# Patient Record
Sex: MALE | Race: WHITE | NOT HISPANIC OR LATINO | Employment: FULL TIME | URBAN - METROPOLITAN AREA
[De-identification: names, ages, dates, MRNs, and addresses within clinical notes are randomized per-mention and may not be internally consistent; named-entity substitution may affect disease eponyms.]

---

## 2024-02-07 ENCOUNTER — APPOINTMENT (OUTPATIENT)
Dept: GENERAL RADIOLOGY | Facility: HOSPITAL | Age: 49
End: 2024-02-07
Payer: COMMERCIAL

## 2024-02-07 ENCOUNTER — APPOINTMENT (OUTPATIENT)
Facility: HOSPITAL | Age: 49
End: 2024-02-07
Payer: COMMERCIAL

## 2024-02-07 ENCOUNTER — HOSPITAL ENCOUNTER (INPATIENT)
Facility: HOSPITAL | Age: 49
LOS: 9 days | Discharge: HOME | End: 2024-02-16
Attending: EMERGENCY MEDICINE | Admitting: FAMILY MEDICINE
Payer: COMMERCIAL

## 2024-02-07 DIAGNOSIS — Z78.9 DECREASED ACTIVITIES OF DAILY LIVING (ADL): ICD-10-CM

## 2024-02-07 DIAGNOSIS — M86.072 ACUTE HEMATOGENOUS OSTEOMYELITIS OF LEFT ANKLE: Primary | ICD-10-CM

## 2024-02-07 DIAGNOSIS — L02.619 ABSCESS OF HEEL: ICD-10-CM

## 2024-02-07 DIAGNOSIS — S86.012A RUPTURE OF LEFT ACHILLES TENDON, INITIAL ENCOUNTER: ICD-10-CM

## 2024-02-07 DIAGNOSIS — R26.2 DIFFICULTY IN WALKING: ICD-10-CM

## 2024-02-07 PROBLEM — S86.019A ACHILLES RUPTURE: Status: ACTIVE | Noted: 2024-02-07

## 2024-02-07 LAB
ALBUMIN SERPL-MCNC: 3.4 G/DL (ref 3.5–5.2)
ALBUMIN/GLOB SERPL: 0.9 G/DL
ALP SERPL-CCNC: 144 U/L (ref 39–117)
ALT SERPL W P-5'-P-CCNC: 18 U/L (ref 1–41)
ANION GAP SERPL CALCULATED.3IONS-SCNC: 15.6 MMOL/L (ref 5–15)
AST SERPL-CCNC: 22 U/L (ref 1–40)
BASOPHILS # BLD AUTO: 0.04 10*3/MM3 (ref 0–0.2)
BASOPHILS NFR BLD AUTO: 0.2 % (ref 0–1.5)
BH CV LOWER VASCULAR LEFT COMMON FEMORAL AUGMENT: NORMAL
BH CV LOWER VASCULAR LEFT COMMON FEMORAL COMPETENT: NORMAL
BH CV LOWER VASCULAR LEFT COMMON FEMORAL COMPRESS: NORMAL
BH CV LOWER VASCULAR LEFT COMMON FEMORAL PHASIC: NORMAL
BH CV LOWER VASCULAR LEFT COMMON FEMORAL SPONT: NORMAL
BH CV LOWER VASCULAR LEFT DISTAL FEMORAL COMPRESS: NORMAL
BH CV LOWER VASCULAR LEFT GASTRONEMIUS COMPRESS: NORMAL
BH CV LOWER VASCULAR LEFT GREATER SAPH AK COMPRESS: NORMAL
BH CV LOWER VASCULAR LEFT GREATER SAPH BK COMPRESS: NORMAL
BH CV LOWER VASCULAR LEFT LESSER SAPH COMPRESS: NORMAL
BH CV LOWER VASCULAR LEFT MID FEMORAL AUGMENT: NORMAL
BH CV LOWER VASCULAR LEFT MID FEMORAL COMPETENT: NORMAL
BH CV LOWER VASCULAR LEFT MID FEMORAL COMPRESS: NORMAL
BH CV LOWER VASCULAR LEFT MID FEMORAL PHASIC: NORMAL
BH CV LOWER VASCULAR LEFT MID FEMORAL SPONT: NORMAL
BH CV LOWER VASCULAR LEFT PERONEAL COMPRESS: NORMAL
BH CV LOWER VASCULAR LEFT POPLITEAL AUGMENT: NORMAL
BH CV LOWER VASCULAR LEFT POPLITEAL COMPETENT: NORMAL
BH CV LOWER VASCULAR LEFT POPLITEAL COMPRESS: NORMAL
BH CV LOWER VASCULAR LEFT POPLITEAL PHASIC: NORMAL
BH CV LOWER VASCULAR LEFT POPLITEAL SPONT: NORMAL
BH CV LOWER VASCULAR LEFT POSTERIOR TIBIAL COMPRESS: NORMAL
BH CV LOWER VASCULAR LEFT PROXIMAL FEMORAL COMPRESS: NORMAL
BH CV LOWER VASCULAR RIGHT COMMON FEMORAL AUGMENT: NORMAL
BH CV LOWER VASCULAR RIGHT COMMON FEMORAL COMPETENT: NORMAL
BH CV LOWER VASCULAR RIGHT COMMON FEMORAL COMPRESS: NORMAL
BH CV LOWER VASCULAR RIGHT COMMON FEMORAL PHASIC: NORMAL
BH CV LOWER VASCULAR RIGHT COMMON FEMORAL SPONT: NORMAL
BH CV VAS PRELIMINARY FINDINGS SCRIPTING: 1
BILIRUB SERPL-MCNC: 0.9 MG/DL (ref 0–1.2)
BUN SERPL-MCNC: 14 MG/DL (ref 6–20)
BUN/CREAT SERPL: 19.4 (ref 7–25)
CALCIUM SPEC-SCNC: 9.1 MG/DL (ref 8.6–10.5)
CHLORIDE SERPL-SCNC: 99 MMOL/L (ref 98–107)
CO2 SERPL-SCNC: 20.4 MMOL/L (ref 22–29)
CREAT SERPL-MCNC: 0.72 MG/DL (ref 0.76–1.27)
CRP SERPL-MCNC: 31.39 MG/DL (ref 0–0.5)
D-LACTATE SERPL-SCNC: 1.7 MMOL/L (ref 0.5–2)
DEPRECATED RDW RBC AUTO: 44 FL (ref 37–54)
EGFRCR SERPLBLD CKD-EPI 2021: 111.3 ML/MIN/1.73
EOSINOPHIL # BLD AUTO: 0.03 10*3/MM3 (ref 0–0.4)
EOSINOPHIL NFR BLD AUTO: 0.1 % (ref 0.3–6.2)
ERYTHROCYTE [DISTWIDTH] IN BLOOD BY AUTOMATED COUNT: 13.1 % (ref 12.3–15.4)
ERYTHROCYTE [SEDIMENTATION RATE] IN BLOOD: 54 MM/HR (ref 0–15)
GLOBULIN UR ELPH-MCNC: 4 GM/DL
GLUCOSE SERPL-MCNC: 157 MG/DL (ref 65–99)
HCT VFR BLD AUTO: 36.4 % (ref 37.5–51)
HGB BLD-MCNC: 12.2 G/DL (ref 13–17.7)
IMM GRANULOCYTES # BLD AUTO: 0.36 10*3/MM3 (ref 0–0.05)
IMM GRANULOCYTES NFR BLD AUTO: 1.6 % (ref 0–0.5)
LYMPHOCYTES # BLD AUTO: 0.47 10*3/MM3 (ref 0.7–3.1)
LYMPHOCYTES NFR BLD AUTO: 2.1 % (ref 19.6–45.3)
MCH RBC QN AUTO: 30.9 PG (ref 26.6–33)
MCHC RBC AUTO-ENTMCNC: 33.5 G/DL (ref 31.5–35.7)
MCV RBC AUTO: 92.2 FL (ref 79–97)
MONOCYTES # BLD AUTO: 1.35 10*3/MM3 (ref 0.1–0.9)
MONOCYTES NFR BLD AUTO: 6 % (ref 5–12)
NEUTROPHILS NFR BLD AUTO: 20.16 10*3/MM3 (ref 1.7–7)
NEUTROPHILS NFR BLD AUTO: 90 % (ref 42.7–76)
NRBC BLD AUTO-RTO: 0 /100 WBC (ref 0–0.2)
PLATELET # BLD AUTO: 165 10*3/MM3 (ref 140–450)
PMV BLD AUTO: 11.4 FL (ref 6–12)
POTASSIUM SERPL-SCNC: 3.5 MMOL/L (ref 3.5–5.2)
PROT SERPL-MCNC: 7.4 G/DL (ref 6–8.5)
RBC # BLD AUTO: 3.95 10*6/MM3 (ref 4.14–5.8)
SODIUM SERPL-SCNC: 135 MMOL/L (ref 136–145)
WBC NRBC COR # BLD AUTO: 22.41 10*3/MM3 (ref 3.4–10.8)
WHOLE BLOOD HOLD COAG: NORMAL

## 2024-02-07 PROCEDURE — 93010 ELECTROCARDIOGRAM REPORT: CPT | Performed by: INTERNAL MEDICINE

## 2024-02-07 PROCEDURE — 83605 ASSAY OF LACTIC ACID: CPT

## 2024-02-07 PROCEDURE — 93971 EXTREMITY STUDY: CPT | Performed by: SURGERY

## 2024-02-07 PROCEDURE — 99285 EMERGENCY DEPT VISIT HI MDM: CPT

## 2024-02-07 PROCEDURE — 93971 EXTREMITY STUDY: CPT

## 2024-02-07 PROCEDURE — 73630 X-RAY EXAM OF FOOT: CPT

## 2024-02-07 PROCEDURE — 25010000002 ONDANSETRON PER 1 MG: Performed by: EMERGENCY MEDICINE

## 2024-02-07 PROCEDURE — 94761 N-INVAS EAR/PLS OXIMETRY MLT: CPT

## 2024-02-07 PROCEDURE — 25010000002 HYDROMORPHONE 1 MG/ML SOLUTION: Performed by: EMERGENCY MEDICINE

## 2024-02-07 PROCEDURE — 86140 C-REACTIVE PROTEIN: CPT | Performed by: EMERGENCY MEDICINE

## 2024-02-07 PROCEDURE — 25010000002 HYDROMORPHONE 1 MG/ML SOLUTION: Performed by: FAMILY MEDICINE

## 2024-02-07 PROCEDURE — 85652 RBC SED RATE AUTOMATED: CPT | Performed by: EMERGENCY MEDICINE

## 2024-02-07 PROCEDURE — 99222 1ST HOSP IP/OBS MODERATE 55: CPT | Performed by: FAMILY MEDICINE

## 2024-02-07 PROCEDURE — 80053 COMPREHEN METABOLIC PANEL: CPT | Performed by: EMERGENCY MEDICINE

## 2024-02-07 PROCEDURE — 73610 X-RAY EXAM OF ANKLE: CPT

## 2024-02-07 PROCEDURE — 73590 X-RAY EXAM OF LOWER LEG: CPT

## 2024-02-07 PROCEDURE — 93005 ELECTROCARDIOGRAM TRACING: CPT | Performed by: FAMILY MEDICINE

## 2024-02-07 PROCEDURE — 25010000002 MORPHINE PER 10 MG: Performed by: EMERGENCY MEDICINE

## 2024-02-07 PROCEDURE — 85025 COMPLETE CBC W/AUTO DIFF WBC: CPT | Performed by: EMERGENCY MEDICINE

## 2024-02-07 RX ORDER — ONDANSETRON 2 MG/ML
4 INJECTION INTRAMUSCULAR; INTRAVENOUS ONCE
Status: COMPLETED | OUTPATIENT
Start: 2024-02-07 | End: 2024-02-07

## 2024-02-07 RX ORDER — AMOXICILLIN 250 MG
2 CAPSULE ORAL 2 TIMES DAILY PRN
Status: DISCONTINUED | OUTPATIENT
Start: 2024-02-07 | End: 2024-02-16 | Stop reason: HOSPADM

## 2024-02-07 RX ORDER — SODIUM CHLORIDE 0.9 % (FLUSH) 0.9 %
10 SYRINGE (ML) INJECTION EVERY 12 HOURS SCHEDULED
Status: DISCONTINUED | OUTPATIENT
Start: 2024-02-07 | End: 2024-02-16 | Stop reason: HOSPADM

## 2024-02-07 RX ORDER — HEPARIN SODIUM 5000 [USP'U]/ML
5000 INJECTION, SOLUTION INTRAVENOUS; SUBCUTANEOUS EVERY 8 HOURS SCHEDULED
Status: DISCONTINUED | OUTPATIENT
Start: 2024-02-07 | End: 2024-02-13

## 2024-02-07 RX ORDER — SODIUM CHLORIDE 9 MG/ML
40 INJECTION, SOLUTION INTRAVENOUS AS NEEDED
Status: DISCONTINUED | OUTPATIENT
Start: 2024-02-07 | End: 2024-02-16 | Stop reason: HOSPADM

## 2024-02-07 RX ORDER — SODIUM CHLORIDE 0.9 % (FLUSH) 0.9 %
10 SYRINGE (ML) INJECTION AS NEEDED
Status: DISCONTINUED | OUTPATIENT
Start: 2024-02-07 | End: 2024-02-16 | Stop reason: HOSPADM

## 2024-02-07 RX ORDER — BISACODYL 10 MG
10 SUPPOSITORY, RECTAL RECTAL DAILY PRN
Status: DISCONTINUED | OUTPATIENT
Start: 2024-02-07 | End: 2024-02-16 | Stop reason: HOSPADM

## 2024-02-07 RX ORDER — HYDROCODONE BITARTRATE AND ACETAMINOPHEN 7.5; 325 MG/1; MG/1
1 TABLET ORAL EVERY 6 HOURS PRN
Status: DISCONTINUED | OUTPATIENT
Start: 2024-02-07 | End: 2024-02-12

## 2024-02-07 RX ORDER — POLYETHYLENE GLYCOL 3350 17 G/17G
17 POWDER, FOR SOLUTION ORAL DAILY PRN
Status: DISCONTINUED | OUTPATIENT
Start: 2024-02-07 | End: 2024-02-16 | Stop reason: HOSPADM

## 2024-02-07 RX ORDER — BISACODYL 5 MG/1
5 TABLET, DELAYED RELEASE ORAL DAILY PRN
Status: DISCONTINUED | OUTPATIENT
Start: 2024-02-07 | End: 2024-02-16 | Stop reason: HOSPADM

## 2024-02-07 RX ORDER — ONDANSETRON 2 MG/ML
4 INJECTION INTRAMUSCULAR; INTRAVENOUS EVERY 6 HOURS PRN
Status: DISCONTINUED | OUTPATIENT
Start: 2024-02-07 | End: 2024-02-16 | Stop reason: HOSPADM

## 2024-02-07 RX ADMIN — HYDROMORPHONE HYDROCHLORIDE 1 MG: 1 INJECTION, SOLUTION INTRAMUSCULAR; INTRAVENOUS; SUBCUTANEOUS at 18:46

## 2024-02-07 RX ADMIN — HYDROMORPHONE HYDROCHLORIDE 1 MG: 1 INJECTION, SOLUTION INTRAMUSCULAR; INTRAVENOUS; SUBCUTANEOUS at 23:52

## 2024-02-07 RX ADMIN — MORPHINE SULFATE 4 MG: 4 INJECTION, SOLUTION INTRAMUSCULAR; INTRAVENOUS at 15:30

## 2024-02-07 RX ADMIN — ONDANSETRON 4 MG: 2 INJECTION INTRAMUSCULAR; INTRAVENOUS at 15:30

## 2024-02-07 RX ADMIN — Medication 10 ML: at 20:45

## 2024-02-07 RX ADMIN — HYDROMORPHONE HYDROCHLORIDE 1 MG: 1 INJECTION, SOLUTION INTRAMUSCULAR; INTRAVENOUS; SUBCUTANEOUS at 20:46

## 2024-02-07 RX ADMIN — HYDROCODONE BITARTRATE AND ACETAMINOPHEN 1 TABLET: 7.5; 325 TABLET ORAL at 20:23

## 2024-02-07 RX ADMIN — HYDROMORPHONE HYDROCHLORIDE 1 MG: 1 INJECTION, SOLUTION INTRAMUSCULAR; INTRAVENOUS; SUBCUTANEOUS at 16:03

## 2024-02-07 NOTE — ED NOTES
Pts o2 sat dropped to 92% he was placed on 2 liters per nasal cannula, PRAMOD Pino aware and Clinical coordinator aware and arrangements for a monitored bed are being made currently.

## 2024-02-07 NOTE — H&P
"[facility]   HISTORY AND PHYSICAL    Patient Name: Karsten Johnson  : 1975  MRN: [id number]  Primary Care Physician:  Provider, No Known  Date of admission: 2024    Subjective   Subjective     Chief Complaint: Left heel pain    History of Present Illness  Patient is a 48-year-old male with essentially no past medical history who presented to the emergency department with left heel/Achilles pain that began 5 days ago.  Patient says that happened while at work.  There was no trauma.  Patient said the pain started slowly but steadily increased.  Patient went to an urgent care in [facility] and was told that he most likely had a torn Achilles.  He said he was told by the physician there to \"just take it easy\".  Unfortunately the swelling and pain intensified over the last couple of days [place] for further evaluation.  Here in the ED he had an x-ray that shows rupture of the Achilles tendon.  The case was discussed with both orthopedic surgery as well as podiatry.  Podiatry recommended an MRI without contrast and stated that they will see him in the a.m. for surgical fixation  Review of Systems   General-fatigue  Musculoskeletal left ankle pain  All other systems were reviewed and subsequently negative  Personal History     History reviewed. No pertinent past medical history.    History reviewed. No pertinent surgical history.    Family History:   Hypertension    Social History:  reports that he has never smoked. He does not have any smokeless tobacco history on file. He reports current alcohol use. He reports that he does not use drugs.    Home Medications:   None    Allergies:  No Known Allergies    Objective    Objective     Vitals:   Temp:  [98.4 °F (36.9 °C)-99.1 °F (37.3 °C)] 99.1 °F (37.3 °C)  Heart Rate:  [93-95] 95  Resp:  [20-22] 22  BP: (141-151)/(87-91) 141/87    Physical Exam  Constitutional:  Well-developed and well-nourished.  No acute distress.      HENT:  Head:  Normocephalic and " atraumatic.  Mouth:  Moist mucous membranes.    Eyes:  Conjunctivae and EOM are normal. No scleral icterus.    Neck:  Neck supple.  No JVD present.    Cardiovascular:  Normal rate, regular rhythm and normal heart sounds with no murmur.  Pulmonary/Chest:  No respiratory distress, no wheezes, no crackles, with normal breath sounds and good air movement.  Abdominal:  Soft. No distension and no tenderness.   Musculoskeletal: Positive swelling and ecchymosis over the left Achilles.  The majority of the swelling would be at the insertion site of the calcaneus.  The area is exquisitely tender to palpation neurological:  Alert and oriented to person, place, and time.  No cranial nerve deficit.    Skin:  Skin is warm and dry. No rash noted. No pallor.   Peripheral vascular:  No clubbing, no cyanosis, no edema.  Psychiatric: Appropriate mood and affect  :    Result Review    Result Review:  I have personally reviewed the results from the time of this admission to 2/7/2024 17:49 EST and agree with these findings:  [x]  Laboratory list / accordion  []  Microbiology  [x]  Radiology  []  EKG/Telemetry   []  Cardiology/Vascular   []  Pathology  []  Old records  []  Other:  Most notable findings include:       Assessment & Plan   Assessment / Plan     Brief Patient Summary:  Karsten Johnson is a 48 y.o. male who presents to the emergency department with redness swelling over the left Achilles.  X-ray shows that it is most likely torn and will need surgical fixation.  Podiatry is aware and will see in the a.m.    Active Hospital Problems:  Active Hospital Problems    Diagnosis     Achilles rupture    Leukocytosis most likely reactive  Mild anemia    Plan:   Patient will be admitted to the hospital service  Of order an MRI without contrast of the left ankle  Will order a duplex Doppler of the left leg to rule out DVT  Podiatry has already been consulted and will see in the a.m. for surgical fixation  Will treat the patient's pain  with both IV and oral pain medications  Patient can have a regular diet until midnight at which time we will make him n.p.o.      DVT prophylaxis:  Medical DVT prophylaxis orders are signed and held.          CODE STATUS:    Code Status (Patient has no pulse and is not breathing): CPR (Attempt to Resuscitate)  Medical Interventions (Patient has pulse or is breathing): Full Support    Admission Status:  I believe this patient meets inpatient status.    Tao Jacobson, DO

## 2024-02-07 NOTE — ED PROVIDER NOTES
Time: 3:41 PM EST  Date of encounter:  2/7/2024  Independent Historian/Clinical History and Information was obtained by:   Patient    History is limited by: N/A    Chief Complaint: Left ankle/Achilles pain      History of Present Illness:  Patient is a 48 y.o. year old male who presents to the emergency department for evaluation of left ankle/Achilles pain that began 5 days ago at work.  Patient denies any known trauma.  Patient states he went to [place] urgent care and was told he had a torn Achilles.  Patient states he is now having worsening pain and swelling/bruising.    HPI    Patient Care Team  Primary Care Provider: Provider, No Known    Past Medical History:     No Known Allergies  History reviewed. No pertinent past medical history.  History reviewed. No pertinent surgical history.  History reviewed. No pertinent family history.    Home Medications:  Prior to Admission medications    Not on File        Social History:   Social History     Tobacco Use    Smoking status: Never   Substance Use Topics    Alcohol use: Yes    Drug use: Never         Review of Systems:  Review of Systems   Constitutional:  Negative for chills and fever.   HENT:  Negative for congestion, rhinorrhea and sore throat.    Eyes:  Negative for pain and visual disturbance.   Respiratory:  Negative for apnea, cough, chest tightness and shortness of breath.    Cardiovascular:  Negative for chest pain and palpitations.   Gastrointestinal:  Negative for abdominal pain, diarrhea, nausea and vomiting.   Genitourinary:  Negative for difficulty urinating and dysuria.   Musculoskeletal:  Positive for arthralgias and gait problem. Negative for myalgias.   Skin:  Positive for color change.   Neurological:  Negative for seizures and headaches.   Psychiatric/Behavioral: Negative.     All other systems reviewed and are negative.       Physical Exam:  /87 (BP Location: Left arm, Patient Position: Lying)   Pulse 95   Temp 99.1 °F (37.3 °C)  (Oral)   Resp 22   SpO2 93%     Physical Exam  Vitals and nursing note reviewed.   Constitutional:       General: He is not in acute distress.     Appearance: Normal appearance. He is not toxic-appearing.   HENT:      Head: Normocephalic and atraumatic.      Jaw: There is normal jaw occlusion.   Eyes:      General: Lids are normal.      Extraocular Movements: Extraocular movements intact.      Conjunctiva/sclera: Conjunctivae normal.      Pupils: Pupils are equal, round, and reactive to light.   Cardiovascular:      Rate and Rhythm: Normal rate and regular rhythm.      Pulses: Normal pulses.      Heart sounds: Normal heart sounds.   Pulmonary:      Effort: Pulmonary effort is normal. No respiratory distress.      Breath sounds: Normal breath sounds. No wheezing or rhonchi.   Abdominal:      General: Abdomen is flat.      Palpations: Abdomen is soft.      Tenderness: There is no abdominal tenderness. There is no guarding or rebound.   Musculoskeletal:         General: Normal range of motion.      Cervical back: Normal range of motion and neck supple.      Right lower leg: No edema.      Left lower leg: No edema.   Skin:     General: Skin is warm and dry.      Findings: Bruising (Left Achilles/foot as pictured below.) present.   Neurological:      Mental Status: He is alert and oriented to person, place, and time. Mental status is at baseline.   Psychiatric:         Mood and Affect: Mood normal.                  Procedures:  Procedures      Medical Decision Making:      Comorbidities that affect care:    None    External Notes reviewed:          The following orders were placed and all results were independently analyzed by me:  Orders Placed This Encounter   Procedures    XR Foot 3+ View Left    XR Ankle 3+ View Left    XR Tibia Fibula 2 View Left    Comprehensive Metabolic Panel    Sedimentation Rate    C-reactive Protein    CBC Auto Differential    Lactic Acid, Plasma    Inpatient Podiatry Consult    Inpatient  Hospitalist Consult    CBC & Differential       Medications Given in the Emergency Department:  Medications   morphine injection 4 mg (4 mg Intravenous Given 2/7/24 1530)   ondansetron (ZOFRAN) injection 4 mg (4 mg Intravenous Given 2/7/24 1530)   HYDROmorphone (DILAUDID) injection 1 mg (1 mg Intravenous Given 2/7/24 1603)        ED Course:         Labs:    Lab Results (last 24 hours)       Procedure Component Value Units Date/Time    Comprehensive Metabolic Panel [[id number]]  (Abnormal) Collected: 02/07/24 1533    Specimen: Blood Updated: 02/07/24 1609     Glucose 157 mg/dL      BUN 14 mg/dL      Creatinine 0.72 mg/dL      Sodium 135 mmol/L      Potassium 3.5 mmol/L      Chloride 99 mmol/L      CO2 20.4 mmol/L      Calcium 9.1 mg/dL      Total Protein 7.4 g/dL      Albumin 3.4 g/dL      ALT (SGPT) 18 U/L      AST (SGOT) 22 U/L      Alkaline Phosphatase 144 U/L      Total Bilirubin 0.9 mg/dL      Globulin 4.0 gm/dL      A/G Ratio 0.9 g/dL      BUN/Creatinine Ratio 19.4     Anion Gap 15.6 mmol/L      eGFR 111.3 mL/min/1.73     Narrative:      GFR Normal >60  Chronic Kidney Disease <60  Kidney Failure <15      CBC & Differential [[id number]]  (Abnormal) Collected: 02/07/24 1533    Specimen: Blood Updated: 02/07/24 1543    Narrative:      The following orders were created for panel order CBC & Differential.  Procedure                               Abnormality         Status                     ---------                               -----------         ------                     CBC Auto Differential[[id number]]        Abnormal            Final result                 Please view results for these tests on the individual orders.    Sedimentation Rate [[id number]]  (Abnormal) Collected: 02/07/24 1533    Specimen: Blood Updated: 02/07/24 1544     Sed Rate 54 mm/hr     C-reactive Protein [[id number]]  (Abnormal) Collected: 02/07/24 1533    Specimen: Blood Updated: 02/07/24 1609     C-Reactive Protein 31.39 mg/dL     CBC Auto  Differential [[id number]]  (Abnormal) Collected: 02/07/24 1533    Specimen: Blood Updated: 02/07/24 1543     WBC 22.41 10*3/mm3      RBC 3.95 10*6/mm3      Hemoglobin 12.2 g/dL      Hematocrit 36.4 %      MCV 92.2 fL      MCH 30.9 pg      MCHC 33.5 g/dL      RDW 13.1 %      RDW-SD 44.0 fl      MPV 11.4 fL      Platelets 165 10*3/mm3      Neutrophil % 90.0 %      Lymphocyte % 2.1 %      Monocyte % 6.0 %      Eosinophil % 0.1 %      Basophil % 0.2 %      Immature Grans % 1.6 %      Neutrophils, Absolute 20.16 10*3/mm3      Lymphocytes, Absolute 0.47 10*3/mm3      Monocytes, Absolute 1.35 10*3/mm3      Eosinophils, Absolute 0.03 10*3/mm3      Basophils, Absolute 0.04 10*3/mm3      Immature Grans, Absolute 0.36 10*3/mm3      nRBC 0.0 /100 WBC     Lactic Acid, Plasma [[id number]]  (Normal) Collected: 02/07/24 1533    Specimen: Blood Updated: 02/07/24 1607     Lactate 1.7 mmol/L              Imaging:    XR Foot 3+ View Left    Result Date: 2/7/2024  PROCEDURE: XR ANKLE 3+ VW LEFT, 2/07/2024, 15:42 XR FOOT 3+ VW LEFT, 2/07/2024, 15:44 XR TIBIA FIBULA 2 VW LEFT, 2/07/2024, 15:  COMPARISON: None  INDICATIONS: pain no known injury swelling and bruising medial aspect  FINDINGS:  Ankle:  There is soft tissue swelling about the medial portion of the ankle.  The ankle mortise looks intact.  There is osseous demineralization.  There is abnormal appearance to the distal Achilles tendon area with abnormal ossification in soft tissue swelling.  An Achilles tendon injury could account for this.  There is a plantar calcaneal spur.  There is ossification in the soft tissues adjacent to the medial tibial tubercle that may relate to more of the changes in the posterior ankle as opposed to an injury around this area.  Foot:  As noted on the ankle films there is an abnormal appearance to the posterior ankle around the distal Achilles tendon with abnormal ossification and soft tissue swelling that could relate to a more acute Achilles tendon  injury.  There is no subluxation or dislocation of the digits.  There is osseous demineralization.  Tibia fibula:  There is no definite fracture.  There is osseous demineralization.        1. Abnormal appearance to the area of the distal Achilles tendon.  Injury to this area could not be excluded. 2. Osseous demineralization 3. Soft tissue swelling about the medial ankle/foot. 4. Calcification/ossification adjacent to the medial tubercle of the talus that may relate to the findings in the posterior ankle area as opposed to an injury around the medial tubercle of the talus       AUSTIN HARRIS MD       Electronically Signed and Approved By: AUSTIN HARRIS MD on 2/07/2024 at 16:04             XR Ankle 3+ View Left    Result Date: 2/7/2024  PROCEDURE: XR ANKLE 3+ VW LEFT, 2/07/2024, 15:42 XR FOOT 3+ VW LEFT, 2/07/2024, 15:44 XR TIBIA FIBULA 2 VW LEFT, 2/07/2024, 15:  COMPARISON: None  INDICATIONS: pain no known injury swelling and bruising medial aspect  FINDINGS:  Ankle:  There is soft tissue swelling about the medial portion of the ankle.  The ankle mortise looks intact.  There is osseous demineralization.  There is abnormal appearance to the distal Achilles tendon area with abnormal ossification in soft tissue swelling.  An Achilles tendon injury could account for this.  There is a plantar calcaneal spur.  There is ossification in the soft tissues adjacent to the medial tibial tubercle that may relate to more of the changes in the posterior ankle as opposed to an injury around this area.  Foot:  As noted on the ankle films there is an abnormal appearance to the posterior ankle around the distal Achilles tendon with abnormal ossification and soft tissue swelling that could relate to a more acute Achilles tendon injury.  There is no subluxation or dislocation of the digits.  There is osseous demineralization.  Tibia fibula:  There is no definite fracture.  There is osseous demineralization.        1. Abnormal appearance  to the area of the distal Achilles tendon.  Injury to this area could not be excluded. 2. Osseous demineralization 3. Soft tissue swelling about the medial ankle/foot. 4. Calcification/ossification adjacent to the medial tubercle of the talus that may relate to the findings in the posterior ankle area as opposed to an injury around the medial tubercle of the talus       AUSTIN HARRIS MD       Electronically Signed and Approved By: AUSTIN HARRIS MD on 2/07/2024 at 16:04             XR Tibia Fibula 2 View Left    Result Date: 2/7/2024  PROCEDURE: XR ANKLE 3+ VW LEFT, 2/07/2024, 15:42 XR FOOT 3+ VW LEFT, 2/07/2024, 15:44 XR TIBIA FIBULA 2 VW LEFT, 2/07/2024, 15:  COMPARISON: None  INDICATIONS: pain no known injury swelling and bruising medial aspect  FINDINGS:  Ankle:  There is soft tissue swelling about the medial portion of the ankle.  The ankle mortise looks intact.  There is osseous demineralization.  There is abnormal appearance to the distal Achilles tendon area with abnormal ossification in soft tissue swelling.  An Achilles tendon injury could account for this.  There is a plantar calcaneal spur.  There is ossification in the soft tissues adjacent to the medial tibial tubercle that may relate to more of the changes in the posterior ankle as opposed to an injury around this area.  Foot:  As noted on the ankle films there is an abnormal appearance to the posterior ankle around the distal Achilles tendon with abnormal ossification and soft tissue swelling that could relate to a more acute Achilles tendon injury.  There is no subluxation or dislocation of the digits.  There is osseous demineralization.  Tibia fibula:  There is no definite fracture.  There is osseous demineralization.        1. Abnormal appearance to the area of the distal Achilles tendon.  Injury to this area could not be excluded. 2. Osseous demineralization 3. Soft tissue swelling about the medial ankle/foot. 4. Calcification/ossification adjacent  to the medial tubercle of the talus that may relate to the findings in the posterior ankle area as opposed to an injury around the medial tubercle of the talus       AUSTIN HARRIS MD       Electronically Signed and Approved By: AUSTIN HARRIS MD on 2/07/2024 at 16:04                Differential Diagnosis and Discussion:    Extremity Pain: Differential diagnosis includes but is not limited to soft tissue sprain, tendonitis, tendon injury, dislocation, fracture, deep vein thrombosis, arterial insufficiency, osteoarthritis, bursitis, and ligamentous damage.    All labs were reviewed and interpreted by me.  All X-rays impressions were independently interpreted by me.    MDM     Amount and/or Complexity of Data Reviewed  Clinical lab tests: reviewed  Tests in the radiology section of CPT®: reviewed       Patient most likely has an Achilles rupture.  I spoke with Dr. Beck who states to do a duplex venous ultrasound to rule out DVT and admit to the hospitalist.  He states he would like the hospitalist to do an MRI without contrast and he will see the patient in the morning.  I spoke with hospitalist  who agrees to admit the patient and order the study.          Patient Care Considerations:          Consultants/Shared Management Plan:    I spoke with Dr. Beck who states to do a duplex venous ultrasound to rule out DVT and admit to the hospitalist.  He states he would like the hospitalist to do an MRI without contrast and he will see the patient in the morning.  I spoke with hospitalist  who agrees to admit the patient and order the study.    Social Determinants of Health:          Disposition and Care Coordination:    Admit:   Through independent evaluation of the patient's history, physical, and imperical data, the patient meets criteria for inpatient admission to the hospital.        Final diagnoses:   Rupture of left Achilles tendon, initial encounter        ED Disposition       ED  Disposition   Decision to Admit    Condition   --    Comment   --               This medical record created using voice recognition software.             Alvarez Braden PA-C  02/07/24 1732

## 2024-02-08 ENCOUNTER — APPOINTMENT (OUTPATIENT)
Dept: MRI IMAGING | Facility: HOSPITAL | Age: 49
End: 2024-02-08
Payer: COMMERCIAL

## 2024-02-08 ENCOUNTER — ANESTHESIA EVENT (OUTPATIENT)
Dept: PERIOP | Facility: HOSPITAL | Age: 49
End: 2024-02-08

## 2024-02-08 PROBLEM — L02.416 ABSCESS OF LEFT LEG: Status: ACTIVE | Noted: 2024-02-08

## 2024-02-08 PROBLEM — L02.619: Status: ACTIVE | Noted: 2024-02-08

## 2024-02-08 LAB
AMPHET+METHAMPHET UR QL: NEGATIVE
ANION GAP SERPL CALCULATED.3IONS-SCNC: 10.5 MMOL/L (ref 5–15)
BARBITURATES UR QL SCN: NEGATIVE
BASOPHILS # BLD AUTO: 0.06 10*3/MM3 (ref 0–0.2)
BASOPHILS NFR BLD AUTO: 0.2 % (ref 0–1.5)
BENZODIAZ UR QL SCN: NEGATIVE
BUN SERPL-MCNC: 14 MG/DL (ref 6–20)
BUN/CREAT SERPL: 17.5 (ref 7–25)
CALCIUM SPEC-SCNC: 9.1 MG/DL (ref 8.6–10.5)
CANNABINOIDS SERPL QL: POSITIVE
CHLORIDE SERPL-SCNC: 98 MMOL/L (ref 98–107)
CO2 SERPL-SCNC: 23.5 MMOL/L (ref 22–29)
COCAINE UR QL: NEGATIVE
CREAT SERPL-MCNC: 0.8 MG/DL (ref 0.76–1.27)
D-LACTATE SERPL-SCNC: 1.1 MMOL/L (ref 0.5–2)
DEPRECATED RDW RBC AUTO: 45.8 FL (ref 37–54)
EGFRCR SERPLBLD CKD-EPI 2021: 107.8 ML/MIN/1.73
EOSINOPHIL # BLD AUTO: 0.02 10*3/MM3 (ref 0–0.4)
EOSINOPHIL NFR BLD AUTO: 0.1 % (ref 0.3–6.2)
ERYTHROCYTE [DISTWIDTH] IN BLOOD BY AUTOMATED COUNT: 13.2 % (ref 12.3–15.4)
FENTANYL UR-MCNC: NEGATIVE NG/ML
GLUCOSE SERPL-MCNC: 126 MG/DL (ref 65–99)
HCT VFR BLD AUTO: 35.8 % (ref 37.5–51)
HGB BLD-MCNC: 11.4 G/DL (ref 13–17.7)
IMM GRANULOCYTES # BLD AUTO: 1.09 10*3/MM3 (ref 0–0.05)
IMM GRANULOCYTES NFR BLD AUTO: 4.5 % (ref 0–0.5)
LYMPHOCYTES # BLD AUTO: 0.77 10*3/MM3 (ref 0.7–3.1)
LYMPHOCYTES NFR BLD AUTO: 3.2 % (ref 19.6–45.3)
MAGNESIUM SERPL-MCNC: 1.9 MG/DL (ref 1.6–2.6)
MCH RBC QN AUTO: 30 PG (ref 26.6–33)
MCHC RBC AUTO-ENTMCNC: 31.8 G/DL (ref 31.5–35.7)
MCV RBC AUTO: 94.2 FL (ref 79–97)
METHADONE UR QL SCN: NEGATIVE
MONOCYTES # BLD AUTO: 1.47 10*3/MM3 (ref 0.1–0.9)
MONOCYTES NFR BLD AUTO: 6 % (ref 5–12)
NEUTROPHILS NFR BLD AUTO: 20.91 10*3/MM3 (ref 1.7–7)
NEUTROPHILS NFR BLD AUTO: 86 % (ref 42.7–76)
NRBC BLD AUTO-RTO: 0 /100 WBC (ref 0–0.2)
OPIATES UR QL: POSITIVE
OXYCODONE UR QL SCN: NEGATIVE
PLAT MORPH BLD: NORMAL
PLATELET # BLD AUTO: 168 10*3/MM3 (ref 140–450)
PMV BLD AUTO: 11.5 FL (ref 6–12)
POTASSIUM SERPL-SCNC: 4 MMOL/L (ref 3.5–5.2)
PROCALCITONIN SERPL-MCNC: 1.89 NG/ML (ref 0–0.25)
QT INTERVAL: 341 MS
QTC INTERVAL: 428 MS
RBC # BLD AUTO: 3.8 10*6/MM3 (ref 4.14–5.8)
RBC MORPH BLD: NORMAL
SODIUM SERPL-SCNC: 132 MMOL/L (ref 136–145)
WBC MORPH BLD: NORMAL
WBC NRBC COR # BLD AUTO: 24.32 10*3/MM3 (ref 3.4–10.8)

## 2024-02-08 PROCEDURE — 87154 CUL TYP ID BLD PTHGN 6+ TRGT: CPT | Performed by: INTERNAL MEDICINE

## 2024-02-08 PROCEDURE — 25010000002 CEFEPIME PER 500 MG: Performed by: INTERNAL MEDICINE

## 2024-02-08 PROCEDURE — 25010000002 HYDROMORPHONE 1 MG/ML SOLUTION: Performed by: FAMILY MEDICINE

## 2024-02-08 PROCEDURE — 80307 DRUG TEST PRSMV CHEM ANLYZR: CPT | Performed by: INTERNAL MEDICINE

## 2024-02-08 PROCEDURE — 94799 UNLISTED PULMONARY SVC/PX: CPT

## 2024-02-08 PROCEDURE — 85025 COMPLETE CBC W/AUTO DIFF WBC: CPT | Performed by: FAMILY MEDICINE

## 2024-02-08 PROCEDURE — 87186 SC STD MICRODIL/AGAR DIL: CPT | Performed by: INTERNAL MEDICINE

## 2024-02-08 PROCEDURE — 25810000003 SODIUM CHLORIDE 0.9 % SOLUTION: Performed by: INTERNAL MEDICINE

## 2024-02-08 PROCEDURE — 87147 CULTURE TYPE IMMUNOLOGIC: CPT | Performed by: INTERNAL MEDICINE

## 2024-02-08 PROCEDURE — 88312 SPECIAL STAINS GROUP 1: CPT | Performed by: INTERNAL MEDICINE

## 2024-02-08 PROCEDURE — 80048 BASIC METABOLIC PNL TOTAL CA: CPT | Performed by: FAMILY MEDICINE

## 2024-02-08 PROCEDURE — 94761 N-INVAS EAR/PLS OXIMETRY MLT: CPT

## 2024-02-08 PROCEDURE — 99232 SBSQ HOSP IP/OBS MODERATE 35: CPT | Performed by: INTERNAL MEDICINE

## 2024-02-08 PROCEDURE — 25010000002 HYDROMORPHONE 1 MG/ML SOLUTION: Performed by: INTERNAL MEDICINE

## 2024-02-08 PROCEDURE — 87040 BLOOD CULTURE FOR BACTERIA: CPT | Performed by: INTERNAL MEDICINE

## 2024-02-08 PROCEDURE — 25010000002 VANCOMYCIN 5 G RECONSTITUTED SOLUTION: Performed by: INTERNAL MEDICINE

## 2024-02-08 PROCEDURE — 85007 BL SMEAR W/DIFF WBC COUNT: CPT | Performed by: FAMILY MEDICINE

## 2024-02-08 PROCEDURE — 83605 ASSAY OF LACTIC ACID: CPT | Performed by: INTERNAL MEDICINE

## 2024-02-08 PROCEDURE — 25010000002 HEPARIN (PORCINE) PER 1000 UNITS: Performed by: FAMILY MEDICINE

## 2024-02-08 PROCEDURE — 84145 PROCALCITONIN (PCT): CPT | Performed by: INTERNAL MEDICINE

## 2024-02-08 PROCEDURE — 83735 ASSAY OF MAGNESIUM: CPT | Performed by: FAMILY MEDICINE

## 2024-02-08 PROCEDURE — 99253 IP/OBS CNSLTJ NEW/EST LOW 45: CPT | Performed by: PODIATRIST

## 2024-02-08 PROCEDURE — 73721 MRI JNT OF LWR EXTRE W/O DYE: CPT

## 2024-02-08 RX ORDER — VANCOMYCIN 2 GRAM/500 ML IN 0.9 % SODIUM CHLORIDE INTRAVENOUS
2000 ONCE
Status: DISCONTINUED | OUTPATIENT
Start: 2024-02-08 | End: 2024-02-08

## 2024-02-08 RX ORDER — ACETAMINOPHEN 325 MG/1
650 TABLET ORAL EVERY 6 HOURS PRN
Status: DISCONTINUED | OUTPATIENT
Start: 2024-02-08 | End: 2024-02-16 | Stop reason: HOSPADM

## 2024-02-08 RX ORDER — VANCOMYCIN 2 GRAM/500 ML IN 0.9 % SODIUM CHLORIDE INTRAVENOUS
2000 ONCE
Status: COMPLETED | OUTPATIENT
Start: 2024-02-08 | End: 2024-02-08

## 2024-02-08 RX ORDER — VANCOMYCIN/0.9 % SOD CHLORIDE 1.5G/250ML
1500 PLASTIC BAG, INJECTION (ML) INTRAVENOUS EVERY 12 HOURS
Status: DISCONTINUED | OUTPATIENT
Start: 2024-02-09 | End: 2024-02-12

## 2024-02-08 RX ORDER — ACETAMINOPHEN 650 MG/1
650 SUPPOSITORY RECTAL EVERY 6 HOURS PRN
Status: DISCONTINUED | OUTPATIENT
Start: 2024-02-08 | End: 2024-02-16 | Stop reason: HOSPADM

## 2024-02-08 RX ORDER — CHOLECALCIFEROL (VITAMIN D3) 125 MCG
10 CAPSULE ORAL NIGHTLY PRN
Status: DISCONTINUED | OUTPATIENT
Start: 2024-02-08 | End: 2024-02-16 | Stop reason: HOSPADM

## 2024-02-08 RX ADMIN — HYDROMORPHONE HYDROCHLORIDE 1 MG: 1 INJECTION, SOLUTION INTRAMUSCULAR; INTRAVENOUS; SUBCUTANEOUS at 10:01

## 2024-02-08 RX ADMIN — HYDROMORPHONE HYDROCHLORIDE 1.5 MG: 1 INJECTION, SOLUTION INTRAMUSCULAR; INTRAVENOUS; SUBCUTANEOUS at 14:55

## 2024-02-08 RX ADMIN — CEFEPIME HYDROCHLORIDE 1000 MG: 1 INJECTION, POWDER, FOR SOLUTION INTRAMUSCULAR; INTRAVENOUS at 22:47

## 2024-02-08 RX ADMIN — Medication 10 ML: at 11:20

## 2024-02-08 RX ADMIN — HYDROMORPHONE HYDROCHLORIDE 1 MG: 1 INJECTION, SOLUTION INTRAMUSCULAR; INTRAVENOUS; SUBCUTANEOUS at 05:34

## 2024-02-08 RX ADMIN — HEPARIN SODIUM 5000 UNITS: 5000 INJECTION INTRAVENOUS; SUBCUTANEOUS at 14:55

## 2024-02-08 RX ADMIN — HYDROMORPHONE HYDROCHLORIDE 1.5 MG: 1 INJECTION, SOLUTION INTRAMUSCULAR; INTRAVENOUS; SUBCUTANEOUS at 18:04

## 2024-02-08 RX ADMIN — CEFEPIME HYDROCHLORIDE 1000 MG: 1 INJECTION, POWDER, FOR SOLUTION INTRAMUSCULAR; INTRAVENOUS at 14:55

## 2024-02-08 RX ADMIN — HYDROMORPHONE HYDROCHLORIDE 1.5 MG: 1 INJECTION, SOLUTION INTRAMUSCULAR; INTRAVENOUS; SUBCUTANEOUS at 22:57

## 2024-02-08 RX ADMIN — ACETAMINOPHEN 650 MG: 325 TABLET ORAL at 16:16

## 2024-02-08 RX ADMIN — HYDROMORPHONE HYDROCHLORIDE 1.5 MG: 1 INJECTION, SOLUTION INTRAMUSCULAR; INTRAVENOUS; SUBCUTANEOUS at 12:19

## 2024-02-08 RX ADMIN — VANCOMYCIN HYDROCHLORIDE 2000 MG: 5 INJECTION, POWDER, LYOPHILIZED, FOR SOLUTION INTRAVENOUS at 19:47

## 2024-02-08 RX ADMIN — HYDROCODONE BITARTRATE AND ACETAMINOPHEN 1 TABLET: 7.5; 325 TABLET ORAL at 22:00

## 2024-02-08 RX ADMIN — HYDROMORPHONE HYDROCHLORIDE 1.5 MG: 1 INJECTION, SOLUTION INTRAMUSCULAR; INTRAVENOUS; SUBCUTANEOUS at 20:28

## 2024-02-08 RX ADMIN — Medication 10 MG: at 22:49

## 2024-02-08 RX ADMIN — HYDROMORPHONE HYDROCHLORIDE 1 MG: 1 INJECTION, SOLUTION INTRAMUSCULAR; INTRAVENOUS; SUBCUTANEOUS at 03:33

## 2024-02-08 RX ADMIN — HYDROMORPHONE HYDROCHLORIDE 1 MG: 1 INJECTION, SOLUTION INTRAMUSCULAR; INTRAVENOUS; SUBCUTANEOUS at 07:39

## 2024-02-08 RX ADMIN — Medication 10 ML: at 20:29

## 2024-02-08 NOTE — CONSULTS
02/08/24   Foot and Ankle Surgery - Consult  Provider: Alvarez Beck DPM  Location: [facility]    Subjective:  Karsten Johnson is a 48 y.o. male.     Chief Complaint   Patient presents with    left ankle pain     Has a torn achilles tendon    Foot Swelling     Patient is a 48-year-old male presenting with left ankle pain.  Patient states that he has had pain in his ankle since last November off-and-on.  Patient states it started getting worse last week and he went to the urgent care who gave him a boot to wear and told him he had a torn Achilles.  Patient has been wearing the boot at work and the last couple days it started getting worse.  Patient came to the emergency department due to increasing pain to his heel yesterday.  X-rays show changes likely related to an Achilles rupture and a white count of 22,000.  States he has a history of gout.    No Known Allergies    History reviewed. No pertinent past medical history.    History reviewed. No pertinent surgical history.    History reviewed. No pertinent family history.    Social History     Socioeconomic History    Marital status:    Tobacco Use    Smoking status: Every Day     Packs/day: 1.00     Years: 25.00     Additional pack years: 0.00     Total pack years: 25.00     Types: Cigarettes     Passive exposure: Never    Smokeless tobacco: Never   Vaping Use    Vaping Use: Never used   Substance and Sexual Activity    Alcohol use: Yes     Alcohol/week: 3.0 standard drinks of alcohol     Types: 3 Cans of beer per week    Drug use: Yes     Types: Marijuana    Sexual activity: Defer          Current Facility-Administered Medications:     sennosides-docusate (PERICOLACE) 8.6-50 MG per tablet 2 tablet, 2 tablet, Oral, BID PRN **AND** polyethylene glycol (MIRALAX) packet 17 g, 17 g, Oral, Daily PRN **AND** bisacodyl (DULCOLAX) EC tablet 5 mg, 5 mg, Oral, Daily PRN **AND** bisacodyl (DULCOLAX) suppository 10 mg, 10 mg, Rectal, Daily PRN, [place],  DO Tao    heparin (porcine) 5000 UNIT/ML injection 5,000 Units, 5,000 Units, Subcutaneous, Q8H, Tao Jacobson DO    HYDROcodone-acetaminophen (NORCO) 7.5-325 MG per tablet 1 tablet, 1 tablet, Oral, Q6H PRN, Tao Jacobson DO, 1 tablet at 02/07/24 2023    HYDROmorphone (DILAUDID) injection 1 mg, 1 mg, Intravenous, Q2H PRN, Tao Jacobson DO, 1 mg at 02/08/24 0739    ondansetron (ZOFRAN) injection 4 mg, 4 mg, Intravenous, Q6H PRN, Tao Jacobson DO    sodium chloride 0.9 % flush 10 mL, 10 mL, Intravenous, Q12H, Tao Jacobson DO, 10 mL at 02/07/24 2045    sodium chloride 0.9 % flush 10 mL, 10 mL, Intravenous, PRN, Tao Jacobson DO    sodium chloride 0.9 % infusion 40 mL, 40 mL, Intravenous, PRN, Tao Jacobson DO    Review of Systems:  General: Denies fever, chills, fatigue, and weakness.  Eyes: Denies vision loss, blurry vision, and excessive redness.  ENT: Denies hearing issues and difficulty swallowing.  Cardiovascular: Denies palpitations, chest pain, or syncopal episodes.  Respiratory: Denies shortness of breath, wheezing, and coughing.  GI: Denies abdominal pain, nausea, and vomiting.   : Denies frequency, hematuria, and urgency.  Musculoskeletal: Denies muscle cramps, joint pains, and stiffness.  Derm: Denies rash, open wounds, or suspicious lesions.  Neuro: Denies headaches, numbness, loss of coordination, and tremors.  Psych: Denies anxiety and depression.  Endocrine: Denies temperature intolerance and changes in appetite.  Heme: Denies bleeding disorders or abnormal bruising.     Objective   /80 (BP Location: Right arm, Patient Position: Lying)   Pulse 98   Temp 98.6 °F (37 °C) (Oral)   Resp 18   Wt 104 kg (230 lb 6.1 oz)   SpO2 97%     Foot/Ankle Exam    GENERAL  Appearance:  appears stated age  Orientation:  AAOx3  Affect:  appropriate  Gait:  unimpaired  Assistance:  independent  Right shoe gear: casual shoe  Left shoe gear: casual shoe    VASCULAR     Right Foot Vascularity    Normal vascular exam    Dorsalis pedis:  2+  Posterior tibial:  2+  Skin temperature:  warm  Edema grading:  None  CFT:  < 3 seconds  Pedal hair growth:  Present  Varicosities:  none     Left Foot Vascularity   Normal vascular exam    Dorsalis pedis:  2+  Posterior tibial:  2+  Skin temperature:  warm  Edema grading:  None  CFT:  < 3 seconds  Pedal hair growth:  Present  Varicosities:  none     NEUROLOGIC     Right Foot Neurologic   Normal sensation    Light touch sensation: normal  Vibratory sensation: normal  Hot/Cold sensation: normal  Protective Sensation using [place]-Flip Monofilament:   Sites intact: 10  Sites tested: 10     Left Foot Neurologic   Normal sensation    Light touch sensation: normal  Vibratory sensation: normal  Hot/Cold sensation:  normal  Protective Sensation using [place]-Flip Monofilament:   Sites intact: 10  Sites tested: 10    MUSCLE STRENGTH     Right Foot Muscle Strength   Foot dorsiflexion:  4  Foot plantar flexion:  4  Foot inversion:  4  Foot eversion:  4     Left Foot Muscle Strength   Foot dorsiflexion:  4  Foot plantar flexion:  4  Foot inversion:  4  Foot eversion:  4    RANGE OF MOTION     Right Foot Range of Motion   Foot and ankle ROM within normal limits       Left Foot Range of Motion   Foot and ankle ROM within normal limits      DERMATOLOGIC      Right Foot Dermatologic   Skin  Right foot skin is intact.      Left Foot Dermatologic   Skin  Left foot skin is intact.             Results from last 7 days   Lab Units 02/08/24  0403   WBC 10*3/mm3 24.32*   HEMOGLOBIN g/dL 11.4*   HEMATOCRIT % 35.8*   PLATELETS 10*3/mm3 168       Assessment & Plan     Active Hospital Problems    Diagnosis     **Achilles rupture     Abscess of heel       Patient examined evaluated  Achilles rupture vs abscess   MRI ordered  Further treatment pending MRI findings   Antibiotics per primary      Thank you for the consultation and allowing me to participate in this patient's care. Please  call with any additional questions or concerns.     Part of this note may be an electronic transcription/translation of spoken language to printed text using the Dragon Dictation System.

## 2024-02-08 NOTE — PLAN OF CARE
Goal Outcome Evaluation:  Pt complained of severe pain several times, which was managed with IV PRN medication. VSS. Pt was able to ambulate from the stretcher to the bed while not placing any weight on the affected joint. Pt will go to MRI today. Pt may go to surgery today. James Gamble RN

## 2024-02-08 NOTE — PROGRESS NOTES
"[facility] Clinical Pharmacy Services: Vancomycin Pharmacokinetic Initial Consult Note    Karsten Johnson is a 48 y.o. male who is on day 1 of pharmacy to dose vancomycin for Skin and Soft Tissue.    Consult Information  Consulting Provider: Sp  Planned Duration of Therapy: 7 days  Was Patient Receiving Prior to Admission/Consult?: No  Loading Dose Ordered or Given: 2000 mg on  at ~1800  PK/PD Target: -600 mg/L.hr   Other Antimicrobials: Cefepime    Imaging Reviewed?: Yes    Microbiology Data  Culture/Source:    blood cultures- in process    Vitals/Labs  Ht: 167.6 cm (66\"); Wt: 104 kg (230 lb 6.1 oz)  Temp (24hrs), Av.3 °F (37.4 °C), Min:98.4 °F (36.9 °C), Max:100.9 °F (38.3 °C)   Estimated Creatinine Clearance: 125.5 mL/min (by C-G formula based on SCr of 0.8 mg/dL).       Results from last 7 days   Lab Units 24  0403 24  1533   CREATININE mg/dL 0.80 0.72*   WBC 10*3/mm3 24.32* 22.41*     Assessment/Plan:    Vancomycin Dose:  1500 mg IV every 12 hours; which provides the following predicted parameters:  AUC24,ss: 546 mg/L.hr  PAUC*: 81 %  Ctrough,ss: 16.9 mg/L  Pconc*: 36 %  Tox.: 13 %  Vanc Trough planned for 2/10   Patient has order for Basic Metabolic Panel    Pharmacy will follow patient's kidney function and will adjust doses and obtain levels as necessary. Thank you for involving pharmacy in this patient's care. Please contact pharmacy with any questions or concerns.                           Aster Chambers  Clinical Pharmacist    "

## 2024-02-08 NOTE — PROGRESS NOTES
"Patient Care Team:  ProviderLisa Known as PCP - General    Date: 2024  Patient Name: Karsten Johnson  : 1975  MRN: [id number]  Date of admission: 2024  Room/Bed: 223/1      Subjective   Subjective         Chief Complaint: f/u left foot pain     Summary:Karsten Johnson is a 48 y.o. obese male with essentially no past medical history who presented to the emergency department with left heel/Achilles pain that began 5 days ago.  Patient says that happened while at work.  There was no trauma.  Patient said the pain started slowly but steadily increased.  Patient went to an urgent care in [facility] and was told that he most likely had a torn Achilles.  He said he was told by the physician there to \"just take it easy\".  Unfortunately, the swelling and pain intensified over the last couple of days [facility] for further evaluation.  Here in the ED he had an x-ray that shows rupture of the Achilles tendon.  The case was discussed with both orthopedic surgery as well as podiatry.  Podiatry recommended an MRI without contrast.      Interval Followup:   Patient reports pain initially started in October and has been swelling intermittently since that time, becoming severe on  and progressed to being unable to stand on Monday. Patient is complaining of severe pain and swelling that has become even more severe since last night. Patient developed fever this morning, but denies any fever or chills prior to today. Patient denies any abrasion to his foot and he has been off his foot since Monday.         Review of Systems   Constitutional:  Negative for chills, diaphoresis and fever.   Musculoskeletal:  Positive for joint swelling.         Objective   Objective       Vital Signs  Temp:  [98.4 °F (36.9 °C)-100.9 °F (38.3 °C)] 100.9 °F (38.3 °C)  Heart Rate:  [] 101  Resp:  [18-22] 18  BP: (128-173)/(75-93) 173/93  Oxygen Therapy  SpO2: 96 %  Pulse Oximetry Type: Continuous  Device (Oxygen Therapy): room " "air  Flowsheet Rows      Flowsheet Row First Filed Value   Admission Height 167.6 cm (66\") Documented at 02/08/2024 1500   Admission Weight 104 kg (230 lb 6.1 oz) Documented at 02/07/2024 2028          Intake & Output (last 3 days)         02/05 0701  02/06 0700 02/06 0701  02/07 0700 02/07 0701 02/08 0700 02/08 0701 02/09 0700    Urine (mL/kg/hr)   300 200 (0.2)    Total Output   300 200    Net   -300 -200            Urine Unmeasured Occurrence   2 x           Lines, Drains & Airways       Active LDAs       Name Placement date Placement time Site Days    Peripheral IV 02/08/24 1015 Anterior;Right Antecubital 02/08/24  1015  Antecubital  less than 1                      Physical Exam  Vitals reviewed.   Constitutional:       General: He is in acute distress.      Appearance: He is obese.   HENT:      Head: Normocephalic and atraumatic.      Mouth/Throat:      Mouth: Mucous membranes are moist.   Cardiovascular:      Rate and Rhythm: Regular rhythm. Tachycardia present.      Heart sounds: No murmur heard.  Pulmonary:      Effort: Pulmonary effort is normal. No respiratory distress.      Breath sounds: No wheezing or rales.   Abdominal:      General: Bowel sounds are normal. There is no distension.      Palpations: Abdomen is soft.      Tenderness: There is no abdominal tenderness.   Musculoskeletal:      Comments: LLE swelling   Skin:     Comments: Erythema to the LLE   Neurological:      Mental Status: He is alert.   Psychiatric:         Mood and Affect: Mood normal.         Behavior: Behavior normal.           Results Review:      Results from last 7 days   Lab Units 02/08/24  0403 02/07/24  1533   WBC 10*3/mm3 24.32* 22.41*   HEMOGLOBIN g/dL 11.4* 12.2*   HEMATOCRIT % 35.8* 36.4*   PLATELETS 10*3/mm3 168 165     Results from last 7 days   Lab Units 02/08/24  0403 02/07/24  1533   SODIUM mmol/L 132* 135*   POTASSIUM mmol/L 4.0 3.5   CHLORIDE mmol/L 98 99   CO2 mmol/L 23.5 20.4*   BUN mg/dL 14 14   CREATININE " "mg/dL 0.80 0.72*   CALCIUM mg/dL 9.1 9.1   BILIRUBIN mg/dL  --  0.9   ALK PHOS U/L  --  144*   ALT (SGPT) U/L  --  18   AST (SGOT) U/L  --  22   GLUCOSE mg/dL 126* 157*       Results from last 7 days   Lab Units 02/08/24  0403   MAGNESIUM mg/dL 1.9       No results found for: \"BLOODCX\"    No results found for: \"MRSACX\"    No results found for: \"STOOLCX\"    No results found for: \"URINECX\"    No results found for: \"WOUNDCX\"    Imaging Results (Last 24 Hours)       Procedure Component Value Units Date/Time    MRI Ankle Left Without Contrast [[id number]] Collected: 02/08/24 1457     Updated: 02/08/24 1500    Narrative:      PROCEDURE: MRI ANKLE LEFT  WO CONTRAST     COMPARISON: [facility], CR, XR ANKLE 3+ VW LEFT, 2/07/2024, 15:42.     INDICATIONS: FELT POP TO LEFT HEEL X 1 WEEK AGO AT WORK. SEVERE POSTERIOR ANKLE PAIN WITH A KNOT TO   LEFT MEDIAL ANKLE AND SWELLING.          TECHNIQUE: A complete multi-planar examination was performed without contrast.     FINDINGS:   There is severe tendinosis distal Achilles tendon.  There is a full-thickness disruption of the   tendon at its insertion site on the calcaneus.  This full-thickness gap measures 4.6 cm from   craniocaudal.  No significant retraction of the tendon fibers.  There is high-grade   partial-thickness interstitial tearing of the more watershed/musculotendinous junction.     There is edema in Kager's fat.     There is abnormal marrow edema of the calcaneus most pronounced at the insertion site.  There is   erosive change at the Achilles tendon insertion site consistent with probable inflammatory   arthritis and enthesopathy.  The calcifications seen in the distal tendon on radiograph are not   well seen on MRI.     There is moderate subtalar joint space narrowing and moderate talonavicular joint space narrowing.    There is mild midfoot degenerative change.  There is a moderate-sized os trigonum with mild marrow   edema.  There is a small tibiotalar " joint effusion.           There is fluid in the posterior tibialis and other flexor tendons consistent with tenosynovitis.    The tendons themselves are intact.  The peroneal tendons are normal appearance.  The extensor   tendons are normal appearance.     The syndesmotic ligaments are intact.  The anterior and posterior talofibular ligament and   calcaneofibular ligament are intact.     The medial band plantar fascia is thickened has high signal intensity.  There is a moderate-sized   plantar calcaneal spur.     The sinus tarsi has fat signal intensity which is normal.  The tarsal tunnel is normal.     There is soft tissue edema most pronounced posteriorly.     There are no osteochondral lesions of the talar dome.     There is mild hindfoot valgus alignment.       Impression:         1. Severe tendinosis and enthesopathy at the distal Achilles tendon.  There is a full-thickness gap   of the distal tendon from the insertion site measuring up to 4.6 cm consistent with full thickness   tear.  Severe tendinosis of the musculotendinous junction of the Achilles.  Associated soft tissue   edema.  2. Marrow edema calcaneus with erosive changes at the insertion site of the Achilles tendon likely   related to inflammatory arthritis and reactive edema calcaneus.  3. Findings of plantar fasciitis medial band plantar fascia.  4. Mild hindfoot and midfoot degenerative change.  5. Tenosynovitis of the flexor tendons.            SAEED VIGIL MD         Electronically Signed and Approved By: SAEED VIGIL MD on 2/08/2024 at 14:57                     XR Tibia Fibula 2 View Left [[id number]] Collected: 02/07/24 1605     Updated: 02/07/24 1608    Narrative:      PROCEDURE: XR ANKLE 3+ VW LEFT, 2/07/2024, 15:42  XR FOOT 3+ VW LEFT, 2/07/2024, 15:44  XR TIBIA FIBULA 2 VW LEFT, 2/07/2024, 15:     COMPARISON: None     INDICATIONS: pain no known injury swelling and bruising medial aspect     FINDINGS:   Ankle:  There is soft tissue swelling  about the medial portion of the ankle.  The ankle mortise   looks intact.  There is osseous demineralization.  There is abnormal appearance to the distal   Achilles tendon area with abnormal ossification in soft tissue swelling.  An Achilles tendon injury   could account for this.  There is a plantar calcaneal spur.  There is ossification in the soft   tissues adjacent to the medial tibial tubercle that may relate to more of the changes in the   posterior ankle as opposed to an injury around this area.     Foot:  As noted on the ankle films there is an abnormal appearance to the posterior ankle around   the distal Achilles tendon with abnormal ossification and soft tissue swelling that could relate to   a more acute Achilles tendon injury.  There is no subluxation or dislocation of the digits.  There   is osseous demineralization.     Tibia fibula:  There is no definite fracture.  There is osseous demineralization.       Impression:         1. Abnormal appearance to the area of the distal Achilles tendon.  Injury to this area could not be   excluded.  2. Osseous demineralization   3. Soft tissue swelling about the medial ankle/foot.  4. Calcification/ossification adjacent to the medial tubercle of the talus that may relate to the   findings in the posterior ankle area as opposed to an injury around the medial tubercle of the   talus                  AUSTIN HARRIS MD         Electronically Signed and Approved By: AUSTIN HARRIS MD on 2/07/2024 at 16:04                     XR Foot 3+ View Left [[id number]] Collected: 02/07/24 1605     Updated: 02/07/24 1608    Narrative:      PROCEDURE: XR ANKLE 3+ VW LEFT, 2/07/2024, 15:42  XR FOOT 3+ VW LEFT, 2/07/2024, 15:44  XR TIBIA FIBULA 2 VW LEFT, 2/07/2024, 15:     COMPARISON: None     INDICATIONS: pain no known injury swelling and bruising medial aspect     FINDINGS:   Ankle:  There is soft tissue swelling about the medial portion of the ankle.  The ankle mortise   looks intact.   There is osseous demineralization.  There is abnormal appearance to the distal   Achilles tendon area with abnormal ossification in soft tissue swelling.  An Achilles tendon injury   could account for this.  There is a plantar calcaneal spur.  There is ossification in the soft   tissues adjacent to the medial tibial tubercle that may relate to more of the changes in the   posterior ankle as opposed to an injury around this area.     Foot:  As noted on the ankle films there is an abnormal appearance to the posterior ankle around   the distal Achilles tendon with abnormal ossification and soft tissue swelling that could relate to   a more acute Achilles tendon injury.  There is no subluxation or dislocation of the digits.  There   is osseous demineralization.     Tibia fibula:  There is no definite fracture.  There is osseous demineralization.       Impression:         1. Abnormal appearance to the area of the distal Achilles tendon.  Injury to this area could not be   excluded.  2. Osseous demineralization   3. Soft tissue swelling about the medial ankle/foot.  4. Calcification/ossification adjacent to the medial tubercle of the talus that may relate to the   findings in the posterior ankle area as opposed to an injury around the medial tubercle of the   talus                  AUSTIN HARRIS MD         Electronically Signed and Approved By: AUSTIN HARRIS MD on 2/07/2024 at 16:04                     XR Ankle 3+ View Left [[id number]] Collected: 02/07/24 1605     Updated: 02/07/24 1608    Narrative:      PROCEDURE: XR ANKLE 3+ VW LEFT, 2/07/2024, 15:42  XR FOOT 3+ VW LEFT, 2/07/2024, 15:44  XR TIBIA FIBULA 2 VW LEFT, 2/07/2024, 15:     COMPARISON: None     INDICATIONS: pain no known injury swelling and bruising medial aspect     FINDINGS:   Ankle:  There is soft tissue swelling about the medial portion of the ankle.  The ankle mortise   looks intact.  There is osseous demineralization.  There is abnormal appearance to the  distal   Achilles tendon area with abnormal ossification in soft tissue swelling.  An Achilles tendon injury   could account for this.  There is a plantar calcaneal spur.  There is ossification in the soft   tissues adjacent to the medial tibial tubercle that may relate to more of the changes in the   posterior ankle as opposed to an injury around this area.     Foot:  As noted on the ankle films there is an abnormal appearance to the posterior ankle around   the distal Achilles tendon with abnormal ossification and soft tissue swelling that could relate to   a more acute Achilles tendon injury.  There is no subluxation or dislocation of the digits.  There   is osseous demineralization.     Tibia fibula:  There is no definite fracture.  There is osseous demineralization.       Impression:         1. Abnormal appearance to the area of the distal Achilles tendon.  Injury to this area could not be   excluded.  2. Osseous demineralization   3. Soft tissue swelling about the medial ankle/foot.  4. Calcification/ossification adjacent to the medial tubercle of the talus that may relate to the   findings in the posterior ankle area as opposed to an injury around the medial tubercle of the   talus                  AUSTIN HARRIS MD         Electronically Signed and Approved By: AUSTIN HARRIS MD on 2/07/2024 at 16:04                             I have personally reviewed the patient's new imaging and lab results.          ECG/EMG Results (most recent)       Procedure Component Value Units Date/Time    ECG 12 Lead Pre-Op / Pre-Procedure [[id number]] Collected: 02/07/24 2109     Updated: 02/08/24 0904     QT Interval 341 ms      QTC Interval 428 ms     Narrative:      HEART RATE= 95  bpm  RR Interval= 636  ms  MD Interval= 167  ms  P Horizontal Axis= 7  deg  P Front Axis= 46  deg  QRSD Interval= 99  ms  QT Interval= 341  ms  QTcB= 428  ms  QRS Axis= 9  deg  T Wave Axis= 31  deg  - NORMAL ECG -  Sinus rhythm  No previous ECG  available for comparison  Electronically Signed By: Jarrett Padilla ([facility]) 08-Feb-2024 09:04:41  Date and Time of Study: 2024-02-07 21:09:27                    Medication Review:     Scheduled Meds:cefepime, 1,000 mg, Intravenous, Q8H  heparin (porcine), 5,000 Units, Subcutaneous, Q8H  sodium chloride, 10 mL, Intravenous, Q12H  vancomycin, 2,000 mg, Intravenous, Once      Continuous Infusions:Pharmacy to Dose Cefepime,   Pharmacy to dose vancomycin,       PRN Meds:.  acetaminophen **OR** acetaminophen    senna-docusate sodium **AND** polyethylene glycol **AND** bisacodyl **AND** bisacodyl    HYDROcodone-acetaminophen    HYDROmorphone    ondansetron    Pharmacy to Dose Cefepime    Pharmacy to dose vancomycin    sodium chloride    sodium chloride      I have reviewed the patient's current medication list    Assessment & Plan   Assessment / Plan       Active Hospital Problems    Diagnosis  POA    **Achilles rupture [S86.019A]  Yes    Abscess of heel [L02.619]  Unknown       Left heel pain likely d/t achilles rupture vs abscess   Leukocytosis   - discussed with podiatry and MRI ordered   - given worsening leukocytosis and fever will started antibiotics  - blood cx- pending  - CRP, ESR, Procal elevated   - doppler negative for DVT  - increase IV dilaudid to 1.5mg q2h prn   - lactic acid wnl   - add IV vancomycin and cefepime for now     Obesity  - BMI 37.1  - complicates all aspects of care  -  on lifestyle changes as appropriate     Mild anemia  - no signs of bleeding     DVT Prophylaxis  - heparin     Code Status  - Full Code    Patient is high risk     Plan for disposition: pending progress     Rica Snowden DO  02/08/24  15:41 EST            Note disclaimer: At [facility], we believe that sharing information builds trust and better relationships. You are receiving this note because you recently visited [facility]. It is possible you will see health information before a provider has talked with you  about it. This kind of information can be easy to misunderstand. To help you fully understand what it means for your health, we urge you to discuss this note with your provider.

## 2024-02-08 NOTE — ANESTHESIA PREPROCEDURE EVALUATION
Anesthesia Evaluation     Patient summary reviewed and Nursing notes reviewed   no history of anesthetic complications:   NPO Solid Status: > 8 hours  NPO Liquid Status: > 2 hours           Airway   Mallampati: III  TM distance: >3 FB  Neck ROM: full  No difficulty expected  Dental    (+) poor dentition    Pulmonary - negative pulmonary ROS    breath sounds clear to auscultation  (+) ,wheezes  Cardiovascular - negative cardio ROS and normal exam  Exercise tolerance: good (4-7 METS)    ECG reviewed  Rhythm: regular  Rate: normal        Neuro/Psych- negative ROS  GI/Hepatic/Renal/Endo    (+) diabetes mellitus type 2    Musculoskeletal (-) negative ROS    Abdominal    Substance History - negative use     OB/GYN negative ob/gyn ROS         Other - negative ROS       ROS/Med Hx Other: Left heel pain = torn achilles vx infection      Phys Exam Other: Many cracked and broken teeth, one loose upper right premolar    A few scattered wheezes upon auscultation        Latest Reference Range & Units 02/08/24 04:03   Sodium 136 - 145 mmol/L 132 (L)   Potassium 3.5 - 5.2 mmol/L 4.0   Chloride 98 - 107 mmol/L 98   CO2 22.0 - 29.0 mmol/L 23.5   Anion Gap 5.0 - 15.0 mmol/L 10.5   BUN 6 - 20 mg/dL 14   Creatinine 0.76 - 1.27 mg/dL 0.80   BUN/Creatinine Ratio 7.0 - 25.0  17.5   eGFR >60.0 mL/min/1.73 107.8   Glucose 65 - 99 mg/dL 126 (H)   Calcium 8.6 - 10.5 mg/dL 9.1   Magnesium 1.6 - 2.6 mg/dL 1.9   (L): Data is abnormally low  (H): Data is abnormally high      NORMAL ECG - 2/7/24  Sinus rhythm  No previous ECG available for comparison        Anesthesia Plan    ASA 2     general     (Patient understands anesthesia not responsible for dental damage.    Positive opiods/thc; elevated blood sugar likely undiagnosed type ii dm; to or for repair - reglan preop, if pt still having some wheezing consider nebulizer)  intravenous induction     Anesthetic plan, risks, benefits, and alternatives have been provided, discussed and informed  consent has been obtained with: patient.    Use of blood products discussed with patient .    Plan discussed with CRNA.      CODE STATUS:    Code Status (Patient has no pulse and is not breathing): CPR (Attempt to Resuscitate)  Medical Interventions (Patient has pulse or is breathing): Full Support

## 2024-02-08 NOTE — PLAN OF CARE
Goal Outcome Evaluation:       Pt is alert and oriented X 4, on room air, and on bedrest and non-weight bearing to left ankle. All scheduled medications given during shift. Pain control managed with PRN medications. Skin care completed. Safety checks maintained throughout shift with patient resting in bed, wheels to bed locked, bed alarm on, and personal items and call light within reach.

## 2024-02-09 ENCOUNTER — ANESTHESIA (OUTPATIENT)
Dept: PERIOP | Facility: HOSPITAL | Age: 49
End: 2024-02-09

## 2024-02-09 ENCOUNTER — APPOINTMENT (OUTPATIENT)
Dept: CARDIOLOGY | Facility: HOSPITAL | Age: 49
End: 2024-02-09
Payer: COMMERCIAL

## 2024-02-09 PROBLEM — M86.00 ACUTE HEMATOGENOUS OSTEOMYELITIS: Status: ACTIVE | Noted: 2024-02-09

## 2024-02-09 LAB
ANION GAP SERPL CALCULATED.3IONS-SCNC: 9.8 MMOL/L (ref 5–15)
BACTERIA BLD CULT: ABNORMAL
BASOPHILS # BLD AUTO: 0.04 10*3/MM3 (ref 0–0.2)
BASOPHILS NFR BLD AUTO: 0.2 % (ref 0–1.5)
BH CV ECHO MEAS - AO MAX PG: 10.1 MMHG
BH CV ECHO MEAS - AO MEAN PG: 5.6 MMHG
BH CV ECHO MEAS - AO V2 MAX: 158.8 CM/SEC
BH CV ECHO MEAS - AO V2 VTI: 27.5 CM
BH CV ECHO MEAS - AVA(I,D): 3.2 CM2
BH CV ECHO MEAS - EDV(CUBED): 158.2 ML
BH CV ECHO MEAS - EDV(MOD-SP2): 126 ML
BH CV ECHO MEAS - EDV(MOD-SP4): 172 ML
BH CV ECHO MEAS - EF(MOD-BP): 55.3 %
BH CV ECHO MEAS - EF(MOD-SP2): 44.8 %
BH CV ECHO MEAS - EF(MOD-SP4): 64.2 %
BH CV ECHO MEAS - ESV(CUBED): 56.4 ML
BH CV ECHO MEAS - ESV(MOD-SP2): 69.6 ML
BH CV ECHO MEAS - ESV(MOD-SP4): 61.5 ML
BH CV ECHO MEAS - FS: 29.1 %
BH CV ECHO MEAS - IVS/LVPW: 0.8 CM
BH CV ECHO MEAS - IVSD: 0.97 CM
BH CV ECHO MEAS - LAT PEAK E' VEL: 8.6 CM/SEC
BH CV ECHO MEAS - LV DIASTOLIC VOL/BSA (35-75): 81 CM2
BH CV ECHO MEAS - LV MASS(C)D: 232 GRAMS
BH CV ECHO MEAS - LV MAX PG: 5.2 MMHG
BH CV ECHO MEAS - LV MEAN PG: 3.6 MMHG
BH CV ECHO MEAS - LV SYSTOLIC VOL/BSA (12-30): 29 CM2
BH CV ECHO MEAS - LV V1 MAX: 113.5 CM/SEC
BH CV ECHO MEAS - LV V1 VTI: 22.6 CM
BH CV ECHO MEAS - LVIDD: 5.4 CM
BH CV ECHO MEAS - LVIDS: 3.8 CM
BH CV ECHO MEAS - LVOT AREA: 3.9 CM2
BH CV ECHO MEAS - LVOT DIAM: 2.22 CM
BH CV ECHO MEAS - LVPWD: 1.21 CM
BH CV ECHO MEAS - MED PEAK E' VEL: 7.2 CM/SEC
BH CV ECHO MEAS - MV A MAX VEL: 98.5 CM/SEC
BH CV ECHO MEAS - MV DEC SLOPE: 276.7 CM/SEC2
BH CV ECHO MEAS - MV DEC TIME: 0.3 SEC
BH CV ECHO MEAS - MV E MAX VEL: 81.8 CM/SEC
BH CV ECHO MEAS - MV E/A: 0.83
BH CV ECHO MEAS - PA V2 MAX: 109.9 CM/SEC
BH CV ECHO MEAS - RVDD: 3.7 CM
BH CV ECHO MEAS - SI(MOD-SP2): 26.6 ML/M2
BH CV ECHO MEAS - SI(MOD-SP4): 52.1 ML/M2
BH CV ECHO MEAS - SV(LVOT): 87.7 ML
BH CV ECHO MEAS - SV(MOD-SP2): 56.4 ML
BH CV ECHO MEAS - SV(MOD-SP4): 110.5 ML
BH CV ECHO MEAS - TAPSE (>1.6): 3.3 CM
BH CV ECHO MEAS - TR MAX PG: 26.9 MMHG
BH CV ECHO MEAS - TR MAX VEL: 259.3 CM/SEC
BH CV ECHO MEASUREMENTS AVERAGE E/E' RATIO: 10.35
BOTTLE TYPE: ABNORMAL
BUN SERPL-MCNC: 15 MG/DL (ref 6–20)
BUN/CREAT SERPL: 20.3 (ref 7–25)
CALCIUM SPEC-SCNC: 8.9 MG/DL (ref 8.6–10.5)
CHLORIDE SERPL-SCNC: 98 MMOL/L (ref 98–107)
CO2 SERPL-SCNC: 24.2 MMOL/L (ref 22–29)
CREAT SERPL-MCNC: 0.74 MG/DL (ref 0.76–1.27)
DEPRECATED RDW RBC AUTO: 45.1 FL (ref 37–54)
EGFRCR SERPLBLD CKD-EPI 2021: 110.4 ML/MIN/1.73
EOSINOPHIL # BLD AUTO: 0.02 10*3/MM3 (ref 0–0.4)
EOSINOPHIL NFR BLD AUTO: 0.1 % (ref 0.3–6.2)
ERYTHROCYTE [DISTWIDTH] IN BLOOD BY AUTOMATED COUNT: 13.3 % (ref 12.3–15.4)
GLUCOSE SERPL-MCNC: 160 MG/DL (ref 65–99)
HBA1C MFR BLD: 6.4 % (ref 4.8–5.6)
HCT VFR BLD AUTO: 34.5 % (ref 37.5–51)
HGB BLD-MCNC: 11.2 G/DL (ref 13–17.7)
IMM GRANULOCYTES # BLD AUTO: 1.44 10*3/MM3 (ref 0–0.05)
IMM GRANULOCYTES NFR BLD AUTO: 5.9 % (ref 0–0.5)
LEFT ATRIUM VOLUME INDEX: 35.6 ML/M2
LYMPHOCYTES # BLD AUTO: 0.68 10*3/MM3 (ref 0.7–3.1)
LYMPHOCYTES NFR BLD AUTO: 2.8 % (ref 19.6–45.3)
MAGNESIUM SERPL-MCNC: 2.2 MG/DL (ref 1.6–2.6)
MCH RBC QN AUTO: 29.8 PG (ref 26.6–33)
MCHC RBC AUTO-ENTMCNC: 32.5 G/DL (ref 31.5–35.7)
MCV RBC AUTO: 91.8 FL (ref 79–97)
MONOCYTES # BLD AUTO: 1.59 10*3/MM3 (ref 0.1–0.9)
MONOCYTES NFR BLD AUTO: 6.6 % (ref 5–12)
NEUTROPHILS NFR BLD AUTO: 20.45 10*3/MM3 (ref 1.7–7)
NEUTROPHILS NFR BLD AUTO: 84.4 % (ref 42.7–76)
NRBC BLD AUTO-RTO: 0 /100 WBC (ref 0–0.2)
PLATELET # BLD AUTO: 195 10*3/MM3 (ref 140–450)
PMV BLD AUTO: 10.9 FL (ref 6–12)
POTASSIUM SERPL-SCNC: 3.7 MMOL/L (ref 3.5–5.2)
RBC # BLD AUTO: 3.76 10*6/MM3 (ref 4.14–5.8)
SODIUM SERPL-SCNC: 132 MMOL/L (ref 136–145)
WBC NRBC COR # BLD AUTO: 24.22 10*3/MM3 (ref 3.4–10.8)

## 2024-02-09 PROCEDURE — 25010000002 ONDANSETRON PER 1 MG: Performed by: FAMILY MEDICINE

## 2024-02-09 PROCEDURE — 93306 TTE W/DOPPLER COMPLETE: CPT | Performed by: STUDENT IN AN ORGANIZED HEALTH CARE EDUCATION/TRAINING PROGRAM

## 2024-02-09 PROCEDURE — 25810000003 SODIUM CHLORIDE 0.9 % SOLUTION: Performed by: INTERNAL MEDICINE

## 2024-02-09 PROCEDURE — 25010000002 CEFEPIME PER 500 MG: Performed by: INTERNAL MEDICINE

## 2024-02-09 PROCEDURE — 85025 COMPLETE CBC W/AUTO DIFF WBC: CPT | Performed by: INTERNAL MEDICINE

## 2024-02-09 PROCEDURE — 94761 N-INVAS EAR/PLS OXIMETRY MLT: CPT

## 2024-02-09 PROCEDURE — 83036 HEMOGLOBIN GLYCOSYLATED A1C: CPT | Performed by: INTERNAL MEDICINE

## 2024-02-09 PROCEDURE — 99232 SBSQ HOSP IP/OBS MODERATE 35: CPT | Performed by: PODIATRIST

## 2024-02-09 PROCEDURE — 25010000002 HYDROMORPHONE 1 MG/ML SOLUTION: Performed by: INTERNAL MEDICINE

## 2024-02-09 PROCEDURE — 93306 TTE W/DOPPLER COMPLETE: CPT

## 2024-02-09 PROCEDURE — 83735 ASSAY OF MAGNESIUM: CPT | Performed by: INTERNAL MEDICINE

## 2024-02-09 PROCEDURE — 80048 BASIC METABOLIC PNL TOTAL CA: CPT | Performed by: INTERNAL MEDICINE

## 2024-02-09 PROCEDURE — 99233 SBSQ HOSP IP/OBS HIGH 50: CPT | Performed by: INTERNAL MEDICINE

## 2024-02-09 PROCEDURE — 25010000002 VANCOMYCIN 5 G RECONSTITUTED SOLUTION: Performed by: INTERNAL MEDICINE

## 2024-02-09 PROCEDURE — 94799 UNLISTED PULMONARY SVC/PX: CPT

## 2024-02-09 RX ADMIN — HYDROMORPHONE HYDROCHLORIDE 1.5 MG: 1 INJECTION, SOLUTION INTRAMUSCULAR; INTRAVENOUS; SUBCUTANEOUS at 08:07

## 2024-02-09 RX ADMIN — VANCOMYCIN HYDROCHLORIDE 1500 MG: 5 INJECTION, POWDER, LYOPHILIZED, FOR SOLUTION INTRAVENOUS at 21:41

## 2024-02-09 RX ADMIN — Medication 10 ML: at 08:11

## 2024-02-09 RX ADMIN — HYDROMORPHONE HYDROCHLORIDE 1.5 MG: 1 INJECTION, SOLUTION INTRAMUSCULAR; INTRAVENOUS; SUBCUTANEOUS at 19:06

## 2024-02-09 RX ADMIN — HYDROMORPHONE HYDROCHLORIDE 1.5 MG: 1 INJECTION, SOLUTION INTRAMUSCULAR; INTRAVENOUS; SUBCUTANEOUS at 13:28

## 2024-02-09 RX ADMIN — HYDROMORPHONE HYDROCHLORIDE 1.5 MG: 1 INJECTION, SOLUTION INTRAMUSCULAR; INTRAVENOUS; SUBCUTANEOUS at 16:45

## 2024-02-09 RX ADMIN — HYDROMORPHONE HYDROCHLORIDE 1.5 MG: 1 INJECTION, SOLUTION INTRAMUSCULAR; INTRAVENOUS; SUBCUTANEOUS at 04:56

## 2024-02-09 RX ADMIN — HYDROCODONE BITARTRATE AND ACETAMINOPHEN 1 TABLET: 7.5; 325 TABLET ORAL at 11:00

## 2024-02-09 RX ADMIN — VANCOMYCIN HYDROCHLORIDE 1500 MG: 5 INJECTION, POWDER, LYOPHILIZED, FOR SOLUTION INTRAVENOUS at 10:09

## 2024-02-09 RX ADMIN — CEFEPIME HYDROCHLORIDE 1000 MG: 1 INJECTION, POWDER, FOR SOLUTION INTRAMUSCULAR; INTRAVENOUS at 06:32

## 2024-02-09 RX ADMIN — ONDANSETRON 4 MG: 2 INJECTION INTRAMUSCULAR; INTRAVENOUS at 05:11

## 2024-02-09 RX ADMIN — HYDROMORPHONE HYDROCHLORIDE 1.5 MG: 1 INJECTION, SOLUTION INTRAMUSCULAR; INTRAVENOUS; SUBCUTANEOUS at 21:40

## 2024-02-09 RX ADMIN — Medication 10 ML: at 21:41

## 2024-02-09 NOTE — PROGRESS NOTES
"Patient Care Team:  Provider, No Known as PCP - General    Date: 2024  Patient Name: Karsten Johnson  : 1975  MRN: [id number]  Date of admission: 2024  Room/Bed: 223/      Subjective   Subjective         Chief Complaint: f/u left foot pain     Summary:Karsten Johnson is a 48 y.o. obese male with essentially no past medical history who presented to the emergency department with left heel/Achilles pain that began 5 days ago.  Patient says that happened while at work.  There was no trauma.  Patient said the pain started slowly but steadily increased.  Patient went to an urgent care in [facility] and was told that he most likely had a torn Achilles.  He said he was told by the physician there to \"just take it easy\".  Unfortunately, the swelling and pain intensified over the last couple of days [facility] for further evaluation.  Here in the ED he had an x-ray that shows rupture of the Achilles tendon.  The case was discussed with both orthopedic surgery as well as podiatry.  Podiatry recommended an MRI without contrast.      Interval Followup:   Patient states he hit his ankle and it burst open earlier with some bloody discharge. He is much more comfortable today and less anxious.         Review of Systems   Constitutional:  Negative for chills, diaphoresis and fever.   Musculoskeletal:  Positive for joint swelling.         Objective   Objective       Vital Signs  Temp:  [97.7 °F (36.5 °C)-101.1 °F (38.4 °C)] 97.8 °F (36.6 °C)  Heart Rate:  [] 88  Resp:  [18-20] 18  BP: (117-163)/(61-98) 144/88  Oxygen Therapy  SpO2: 95 %  Pulse Oximetry Type: Continuous  Device (Oxygen Therapy): room air  Flowsheet Rows      Flowsheet Row First Filed Value   Admission Height 167.6 cm (66\") Documented at 2024 1500   Admission Weight 104 kg (230 lb 6.1 oz) Documented at 2024          Intake & Output (last 3 days)          07 0700  0701   07 07 07 " 0701  02/09 0700    Urine (mL/kg/hr)   300 200 (0.2)    Total Output   300 200    Net   -300 -200            Urine Unmeasured Occurrence   2 x           Lines, Drains & Airways       Active LDAs       Name Placement date Placement time Site Days    Peripheral IV 02/08/24 1015 Anterior;Right Antecubital 02/08/24  1015  Antecubital  less than 1                      Physical Exam  Vitals reviewed.   Constitutional:       General: He is not in acute distress.     Appearance: He is obese.   HENT:      Head: Normocephalic and atraumatic.      Mouth/Throat:      Mouth: Mucous membranes are moist.   Cardiovascular:      Rate and Rhythm: Normal rate and regular rhythm.      Heart sounds: No murmur heard.  Pulmonary:      Effort: Pulmonary effort is normal. No respiratory distress.      Breath sounds: No wheezing or rales.   Abdominal:      General: Bowel sounds are normal. There is no distension.      Palpations: Abdomen is soft.      Tenderness: There is no abdominal tenderness.   Musculoskeletal:      Comments: LLE swelling   Skin:     Comments: Erythema to the LLE   Neurological:      Mental Status: He is alert.   Psychiatric:         Mood and Affect: Mood normal.         Behavior: Behavior normal.           Results Review:      Results from last 7 days   Lab Units 02/09/24  0618 02/08/24  0403 02/07/24  1533   WBC 10*3/mm3 24.22* 24.32* 22.41*   HEMOGLOBIN g/dL 11.2* 11.4* 12.2*   HEMATOCRIT % 34.5* 35.8* 36.4*   PLATELETS 10*3/mm3 195 168 165     Results from last 7 days   Lab Units 02/09/24  0618 02/08/24  0403 02/07/24  1533   SODIUM mmol/L 132* 132* 135*   POTASSIUM mmol/L 3.7 4.0 3.5   CHLORIDE mmol/L 98 98 99   CO2 mmol/L 24.2 23.5 20.4*   BUN mg/dL 15 14 14   CREATININE mg/dL 0.74* 0.80 0.72*   CALCIUM mg/dL 8.9 9.1 9.1   BILIRUBIN mg/dL  --   --  0.9   ALK PHOS U/L  --   --  144*   ALT (SGPT) U/L  --   --  18   AST (SGOT) U/L  --   --  22   GLUCOSE mg/dL 160* 126* 157*       Results from last 7 days   Lab Units  "02/09/24  0618 02/08/24  0403   MAGNESIUM mg/dL 2.2 1.9       No results found for: \"BLOODCX\"    No results found for: \"MRSACX\"    No results found for: \"STOOLCX\"    No results found for: \"URINECX\"    No results found for: \"WOUNDCX\"    Imaging Results (Last 24 Hours)       ** No results found for the last 24 hours. **            I have personally reviewed the patient's new imaging and lab results.          ECG/EMG Results (most recent)       Procedure Component Value Units Date/Time    ECG 12 Lead Pre-Op / Pre-Procedure [[id number]] Collected: 02/07/24 2109     Updated: 02/08/24 0904     QT Interval 341 ms      QTC Interval 428 ms     Narrative:      HEART RATE= 95  bpm  RR Interval= 636  ms  MA Interval= 167  ms  P Horizontal Axis= 7  deg  P Front Axis= 46  deg  QRSD Interval= 99  ms  QT Interval= 341  ms  QTcB= 428  ms  QRS Axis= 9  deg  T Wave Axis= 31  deg  - NORMAL ECG -  Sinus rhythm  No previous ECG available for comparison  Electronically Signed By: Jarrett Padilla ([facility]) 08-Feb-2024 09:04:41  Date and Time of Study: 2024-02-07 21:09:27    Adult Transthoracic Echo Complete W/ Cont if Necessary Per Protocol [[id number]] Resulted: 02/09/24 1439     Updated: 02/09/24 1442     EF(MOD-bp) 55.3 %      LVIDd 5.4 cm      LVIDs 3.8 cm      IVSd 0.97 cm      LVPWd 1.21 cm      FS 29.1 %      IVS/LVPW 0.80 cm      ESV(cubed) 56.4 ml      LV Sys Vol (BSA corrected) 29.0 cm2      EDV(cubed) 158.2 ml      LV Flowers Vol (BSA corrected) 81.0 cm2      LV mass(C)d 232.0 grams      LVOT area 3.9 cm2      LVOT diam 2.22 cm      EDV(MOD-sp2) 126.0 ml      EDV(MOD-sp4) 172.0 ml      ESV(MOD-sp2) 69.6 ml      ESV(MOD-sp4) 61.5 ml      SV(MOD-sp2) 56.4 ml      SV(MOD-sp4) 110.5 ml      SI(MOD-sp2) 26.6 ml/m2      SI(MOD-sp4) 52.1 ml/m2      EF(MOD-sp2) 44.8 %      EF(MOD-sp4) 64.2 %      MV E max salvador 81.8 cm/sec      MV A max salvador 98.5 cm/sec      MV dec time 0.30 sec      MV E/A 0.83     LA ESV Index (BP) 35.6 ml/m2      Med Peak E' Salvador " 7.2 cm/sec      Lat Peak E' Salvador 8.6 cm/sec      TR max salvador 259.3 cm/sec      Avg E/e' ratio 10.35     SV(LVOT) 87.7 ml      RVIDd 3.7 cm      TAPSE (>1.6) 3.3 cm      LV V1 max 113.5 cm/sec      LV V1 max PG 5.2 mmHg      LV V1 mean PG 3.6 mmHg      LV V1 VTI 22.6 cm      Ao pk salvador 158.8 cm/sec      Ao max PG 10.1 mmHg      Ao mean PG 5.6 mmHg      Ao V2 VTI 27.5 cm      EVERT(I,D) 3.2 cm2      MV dec slope 276.7 cm/sec2      TR max PG 26.9 mmHg      PA V2 max 109.9 cm/sec     Narrative:        Left ventricular systolic function is normal. Calculated left   ventricular EF = 55.3%    Estimated right ventricular systolic pressure from tricuspid   regurgitation is normal (<35 mmHg).    No obvious wall motion abnormalities    No obvious vegetations on any valve. If high clinical suspicion,   recommend TRACIE              Results for orders placed during the hospital encounter of 02/07/24    Adult Transthoracic Echo Complete W/ Cont if Necessary Per Protocol    Interpretation Summary    Left ventricular systolic function is normal. Calculated left ventricular EF = 55.3%    Estimated right ventricular systolic pressure from tricuspid regurgitation is normal (<35 mmHg).    No obvious wall motion abnormalities    No obvious vegetations on any valve. If high clinical suspicion, recommend TRACIE          Medication Review:     Scheduled Meds:[Held by provider] heparin (porcine), 5,000 Units, Subcutaneous, Q8H  sodium chloride, 10 mL, Intravenous, Q12H  vancomycin, 1,500 mg, Intravenous, Q12H      Continuous Infusions:Pharmacy to dose vancomycin,       PRN Meds:.  acetaminophen **OR** acetaminophen    senna-docusate sodium **AND** polyethylene glycol **AND** bisacodyl **AND** bisacodyl    HYDROcodone-acetaminophen    HYDROmorphone    melatonin    ondansetron    Pharmacy to dose vancomycin    sodium chloride    sodium chloride      I have reviewed the patient's current medication list    Assessment & Plan   Assessment / Plan        Active Hospital Problems    Diagnosis  POA    **Achilles rupture [S86.019A]  Yes    Acute hematogenous osteomyelitis [M86.00]  Unknown    Abscess of heel [L02.619]  Unknown       Left heel pain likely d/t achilles rupture and abscess   Acute hematogenous osteomyelitis  MRSA bacteremia   - discussed with podiatry and surgery was delayed due to emergent surgeries in OR today  -initially started on Vanc and Cefepime, cultures positive for MRSA, dc Cefepiem   - blood cx- MRSA  - repeat blood cx tomorrow after surgery   - CRP, ESR, Procal elevated   - doppler negative for DVT  - continue  IV dilaudid to 1.5mg q2h prn   - lactic acid wnl   - check echocardiogram      Obesity  - BMI 37.1  - complicates all aspects of care  -  on lifestyle changes as appropriate     Mild anemia  - no signs of bleeding     DVT Prophylaxis  - heparin     Code Status  - Full Code    Patient remains high risk     Plan for disposition: pending progress     Rica Snowden DO  02/09/24  17:27 EST            Note disclaimer: At [facility], we believe that sharing information builds trust and better relationships. You are receiving this note because you recently visited [facility]. It is possible you will see health information before a provider has talked with you about it. This kind of information can be easy to misunderstand. To help you fully understand what it means for your health, we urge you to discuss this note with your provider.

## 2024-02-09 NOTE — PROGRESS NOTES
[facility] - PODIATRY    Today's Date: 02/09/24    Patient Name: Karsten Johnson  MRN: [id number]  CSN: [id number]  PCP: Provider, No Known  Referring Provider: No ref. provider found  Attending Provider: Rica Snowden, *  Length of Stay: 2    SUBJECTIVE   Chief Complaint:     HPI: Karsten Johnson, a 48 y.o.male, .  Denies any constitutional symptoms. No other pedal complaints at this time.    History reviewed. No pertinent past medical history.  History reviewed. No pertinent surgical history.  History reviewed. No pertinent family history.  Social History     Socioeconomic History    Marital status:    Tobacco Use    Smoking status: Every Day     Packs/day: 1.00     Years: 25.00     Additional pack years: 0.00     Total pack years: 25.00     Types: Cigarettes     Passive exposure: Never    Smokeless tobacco: Never   Vaping Use    Vaping Use: Never used   Substance and Sexual Activity    Alcohol use: Yes     Alcohol/week: 3.0 standard drinks of alcohol     Types: 3 Cans of beer per week    Drug use: Yes     Types: Marijuana    Sexual activity: Defer     No Known Allergies  Current Facility-Administered Medications   Medication Dose Route Frequency Provider Last Rate Last Admin    acetaminophen (TYLENOL) tablet 650 mg  650 mg Oral Q6H PRN Rica Snowden DO   650 mg at 02/08/24 1616    Or    acetaminophen (TYLENOL) suppository 650 mg  650 mg Rectal Q6H PRN Rica Snowden DO        sennosides-docusate (PERICOLACE) 8.6-50 MG per tablet 2 tablet  2 tablet Oral BID PRN Tao Jacobson DO        And    polyethylene glycol (MIRALAX) packet 17 g  17 g Oral Daily PRN Tao Jacobson DO        And    bisacodyl (DULCOLAX) EC tablet 5 mg  5 mg Oral Daily PRN Tao Jacobson DO        And    bisacodyl (DULCOLAX) suppository 10 mg  10 mg Rectal Daily PRN Tao Jacobson DO        cefepime (MAXIPIME) 1000 mg/100 mL 0.9% NS IVPB (mbp)  1,000 mg Intravenous Q8H Rica Snowden  DO   1,000 mg at 02/09/24 0632    [Held by provider] heparin (porcine) 5000 UNIT/ML injection 5,000 Units  5,000 Units Subcutaneous Q8H Tao Jacobson DO   5,000 Units at 02/08/24 1455    HYDROcodone-acetaminophen (NORCO) 7.5-325 MG per tablet 1 tablet  1 tablet Oral Q6H PRN Tao Jacobson DO   1 tablet at 02/08/24 2200    HYDROmorphone (DILAUDID) injection 1.5 mg  1.5 mg Intravenous Q2H PRN Rica Snowden DO   1.5 mg at 02/09/24 0456    melatonin tablet 10 mg  10 mg Oral Nightly PRN Mony March PA-C   10 mg at 02/08/24 2249    ondansetron (ZOFRAN) injection 4 mg  4 mg Intravenous Q6H PRN Tao Jacobson DO   4 mg at 02/09/24 0511    Pharmacy to Dose Cefepime   Does not apply Continuous PRN Mony March PA-C        Pharmacy to dose vancomycin   Does not apply Continuous PRN Mony March PA-C        sodium chloride 0.9 % flush 10 mL  10 mL Intravenous Q12H Tao Jacobson DO   10 mL at 02/08/24 2029    sodium chloride 0.9 % flush 10 mL  10 mL Intravenous PRN Tao Jacobson DO        sodium chloride 0.9 % infusion 40 mL  40 mL Intravenous PRN Tao Jacobson DO        vancomycin IVPB 1500 mg in 0.9% NaCl (Premix) 500 mL  1,500 mg Intravenous Q12H Rica Snodwen DO         Review of Systems   All other systems reviewed and are negative.      OBJECTIVE     Vitals:    02/09/24 0317   BP: 117/61   Pulse: 88   Resp: 18   Temp: 99 °F (37.2 °C)   SpO2: 94%       PHYSICAL EXAM    GEN:   A&Ox3, NAD. Pt presents in hospital bed.     Foot/Ankle Exam     GENERAL  Appearance:  appears stated age  Orientation:  AAOx3  Affect:  appropriate  Gait:  unimpaired  Assistance:  independent  Right shoe gear: casual shoe  Left shoe gear: casual shoe     VASCULAR      Right Foot Vascularity   Normal vascular exam    Dorsalis pedis:  2+  Posterior tibial:  2+  Skin temperature:  warm  Edema grading:  None  CFT:  < 3 seconds  Pedal hair growth:  Present  Varicosities:  none       Left Foot Vascularity   Normal vascular exam    Dorsalis pedis:  2+  Posterior tibial:  2+  Skin temperature:  warm  Edema grading:  None  CFT:  < 3 seconds  Pedal hair growth:  Present  Varicosities:  none     NEUROLOGIC      Right Foot Neurologic   Normal sensation    Light touch sensation: normal  Vibratory sensation: normal  Hot/Cold sensation: normal  Protective Sensation using Dorset-Flip Monofilament:   Sites intact: 10  Sites tested: 10      Left Foot Neurologic   Normal sensation    Light touch sensation: normal  Vibratory sensation: normal  Hot/Cold sensation:  normal  Protective Sensation using Dorset-Flip Monofilament:   Sites intact: 10  Sites tested: 10     MUSCLE STRENGTH      Right Foot Muscle Strength   Foot dorsiflexion:  4  Foot plantar flexion:  4  Foot inversion:  4  Foot eversion:  4      Left Foot Muscle Strength   Foot dorsiflexion:  4  Foot plantar flexion:  4  Foot inversion:  4  Foot eversion:  4     RANGE OF MOTION      Right Foot Range of Motion   Foot and ankle ROM within normal limits        Left Foot Range of Motion   Foot and ankle ROM within normal limits       DERMATOLOGIC       Right Foot Dermatologic   Skin  Right foot skin is intact.       Left Foot Dermatologic   Skin  Left foot skin is intact.     Swelling to posterior heel with bulla formation to insertion of Achilles tendon.    RADIOLOGY/NUCLEAR:  MRI Ankle Left Without Contrast    Result Date: 2/8/2024  Narrative: PROCEDURE: MRI ANKLE LEFT  WO CONTRAST  COMPARISON: [facility], CR, XR ANKLE 3+ VW LEFT, 2/07/2024, 15:42.  INDICATIONS: FELT POP TO LEFT HEEL X 1 WEEK AGO AT WORK. SEVERE POSTERIOR ANKLE PAIN WITH A KNOT TO LEFT MEDIAL ANKLE AND SWELLING.     TECHNIQUE: A complete multi-planar examination was performed without contrast.  FINDINGS:  There is severe tendinosis distal Achilles tendon.  There is a full-thickness disruption of the tendon at its insertion site on the calcaneus.  This  full-thickness gap measures 4.6 cm from craniocaudal.  No significant retraction of the tendon fibers.  There is high-grade partial-thickness interstitial tearing of the more watershed/musculotendinous junction.  There is edema in Kager's fat.  There is abnormal marrow edema of the calcaneus most pronounced at the insertion site.  There is erosive change at the Achilles tendon insertion site consistent with probable inflammatory arthritis and enthesopathy.  The calcifications seen in the distal tendon on radiograph are not well seen on MRI.  There is moderate subtalar joint space narrowing and moderate talonavicular joint space narrowing.  There is mild midfoot degenerative change.  There is a moderate-sized os trigonum with mild marrow edema.  There is a small tibiotalar joint effusion.    There is fluid in the posterior tibialis and other flexor tendons consistent with tenosynovitis.  The tendons themselves are intact.  The peroneal tendons are normal appearance.  The extensor tendons are normal appearance.  The syndesmotic ligaments are intact.  The anterior and posterior talofibular ligament and calcaneofibular ligament are intact.  The medial band plantar fascia is thickened has high signal intensity.  There is a moderate-sized plantar calcaneal spur.  The sinus tarsi has fat signal intensity which is normal.  The tarsal tunnel is normal.  There is soft tissue edema most pronounced posteriorly.  There are no osteochondral lesions of the talar dome.  There is mild hindfoot valgus alignment.      Impression:   1. Severe tendinosis and enthesopathy at the distal Achilles tendon.  There is a full-thickness gap of the distal tendon from the insertion site measuring up to 4.6 cm consistent with full thickness tear.  Severe tendinosis of the musculotendinous junction of the Achilles.  Associated soft tissue edema. 2. Marrow edema calcaneus with erosive changes at the insertion site of the Achilles tendon likely  related to inflammatory arthritis and reactive edema calcaneus. 3. Findings of plantar fasciitis medial band plantar fascia. 4. Mild hindfoot and midfoot degenerative change. 5. Tenosynovitis of the flexor tendons.     SAEED VIGIL MD       Electronically Signed and Approved By: SAEED VIGIL MD on 2/08/2024 at 14:57             Duplex Venous Lower Extremity - Left CAR    Result Date: 2/7/2024  Narrative:   Normal left lower extremity venous duplex scan.     XR Foot 3+ View Left    Result Date: 2/7/2024  Narrative: PROCEDURE: XR ANKLE 3+ VW LEFT, 2/07/2024, 15:42 XR FOOT 3+ VW LEFT, 2/07/2024, 15:44 XR TIBIA FIBULA 2 VW LEFT, 2/07/2024, 15:  COMPARISON: None  INDICATIONS: pain no known injury swelling and bruising medial aspect  FINDINGS:  Ankle:  There is soft tissue swelling about the medial portion of the ankle.  The ankle mortise looks intact.  There is osseous demineralization.  There is abnormal appearance to the distal Achilles tendon area with abnormal ossification in soft tissue swelling.  An Achilles tendon injury could account for this.  There is a plantar calcaneal spur.  There is ossification in the soft tissues adjacent to the medial tibial tubercle that may relate to more of the changes in the posterior ankle as opposed to an injury around this area.  Foot:  As noted on the ankle films there is an abnormal appearance to the posterior ankle around the distal Achilles tendon with abnormal ossification and soft tissue swelling that could relate to a more acute Achilles tendon injury.  There is no subluxation or dislocation of the digits.  There is osseous demineralization.  Tibia fibula:  There is no definite fracture.  There is osseous demineralization.      Impression:   1. Abnormal appearance to the area of the distal Achilles tendon.  Injury to this area could not be excluded. 2. Osseous demineralization 3. Soft tissue swelling about the medial ankle/foot. 4. Calcification/ossification adjacent to the  medial tubercle of the talus that may relate to the findings in the posterior ankle area as opposed to an injury around the medial tubercle of the talus       AUSTIN HARRIS MD       Electronically Signed and Approved By: AUSTIN HARRIS MD on 2/07/2024 at 16:04             XR Ankle 3+ View Left    Result Date: 2/7/2024  Narrative: PROCEDURE: XR ANKLE 3+ VW LEFT, 2/07/2024, 15:42 XR FOOT 3+ VW LEFT, 2/07/2024, 15:44 XR TIBIA FIBULA 2 VW LEFT, 2/07/2024, 15:  COMPARISON: None  INDICATIONS: pain no known injury swelling and bruising medial aspect  FINDINGS:  Ankle:  There is soft tissue swelling about the medial portion of the ankle.  The ankle mortise looks intact.  There is osseous demineralization.  There is abnormal appearance to the distal Achilles tendon area with abnormal ossification in soft tissue swelling.  An Achilles tendon injury could account for this.  There is a plantar calcaneal spur.  There is ossification in the soft tissues adjacent to the medial tibial tubercle that may relate to more of the changes in the posterior ankle as opposed to an injury around this area.  Foot:  As noted on the ankle films there is an abnormal appearance to the posterior ankle around the distal Achilles tendon with abnormal ossification and soft tissue swelling that could relate to a more acute Achilles tendon injury.  There is no subluxation or dislocation of the digits.  There is osseous demineralization.  Tibia fibula:  There is no definite fracture.  There is osseous demineralization.      Impression:   1. Abnormal appearance to the area of the distal Achilles tendon.  Injury to this area could not be excluded. 2. Osseous demineralization 3. Soft tissue swelling about the medial ankle/foot. 4. Calcification/ossification adjacent to the medial tubercle of the talus that may relate to the findings in the posterior ankle area as opposed to an injury around the medial tubercle of the talus       AUSTIN HARRIS MD        Electronically Signed and Approved By: AUSTIN HARRIS MD on 2/07/2024 at 16:04             XR Tibia Fibula 2 View Left    Result Date: 2/7/2024  Narrative: PROCEDURE: XR ANKLE 3+ VW LEFT, 2/07/2024, 15:42 XR FOOT 3+ VW LEFT, 2/07/2024, 15:44 XR TIBIA FIBULA 2 VW LEFT, 2/07/2024, 15:  COMPARISON: None  INDICATIONS: pain no known injury swelling and bruising medial aspect  FINDINGS:  Ankle:  There is soft tissue swelling about the medial portion of the ankle.  The ankle mortise looks intact.  There is osseous demineralization.  There is abnormal appearance to the distal Achilles tendon area with abnormal ossification in soft tissue swelling.  An Achilles tendon injury could account for this.  There is a plantar calcaneal spur.  There is ossification in the soft tissues adjacent to the medial tibial tubercle that may relate to more of the changes in the posterior ankle as opposed to an injury around this area.  Foot:  As noted on the ankle films there is an abnormal appearance to the posterior ankle around the distal Achilles tendon with abnormal ossification and soft tissue swelling that could relate to a more acute Achilles tendon injury.  There is no subluxation or dislocation of the digits.  There is osseous demineralization.  Tibia fibula:  There is no definite fracture.  There is osseous demineralization.      Impression:   1. Abnormal appearance to the area of the distal Achilles tendon.  Injury to this area could not be excluded. 2. Osseous demineralization 3. Soft tissue swelling about the medial ankle/foot. 4. Calcification/ossification adjacent to the medial tubercle of the talus that may relate to the findings in the posterior ankle area as opposed to an injury around the medial tubercle of the talus       AUSTIN HARRIS MD       Electronically Signed and Approved By: AUSTIN HARRIS MD on 2/07/2024 at 16:04              LABORATORY/CULTURE RESULTS:  Results from last 7 days   Lab Units 02/09/24  0618  02/08/24  0403 02/07/24  1533   WBC 10*3/mm3 24.22* 24.32* 22.41*   HEMOGLOBIN g/dL 11.2* 11.4* 12.2*   HEMATOCRIT % 34.5* 35.8* 36.4*   PLATELETS 10*3/mm3 195 168 165     Results from last 7 days   Lab Units 02/09/24  0618 02/08/24  0403 02/07/24  1533   SODIUM mmol/L 132* 132* 135*   POTASSIUM mmol/L 3.7 4.0 3.5   CHLORIDE mmol/L 98 98 99   CO2 mmol/L 24.2 23.5 20.4*   BUN mg/dL 15 14 14   CREATININE mg/dL 0.74* 0.80 0.72*   CALCIUM mg/dL 8.9 9.1 9.1   BILIRUBIN mg/dL  --   --  0.9   ALK PHOS U/L  --   --  144*   ALT (SGPT) U/L  --   --  18   AST (SGOT) U/L  --   --  22   GLUCOSE mg/dL 160* 126* 157*         Microbiology Results (last 10 days)       Procedure Component Value - Date/Time    Blood Culture - Blood, Hand, Left [[id number]]  (Abnormal) Collected: 02/08/24 1138    Lab Status: Preliminary result Specimen: Blood from Hand, Left Updated: 02/09/24 0237     Blood Culture Abnormal Stain     Gram Stain Aerobic Bottle Gram positive cocci in clusters      Anaerobic Bottle Gram positive cocci in clusters    Blood Culture ID, PCR - Blood, Hand, Left [[id number]]  (Abnormal) Collected: 02/08/24 1138    Lab Status: Final result Specimen: Blood from Hand, Left Updated: 02/09/24 0356     BCID, PCR Staph aureus. mecA/C and MREJ (methicillin resistance gene) detected. Identification by BCID2 PCR.     BOTTLE TYPE Aerobic Bottle    Narrative:      Infectious disease consultation is highly recommended to rule out distant foci of infection.             ASSESSMENT/PLAN     Active Hospital Problems:  Active Hospital Problems    Diagnosis     **Achilles rupture     Acute hematogenous osteomyelitis     Abscess of heel          Comprehensive lower extremity examination and evaluation was performed.    Patient white count 22 with positive blood cultures.  Suspect hematogenous osteomyelitis of the calcaneus with associated rupture.    Patient for incision and drainage today of left leg.    Continue IV antibiotics    Discussed plan  with patient.    Discussed findings and treatment plan including risks, benefits, and treatment options with patient in detail. Patient agreed with treatment plan.      This document has been electronically signed by Alvarez Beck DPM on February 9, 2024 07:17 EST

## 2024-02-09 NOTE — CONSULTS
02/09/24 1606   Coping/Psychosocial   Additional Documentation Spiritual Care (Group)   Spiritual Care   Spiritual Care Source patient request (describe)   Spiritual Care Follow-Up follow-up planned regularly for general support   Response to Spiritual Care receptive of support;engaged in conversation   Spiritual Care Interventions supportive conversation provided   Spiritual Care Visit Type initial   Spiritual Care Request coping/stress of illness support;mood/anxiety support;procedure preparation support   Receptivity to Spiritual Care visit welcomed

## 2024-02-09 NOTE — PLAN OF CARE
Goal Outcome Evaluation:  Plan of Care Reviewed With: patient        Progress: no change  Outcome Evaluation: pt medicated x3 for left ankle/foot pain, pt states medication gives him relief for approximately 1 1/2 hrs and then pain starts again. jerry sharif notified of this. pt voiding per urinal. left leg elevated on pillows. ice pack intact left ankle.  pt npo after midnight for surgery 2-9-24. no changes noted in pt's status.

## 2024-02-10 LAB
ANION GAP SERPL CALCULATED.3IONS-SCNC: 11.1 MMOL/L (ref 5–15)
BASOPHILS # BLD AUTO: 0.07 10*3/MM3 (ref 0–0.2)
BASOPHILS NFR BLD AUTO: 0.3 % (ref 0–1.5)
BUN SERPL-MCNC: 14 MG/DL (ref 6–20)
BUN/CREAT SERPL: 21.2 (ref 7–25)
CALCIUM SPEC-SCNC: 8.4 MG/DL (ref 8.6–10.5)
CHLORIDE SERPL-SCNC: 97 MMOL/L (ref 98–107)
CO2 SERPL-SCNC: 21.9 MMOL/L (ref 22–29)
CREAT SERPL-MCNC: 0.66 MG/DL (ref 0.76–1.27)
DEPRECATED RDW RBC AUTO: 45.7 FL (ref 37–54)
EGFRCR SERPLBLD CKD-EPI 2021: 114.3 ML/MIN/1.73
EOSINOPHIL # BLD AUTO: 0.14 10*3/MM3 (ref 0–0.4)
EOSINOPHIL NFR BLD AUTO: 0.6 % (ref 0.3–6.2)
ERYTHROCYTE [DISTWIDTH] IN BLOOD BY AUTOMATED COUNT: 13.6 % (ref 12.3–15.4)
GLUCOSE BLDC GLUCOMTR-MCNC: 172 MG/DL (ref 70–99)
GLUCOSE BLDC GLUCOMTR-MCNC: 174 MG/DL (ref 70–99)
GLUCOSE BLDC GLUCOMTR-MCNC: 210 MG/DL (ref 70–99)
GLUCOSE BLDC GLUCOMTR-MCNC: 226 MG/DL (ref 70–99)
GLUCOSE SERPL-MCNC: 149 MG/DL (ref 65–99)
HCT VFR BLD AUTO: 31.7 % (ref 37.5–51)
HGB BLD-MCNC: 10.4 G/DL (ref 13–17.7)
IMM GRANULOCYTES # BLD AUTO: 0.27 10*3/MM3 (ref 0–0.05)
IMM GRANULOCYTES NFR BLD AUTO: 1.1 % (ref 0–0.5)
LYMPHOCYTES # BLD AUTO: 1.4 10*3/MM3 (ref 0.7–3.1)
LYMPHOCYTES NFR BLD AUTO: 5.8 % (ref 19.6–45.3)
MAGNESIUM SERPL-MCNC: 2.2 MG/DL (ref 1.6–2.6)
MCH RBC QN AUTO: 29.8 PG (ref 26.6–33)
MCHC RBC AUTO-ENTMCNC: 32.8 G/DL (ref 31.5–35.7)
MCV RBC AUTO: 90.8 FL (ref 79–97)
MONOCYTES # BLD AUTO: 1.78 10*3/MM3 (ref 0.1–0.9)
MONOCYTES NFR BLD AUTO: 7.4 % (ref 5–12)
NEUTROPHILS NFR BLD AUTO: 20.38 10*3/MM3 (ref 1.7–7)
NEUTROPHILS NFR BLD AUTO: 84.8 % (ref 42.7–76)
NRBC BLD AUTO-RTO: 0 /100 WBC (ref 0–0.2)
PLATELET # BLD AUTO: 204 10*3/MM3 (ref 140–450)
PMV BLD AUTO: 10.7 FL (ref 6–12)
POTASSIUM SERPL-SCNC: 3.7 MMOL/L (ref 3.5–5.2)
RBC # BLD AUTO: 3.49 10*6/MM3 (ref 4.14–5.8)
SODIUM SERPL-SCNC: 130 MMOL/L (ref 136–145)
VANCOMYCIN SERPL-MCNC: 19.4 MCG/ML (ref 5–40)
WBC NRBC COR # BLD AUTO: 24.04 10*3/MM3 (ref 3.4–10.8)

## 2024-02-10 PROCEDURE — 63710000001 INSULIN LISPRO (HUMAN) PER 5 UNITS: Performed by: PODIATRIST

## 2024-02-10 PROCEDURE — 87040 BLOOD CULTURE FOR BACTERIA: CPT | Performed by: PODIATRIST

## 2024-02-10 PROCEDURE — 25010000002 KETOROLAC TROMETHAMINE PER 15 MG: Performed by: NURSE ANESTHETIST, CERTIFIED REGISTERED

## 2024-02-10 PROCEDURE — 88311 DECALCIFY TISSUE: CPT | Performed by: PODIATRIST

## 2024-02-10 PROCEDURE — 87205 SMEAR GRAM STAIN: CPT | Performed by: PODIATRIST

## 2024-02-10 PROCEDURE — 25010000002 DEXAMETHASONE PER 1 MG: Performed by: NURSE ANESTHETIST, CERTIFIED REGISTERED

## 2024-02-10 PROCEDURE — 94761 N-INVAS EAR/PLS OXIMETRY MLT: CPT

## 2024-02-10 PROCEDURE — 25810000003 LACTATED RINGERS PER 1000 ML: Performed by: ANESTHESIOLOGY

## 2024-02-10 PROCEDURE — 25010000002 PROPOFOL 10 MG/ML EMULSION: Performed by: NURSE ANESTHETIST, CERTIFIED REGISTERED

## 2024-02-10 PROCEDURE — 82948 REAGENT STRIP/BLOOD GLUCOSE: CPT

## 2024-02-10 PROCEDURE — 87070 CULTURE OTHR SPECIMN AEROBIC: CPT | Performed by: PODIATRIST

## 2024-02-10 PROCEDURE — 87116 MYCOBACTERIA CULTURE: CPT | Performed by: PODIATRIST

## 2024-02-10 PROCEDURE — 87015 SPECIMEN INFECT AGNT CONCNTJ: CPT | Performed by: PODIATRIST

## 2024-02-10 PROCEDURE — 99233 SBSQ HOSP IP/OBS HIGH 50: CPT | Performed by: INTERNAL MEDICINE

## 2024-02-10 PROCEDURE — 25010000002 HYDROMORPHONE 1 MG/ML SOLUTION: Performed by: INTERNAL MEDICINE

## 2024-02-10 PROCEDURE — 82948 REAGENT STRIP/BLOOD GLUCOSE: CPT | Performed by: PODIATRIST

## 2024-02-10 PROCEDURE — 87186 SC STD MICRODIL/AGAR DIL: CPT | Performed by: PODIATRIST

## 2024-02-10 PROCEDURE — 87176 TISSUE HOMOGENIZATION CULTR: CPT | Performed by: PODIATRIST

## 2024-02-10 PROCEDURE — 0QBM0ZX EXCISION OF LEFT TARSAL, OPEN APPROACH, DIAGNOSTIC: ICD-10-PCS | Performed by: PODIATRIST

## 2024-02-10 PROCEDURE — 87147 CULTURE TYPE IMMUNOLOGIC: CPT | Performed by: PODIATRIST

## 2024-02-10 PROCEDURE — 87206 SMEAR FLUORESCENT/ACID STAI: CPT | Performed by: PODIATRIST

## 2024-02-10 PROCEDURE — 28005 TREAT FOOT BONE LESION: CPT | Performed by: PODIATRIST

## 2024-02-10 PROCEDURE — 87075 CULTR BACTERIA EXCEPT BLOOD: CPT | Performed by: PODIATRIST

## 2024-02-10 PROCEDURE — 25010000002 HYDROMORPHONE 1 MG/ML SOLUTION: Performed by: PODIATRIST

## 2024-02-10 PROCEDURE — 83735 ASSAY OF MAGNESIUM: CPT | Performed by: INTERNAL MEDICINE

## 2024-02-10 PROCEDURE — 25010000002 FENTANYL CITRATE (PF) 50 MCG/ML SOLUTION: Performed by: NURSE ANESTHETIST, CERTIFIED REGISTERED

## 2024-02-10 PROCEDURE — 25010000002 HYDROMORPHONE 1 MG/ML SOLUTION: Performed by: NURSE ANESTHETIST, CERTIFIED REGISTERED

## 2024-02-10 PROCEDURE — 25010000002 SUGAMMADEX 200 MG/2ML SOLUTION: Performed by: NURSE ANESTHETIST, CERTIFIED REGISTERED

## 2024-02-10 PROCEDURE — 25810000003 SODIUM CHLORIDE 0.9 % SOLUTION: Performed by: PODIATRIST

## 2024-02-10 PROCEDURE — 80048 BASIC METABOLIC PNL TOTAL CA: CPT | Performed by: INTERNAL MEDICINE

## 2024-02-10 PROCEDURE — 94799 UNLISTED PULMONARY SVC/PX: CPT

## 2024-02-10 PROCEDURE — 25010000002 ONDANSETRON PER 1 MG: Performed by: NURSE ANESTHETIST, CERTIFIED REGISTERED

## 2024-02-10 PROCEDURE — 25810000003 LACTATED RINGERS PER 1000 ML: Performed by: NURSE ANESTHETIST, CERTIFIED REGISTERED

## 2024-02-10 PROCEDURE — 0L9T0ZZ DRAINAGE OF LEFT ANKLE TENDON, OPEN APPROACH: ICD-10-PCS | Performed by: PODIATRIST

## 2024-02-10 PROCEDURE — 85025 COMPLETE CBC W/AUTO DIFF WBC: CPT | Performed by: INTERNAL MEDICINE

## 2024-02-10 PROCEDURE — 80202 ASSAY OF VANCOMYCIN: CPT | Performed by: INTERNAL MEDICINE

## 2024-02-10 PROCEDURE — 25010000002 MIDAZOLAM PER 1MG: Performed by: ANESTHESIOLOGY

## 2024-02-10 PROCEDURE — 88307 TISSUE EXAM BY PATHOLOGIST: CPT | Performed by: PODIATRIST

## 2024-02-10 PROCEDURE — 25010000002 VANCOMYCIN 5 G RECONSTITUTED SOLUTION: Performed by: PODIATRIST

## 2024-02-10 RX ORDER — MEPERIDINE HYDROCHLORIDE 25 MG/ML
12.5 INJECTION INTRAMUSCULAR; INTRAVENOUS; SUBCUTANEOUS
Status: DISCONTINUED | OUTPATIENT
Start: 2024-02-10 | End: 2024-02-10 | Stop reason: HOSPADM

## 2024-02-10 RX ORDER — MIDAZOLAM HYDROCHLORIDE 2 MG/2ML
2 INJECTION, SOLUTION INTRAMUSCULAR; INTRAVENOUS ONCE
Status: COMPLETED | OUTPATIENT
Start: 2024-02-10 | End: 2024-02-10

## 2024-02-10 RX ORDER — OXYCODONE HYDROCHLORIDE 5 MG/1
5 TABLET ORAL
Status: DISCONTINUED | OUTPATIENT
Start: 2024-02-10 | End: 2024-02-10 | Stop reason: HOSPADM

## 2024-02-10 RX ORDER — PROMETHAZINE HYDROCHLORIDE 12.5 MG/1
25 TABLET ORAL ONCE AS NEEDED
Status: DISCONTINUED | OUTPATIENT
Start: 2024-02-10 | End: 2024-02-10 | Stop reason: HOSPADM

## 2024-02-10 RX ORDER — ALBUTEROL SULFATE 2.5 MG/3ML
SOLUTION RESPIRATORY (INHALATION) AS NEEDED
Status: DISCONTINUED | OUTPATIENT
Start: 2024-02-10 | End: 2024-02-10 | Stop reason: SURG

## 2024-02-10 RX ORDER — NICOTINE POLACRILEX 4 MG
15 LOZENGE BUCCAL
Status: DISCONTINUED | OUTPATIENT
Start: 2024-02-10 | End: 2024-02-16 | Stop reason: HOSPADM

## 2024-02-10 RX ORDER — DEXAMETHASONE SODIUM PHOSPHATE 4 MG/ML
INJECTION, SOLUTION INTRA-ARTICULAR; INTRALESIONAL; INTRAMUSCULAR; INTRAVENOUS; SOFT TISSUE AS NEEDED
Status: DISCONTINUED | OUTPATIENT
Start: 2024-02-10 | End: 2024-02-10 | Stop reason: SURG

## 2024-02-10 RX ORDER — IBUPROFEN 600 MG/1
1 TABLET ORAL
Status: DISCONTINUED | OUTPATIENT
Start: 2024-02-10 | End: 2024-02-16 | Stop reason: HOSPADM

## 2024-02-10 RX ORDER — ROCURONIUM BROMIDE 10 MG/ML
INJECTION, SOLUTION INTRAVENOUS AS NEEDED
Status: DISCONTINUED | OUTPATIENT
Start: 2024-02-10 | End: 2024-02-10 | Stop reason: SURG

## 2024-02-10 RX ORDER — ONDANSETRON 2 MG/ML
4 INJECTION INTRAMUSCULAR; INTRAVENOUS ONCE AS NEEDED
Status: DISCONTINUED | OUTPATIENT
Start: 2024-02-10 | End: 2024-02-10 | Stop reason: HOSPADM

## 2024-02-10 RX ORDER — SODIUM CHLORIDE, SODIUM LACTATE, POTASSIUM CHLORIDE, CALCIUM CHLORIDE 600; 310; 30; 20 MG/100ML; MG/100ML; MG/100ML; MG/100ML
INJECTION, SOLUTION INTRAVENOUS CONTINUOUS PRN
Status: DISCONTINUED | OUTPATIENT
Start: 2024-02-10 | End: 2024-02-10 | Stop reason: SURG

## 2024-02-10 RX ORDER — DEXMEDETOMIDINE HYDROCHLORIDE 100 UG/ML
INJECTION, SOLUTION INTRAVENOUS AS NEEDED
Status: DISCONTINUED | OUTPATIENT
Start: 2024-02-10 | End: 2024-02-10 | Stop reason: SURG

## 2024-02-10 RX ORDER — ONDANSETRON 2 MG/ML
INJECTION INTRAMUSCULAR; INTRAVENOUS AS NEEDED
Status: DISCONTINUED | OUTPATIENT
Start: 2024-02-10 | End: 2024-02-10 | Stop reason: SURG

## 2024-02-10 RX ORDER — PROPOFOL 10 MG/ML
VIAL (ML) INTRAVENOUS AS NEEDED
Status: DISCONTINUED | OUTPATIENT
Start: 2024-02-10 | End: 2024-02-10 | Stop reason: SURG

## 2024-02-10 RX ORDER — SODIUM CHLORIDE, SODIUM LACTATE, POTASSIUM CHLORIDE, CALCIUM CHLORIDE 600; 310; 30; 20 MG/100ML; MG/100ML; MG/100ML; MG/100ML
30 INJECTION, SOLUTION INTRAVENOUS CONTINUOUS
Status: DISCONTINUED | OUTPATIENT
Start: 2024-02-10 | End: 2024-02-16

## 2024-02-10 RX ORDER — INSULIN LISPRO 100 [IU]/ML
2-9 INJECTION, SOLUTION INTRAVENOUS; SUBCUTANEOUS
Status: DISCONTINUED | OUTPATIENT
Start: 2024-02-10 | End: 2024-02-16 | Stop reason: HOSPADM

## 2024-02-10 RX ORDER — FENTANYL CITRATE 50 UG/ML
INJECTION, SOLUTION INTRAMUSCULAR; INTRAVENOUS AS NEEDED
Status: DISCONTINUED | OUTPATIENT
Start: 2024-02-10 | End: 2024-02-10 | Stop reason: SURG

## 2024-02-10 RX ORDER — PROMETHAZINE HYDROCHLORIDE 25 MG/1
25 SUPPOSITORY RECTAL ONCE AS NEEDED
Status: DISCONTINUED | OUTPATIENT
Start: 2024-02-10 | End: 2024-02-10 | Stop reason: HOSPADM

## 2024-02-10 RX ORDER — DEXTROSE MONOHYDRATE 25 G/50ML
25 INJECTION, SOLUTION INTRAVENOUS
Status: DISCONTINUED | OUTPATIENT
Start: 2024-02-10 | End: 2024-02-16 | Stop reason: HOSPADM

## 2024-02-10 RX ORDER — LIDOCAINE HYDROCHLORIDE 20 MG/ML
INJECTION, SOLUTION EPIDURAL; INFILTRATION; INTRACAUDAL; PERINEURAL AS NEEDED
Status: DISCONTINUED | OUTPATIENT
Start: 2024-02-10 | End: 2024-02-10 | Stop reason: SURG

## 2024-02-10 RX ORDER — KETOROLAC TROMETHAMINE 30 MG/ML
INJECTION, SOLUTION INTRAMUSCULAR; INTRAVENOUS AS NEEDED
Status: DISCONTINUED | OUTPATIENT
Start: 2024-02-10 | End: 2024-02-10 | Stop reason: SURG

## 2024-02-10 RX ADMIN — DEXMEDETOMIDINE HYDROCHLORIDE 10 MCG: 100 INJECTION, SOLUTION, CONCENTRATE INTRAVENOUS at 07:37

## 2024-02-10 RX ADMIN — HYDROMORPHONE HYDROCHLORIDE 1.5 MG: 1 INJECTION, SOLUTION INTRAMUSCULAR; INTRAVENOUS; SUBCUTANEOUS at 11:52

## 2024-02-10 RX ADMIN — HYDROMORPHONE HYDROCHLORIDE 1 MG: 1 INJECTION, SOLUTION INTRAMUSCULAR; INTRAVENOUS; SUBCUTANEOUS at 15:26

## 2024-02-10 RX ADMIN — LIDOCAINE HYDROCHLORIDE 100 MG: 20 INJECTION, SOLUTION EPIDURAL; INFILTRATION; INTRACAUDAL; PERINEURAL at 07:33

## 2024-02-10 RX ADMIN — SODIUM CHLORIDE, POTASSIUM CHLORIDE, SODIUM LACTATE AND CALCIUM CHLORIDE: 600; 310; 30; 20 INJECTION, SOLUTION INTRAVENOUS at 07:25

## 2024-02-10 RX ADMIN — INSULIN LISPRO 2 UNITS: 100 INJECTION, SOLUTION INTRAVENOUS; SUBCUTANEOUS at 17:00

## 2024-02-10 RX ADMIN — MIDAZOLAM HYDROCHLORIDE 2 MG: 1 INJECTION, SOLUTION INTRAMUSCULAR; INTRAVENOUS at 07:14

## 2024-02-10 RX ADMIN — HYDROMORPHONE HYDROCHLORIDE 1 MG: 1 INJECTION, SOLUTION INTRAMUSCULAR; INTRAVENOUS; SUBCUTANEOUS at 22:21

## 2024-02-10 RX ADMIN — HYDROMORPHONE HYDROCHLORIDE 0.5 MG: 1 INJECTION, SOLUTION INTRAMUSCULAR; INTRAVENOUS; SUBCUTANEOUS at 08:48

## 2024-02-10 RX ADMIN — Medication 10 ML: at 10:20

## 2024-02-10 RX ADMIN — DEXMEDETOMIDINE HYDROCHLORIDE 10 MCG: 100 INJECTION, SOLUTION, CONCENTRATE INTRAVENOUS at 07:45

## 2024-02-10 RX ADMIN — DEXMEDETOMIDINE HYDROCHLORIDE 10 MCG: 100 INJECTION, SOLUTION, CONCENTRATE INTRAVENOUS at 08:06

## 2024-02-10 RX ADMIN — HYDROMORPHONE HYDROCHLORIDE 1.5 MG: 1 INJECTION, SOLUTION INTRAMUSCULAR; INTRAVENOUS; SUBCUTANEOUS at 00:27

## 2024-02-10 RX ADMIN — HYDROCODONE BITARTRATE AND ACETAMINOPHEN 1 TABLET: 7.5; 325 TABLET ORAL at 13:43

## 2024-02-10 RX ADMIN — Medication 10 ML: at 21:56

## 2024-02-10 RX ADMIN — INSULIN LISPRO 4 UNITS: 100 INJECTION, SOLUTION INTRAVENOUS; SUBCUTANEOUS at 11:52

## 2024-02-10 RX ADMIN — DEXAMETHASONE SODIUM PHOSPHATE 4 MG: 4 INJECTION, SOLUTION INTRAMUSCULAR; INTRAVENOUS at 08:10

## 2024-02-10 RX ADMIN — VANCOMYCIN HYDROCHLORIDE 1500 MG: 5 INJECTION, POWDER, LYOPHILIZED, FOR SOLUTION INTRAVENOUS at 10:20

## 2024-02-10 RX ADMIN — SODIUM CHLORIDE, POTASSIUM CHLORIDE, SODIUM LACTATE AND CALCIUM CHLORIDE 30 ML/HR: 600; 310; 30; 20 INJECTION, SOLUTION INTRAVENOUS at 07:13

## 2024-02-10 RX ADMIN — ALBUTEROL SULFATE 2.5 MG: 2.5 SOLUTION RESPIRATORY (INHALATION) at 07:41

## 2024-02-10 RX ADMIN — HYDROMORPHONE HYDROCHLORIDE 1 MG: 1 INJECTION, SOLUTION INTRAMUSCULAR; INTRAVENOUS; SUBCUTANEOUS at 19:18

## 2024-02-10 RX ADMIN — INSULIN LISPRO 2 UNITS: 100 INJECTION, SOLUTION INTRAVENOUS; SUBCUTANEOUS at 21:55

## 2024-02-10 RX ADMIN — KETOROLAC TROMETHAMINE 30 MG: 30 INJECTION, SOLUTION INTRAMUSCULAR; INTRAVENOUS at 08:10

## 2024-02-10 RX ADMIN — SUGAMMADEX 200 MG: 100 INJECTION, SOLUTION INTRAVENOUS at 08:23

## 2024-02-10 RX ADMIN — HYDROMORPHONE HYDROCHLORIDE 0.5 MG: 1 INJECTION, SOLUTION INTRAMUSCULAR; INTRAVENOUS; SUBCUTANEOUS at 08:54

## 2024-02-10 RX ADMIN — DEXMEDETOMIDINE HYDROCHLORIDE 10 MCG: 100 INJECTION, SOLUTION, CONCENTRATE INTRAVENOUS at 07:31

## 2024-02-10 RX ADMIN — VANCOMYCIN HYDROCHLORIDE 1500 MG: 5 INJECTION, POWDER, LYOPHILIZED, FOR SOLUTION INTRAVENOUS at 21:56

## 2024-02-10 RX ADMIN — FENTANYL CITRATE 100 MCG: 50 INJECTION, SOLUTION INTRAMUSCULAR; INTRAVENOUS at 07:33

## 2024-02-10 RX ADMIN — ONDANSETRON HYDROCHLORIDE 4 MG: 2 SOLUTION INTRAMUSCULAR; INTRAVENOUS at 08:10

## 2024-02-10 RX ADMIN — OXYCODONE 5 MG: 5 TABLET ORAL at 09:08

## 2024-02-10 RX ADMIN — HYDROMORPHONE HYDROCHLORIDE 1.5 MG: 1 INJECTION, SOLUTION INTRAMUSCULAR; INTRAVENOUS; SUBCUTANEOUS at 03:56

## 2024-02-10 RX ADMIN — PROPOFOL 200 MG: 10 INJECTION, EMULSION INTRAVENOUS at 07:33

## 2024-02-10 RX ADMIN — ROCURONIUM BROMIDE 50 MG: 10 INJECTION, SOLUTION INTRAVENOUS at 07:33

## 2024-02-10 NOTE — PLAN OF CARE
Goal Outcome Evaluation:  Plan of Care Reviewed With: patient        Progress: no change  Outcome Evaluation: pt. medicated x 3 for pain with relief noted.  voiding without difficulty.  VSS.  no significant changes noted this shift.

## 2024-02-10 NOTE — PROGRESS NOTES
"Patient Care Team:  Provider, No Known as PCP - General    Date: 2/10/2024  Patient Name: Karsten Johnson  : 1975  MRN: [id number]  Date of admission: 2024  Room/Bed: 223/1      Subjective   Subjective         Chief Complaint: f/u left foot pain     Summary:Karsten Johnson is a 48 y.o. obese male with essentially no past medical history who presented to the emergency department with left heel/Achilles pain that began 5 days ago.  Patient says that happened while at work.  There was no trauma.  Patient said the pain started slowly but steadily increased.  Patient went to an urgent care in [facility] and was told that he most likely had a torn Achilles.  He said he was told by the physician there to \"just take it easy\". Unfortunately, the swelling and pain intensified over the last couple of days [facility] for further evaluation.  Here in the ED he had an x-ray that shows rupture of the Achilles tendon.  The case was discussed with both orthopedic surgery as well as podiatry.  Podiatry recommended an MRI without contrast.      Interval Followup:   Patient seen after surgery, states he is feeling much better. Nursing report no acute overnight events.         Review of Systems   Constitutional:  Negative for chills, diaphoresis and fever.         Objective   Objective       Vital Signs  Temp:  [97.1 °F (36.2 °C)-99.9 °F (37.7 °C)] 99 °F (37.2 °C)  Heart Rate:  [62-88] 66  Resp:  [16-22] 16  BP: (105-156)/(64-98) 112/67  Oxygen Therapy  SpO2: 99 %  Pulse Oximetry Type: Continuous  Device (Oxygen Therapy): nasal cannula  Flow (L/min): 1  Flowsheet Rows      Flowsheet Row First Filed Value   Admission Height 167.6 cm (66\") Documented at 2024 1500   Admission Weight 104 kg (230 lb 6.1 oz) Documented at 2024          Intake & Output (last 3 days)          07 07 0701   07 07 07 07    Urine (mL/kg/hr)   300 200 (0.2)    Total " Output   300 200    Net   -300 -200            Urine Unmeasured Occurrence   2 x           Lines, Drains & Airways       Active LDAs       Name Placement date Placement time Site Days    Peripheral IV 02/08/24 1015 Anterior;Right Antecubital 02/08/24  1015  Antecubital  less than 1                      Physical Exam  Vitals reviewed.   Constitutional:       General: He is not in acute distress.     Appearance: He is obese.   HENT:      Head: Normocephalic and atraumatic.      Mouth/Throat:      Mouth: Mucous membranes are moist.   Cardiovascular:      Rate and Rhythm: Normal rate and regular rhythm.      Heart sounds: No murmur heard.  Pulmonary:      Effort: Pulmonary effort is normal. No respiratory distress.      Breath sounds: No wheezing or rales.   Abdominal:      General: Bowel sounds are normal. There is no distension.      Palpations: Abdomen is soft.      Tenderness: There is no abdominal tenderness.   Musculoskeletal:      Comments: +1 np LLE edema    Skin:     Comments: Dressing in place to LLE    Neurological:      Mental Status: He is alert.   Psychiatric:         Mood and Affect: Mood normal.         Behavior: Behavior normal.           Results Review:      Results from last 7 days   Lab Units 02/10/24  0302 02/09/24  0618 02/08/24  0403   WBC 10*3/mm3 24.04* 24.22* 24.32*   HEMOGLOBIN g/dL 10.4* 11.2* 11.4*   HEMATOCRIT % 31.7* 34.5* 35.8*   PLATELETS 10*3/mm3 204 195 168     Results from last 7 days   Lab Units 02/10/24  0302 02/09/24  0618 02/08/24  0403 02/07/24  1533   SODIUM mmol/L 130* 132* 132* 135*   POTASSIUM mmol/L 3.7 3.7 4.0 3.5   CHLORIDE mmol/L 97* 98 98 99   CO2 mmol/L 21.9* 24.2 23.5 20.4*   BUN mg/dL 14 15 14 14   CREATININE mg/dL 0.66* 0.74* 0.80 0.72*   CALCIUM mg/dL 8.4* 8.9 9.1 9.1   BILIRUBIN mg/dL  --   --   --  0.9   ALK PHOS U/L  --   --   --  144*   ALT (SGPT) U/L  --   --   --  18   AST (SGOT) U/L  --   --   --  22   GLUCOSE mg/dL 149* 160* 126* 157*       Results from last  "7 days   Lab Units 02/10/24  0302 02/09/24  0618 02/08/24  0403   MAGNESIUM mg/dL 2.2 2.2 1.9       No results found for: \"BLOODCX\"    No results found for: \"MRSACX\"    No results found for: \"STOOLCX\"    No results found for: \"URINECX\"    No results found for: \"WOUNDCX\"    Imaging Results (Last 24 Hours)       ** No results found for the last 24 hours. **            I have personally reviewed the patient's new imaging and lab results.          ECG/EMG Results (most recent)       Procedure Component Value Units Date/Time    ECG 12 Lead Pre-Op / Pre-Procedure [[id number]] Collected: 02/07/24 2109     Updated: 02/08/24 0904     QT Interval 341 ms      QTC Interval 428 ms     Narrative:      HEART RATE= 95  bpm  RR Interval= 636  ms  KS Interval= 167  ms  P Horizontal Axis= 7  deg  P Front Axis= 46  deg  QRSD Interval= 99  ms  QT Interval= 341  ms  QTcB= 428  ms  QRS Axis= 9  deg  T Wave Axis= 31  deg  - NORMAL ECG -  Sinus rhythm  No previous ECG available for comparison  Electronically Signed By: Jarrett Padilla ([facility]) 08-Feb-2024 09:04:41  Date and Time of Study: 2024-02-07 21:09:27    Adult Transthoracic Echo Complete W/ Cont if Necessary Per Protocol [[id number]] Resulted: 02/09/24 1439     Updated: 02/09/24 1442     EF(MOD-bp) 55.3 %      LVIDd 5.4 cm      LVIDs 3.8 cm      IVSd 0.97 cm      LVPWd 1.21 cm      FS 29.1 %      IVS/LVPW 0.80 cm      ESV(cubed) 56.4 ml      LV Sys Vol (BSA corrected) 29.0 cm2      EDV(cubed) 158.2 ml      LV Flowers Vol (BSA corrected) 81.0 cm2      LV mass(C)d 232.0 grams      LVOT area 3.9 cm2      LVOT diam 2.22 cm      EDV(MOD-sp2) 126.0 ml      EDV(MOD-sp4) 172.0 ml      ESV(MOD-sp2) 69.6 ml      ESV(MOD-sp4) 61.5 ml      SV(MOD-sp2) 56.4 ml      SV(MOD-sp4) 110.5 ml      SI(MOD-sp2) 26.6 ml/m2      SI(MOD-sp4) 52.1 ml/m2      EF(MOD-sp2) 44.8 %      EF(MOD-sp4) 64.2 %      MV E max ton 81.8 cm/sec      MV A max ton 98.5 cm/sec      MV dec time 0.30 sec      MV E/A 0.83     LA ESV Index " (BP) 35.6 ml/m2      Med Peak E' Salvador 7.2 cm/sec      Lat Peak E' Salvador 8.6 cm/sec      TR max salvador 259.3 cm/sec      Avg E/e' ratio 10.35     SV(LVOT) 87.7 ml      RVIDd 3.7 cm      TAPSE (>1.6) 3.3 cm      LV V1 max 113.5 cm/sec      LV V1 max PG 5.2 mmHg      LV V1 mean PG 3.6 mmHg      LV V1 VTI 22.6 cm      Ao pk salvador 158.8 cm/sec      Ao max PG 10.1 mmHg      Ao mean PG 5.6 mmHg      Ao V2 VTI 27.5 cm      EVERT(I,D) 3.2 cm2      MV dec slope 276.7 cm/sec2      TR max PG 26.9 mmHg      PA V2 max 109.9 cm/sec     Narrative:        Left ventricular systolic function is normal. Calculated left   ventricular EF = 55.3%    Estimated right ventricular systolic pressure from tricuspid   regurgitation is normal (<35 mmHg).    No obvious wall motion abnormalities    No obvious vegetations on any valve. If high clinical suspicion,   recommend TRACIE              Results for orders placed during the hospital encounter of 02/07/24    Adult Transthoracic Echo Complete W/ Cont if Necessary Per Protocol    Interpretation Summary    Left ventricular systolic function is normal. Calculated left ventricular EF = 55.3%    Estimated right ventricular systolic pressure from tricuspid regurgitation is normal (<35 mmHg).    No obvious wall motion abnormalities    No obvious vegetations on any valve. If high clinical suspicion, recommend TRACIE          Medication Review:     Scheduled Meds:[Held by provider] heparin (porcine), 5,000 Units, Subcutaneous, Q8H  insulin lispro, 2-9 Units, Subcutaneous, 4x Daily AC & at Bedtime  sodium chloride, 10 mL, Intravenous, Q12H  vancomycin, 1,500 mg, Intravenous, Q12H      Continuous Infusions:lactated ringers, 30 mL/hr, Last Rate: 30 mL/hr (02/10/24 0713)  Pharmacy to dose vancomycin,       PRN Meds:.  acetaminophen **OR** acetaminophen    senna-docusate sodium **AND** polyethylene glycol **AND** bisacodyl **AND** bisacodyl    dextrose    dextrose    glucagon (human recombinant)     HYDROcodone-acetaminophen    HYDROmorphone    melatonin    ondansetron    Pharmacy to dose vancomycin    sodium chloride    sodium chloride      I have reviewed the patient's current medication list    Assessment & Plan   Assessment / Plan       Active Hospital Problems    Diagnosis  POA    **Achilles rupture [S86.019A]  Yes    Acute hematogenous osteomyelitis [M86.00]  Unknown    Abscess of heel [L02.619]  Unknown       Left heel pain likely d/t achilles rupture and heel abscess s/p I&D left ankle and biopsy of left ankle 2/10/24  Acute hematogenous osteomyelitis  MRSA bacteremia   - continue Vancomycin   - blood cx- MRSA  - repeat blood cx 2/10- pending    - CRP, ESR, Procal elevated   - doppler negative for DVT  - started on hydrocodone   - decrease IV dilaudid to 1mg q3h prn   - lactic acid wnl   - echocardiogram negative for any signs of vegetation  - patient reports tooth decay     Prediabetes  - Hgb A1c 6.4  - add SSI   - monitor with routine accu-checks and make adjustments as needed     Obesity  - BMI 37.1  - complicates all aspects of care  -  on lifestyle changes as appropriate     Mild anemia  - no signs of bleeding     DVT Prophylaxis  - heparin     Code Status  - Full Code    Patient remains high risk       Plan for disposition: pending progress     Rica Snowden DO  02/10/24  14:07 EST            Note disclaimer: At [facility], we believe that sharing information builds trust and better relationships. You are receiving this note because you recently visited [facility]. It is possible you will see health information before a provider has talked with you about it. This kind of information can be easy to misunderstand. To help you fully understand what it means for your health, we urge you to discuss this note with your provider.

## 2024-02-10 NOTE — PROGRESS NOTES
"[facility] Clinical Pharmacy Services: Vancomycin Monitoring Note    Karsten Johnson is a 48 y.o. male who is on day 3/TBD of pharmacy to dose vancomycin for Bacteremia.    Previous Vancomycin Dose:   1500 mg IV every  12  hours  Imaging Reviewed?: Yes  Updated Cultures and Sensitivities:   2/10 Blood cx : ordered   Blood cx : MRSA    Vitals/Labs  Ht: 167.6 cm (66\"); Wt: 104 kg (230 lb 6.1 oz)   Temp (24hrs), Av.5 °F (36.9 °C), Min:97.1 °F (36.2 °C), Max:99.9 °F (37.7 °C)   Estimated Creatinine Clearance: 152.1 mL/min (A) (by C-G formula based on SCr of 0.66 mg/dL (L)).     Results from last 7 days   Lab Units 02/10/24  0302 24  0618 24  0403   VANCOMYCIN RM mcg/mL 19.40  --   --    CREATININE mg/dL 0.66* 0.74* 0.80   WBC 10*3/mm3 24.04* 24.22* 24.32*     Assessment/Plan    Current Vancomycin Dose:  1500 mg IV every 12 hours; which provides the following predicted parameters:  AUC24,ss: 535 mg/L.hr  PAUC*: 92 %  Ctrough,ss: 16.6 mg/L  Pconc*: 30 %  Tox.: 12 %  Next Vanc Random ordered for  at 0600 with AM labs  We will continue to monitor patient changes and renal function     Thank you for involving pharmacy in this patient's care. Please contact pharmacy with any questions or concerns.    Danielle Schwarz [facility]  Clinical Pharmacist  "

## 2024-02-10 NOTE — OP NOTE
Operative Note   Foot and Ankle Surgery   Provider: Dr. Alvarez Beck DPM  Location: [facility]    Patient Name:  Karsten Johnson  YOB: 1975    Date of Surgery:  2/10/2024    Pre-op Diagnosis:   Abscess of heel [L02.619]       Post-Op Diagnosis Codes:     * Abscess of heel [L02.619]     * Acute hematogenous osteomyelitis [M86.00]    Procedure/CPT® Codes:  MA INCISION BONE CORTEX FOOT [18291]  MA PARTIAL EXCISION BONE TALUS/CALCANEUS [41442]    Procedure(s):  INCISION AND DRAINAGE  LEFT ANKLE,BIOPSY LEFT ANKEL        Staff:  Surgeon(s):  Alvarez Beck DPM          Anesthesia: General    Estimated Blood Loss: less than 5 ml    Implants:    Nothing was implanted during the procedure    Specimen:          Specimens       ID Source Type Tests Collected By Collected At Frozen?    1 Ankle, Left Bone TISSUE / BONE CULTURE   Alvarez Beck DPM 2/10/24 0802     Description: left ankle wound culture    This specimen was not marked as sent.    2 Ankle, Left Bone TISSUE / BONE CULTURE  AFB CULTURE   Alvarez Beck DPM 2/10/24 0817     Description: left ankle bone culture    This specimen was not marked as sent.    B Ankle, Left Bone TISSUE PATHOLOGY EXAM   Alvarez Beck DPM 2/10/24 0814     Description: left ankle bone biopsy for permanent    This specimen was not marked as sent.            Findings: Osteomyelitis calcaneus-20 cc of purulent drainage.    Complications: none    History: Patient is a 48-year-old male that presented with left heel pain.  This has been going on for several months.  Patient was seen in the urgent care 1 week ago for an Achilles rupture.  Patient had worsening symptoms and was brought to hospital.  Patient was noted to have an increased white count and positive blood cultures and confirmed Achilles rupture.  Patient was brought to the operating table today for an incision and drainage and biopsies of his calcaneus.     Description of Procedure:    Procedure, risks, complications, and goals were discussed with the patient at bedside.  Risks include but are not limited to infection, complications from anesthesia (including death), chronic pain or numbness, hematoma/seroma, deep vein thrombosis, wound complications, and potential for additional surgical procedures.  Patient understands and elects to proceed with surgery at this time. Informed consent was obtained before proceeding to the operating suite.  All questions were answered to the patient's satisfaction. No guarantees or assurances were given or implied.      Left foot was prepped and draped in usual sterile manner.  Attention was directed to the posterior aspect of the here where an incision was made deepened through subcutaneous tissue down to the calcaneus.  The necrotic tissue posterior medial was removed from the area leaving a large circular wound.  This probes down to bone and up the Achilles tendon.  An incision was then made over the Achilles tendon deepened through subcutaneous tissue down to the tendon sheath.  The tendon sheath was incised and the Achilles tendon was reflected up.  It was noted that the Achilles tendon was frayed and necrotic at the insertion.  Bone fragmentation was noted at the posterior calcaneus.  Approximately 20 cc of purulent drainage was expressed from the Achilles tendon sheath and the posterior compartment.  Site was flushed with pulse lavage and all purulent drainage was flushed from the site.  Healthy bleeding tissue remained.  Bone cultures and biopsies were taking of the posterior calcaneus. all nonviable Achilles tendon distally was resected back using a 15 blade.  The proximal incision was closed using 2-0 nylon in the distal wound was packed with Betadine soaked Adaptic and gauze.    Alvarez Beck DPM  [facility]   [phone]    Alvarez Beck DPM     Date: 2/10/2024  Time: 08:50 EST      Plan: Patient will be readmitted to the  floor, will follow cultures and response to IV antibiotics.   Will continue with daily dressing changes and packing.Patient likely will require wound VAC to posterior heel wound once stabilized.

## 2024-02-10 NOTE — PROGRESS NOTES
[facility] - PODIATRY    Today's Date: 02/10/24    Patient Name: Karsten Johnson  MRN: [id number]  CSN: [id number]  PCP: Provider, No Known  Referring Provider: No ref. provider found  Attending Provider: Rica Snowden, *  Length of Stay: 3    SUBJECTIVE   Chief Complaint:     HPI: Karsten Johnson, a 48 y.o.male, .  Denies any constitutional symptoms. No other pedal complaints at this time.    History reviewed. No pertinent past medical history.  History reviewed. No pertinent surgical history.  History reviewed. No pertinent family history.  Social History     Socioeconomic History    Marital status:    Tobacco Use    Smoking status: Every Day     Packs/day: 1.00     Years: 25.00     Additional pack years: 0.00     Total pack years: 25.00     Types: Cigarettes     Passive exposure: Never    Smokeless tobacco: Never   Vaping Use    Vaping Use: Never used   Substance and Sexual Activity    Alcohol use: Yes     Alcohol/week: 3.0 standard drinks of alcohol     Types: 3 Cans of beer per week    Drug use: Yes     Types: Marijuana    Sexual activity: Defer     No Known Allergies  Current Facility-Administered Medications   Medication Dose Route Frequency Provider Last Rate Last Admin    [MAR Hold] acetaminophen (TYLENOL) tablet 650 mg  650 mg Oral Q6H PRN Rica Snowden DO   650 mg at 02/08/24 1616    Or    [MAR Hold] acetaminophen (TYLENOL) suppository 650 mg  650 mg Rectal Q6H PRN Rica Snowden DO        [MAR Hold] sennosides-docusate (PERICOLACE) 8.6-50 MG per tablet 2 tablet  2 tablet Oral BID PRN Tao Jacobson DO        And    [MAR Hold] polyethylene glycol (MIRALAX) packet 17 g  17 g Oral Daily PRN Tao Jacobson DO        And    [MAR Hold] bisacodyl (DULCOLAX) EC tablet 5 mg  5 mg Oral Daily PRN Tao Jacobson DO        And    [MAR Hold] bisacodyl (DULCOLAX) suppository 10 mg  10 mg Rectal Daily PRN Tao Jacobson DO        [Held by provider] heparin (porcine)  5000 UNIT/ML injection 5,000 Units  5,000 Units Subcutaneous Q8H Tao Jacobson DO   5,000 Units at 02/08/24 1455    [MAR Hold] HYDROcodone-acetaminophen (NORCO) 7.5-325 MG per tablet 1 tablet  1 tablet Oral Q6H PRN Tao Jacobson DO   1 tablet at 02/09/24 1100    [MAR Hold] HYDROmorphone (DILAUDID) injection 1.5 mg  1.5 mg Intravenous Q2H PRN Rica Snowden DO   1.5 mg at 02/10/24 0356    [MAR Hold] melatonin tablet 10 mg  10 mg Oral Nightly PRN Mony March PA-C   10 mg at 02/08/24 2249    [MAR Hold] ondansetron (ZOFRAN) injection 4 mg  4 mg Intravenous Q6H PRN Tao Jacobson DO   4 mg at 02/09/24 0511    Pharmacy to dose vancomycin   Does not apply Continuous PRN Mony March PA-C        [MAR Hold] sodium chloride 0.9 % flush 10 mL  10 mL Intravenous Q12H Tao Jacobson DO   10 mL at 02/09/24 2141    [MAR Hold] sodium chloride 0.9 % flush 10 mL  10 mL Intravenous PRN Tao Jacobson DO        [MAR Hold] sodium chloride 0.9 % infusion 40 mL  40 mL Intravenous PRN Tao Jacobson DO        vancomycin IVPB 1500 mg in 0.9% NaCl (Premix) 500 mL  1,500 mg Intravenous Q12H Rica Snowden .3 mL/hr at 02/09/24 2141 1,500 mg at 02/09/24 2141     Review of Systems   All other systems reviewed and are negative.      OBJECTIVE     Vitals:    02/10/24 0702   BP:    Pulse:    Resp:    Temp:    SpO2: 95%       PHYSICAL EXAM    GEN:   A&Ox3, NAD. Pt presents in hospital bed.     Foot/Ankle Exam     GENERAL  Appearance:  appears stated age  Orientation:  AAOx3  Affect:  appropriate  Gait:  unimpaired  Assistance:  independent  Right shoe gear: casual shoe  Left shoe gear: casual shoe     VASCULAR      Right Foot Vascularity   Normal vascular exam    Dorsalis pedis:  2+  Posterior tibial:  2+  Skin temperature:  warm  Edema grading:  None  CFT:  < 3 seconds  Pedal hair growth:  Present  Varicosities:  none      Left Foot Vascularity   Normal vascular exam    Dorsalis pedis:   2+  Posterior tibial:  2+  Skin temperature:  warm  Edema grading:  None  CFT:  < 3 seconds  Pedal hair growth:  Present  Varicosities:  none     NEUROLOGIC      Right Foot Neurologic   Normal sensation    Light touch sensation: normal  Vibratory sensation: normal  Hot/Cold sensation: normal  Protective Sensation using Orchard-Flip Monofilament:   Sites intact: 10  Sites tested: 10      Left Foot Neurologic   Normal sensation    Light touch sensation: normal  Vibratory sensation: normal  Hot/Cold sensation:  normal  Protective Sensation using Orchard-Flip Monofilament:   Sites intact: 10  Sites tested: 10     MUSCLE STRENGTH      Right Foot Muscle Strength   Foot dorsiflexion:  4  Foot plantar flexion:  4  Foot inversion:  4  Foot eversion:  4      Left Foot Muscle Strength   Foot dorsiflexion:  4  Foot plantar flexion:  4  Foot inversion:  4  Foot eversion:  4     RANGE OF MOTION      Right Foot Range of Motion   Foot and ankle ROM within normal limits        Left Foot Range of Motion   Foot and ankle ROM within normal limits       DERMATOLOGIC       Right Foot Dermatologic   Skin  Right foot skin is intact.       Left Foot Dermatologic   Skin  Left foot skin is intact.     Swelling to posterior heel with bulla formation to insertion of Achilles tendon.    RADIOLOGY/NUCLEAR:  Adult Transthoracic Echo Complete W/ Cont if Necessary Per Protocol    Result Date: 2/9/2024  Narrative:   Left ventricular systolic function is normal. Calculated left ventricular EF = 55.3%   Estimated right ventricular systolic pressure from tricuspid regurgitation is normal (<35 mmHg).   No obvious wall motion abnormalities   No obvious vegetations on any valve. If high clinical suspicion, recommend TRACIE     MRI Ankle Left Without Contrast    Result Date: 2/8/2024  Narrative: PROCEDURE: MRI ANKLE LEFT  WO CONTRAST  COMPARISON: [facility], CR, XR ANKLE 3+ VW LEFT, 2/07/2024, 15:42.  INDICATIONS: FELT POP TO LEFT HEEL X  1 WEEK AGO AT WORK. SEVERE POSTERIOR ANKLE PAIN WITH A KNOT TO LEFT MEDIAL ANKLE AND SWELLING.     TECHNIQUE: A complete multi-planar examination was performed without contrast.  FINDINGS:  There is severe tendinosis distal Achilles tendon.  There is a full-thickness disruption of the tendon at its insertion site on the calcaneus.  This full-thickness gap measures 4.6 cm from craniocaudal.  No significant retraction of the tendon fibers.  There is high-grade partial-thickness interstitial tearing of the more watershed/musculotendinous junction.  There is edema in Kager's fat.  There is abnormal marrow edema of the calcaneus most pronounced at the insertion site.  There is erosive change at the Achilles tendon insertion site consistent with probable inflammatory arthritis and enthesopathy.  The calcifications seen in the distal tendon on radiograph are not well seen on MRI.  There is moderate subtalar joint space narrowing and moderate talonavicular joint space narrowing.  There is mild midfoot degenerative change.  There is a moderate-sized os trigonum with mild marrow edema.  There is a small tibiotalar joint effusion.    There is fluid in the posterior tibialis and other flexor tendons consistent with tenosynovitis.  The tendons themselves are intact.  The peroneal tendons are normal appearance.  The extensor tendons are normal appearance.  The syndesmotic ligaments are intact.  The anterior and posterior talofibular ligament and calcaneofibular ligament are intact.  The medial band plantar fascia is thickened has high signal intensity.  There is a moderate-sized plantar calcaneal spur.  The sinus tarsi has fat signal intensity which is normal.  The tarsal tunnel is normal.  There is soft tissue edema most pronounced posteriorly.  There are no osteochondral lesions of the talar dome.  There is mild hindfoot valgus alignment.      Impression:   1. Severe tendinosis and enthesopathy at the distal Achilles tendon.   There is a full-thickness gap of the distal tendon from the insertion site measuring up to 4.6 cm consistent with full thickness tear.  Severe tendinosis of the musculotendinous junction of the Achilles.  Associated soft tissue edema. 2. Marrow edema calcaneus with erosive changes at the insertion site of the Achilles tendon likely related to inflammatory arthritis and reactive edema calcaneus. 3. Findings of plantar fasciitis medial band plantar fascia. 4. Mild hindfoot and midfoot degenerative change. 5. Tenosynovitis of the flexor tendons.     SAEED VIGIL MD       Electronically Signed and Approved By: SAEED VIGIL MD on 2/08/2024 at 14:57             Duplex Venous Lower Extremity - Left CAR    Result Date: 2/7/2024  Narrative:   Normal left lower extremity venous duplex scan.     XR Foot 3+ View Left    Result Date: 2/7/2024  Narrative: PROCEDURE: XR ANKLE 3+ VW LEFT, 2/07/2024, 15:42 XR FOOT 3+ VW LEFT, 2/07/2024, 15:44 XR TIBIA FIBULA 2 VW LEFT, 2/07/2024, 15:  COMPARISON: None  INDICATIONS: pain no known injury swelling and bruising medial aspect  FINDINGS:  Ankle:  There is soft tissue swelling about the medial portion of the ankle.  The ankle mortise looks intact.  There is osseous demineralization.  There is abnormal appearance to the distal Achilles tendon area with abnormal ossification in soft tissue swelling.  An Achilles tendon injury could account for this.  There is a plantar calcaneal spur.  There is ossification in the soft tissues adjacent to the medial tibial tubercle that may relate to more of the changes in the posterior ankle as opposed to an injury around this area.  Foot:  As noted on the ankle films there is an abnormal appearance to the posterior ankle around the distal Achilles tendon with abnormal ossification and soft tissue swelling that could relate to a more acute Achilles tendon injury.  There is no subluxation or dislocation of the digits.  There is osseous demineralization.  Tibia  fibula:  There is no definite fracture.  There is osseous demineralization.      Impression:   1. Abnormal appearance to the area of the distal Achilles tendon.  Injury to this area could not be excluded. 2. Osseous demineralization 3. Soft tissue swelling about the medial ankle/foot. 4. Calcification/ossification adjacent to the medial tubercle of the talus that may relate to the findings in the posterior ankle area as opposed to an injury around the medial tubercle of the talus       AUSTIN HARRIS MD       Electronically Signed and Approved By: AUSTIN HARRIS MD on 2/07/2024 at 16:04             XR Ankle 3+ View Left    Result Date: 2/7/2024  Narrative: PROCEDURE: XR ANKLE 3+ VW LEFT, 2/07/2024, 15:42 XR FOOT 3+ VW LEFT, 2/07/2024, 15:44 XR TIBIA FIBULA 2 VW LEFT, 2/07/2024, 15:  COMPARISON: None  INDICATIONS: pain no known injury swelling and bruising medial aspect  FINDINGS:  Ankle:  There is soft tissue swelling about the medial portion of the ankle.  The ankle mortise looks intact.  There is osseous demineralization.  There is abnormal appearance to the distal Achilles tendon area with abnormal ossification in soft tissue swelling.  An Achilles tendon injury could account for this.  There is a plantar calcaneal spur.  There is ossification in the soft tissues adjacent to the medial tibial tubercle that may relate to more of the changes in the posterior ankle as opposed to an injury around this area.  Foot:  As noted on the ankle films there is an abnormal appearance to the posterior ankle around the distal Achilles tendon with abnormal ossification and soft tissue swelling that could relate to a more acute Achilles tendon injury.  There is no subluxation or dislocation of the digits.  There is osseous demineralization.  Tibia fibula:  There is no definite fracture.  There is osseous demineralization.      Impression:   1. Abnormal appearance to the area of the distal Achilles tendon.  Injury to this area could  not be excluded. 2. Osseous demineralization 3. Soft tissue swelling about the medial ankle/foot. 4. Calcification/ossification adjacent to the medial tubercle of the talus that may relate to the findings in the posterior ankle area as opposed to an injury around the medial tubercle of the talus       AUSTIN HARRIS MD       Electronically Signed and Approved By: AUSTIN HARRIS MD on 2/07/2024 at 16:04             XR Tibia Fibula 2 View Left    Result Date: 2/7/2024  Narrative: PROCEDURE: XR ANKLE 3+ VW LEFT, 2/07/2024, 15:42 XR FOOT 3+ VW LEFT, 2/07/2024, 15:44 XR TIBIA FIBULA 2 VW LEFT, 2/07/2024, 15:  COMPARISON: None  INDICATIONS: pain no known injury swelling and bruising medial aspect  FINDINGS:  Ankle:  There is soft tissue swelling about the medial portion of the ankle.  The ankle mortise looks intact.  There is osseous demineralization.  There is abnormal appearance to the distal Achilles tendon area with abnormal ossification in soft tissue swelling.  An Achilles tendon injury could account for this.  There is a plantar calcaneal spur.  There is ossification in the soft tissues adjacent to the medial tibial tubercle that may relate to more of the changes in the posterior ankle as opposed to an injury around this area.  Foot:  As noted on the ankle films there is an abnormal appearance to the posterior ankle around the distal Achilles tendon with abnormal ossification and soft tissue swelling that could relate to a more acute Achilles tendon injury.  There is no subluxation or dislocation of the digits.  There is osseous demineralization.  Tibia fibula:  There is no definite fracture.  There is osseous demineralization.      Impression:   1. Abnormal appearance to the area of the distal Achilles tendon.  Injury to this area could not be excluded. 2. Osseous demineralization 3. Soft tissue swelling about the medial ankle/foot. 4. Calcification/ossification adjacent to the medial tubercle of the talus that may  relate to the findings in the posterior ankle area as opposed to an injury around the medial tubercle of the talus       AUSTIN HARRIS MD       Electronically Signed and Approved By: AUSTIN HARRIS MD on 2/07/2024 at 16:04              LABORATORY/CULTURE RESULTS:  Results from last 7 days   Lab Units 02/10/24  0302 02/09/24 0618 02/08/24  0403   WBC 10*3/mm3 24.04* 24.22* 24.32*   HEMOGLOBIN g/dL 10.4* 11.2* 11.4*   HEMATOCRIT % 31.7* 34.5* 35.8*   PLATELETS 10*3/mm3 204 195 168     Results from last 7 days   Lab Units 02/10/24  0302 02/09/24 0618 02/08/24  0403 02/07/24  1533   SODIUM mmol/L 130* 132* 132* 135*   POTASSIUM mmol/L 3.7 3.7 4.0 3.5   CHLORIDE mmol/L 97* 98 98 99   CO2 mmol/L 21.9* 24.2 23.5 20.4*   BUN mg/dL 14 15 14 14   CREATININE mg/dL 0.66* 0.74* 0.80 0.72*   CALCIUM mg/dL 8.4* 8.9 9.1 9.1   BILIRUBIN mg/dL  --   --   --  0.9   ALK PHOS U/L  --   --   --  144*   ALT (SGPT) U/L  --   --   --  18   AST (SGOT) U/L  --   --   --  22   GLUCOSE mg/dL 149* 160* 126* 157*         Microbiology Results (last 10 days)       Procedure Component Value - Date/Time    Blood Culture - Blood, Hand, Left [[id number]]  (Abnormal) Collected: 02/08/24 1138    Lab Status: Preliminary result Specimen: Blood from Hand, Left Updated: 02/09/24 0237     Blood Culture Abnormal Stain     Gram Stain Aerobic Bottle Gram positive cocci in clusters      Anaerobic Bottle Gram positive cocci in clusters    Blood Culture - Blood, Hand, Right [[id number]]  (Abnormal) Collected: 02/08/24 1138    Lab Status: Preliminary result Specimen: Blood from Hand, Right Updated: 02/09/24 0911     Blood Culture Abnormal Stain     Gram Stain Aerobic Bottle Gram positive cocci in clusters      Anaerobic Bottle Gram positive cocci in clusters    Blood Culture ID, PCR - Blood, Hand, Left [[id number]]  (Abnormal) Collected: 02/08/24 1138    Lab Status: Final result Specimen: Blood from Hand, Left Updated: 02/09/24 8589     BCID, PCR Staph aureus. mecA/C  and MREJ (methicillin resistance gene) detected. Identification by BCID2 PCR.     BOTTLE TYPE Aerobic Bottle    Narrative:      Infectious disease consultation is highly recommended to rule out distant foci of infection.             ASSESSMENT/PLAN     Active Hospital Problems:  Active Hospital Problems    Diagnosis     **Achilles rupture     Acute hematogenous osteomyelitis     Abscess of heel          Patient for I&D   Continue IV antibiotics  Further treatment pending operative findings  Discussed plan with patient and family     Discussed findings and treatment plan including risks, benefits, and treatment options with patient in detail. Patient agreed with treatment plan.      This document has been electronically signed by Alvarez Beck DPM on February 10, 2024 07:05 EST

## 2024-02-10 NOTE — PLAN OF CARE
Goal Outcome Evaluation:   Pt had procedure to LLE this shift. Surgical dressing is clean, dry and intact. Pain medication given as appropriate. Patient/spouse educated and given printed education regarding diabetes/treatment/management.

## 2024-02-10 NOTE — ANESTHESIA POSTPROCEDURE EVALUATION
Patient: Karsten Johnson    Procedure Summary       Date: 02/10/24 Room / Location: [facility] OR 04 / [facility] MAIN OR    Anesthesia Start: 0725 Anesthesia Stop: 0836    Procedure: INCISION AND DRAINAGE  LEFT ANKLE,BIOPSY LEFT ANKEL (Left) Diagnosis:       Abscess of heel      Acute hematogenous osteomyelitis      (Abscess of heel [L02.619])    Surgeons: Alvarez Beck DPM Provider: Santiago Gonzalez MD    Anesthesia Type: general ASA Status: 2            Anesthesia Type: general    Vitals  Vitals Value Taken Time   /73 02/10/24 0855   Temp 37.7 °C (99.8 °F) 02/10/24 0835   Pulse 65 02/10/24 0900   Resp 16 02/10/24 0840   SpO2 94 % 02/10/24 0900   Vitals shown include unfiled device data.        Post Anesthesia Care and Evaluation    Patient location during evaluation: bedside  Patient participation: complete - patient participated  Level of consciousness: awake  Pain management: adequate    Airway patency: patent  PONV Status: none  Cardiovascular status: acceptable  Respiratory status: acceptable  Hydration status: acceptable    Comments: An Anesthesiologist personally participated in the most demanding procedures (including induction and emergence if applicable) in the anesthesia plan, monitored the course of anesthesia administration at frequent intervals and remained physically present and available for immediate diagnosis and treatment of emergencies.

## 2024-02-10 NOTE — NURSING NOTE
Pt had no new changes throughout shift. Pt surgery pushed to tomorrow morning. All scheduled medications given. Pain managed with PRN medication. Pt is on bedrest until surgery. Safety checks maintained throughout shift with pt resting in bed, wheels to bed locked, and personal items and call light within reach. Pt in contact isolation due to MRSA. Proper PPE guidelines followed with each patient interaction.

## 2024-02-11 LAB
ANION GAP SERPL CALCULATED.3IONS-SCNC: 9 MMOL/L (ref 5–15)
ANISOCYTOSIS BLD QL: NORMAL
BACTERIA SPEC AEROBE CULT: ABNORMAL
BACTERIA SPEC AEROBE CULT: ABNORMAL
BUN SERPL-MCNC: 19 MG/DL (ref 6–20)
BUN/CREAT SERPL: 26.8 (ref 7–25)
CALCIUM SPEC-SCNC: 8.4 MG/DL (ref 8.6–10.5)
CHLORIDE SERPL-SCNC: 100 MMOL/L (ref 98–107)
CLUMPED PLATELETS: PRESENT
CO2 SERPL-SCNC: 24 MMOL/L (ref 22–29)
CREAT SERPL-MCNC: 0.71 MG/DL (ref 0.76–1.27)
DEPRECATED RDW RBC AUTO: 46.7 FL (ref 37–54)
EGFRCR SERPLBLD CKD-EPI 2021: 111.8 ML/MIN/1.73
EOSINOPHIL # BLD MANUAL: 0.26 10*3/MM3 (ref 0–0.4)
EOSINOPHIL NFR BLD MANUAL: 1 % (ref 0.3–6.2)
ERYTHROCYTE [DISTWIDTH] IN BLOOD BY AUTOMATED COUNT: 13.8 % (ref 12.3–15.4)
GLUCOSE BLDC GLUCOMTR-MCNC: 125 MG/DL (ref 70–99)
GLUCOSE BLDC GLUCOMTR-MCNC: 148 MG/DL (ref 70–99)
GLUCOSE BLDC GLUCOMTR-MCNC: 155 MG/DL (ref 70–99)
GLUCOSE BLDC GLUCOMTR-MCNC: 169 MG/DL (ref 70–99)
GLUCOSE SERPL-MCNC: 139 MG/DL (ref 65–99)
GRAM STN SPEC: ABNORMAL
HCT VFR BLD AUTO: 31.3 % (ref 37.5–51)
HGB BLD-MCNC: 10.3 G/DL (ref 13–17.7)
ISOLATED FROM: ABNORMAL
ISOLATED FROM: ABNORMAL
LARGE PLATELETS: NORMAL
LYMPHOCYTES # BLD MANUAL: 0.53 10*3/MM3 (ref 0.7–3.1)
LYMPHOCYTES NFR BLD MANUAL: 5 % (ref 5–12)
MCH RBC QN AUTO: 30 PG (ref 26.6–33)
MCHC RBC AUTO-ENTMCNC: 32.9 G/DL (ref 31.5–35.7)
MCV RBC AUTO: 91.3 FL (ref 79–97)
METAMYELOCYTES NFR BLD MANUAL: 1 % (ref 0–0)
MONOCYTES # BLD: 1.31 10*3/MM3 (ref 0.1–0.9)
NEUTROPHILS # BLD AUTO: 23.91 10*3/MM3 (ref 1.7–7)
NEUTROPHILS NFR BLD MANUAL: 90 % (ref 42.7–76)
NEUTS BAND NFR BLD MANUAL: 1 % (ref 0–5)
PLAT MORPH BLD: NORMAL
PLATELET # BLD AUTO: 192 10*3/MM3 (ref 140–450)
PMV BLD AUTO: 10.9 FL (ref 6–12)
POTASSIUM SERPL-SCNC: 4 MMOL/L (ref 3.5–5.2)
RBC # BLD AUTO: 3.43 10*6/MM3 (ref 4.14–5.8)
RBC MORPH BLD: NORMAL
SCAN SLIDE: NORMAL
SMALL PLATELETS BLD QL SMEAR: ADEQUATE
SODIUM SERPL-SCNC: 133 MMOL/L (ref 136–145)
VARIANT LYMPHS NFR BLD MANUAL: 2 % (ref 19.6–45.3)
WBC MORPH BLD: NORMAL
WBC NRBC COR # BLD AUTO: 26.28 10*3/MM3 (ref 3.4–10.8)

## 2024-02-11 PROCEDURE — 99233 SBSQ HOSP IP/OBS HIGH 50: CPT | Performed by: INTERNAL MEDICINE

## 2024-02-11 PROCEDURE — 25010000002 HYDROMORPHONE 1 MG/ML SOLUTION: Performed by: INTERNAL MEDICINE

## 2024-02-11 PROCEDURE — 63710000001 INSULIN LISPRO (HUMAN) PER 5 UNITS: Performed by: PODIATRIST

## 2024-02-11 PROCEDURE — 82948 REAGENT STRIP/BLOOD GLUCOSE: CPT | Performed by: PODIATRIST

## 2024-02-11 PROCEDURE — 85025 COMPLETE CBC W/AUTO DIFF WBC: CPT | Performed by: INTERNAL MEDICINE

## 2024-02-11 PROCEDURE — 94799 UNLISTED PULMONARY SVC/PX: CPT

## 2024-02-11 PROCEDURE — 99024 POSTOP FOLLOW-UP VISIT: CPT | Performed by: PODIATRIST

## 2024-02-11 PROCEDURE — 82948 REAGENT STRIP/BLOOD GLUCOSE: CPT

## 2024-02-11 PROCEDURE — 80048 BASIC METABOLIC PNL TOTAL CA: CPT | Performed by: INTERNAL MEDICINE

## 2024-02-11 PROCEDURE — 25010000002 VANCOMYCIN 5 G RECONSTITUTED SOLUTION: Performed by: PODIATRIST

## 2024-02-11 PROCEDURE — 85007 BL SMEAR W/DIFF WBC COUNT: CPT | Performed by: INTERNAL MEDICINE

## 2024-02-11 PROCEDURE — 94761 N-INVAS EAR/PLS OXIMETRY MLT: CPT

## 2024-02-11 PROCEDURE — 25810000003 SODIUM CHLORIDE 0.9 % SOLUTION: Performed by: PODIATRIST

## 2024-02-11 RX ORDER — HYDROCODONE BITARTRATE AND ACETAMINOPHEN 7.5; 325 MG/1; MG/1
2 TABLET ORAL EVERY 6 HOURS PRN
Status: DISCONTINUED | OUTPATIENT
Start: 2024-02-11 | End: 2024-02-12

## 2024-02-11 RX ORDER — LISINOPRIL 20 MG/1
20 TABLET ORAL
Status: DISCONTINUED | OUTPATIENT
Start: 2024-02-11 | End: 2024-02-12

## 2024-02-11 RX ADMIN — Medication 10 ML: at 20:56

## 2024-02-11 RX ADMIN — HYDROMORPHONE HYDROCHLORIDE 1 MG: 1 INJECTION, SOLUTION INTRAMUSCULAR; INTRAVENOUS; SUBCUTANEOUS at 08:34

## 2024-02-11 RX ADMIN — DOCUSATE SODIUM 50MG AND SENNOSIDES 8.6MG 2 TABLET: 8.6; 5 TABLET, FILM COATED ORAL at 20:56

## 2024-02-11 RX ADMIN — HYDROMORPHONE HYDROCHLORIDE 1 MG: 1 INJECTION, SOLUTION INTRAMUSCULAR; INTRAVENOUS; SUBCUTANEOUS at 05:23

## 2024-02-11 RX ADMIN — INSULIN LISPRO 2 UNITS: 100 INJECTION, SOLUTION INTRAVENOUS; SUBCUTANEOUS at 20:57

## 2024-02-11 RX ADMIN — HYDROCODONE BITARTRATE AND ACETAMINOPHEN 2 TABLET: 7.5; 325 TABLET ORAL at 14:13

## 2024-02-11 RX ADMIN — VANCOMYCIN HYDROCHLORIDE 1500 MG: 5 INJECTION, POWDER, LYOPHILIZED, FOR SOLUTION INTRAVENOUS at 20:56

## 2024-02-11 RX ADMIN — VANCOMYCIN HYDROCHLORIDE 1500 MG: 5 INJECTION, POWDER, LYOPHILIZED, FOR SOLUTION INTRAVENOUS at 08:35

## 2024-02-11 RX ADMIN — HYDROMORPHONE HYDROCHLORIDE 1 MG: 1 INJECTION, SOLUTION INTRAMUSCULAR; INTRAVENOUS; SUBCUTANEOUS at 19:47

## 2024-02-11 RX ADMIN — HYDROMORPHONE HYDROCHLORIDE 1 MG: 1 INJECTION, SOLUTION INTRAMUSCULAR; INTRAVENOUS; SUBCUTANEOUS at 16:44

## 2024-02-11 RX ADMIN — HYDROCODONE BITARTRATE AND ACETAMINOPHEN 1 TABLET: 7.5; 325 TABLET ORAL at 07:34

## 2024-02-11 RX ADMIN — INSULIN LISPRO 2 UNITS: 100 INJECTION, SOLUTION INTRAVENOUS; SUBCUTANEOUS at 08:34

## 2024-02-11 RX ADMIN — LISINOPRIL 20 MG: 20 TABLET ORAL at 16:56

## 2024-02-11 RX ADMIN — HYDROCODONE BITARTRATE AND ACETAMINOPHEN 2 TABLET: 7.5; 325 TABLET ORAL at 20:56

## 2024-02-11 RX ADMIN — Medication 10 ML: at 08:35

## 2024-02-11 RX ADMIN — HYDROMORPHONE HYDROCHLORIDE 1 MG: 1 INJECTION, SOLUTION INTRAMUSCULAR; INTRAVENOUS; SUBCUTANEOUS at 13:17

## 2024-02-11 NOTE — PROGRESS NOTES
"Patient Care Team:  Provider, No Known as PCP - General    Date: 2024  Patient Name: Karsten Johnson  : 1975  MRN: [id number]  Date of admission: 2024  Room/Bed: 223/1      Subjective   Subjective         Chief Complaint: f/u left foot pain     Summary:Karsten Johnson is a 48 y.o. obese male with essentially no past medical history who presented to the emergency department with left heel/Achilles pain that began 5 days ago.  Patient says that happened while at work.  There was no trauma.  Patient said the pain started slowly but steadily increased.  Patient went to an urgent care in [facility] and was told that he most likely had a torn Achilles.  He said he was told by the physician there to \"just take it easy\". Unfortunately, the swelling and pain intensified over the last couple of days [facility] for further evaluation.  Here in the ED he had an x-ray that shows rupture of the Achilles tendon.  The case was discussed with both orthopedic surgery as well as podiatry.  Podiatry recommended an MRI without contrast.      Interval Followup:   Patient still having some pain and swelling in his LLE but overall is feeling better.         Review of Systems   All other systems reviewed and are negative.        Objective   Objective       Vital Signs  Temp:  [97.2 °F (36.2 °C)-98.6 °F (37 °C)] 98.6 °F (37 °C)  Heart Rate:  [66-83] 83  Resp:  [16-20] 20  BP: (133-172)/() 172/107  Oxygen Therapy  SpO2: 100 %  Pulse Oximetry Type: Continuous  Device (Oxygen Therapy): room air  Flow (L/min): 1  Flowsheet Rows      Flowsheet Row First Filed Value   Admission Height 167.6 cm (66\") Documented at 2024 1500   Admission Weight 104 kg (230 lb 6.1 oz) Documented at 2024          Intake & Output (last 3 days)          07 0700  0701   07 07 07 07    Urine (mL/kg/hr)   300 200 (0.2)    Total Output   300 200    Net   -300 -200         "    Urine Unmeasured Occurrence   2 x           Lines, Drains & Airways       Active LDAs       Name Placement date Placement time Site Days    Peripheral IV 02/08/24 1015 Anterior;Right Antecubital 02/08/24  1015  Antecubital  less than 1                      Physical Exam  Vitals reviewed.   Constitutional:       General: He is not in acute distress.     Appearance: He is obese.   HENT:      Head: Normocephalic and atraumatic.      Mouth/Throat:      Mouth: Mucous membranes are moist.   Cardiovascular:      Rate and Rhythm: Normal rate and regular rhythm.      Heart sounds: No murmur heard.  Pulmonary:      Effort: Pulmonary effort is normal. No respiratory distress.      Breath sounds: No wheezing or rales.   Abdominal:      General: Bowel sounds are normal. There is no distension.      Palpations: Abdomen is soft.      Tenderness: There is no abdominal tenderness.   Musculoskeletal:      Comments: +1 np LLE edema, mildly ttp   Skin:     Comments: Dressing in place to LLE    Neurological:      Mental Status: He is alert.   Psychiatric:         Mood and Affect: Mood normal.         Behavior: Behavior normal.           Results Review:      Results from last 7 days   Lab Units 02/11/24  0425 02/10/24  0302 02/09/24  0618   WBC 10*3/mm3 26.28* 24.04* 24.22*   HEMOGLOBIN g/dL 10.3* 10.4* 11.2*   HEMATOCRIT % 31.3* 31.7* 34.5*   PLATELETS 10*3/mm3 192 204 195   MONOCYTES % % 5.0  --   --    EOSINOPHIL % % 1.0  --   --      Results from last 7 days   Lab Units 02/11/24  0425 02/10/24  0302 02/09/24  0618 02/08/24  0403 02/07/24  1533   SODIUM mmol/L 133* 130* 132*   < > 135*   POTASSIUM mmol/L 4.0 3.7 3.7   < > 3.5   CHLORIDE mmol/L 100 97* 98   < > 99   CO2 mmol/L 24.0 21.9* 24.2   < > 20.4*   BUN mg/dL 19 14 15   < > 14   CREATININE mg/dL 0.71* 0.66* 0.74*   < > 0.72*   CALCIUM mg/dL 8.4* 8.4* 8.9   < > 9.1   BILIRUBIN mg/dL  --   --   --   --  0.9   ALK PHOS U/L  --   --   --   --  144*   ALT (SGPT) U/L  --   --   --   " --  18   AST (SGOT) U/L  --   --   --   --  22   GLUCOSE mg/dL 139* 149* 160*   < > 157*    < > = values in this interval not displayed.       Results from last 7 days   Lab Units 02/10/24  0302 02/09/24  0618 02/08/24  0403   MAGNESIUM mg/dL 2.2 2.2 1.9       No results found for: \"BLOODCX\"    No results found for: \"MRSACX\"    No results found for: \"STOOLCX\"    No results found for: \"URINECX\"    No results found for: \"WOUNDCX\"    Imaging Results (Last 24 Hours)       ** No results found for the last 24 hours. **            I have personally reviewed the patient's new imaging and lab results.          ECG/EMG Results (most recent)       Procedure Component Value Units Date/Time    ECG 12 Lead Pre-Op / Pre-Procedure [[id number]] Collected: 02/07/24 2109     Updated: 02/08/24 0904     QT Interval 341 ms      QTC Interval 428 ms     Narrative:      HEART RATE= 95  bpm  RR Interval= 636  ms  MT Interval= 167  ms  P Horizontal Axis= 7  deg  P Front Axis= 46  deg  QRSD Interval= 99  ms  QT Interval= 341  ms  QTcB= 428  ms  QRS Axis= 9  deg  T Wave Axis= 31  deg  - NORMAL ECG -  Sinus rhythm  No previous ECG available for comparison  Electronically Signed By: Jarrett Padilla ([facility]) 08-Feb-2024 09:04:41  Date and Time of Study: 2024-02-07 21:09:27    Adult Transthoracic Echo Complete W/ Cont if Necessary Per Protocol [[id number]] Resulted: 02/09/24 1439     Updated: 02/09/24 1442     EF(MOD-bp) 55.3 %      LVIDd 5.4 cm      LVIDs 3.8 cm      IVSd 0.97 cm      LVPWd 1.21 cm      FS 29.1 %      IVS/LVPW 0.80 cm      ESV(cubed) 56.4 ml      LV Sys Vol (BSA corrected) 29.0 cm2      EDV(cubed) 158.2 ml      LV Flowers Vol (BSA corrected) 81.0 cm2      LV mass(C)d 232.0 grams      LVOT area 3.9 cm2      LVOT diam 2.22 cm      EDV(MOD-sp2) 126.0 ml      EDV(MOD-sp4) 172.0 ml      ESV(MOD-sp2) 69.6 ml      ESV(MOD-sp4) 61.5 ml      SV(MOD-sp2) 56.4 ml      SV(MOD-sp4) 110.5 ml      SI(MOD-sp2) 26.6 ml/m2      SI(MOD-sp4) 52.1 ml/m2      " EF(MOD-sp2) 44.8 %      EF(MOD-sp4) 64.2 %      MV E max salvador 81.8 cm/sec      MV A max salvador 98.5 cm/sec      MV dec time 0.30 sec      MV E/A 0.83     LA ESV Index (BP) 35.6 ml/m2      Med Peak E' Salvador 7.2 cm/sec      Lat Peak E' Salvador 8.6 cm/sec      TR max salvador 259.3 cm/sec      Avg E/e' ratio 10.35     SV(LVOT) 87.7 ml      RVIDd 3.7 cm      TAPSE (>1.6) 3.3 cm      LV V1 max 113.5 cm/sec      LV V1 max PG 5.2 mmHg      LV V1 mean PG 3.6 mmHg      LV V1 VTI 22.6 cm      Ao pk salvador 158.8 cm/sec      Ao max PG 10.1 mmHg      Ao mean PG 5.6 mmHg      Ao V2 VTI 27.5 cm      EVERT(I,D) 3.2 cm2      MV dec slope 276.7 cm/sec2      TR max PG 26.9 mmHg      PA V2 max 109.9 cm/sec     Narrative:        Left ventricular systolic function is normal. Calculated left   ventricular EF = 55.3%    Estimated right ventricular systolic pressure from tricuspid   regurgitation is normal (<35 mmHg).    No obvious wall motion abnormalities    No obvious vegetations on any valve. If high clinical suspicion,   recommend TRACIE              Results for orders placed during the hospital encounter of 02/07/24    Adult Transthoracic Echo Complete W/ Cont if Necessary Per Protocol    Interpretation Summary    Left ventricular systolic function is normal. Calculated left ventricular EF = 55.3%    Estimated right ventricular systolic pressure from tricuspid regurgitation is normal (<35 mmHg).    No obvious wall motion abnormalities    No obvious vegetations on any valve. If high clinical suspicion, recommend TRACIE          Medication Review:     Scheduled Meds:[Held by provider] heparin (porcine), 5,000 Units, Subcutaneous, Q8H  insulin lispro, 2-9 Units, Subcutaneous, 4x Daily AC & at Bedtime  sodium chloride, 10 mL, Intravenous, Q12H  vancomycin, 1,500 mg, Intravenous, Q12H      Continuous Infusions:lactated ringers, 30 mL/hr, Last Rate: 30 mL/hr (02/10/24 0713)  Pharmacy to dose vancomycin,       PRN Meds:.  acetaminophen **OR** acetaminophen     senna-docusate sodium **AND** polyethylene glycol **AND** bisacodyl **AND** bisacodyl    dextrose    dextrose    glucagon (human recombinant)    HYDROcodone-acetaminophen    HYDROcodone-acetaminophen    HYDROmorphone    melatonin    ondansetron    Pharmacy to dose vancomycin    sodium chloride    sodium chloride      I have reviewed the patient's current medication list    Assessment & Plan   Assessment / Plan       Active Hospital Problems    Diagnosis  POA    **Achilles rupture [S86.019A]  Yes    Acute hematogenous osteomyelitis [M86.00]  Unknown    Abscess of heel [L02.619]  Unknown       Left heel pain likely d/t achilles rupture and heel abscess s/p I&D left ankle and biopsy of left ankle 2/10/24  Acute hematogenous osteomyelitis  MRSA bacteremia   - continue Vancomycin   - blood cx- MRSA  - repeat blood cx 2/10- gpc  - wound cx- pending  - bone cx- pending  - CRP, ESR, Procal elevated   - doppler negative for DVT  - increase dose of hydrocodone, try to limit IV dilaudid  - lactic acid wnl   - echocardiogram negative for any signs of vegetation  - patient reports tooth decay   - will discuss with cardiology about TRACIE    Prediabetes  - Hgb A1c 6.4  - continue SSI   - monitor with routine accu-checks and make adjustments as needed     Hypertension  - add lisinopril 20mg   - monitor with routine vs and make adjustments as needed     Obesity  - BMI 37.1  - complicates all aspects of care  -  on lifestyle changes as appropriate     Mild anemia  - no signs of bleeding     DVT Prophylaxis  - heparin     Code Status  - Full Code    Patient remains high risk       Plan for disposition: pending progress     Rica Snowden DO  02/11/24  16:33 EST            Note disclaimer: At [facility], we believe that sharing information builds trust and better relationships. You are receiving this note because you recently visited [facility]. It is possible you will see health information before a provider has  talked with you about it. This kind of information can be easy to misunderstand. To help you fully understand what it means for your health, we urge you to discuss this note with your provider.

## 2024-02-11 NOTE — PROGRESS NOTES
[facility] - PODIATRY    Today's Date: 02/11/24    Patient Name: Karsten Johnson  MRN: [id number]  CSN: [id number]  PCP: Provider, No Known  Referring Provider: No ref. provider found  Attending Provider: Rica Snowden, *  Length of Stay: 4    SUBJECTIVE   Chief Complaint:     HPI: Karsten Johnson, a 48 y.o.male, .  Denies any constitutional symptoms. No other pedal complaints at this time. Pain to heel.    History reviewed. No pertinent past medical history.  Past Surgical History:   Procedure Laterality Date    INCISION AND DRAINAGE OF WOUND Left 2/10/2024    Procedure: INCISION AND DRAINAGE  LEFT ANKLE,BIOPSY LEFT ANKEL;  Surgeon: Alvarez Beck DPM;  Location: [facility] OR;  Service: Podiatry;  Laterality: Left;     History reviewed. No pertinent family history.  Social History     Socioeconomic History    Marital status:    Tobacco Use    Smoking status: Every Day     Packs/day: 1.00     Years: 25.00     Additional pack years: 0.00     Total pack years: 25.00     Types: Cigarettes     Passive exposure: Never    Smokeless tobacco: Never   Vaping Use    Vaping Use: Never used   Substance and Sexual Activity    Alcohol use: Yes     Alcohol/week: 3.0 standard drinks of alcohol     Types: 3 Cans of beer per week    Drug use: Yes     Types: Marijuana    Sexual activity: Defer     No Known Allergies  Current Facility-Administered Medications   Medication Dose Route Frequency Provider Last Rate Last Admin    acetaminophen (TYLENOL) tablet 650 mg  650 mg Oral Q6H PRN Alvarez Beck DPM   650 mg at 02/08/24 1616    Or    acetaminophen (TYLENOL) suppository 650 mg  650 mg Rectal Q6H PRN Alvarez Beck DPM        sennosides-docusate (PERICOLACE) 8.6-50 MG per tablet 2 tablet  2 tablet Oral BID PRN Alvarez Beck DPM        And    polyethylene glycol (MIRALAX) packet 17 g  17 g Oral Daily PRN Alvarez Beck DPM        And    bisacodyl (DULCOLAX) EC tablet 5 mg  5 mg  Oral Daily PRN Alvarez Beck DPM        And    bisacodyl (DULCOLAX) suppository 10 mg  10 mg Rectal Daily PRN Alvarez Beck DPM        dextrose (D50W) (25 g/50 mL) IV injection 25 g  25 g Intravenous Q15 Min PRN Alvarez Beck DPM        dextrose (GLUTOSE) oral gel 15 g  15 g Oral Q15 Min PRN Alvarez Beck DPM        glucagon (GLUCAGEN) injection 1 mg  1 mg Intramuscular Q15 Min PRN Alvarez Beck DPM        [Held by provider] heparin (porcine) 5000 UNIT/ML injection 5,000 Units  5,000 Units Subcutaneous Q8H Tao Jacobson DO   5,000 Units at 02/08/24 1455    HYDROcodone-acetaminophen (NORCO) 7.5-325 MG per tablet 1 tablet  1 tablet Oral Q6H PRN Alvarez Beck DPM   1 tablet at 02/11/24 0734    HYDROmorphone (DILAUDID) injection 1 mg  1 mg Intravenous Q3H PRN Rica Snowden DO   1 mg at 02/11/24 0523    Insulin Lispro (humaLOG) injection 2-9 Units  2-9 Units Subcutaneous 4x Daily AC & at Bedtime Alvarez Beck DPM   2 Units at 02/10/24 2155    lactated ringers infusion  30 mL/hr Intravenous Continuous Alvarez Beck DPM 30 mL/hr at 02/10/24 0713 30 mL/hr at 02/10/24 0713    melatonin tablet 10 mg  10 mg Oral Nightly PRN Alvarez Beck DPM   10 mg at 02/08/24 2249    ondansetron (ZOFRAN) injection 4 mg  4 mg Intravenous Q6H PRN Alvarez Beck DPM   4 mg at 02/09/24 0511    Pharmacy to dose vancomycin   Does not apply Continuous PRN Alvarez Beck DPM        sodium chloride 0.9 % flush 10 mL  10 mL Intravenous Q12H Alvarez Beck DPM   10 mL at 02/10/24 2156    sodium chloride 0.9 % flush 10 mL  10 mL Intravenous PRN Alvarez Beck DPM        sodium chloride 0.9 % infusion 40 mL  40 mL Intravenous PRN Alvarez Beck DPM        vancomycin IVPB 1500 mg in 0.9% NaCl (Premix) 500 mL  1,500 mg Intravenous Q12H Alvarez Beck .3 mL/hr at 02/10/24 2156 1,500 mg at 02/10/24 2156     Review of Systems   All other systems reviewed and  are negative.      OBJECTIVE     Vitals:    02/11/24 0600   BP:    Pulse:    Resp:    Temp:    SpO2: 96%       PHYSICAL EXAM    GEN:   A&Ox3, NAD. Pt presents in hospital bed.     Foot/Ankle Exam     GENERAL  Appearance:  appears stated age  Orientation:  AAOx3  Affect:  appropriate  Gait:  unimpaired  Assistance:  independent  Right shoe gear: casual shoe  Left shoe gear: casual shoe     VASCULAR      Right Foot Vascularity   Normal vascular exam    Dorsalis pedis:  2+  Posterior tibial:  2+  Skin temperature:  warm  Edema grading:  None  CFT:  < 3 seconds  Pedal hair growth:  Present  Varicosities:  none      Left Foot Vascularity   Normal vascular exam    Dorsalis pedis:  2+  Posterior tibial:  2+  Skin temperature:  warm  Edema grading:  None  CFT:  < 3 seconds  Pedal hair growth:  Present  Varicosities:  none     NEUROLOGIC      Right Foot Neurologic   Normal sensation    Light touch sensation: normal  Vibratory sensation: normal  Hot/Cold sensation: normal  Protective Sensation using [place]-Flip Monofilament:   Sites intact: 10  Sites tested: 10      Left Foot Neurologic   Normal sensation    Light touch sensation: normal  Vibratory sensation: normal  Hot/Cold sensation:  normal  Protective Sensation using [place]-Flip Monofilament:   Sites intact: 10  Sites tested: 10     MUSCLE STRENGTH      Right Foot Muscle Strength   Foot dorsiflexion:  4  Foot plantar flexion:  4  Foot inversion:  4  Foot eversion:  4      Left Foot Muscle Strength   Foot dorsiflexion:  4  Foot plantar flexion:  4  Foot inversion:  4  Foot eversion:  4     RANGE OF MOTION      Right Foot Range of Motion   Foot and ankle ROM within normal limits        Left Foot Range of Motion   Foot and ankle ROM within normal limits       DERMATOLOGIC       Right Foot Dermatologic   Skin  Right foot skin is intact.       Left Foot Dermatologic   Skin  Left foot skin is intact.     Wound to right posterior heel with mild fibrosis to the level  of bone. No purulence. Decrease swelling to ankle and leg.    RADIOLOGY/NUCLEAR:  Adult Transthoracic Echo Complete W/ Cont if Necessary Per Protocol    Result Date: 2/9/2024  Narrative:   Left ventricular systolic function is normal. Calculated left ventricular EF = 55.3%   Estimated right ventricular systolic pressure from tricuspid regurgitation is normal (<35 mmHg).   No obvious wall motion abnormalities   No obvious vegetations on any valve. If high clinical suspicion, recommend TRACIE     MRI Ankle Left Without Contrast    Result Date: 2/8/2024  Narrative: PROCEDURE: MRI ANKLE LEFT  WO CONTRAST  COMPARISON: [facility], CR, XR ANKLE 3+ VW LEFT, 2/07/2024, 15:42.  INDICATIONS: FELT POP TO LEFT HEEL X 1 WEEK AGO AT WORK. SEVERE POSTERIOR ANKLE PAIN WITH A KNOT TO LEFT MEDIAL ANKLE AND SWELLING.     TECHNIQUE: A complete multi-planar examination was performed without contrast.  FINDINGS:  There is severe tendinosis distal Achilles tendon.  There is a full-thickness disruption of the tendon at its insertion site on the calcaneus.  This full-thickness gap measures 4.6 cm from craniocaudal.  No significant retraction of the tendon fibers.  There is high-grade partial-thickness interstitial tearing of the more watershed/musculotendinous junction.  There is edema in Kager's fat.  There is abnormal marrow edema of the calcaneus most pronounced at the insertion site.  There is erosive change at the Achilles tendon insertion site consistent with probable inflammatory arthritis and enthesopathy.  The calcifications seen in the distal tendon on radiograph are not well seen on MRI.  There is moderate subtalar joint space narrowing and moderate talonavicular joint space narrowing.  There is mild midfoot degenerative change.  There is a moderate-sized os trigonum with mild marrow edema.  There is a small tibiotalar joint effusion.    There is fluid in the posterior tibialis and other flexor tendons consistent with  tenosynovitis.  The tendons themselves are intact.  The peroneal tendons are normal appearance.  The extensor tendons are normal appearance.  The syndesmotic ligaments are intact.  The anterior and posterior talofibular ligament and calcaneofibular ligament are intact.  The medial band plantar fascia is thickened has high signal intensity.  There is a moderate-sized plantar calcaneal spur.  The sinus tarsi has fat signal intensity which is normal.  The tarsal tunnel is normal.  There is soft tissue edema most pronounced posteriorly.  There are no osteochondral lesions of the talar dome.  There is mild hindfoot valgus alignment.      Impression:   1. Severe tendinosis and enthesopathy at the distal Achilles tendon.  There is a full-thickness gap of the distal tendon from the insertion site measuring up to 4.6 cm consistent with full thickness tear.  Severe tendinosis of the musculotendinous junction of the Achilles.  Associated soft tissue edema. 2. Marrow edema calcaneus with erosive changes at the insertion site of the Achilles tendon likely related to inflammatory arthritis and reactive edema calcaneus. 3. Findings of plantar fasciitis medial band plantar fascia. 4. Mild hindfoot and midfoot degenerative change. 5. Tenosynovitis of the flexor tendons.     SAEED VIGIL MD       Electronically Signed and Approved By: SAEED VIGIL MD on 2/08/2024 at 14:57             Duplex Venous Lower Extremity - Left CAR    Result Date: 2/7/2024  Narrative:   Normal left lower extremity venous duplex scan.     XR Foot 3+ View Left    Result Date: 2/7/2024  Narrative: PROCEDURE: XR ANKLE 3+ VW LEFT, 2/07/2024, 15:42 XR FOOT 3+ VW LEFT, 2/07/2024, 15:44 XR TIBIA FIBULA 2 VW LEFT, 2/07/2024, 15:  COMPARISON: None  INDICATIONS: pain no known injury swelling and bruising medial aspect  FINDINGS:  Ankle:  There is soft tissue swelling about the medial portion of the ankle.  The ankle mortise looks intact.  There is osseous  demineralization.  There is abnormal appearance to the distal Achilles tendon area with abnormal ossification in soft tissue swelling.  An Achilles tendon injury could account for this.  There is a plantar calcaneal spur.  There is ossification in the soft tissues adjacent to the medial tibial tubercle that may relate to more of the changes in the posterior ankle as opposed to an injury around this area.  Foot:  As noted on the ankle films there is an abnormal appearance to the posterior ankle around the distal Achilles tendon with abnormal ossification and soft tissue swelling that could relate to a more acute Achilles tendon injury.  There is no subluxation or dislocation of the digits.  There is osseous demineralization.  Tibia fibula:  There is no definite fracture.  There is osseous demineralization.      Impression:   1. Abnormal appearance to the area of the distal Achilles tendon.  Injury to this area could not be excluded. 2. Osseous demineralization 3. Soft tissue swelling about the medial ankle/foot. 4. Calcification/ossification adjacent to the medial tubercle of the talus that may relate to the findings in the posterior ankle area as opposed to an injury around the medial tubercle of the talus       AUSTIN HARRIS MD       Electronically Signed and Approved By: AUSTIN HARRIS MD on 2/07/2024 at 16:04             XR Ankle 3+ View Left    Result Date: 2/7/2024  Narrative: PROCEDURE: XR ANKLE 3+ VW LEFT, 2/07/2024, 15:42 XR FOOT 3+ VW LEFT, 2/07/2024, 15:44 XR TIBIA FIBULA 2 VW LEFT, 2/07/2024, 15:  COMPARISON: None  INDICATIONS: pain no known injury swelling and bruising medial aspect  FINDINGS:  Ankle:  There is soft tissue swelling about the medial portion of the ankle.  The ankle mortise looks intact.  There is osseous demineralization.  There is abnormal appearance to the distal Achilles tendon area with abnormal ossification in soft tissue swelling.  An Achilles tendon injury could account for this.   There is a plantar calcaneal spur.  There is ossification in the soft tissues adjacent to the medial tibial tubercle that may relate to more of the changes in the posterior ankle as opposed to an injury around this area.  Foot:  As noted on the ankle films there is an abnormal appearance to the posterior ankle around the distal Achilles tendon with abnormal ossification and soft tissue swelling that could relate to a more acute Achilles tendon injury.  There is no subluxation or dislocation of the digits.  There is osseous demineralization.  Tibia fibula:  There is no definite fracture.  There is osseous demineralization.      Impression:   1. Abnormal appearance to the area of the distal Achilles tendon.  Injury to this area could not be excluded. 2. Osseous demineralization 3. Soft tissue swelling about the medial ankle/foot. 4. Calcification/ossification adjacent to the medial tubercle of the talus that may relate to the findings in the posterior ankle area as opposed to an injury around the medial tubercle of the talus       AUSTIN HARRIS MD       Electronically Signed and Approved By: AUSTIN HARRIS MD on 2/07/2024 at 16:04             XR Tibia Fibula 2 View Left    Result Date: 2/7/2024  Narrative: PROCEDURE: XR ANKLE 3+ VW LEFT, 2/07/2024, 15:42 XR FOOT 3+ VW LEFT, 2/07/2024, 15:44 XR TIBIA FIBULA 2 VW LEFT, 2/07/2024, 15:  COMPARISON: None  INDICATIONS: pain no known injury swelling and bruising medial aspect  FINDINGS:  Ankle:  There is soft tissue swelling about the medial portion of the ankle.  The ankle mortise looks intact.  There is osseous demineralization.  There is abnormal appearance to the distal Achilles tendon area with abnormal ossification in soft tissue swelling.  An Achilles tendon injury could account for this.  There is a plantar calcaneal spur.  There is ossification in the soft tissues adjacent to the medial tibial tubercle that may relate to more of the changes in the posterior ankle as  opposed to an injury around this area.  Foot:  As noted on the ankle films there is an abnormal appearance to the posterior ankle around the distal Achilles tendon with abnormal ossification and soft tissue swelling that could relate to a more acute Achilles tendon injury.  There is no subluxation or dislocation of the digits.  There is osseous demineralization.  Tibia fibula:  There is no definite fracture.  There is osseous demineralization.      Impression:   1. Abnormal appearance to the area of the distal Achilles tendon.  Injury to this area could not be excluded. 2. Osseous demineralization 3. Soft tissue swelling about the medial ankle/foot. 4. Calcification/ossification adjacent to the medial tubercle of the talus that may relate to the findings in the posterior ankle area as opposed to an injury around the medial tubercle of the talus       AUSTIN HARRIS MD       Electronically Signed and Approved By: AUSTIN HARRIS MD on 2/07/2024 at 16:04              LABORATORY/CULTURE RESULTS:  Results from last 7 days   Lab Units 02/11/24  0425 02/10/24  0302 02/09/24  0618   WBC 10*3/mm3 26.28* 24.04* 24.22*   HEMOGLOBIN g/dL 10.3* 10.4* 11.2*   HEMATOCRIT % 31.3* 31.7* 34.5*   PLATELETS 10*3/mm3 192 204 195     Results from last 7 days   Lab Units 02/11/24  0425 02/10/24  0302 02/09/24  0618 02/08/24  0403 02/07/24  1533   SODIUM mmol/L 133* 130* 132*   < > 135*   POTASSIUM mmol/L 4.0 3.7 3.7   < > 3.5   CHLORIDE mmol/L 100 97* 98   < > 99   CO2 mmol/L 24.0 21.9* 24.2   < > 20.4*   BUN mg/dL 19 14 15   < > 14   CREATININE mg/dL 0.71* 0.66* 0.74*   < > 0.72*   CALCIUM mg/dL 8.4* 8.4* 8.9   < > 9.1   BILIRUBIN mg/dL  --   --   --   --  0.9   ALK PHOS U/L  --   --   --   --  144*   ALT (SGPT) U/L  --   --   --   --  18   AST (SGOT) U/L  --   --   --   --  22   GLUCOSE mg/dL 139* 149* 160*   < > 157*    < > = values in this interval not displayed.         Microbiology Results (last 10 days)       Procedure Component Value  - Date/Time    Wound Culture - Wound, Ankle, Left [[id number]] Collected: 02/10/24 0840    Lab Status: Preliminary result Specimen: Wound from Ankle, Left Updated: 02/10/24 1256     Gram Stain No organisms seen    Tissue / Bone Culture - Bone, Ankle, Left [[id number]] Collected: 02/10/24 0817    Lab Status: Preliminary result Specimen: Bone from Ankle, Left Updated: 02/10/24 1255     Gram Stain No organisms seen    AFB Culture - Bone, Ankle, Left [[id number]] Collected: 02/10/24 0817    Lab Status: Preliminary result Specimen: Bone from Ankle, Left Updated: 02/10/24 1413     AFB Stain No acid fast bacilli seen on direct smear    Blood Culture - Blood, Hand, Left [[id number]]  (Abnormal)  (Susceptibility) Collected: 02/08/24 1138    Lab Status: Final result Specimen: Blood from Hand, Left Updated: 02/11/24 0641     Blood Culture Staphylococcus aureus, MRSA     Comment:   Infectious disease consultation is highly recommended to rule out distant foci of infection.  Methicillin resistant Staphylococcus aureus, Patient may be an isolation risk.        Isolated from Aerobic and Anaerobic Bottles     Gram Stain Aerobic Bottle Gram positive cocci in clusters      Anaerobic Bottle Gram positive cocci in clusters    Susceptibility        Staphylococcus aureus, MRSA      JULIO      Gentamicin Susceptible      Oxacillin Resistant      Rifampin Susceptible      Vancomycin Susceptible                           Blood Culture - Blood, Hand, Right [[id number]]  (Abnormal) Collected: 02/08/24 1138    Lab Status: Final result Specimen: Blood from Hand, Right Updated: 02/11/24 0643     Blood Culture Staphylococcus aureus, MRSA     Comment:   Infectious disease consultation is highly recommended to rule out distant foci of infection.  Methicillin resistant Staphylococcus aureus, Patient may be an isolation risk.        Isolated from Aerobic and Anaerobic Bottles     Gram Stain Aerobic Bottle Gram positive cocci in clusters      Anaerobic  Bottle Gram positive cocci in clusters    Narrative:      Refer to previous blood culture collected on 02/08/2024 at 1138 for MICs.        Blood Culture ID, PCR - Blood, Hand, Left [[id number]]  (Abnormal) Collected: 02/08/24 1138    Lab Status: Final result Specimen: Blood from Hand, Left Updated: 02/09/24 0356     BCID, PCR Staph aureus. mecA/C and MREJ (methicillin resistance gene) detected. Identification by BCID2 PCR.     BOTTLE TYPE Aerobic Bottle    Narrative:      Infectious disease consultation is highly recommended to rule out distant foci of infection.             ASSESSMENT/PLAN     Active Hospital Problems:  Active Hospital Problems    Diagnosis     **Achilles rupture     Acute hematogenous osteomyelitis     Abscess of heel        Dressing changed, Betadine packing to posterior heel    IV antibiotics, follow cultures    Possible wound VAC application     Further treatment pending clinical response to treatment     Discussed findings and treatment plan including risks, benefits, and treatment options with patient in detail. Patient agreed with treatment plan.    This document has been electronically signed by Alvarez Beck DPM on February 11, 2024 08:19 EST

## 2024-02-11 NOTE — PROGRESS NOTES
"[facility] Clinical Pharmacy Services: Vancomycin Monitoring Note    Karsten Johnson is a 48 y.o. male who is on day 4/TBD (duration TBD from first negative bcx) of pharmacy to dose vancomycin for Bacteremia.    Previous Vancomycin Dose:   1500 mg IV every  12  hours  Imaging Reviewed?: Yes     TTE: no obvious vegetations on any valve; if high clinical suspicion, recommend TRACIE    Updated Cultures and Sensitivities:   2/10 Blood cx : in process  2/10 Wound cx, left ankle: no organisms seen on gram stain  2/10 Tissue/bone culture, left ankle: no organisms seen on gram stain   Blood cx : MRSA    Vitals/Labs  Ht: 167.6 cm (66\"); Wt: 104 kg (230 lb 6.1 oz)   Temp (24hrs), Av °F (36.7 °C), Min:97.2 °F (36.2 °C), Max:99 °F (37.2 °C)   Estimated Creatinine Clearance: 141.4 mL/min (A) (by C-G formula based on SCr of 0.71 mg/dL (L)).     Results from last 7 days   Lab Units 24  0425 02/10/24  0302 24  0618   VANCOMYCIN RM mcg/mL  --  19.40  --    CREATININE mg/dL 0.71* 0.66* 0.74*   WBC 10*3/mm3 26.28* 24.04* 24.22*     Assessment/Plan    Current Vancomycin Dose:  1500 mg IV every 12 hours; which provides the following predicted parameters:  AUC24,ss: 537 mg/L.hr  PAUC*: 91 %  Ctrough,ss: 16.7 mg/L  Pconc*: 31 %  Tox.: 12 %  Next Vanc Random ordered for  at 0600 with AM labs  We will continue to monitor patient changes and renal function - renal function panel x1 ordered for tomorrow     Thank you for involving pharmacy in this patient's care. Please contact pharmacy with any questions or concerns.    Danielle Schwarz [facility]  Clinical Pharmacist    "

## 2024-02-11 NOTE — PLAN OF CARE
Goal Outcome Evaluation:  Plan of Care Reviewed With: patient        Progress: no change  Outcome Evaluation: pt. medicated x 2 for pain with relief noted. VSS.  no significant changes noted this shift.

## 2024-02-11 NOTE — PLAN OF CARE
Goal Outcome Evaluation:   Dressing to LLE changed by MD this AM. Patient c/o pain throughout shift. PRN medications given as appropriate. PO pain medications adjusted. No other changes this shift.

## 2024-02-12 LAB
ALBUMIN SERPL-MCNC: 2.5 G/DL (ref 3.5–5.2)
ANION GAP SERPL CALCULATED.3IONS-SCNC: 9.2 MMOL/L (ref 5–15)
BASOPHILS # BLD AUTO: 0.19 10*3/MM3 (ref 0–0.2)
BASOPHILS NFR BLD AUTO: 0.8 % (ref 0–1.5)
BUN SERPL-MCNC: 11 MG/DL (ref 6–20)
BUN/CREAT SERPL: 15.5 (ref 7–25)
CALCIUM SPEC-SCNC: 8.4 MG/DL (ref 8.6–10.5)
CHLORIDE SERPL-SCNC: 99 MMOL/L (ref 98–107)
CO2 SERPL-SCNC: 25.8 MMOL/L (ref 22–29)
CREAT SERPL-MCNC: 0.71 MG/DL (ref 0.76–1.27)
DEPRECATED RDW RBC AUTO: 46.6 FL (ref 37–54)
EGFRCR SERPLBLD CKD-EPI 2021: 111.8 ML/MIN/1.73
EOSINOPHIL # BLD AUTO: 0.3 10*3/MM3 (ref 0–0.4)
EOSINOPHIL NFR BLD AUTO: 1.3 % (ref 0.3–6.2)
ERYTHROCYTE [DISTWIDTH] IN BLOOD BY AUTOMATED COUNT: 13.9 % (ref 12.3–15.4)
GLUCOSE BLDC GLUCOMTR-MCNC: 144 MG/DL (ref 70–99)
GLUCOSE BLDC GLUCOMTR-MCNC: 149 MG/DL (ref 70–99)
GLUCOSE BLDC GLUCOMTR-MCNC: 149 MG/DL (ref 70–99)
GLUCOSE BLDC GLUCOMTR-MCNC: 159 MG/DL (ref 70–99)
GLUCOSE SERPL-MCNC: 145 MG/DL (ref 65–99)
HCT VFR BLD AUTO: 35.1 % (ref 37.5–51)
HGB BLD-MCNC: 11.4 G/DL (ref 13–17.7)
IMM GRANULOCYTES # BLD AUTO: 1.86 10*3/MM3 (ref 0–0.05)
IMM GRANULOCYTES NFR BLD AUTO: 7.8 % (ref 0–0.5)
LYMPHOCYTES # BLD AUTO: 2.1 10*3/MM3 (ref 0.7–3.1)
LYMPHOCYTES NFR BLD AUTO: 8.8 % (ref 19.6–45.3)
MAGNESIUM SERPL-MCNC: 2.2 MG/DL (ref 1.6–2.6)
MCH RBC QN AUTO: 29.8 PG (ref 26.6–33)
MCHC RBC AUTO-ENTMCNC: 32.5 G/DL (ref 31.5–35.7)
MCV RBC AUTO: 91.6 FL (ref 79–97)
MONOCYTES # BLD AUTO: 1.83 10*3/MM3 (ref 0.1–0.9)
MONOCYTES NFR BLD AUTO: 7.6 % (ref 5–12)
NEUTROPHILS NFR BLD AUTO: 17.65 10*3/MM3 (ref 1.7–7)
NEUTROPHILS NFR BLD AUTO: 73.7 % (ref 42.7–76)
NRBC BLD AUTO-RTO: 0.1 /100 WBC (ref 0–0.2)
PHOSPHATE SERPL-MCNC: 2.6 MG/DL (ref 2.5–4.5)
PLATELET # BLD AUTO: 254 10*3/MM3 (ref 140–450)
PMV BLD AUTO: 10.9 FL (ref 6–12)
POTASSIUM SERPL-SCNC: 4 MMOL/L (ref 3.5–5.2)
RBC # BLD AUTO: 3.83 10*6/MM3 (ref 4.14–5.8)
SODIUM SERPL-SCNC: 134 MMOL/L (ref 136–145)
VANCOMYCIN SERPL-MCNC: 16.26 MCG/ML (ref 5–40)
WBC NRBC COR # BLD AUTO: 23.93 10*3/MM3 (ref 3.4–10.8)

## 2024-02-12 PROCEDURE — 25010000002 HEPARIN (PORCINE) PER 1000 UNITS: Performed by: INTERNAL MEDICINE

## 2024-02-12 PROCEDURE — 63710000001 INSULIN LISPRO (HUMAN) PER 5 UNITS: Performed by: PODIATRIST

## 2024-02-12 PROCEDURE — 25810000003 SODIUM CHLORIDE 0.9 % SOLUTION: Performed by: INTERNAL MEDICINE

## 2024-02-12 PROCEDURE — 94761 N-INVAS EAR/PLS OXIMETRY MLT: CPT

## 2024-02-12 PROCEDURE — 99024 POSTOP FOLLOW-UP VISIT: CPT | Performed by: PODIATRIST

## 2024-02-12 PROCEDURE — 25010000002 HYDRALAZINE PER 20 MG: Performed by: INTERNAL MEDICINE

## 2024-02-12 PROCEDURE — 25010000002 VANCOMYCIN 5 G RECONSTITUTED SOLUTION: Performed by: INTERNAL MEDICINE

## 2024-02-12 PROCEDURE — 25810000003 SODIUM CHLORIDE 0.9 % SOLUTION: Performed by: PODIATRIST

## 2024-02-12 PROCEDURE — 25010000002 VANCOMYCIN 5 G RECONSTITUTED SOLUTION: Performed by: PODIATRIST

## 2024-02-12 PROCEDURE — 80202 ASSAY OF VANCOMYCIN: CPT | Performed by: PODIATRIST

## 2024-02-12 PROCEDURE — 94799 UNLISTED PULMONARY SVC/PX: CPT

## 2024-02-12 PROCEDURE — 80069 RENAL FUNCTION PANEL: CPT | Performed by: INTERNAL MEDICINE

## 2024-02-12 PROCEDURE — 83735 ASSAY OF MAGNESIUM: CPT | Performed by: INTERNAL MEDICINE

## 2024-02-12 PROCEDURE — 85025 COMPLETE CBC W/AUTO DIFF WBC: CPT | Performed by: INTERNAL MEDICINE

## 2024-02-12 PROCEDURE — 87040 BLOOD CULTURE FOR BACTERIA: CPT | Performed by: INTERNAL MEDICINE

## 2024-02-12 PROCEDURE — 99233 SBSQ HOSP IP/OBS HIGH 50: CPT | Performed by: INTERNAL MEDICINE

## 2024-02-12 PROCEDURE — 25010000002 HYDROMORPHONE 1 MG/ML SOLUTION: Performed by: INTERNAL MEDICINE

## 2024-02-12 PROCEDURE — 82948 REAGENT STRIP/BLOOD GLUCOSE: CPT

## 2024-02-12 RX ORDER — HYDROCODONE BITARTRATE AND ACETAMINOPHEN 7.5; 325 MG/1; MG/1
2 TABLET ORAL EVERY 4 HOURS PRN
Status: DISCONTINUED | OUTPATIENT
Start: 2024-02-12 | End: 2024-02-16 | Stop reason: HOSPADM

## 2024-02-12 RX ORDER — LISINOPRIL 20 MG/1
40 TABLET ORAL
Status: DISCONTINUED | OUTPATIENT
Start: 2024-02-13 | End: 2024-02-16 | Stop reason: HOSPADM

## 2024-02-12 RX ORDER — HYDROCODONE BITARTRATE AND ACETAMINOPHEN 7.5; 325 MG/1; MG/1
1 TABLET ORAL EVERY 4 HOURS PRN
Status: DISCONTINUED | OUTPATIENT
Start: 2024-02-12 | End: 2024-02-16 | Stop reason: HOSPADM

## 2024-02-12 RX ORDER — HYDRALAZINE HYDROCHLORIDE 20 MG/ML
10 INJECTION INTRAMUSCULAR; INTRAVENOUS EVERY 6 HOURS PRN
Status: DISCONTINUED | OUTPATIENT
Start: 2024-02-12 | End: 2024-02-16 | Stop reason: HOSPADM

## 2024-02-12 RX ORDER — LISINOPRIL 20 MG/1
20 TABLET ORAL ONCE
Status: COMPLETED | OUTPATIENT
Start: 2024-02-12 | End: 2024-02-12

## 2024-02-12 RX ADMIN — VANCOMYCIN HYDROCHLORIDE 1750 MG: 5 INJECTION, POWDER, LYOPHILIZED, FOR SOLUTION INTRAVENOUS at 20:59

## 2024-02-12 RX ADMIN — LISINOPRIL 20 MG: 20 TABLET ORAL at 13:28

## 2024-02-12 RX ADMIN — BISACODYL 5 MG: 5 TABLET, COATED ORAL at 20:59

## 2024-02-12 RX ADMIN — HYDROMORPHONE HYDROCHLORIDE 1 MG: 1 INJECTION, SOLUTION INTRAMUSCULAR; INTRAVENOUS; SUBCUTANEOUS at 08:18

## 2024-02-12 RX ADMIN — VANCOMYCIN HYDROCHLORIDE 1500 MG: 5 INJECTION, POWDER, LYOPHILIZED, FOR SOLUTION INTRAVENOUS at 09:29

## 2024-02-12 RX ADMIN — Medication 10 ML: at 08:30

## 2024-02-12 RX ADMIN — HYDROCODONE BITARTRATE AND ACETAMINOPHEN 2 TABLET: 7.5; 325 TABLET ORAL at 20:59

## 2024-02-12 RX ADMIN — POLYETHYLENE GLYCOL 3350 17 G: 17 POWDER, FOR SOLUTION ORAL at 18:36

## 2024-02-12 RX ADMIN — HEPARIN SODIUM 5000 UNITS: 5000 INJECTION INTRAVENOUS; SUBCUTANEOUS at 13:29

## 2024-02-12 RX ADMIN — HYDRALAZINE HYDROCHLORIDE 10 MG: 20 INJECTION, SOLUTION INTRAMUSCULAR; INTRAVENOUS at 15:39

## 2024-02-12 RX ADMIN — HYDROCODONE BITARTRATE AND ACETAMINOPHEN 2 TABLET: 7.5; 325 TABLET ORAL at 11:13

## 2024-02-12 RX ADMIN — Medication 10 MG: at 23:53

## 2024-02-12 RX ADMIN — HEPARIN SODIUM 5000 UNITS: 5000 INJECTION INTRAVENOUS; SUBCUTANEOUS at 21:00

## 2024-02-12 RX ADMIN — LISINOPRIL 20 MG: 20 TABLET ORAL at 09:27

## 2024-02-12 RX ADMIN — HYDROCODONE BITARTRATE AND ACETAMINOPHEN 2 TABLET: 7.5; 325 TABLET ORAL at 16:18

## 2024-02-12 RX ADMIN — INSULIN LISPRO 2 UNITS: 100 INJECTION, SOLUTION INTRAVENOUS; SUBCUTANEOUS at 21:01

## 2024-02-12 RX ADMIN — HYDROCODONE BITARTRATE AND ACETAMINOPHEN 2 TABLET: 7.5; 325 TABLET ORAL at 03:05

## 2024-02-12 RX ADMIN — Medication 10 ML: at 21:01

## 2024-02-12 NOTE — PLAN OF CARE
Goal Outcome Evaluation:            Pt taught therapeutic methods to help deal with and manage pain.

## 2024-02-12 NOTE — PROGRESS NOTES
"[facility] Clinical Pharmacy Services: Vancomycin Monitoring Note    Karsten Johnson is a 48 y.o. male who is on day 5/TBD (duration TBD from first negative bcx) of pharmacy to dose vancomycin for Bacteremia and Bone and/or Joint Infection.    Previous Vancomycin Dose:   1500 mg IV every  12  hours  Imaging Reviewed?: Yes     TTE: no obvious vegetations on any valve; if high clinical suspicion, recommend TRACIE    Updated Cultures and Sensitivities:    Blood cx : pending  2/10 Blood cx : MRSA  2/10 Wound cx, left ankle: MRSA  2/10 Tissue/bone culture, left ankle: MRSA   Blood cx : MRSA    Vitals/Labs  Ht: 167.6 cm (66\"); Wt: 104 kg (230 lb 6.1 oz)   Temp (24hrs), Av.9 °F (37.2 °C), Min:98.2 °F (36.8 °C), Max:99.5 °F (37.5 °C)   Estimated Creatinine Clearance: 141.4 mL/min (A) (by C-G formula based on SCr of 0.71 mg/dL (L)).       Results from last 7 days   Lab Units 24  0547 24  0425 02/10/24  0302   VANCOMYCIN RM mcg/mL 16.26  --  19.40   CREATININE mg/dL 0.71* 0.71* 0.66*   WBC 10*3/mm3 23.93* 26.28* 24.04*     Assessment/Plan    Current Vancomycin Dose:  1750 mg IV every 12 hours; which provides the following predicted parameters:  AUC24,ss: 563 mg/L.hr  PAUC*: 100 %  Ctrough,ss: 16 mg/L  Pconc*: 10 %  Tox.: 11 %  Next Vanc Trough planned for  at 0730 prior to 4th dose of new regimen at 0800  We will continue to monitor patient changes and renal function     Thank you for involving pharmacy in this patient's care. Please contact pharmacy with any questions or concerns.    Varun Miller [facility]  Clinical Pharmacist  "

## 2024-02-12 NOTE — PLAN OF CARE
Goal Outcome Evaluation:  Plan of Care Reviewed With: patient        Progress: no change  Outcome Evaluation: Pt alert and able to make needs known. Pain controlled with PRN Dilaudid and Norco. Pt urinating without difficulty, utilizing urinal. Pt cont. with dressing to left calf to foot-tolerating well. Educated importance of ice and elevation.

## 2024-02-12 NOTE — PROGRESS NOTES
"Patient Care Team:  Provider, No Known as PCP - General    Date: 2024  Patient Name: Karsten Johnson  : 1975  MRN: [id number]  Date of admission: 2024  Room/Bed: [facility]/      Subjective   Subjective         Chief Complaint: f/u left foot pain     Summary:Karsten Johnson is a 48 y.o. obese male with essentially no past medical history who presented to the emergency department with left heel/Achilles pain that began 5 days ago.  Patient says that happened while at work.  There was no trauma.  Patient said the pain started slowly but steadily increased.  Patient went to an urgent care in [facility] and was told that he most likely had a torn Achilles. Patient was found to have achilles rupture, heel abscess and osteomyelitis. Patient's blood cultures are persistently positive for MRSA. Blood cultures repeated this am and if remain positive plan to get TRAICE.       Interval Followup:   Patient is still having significant pain in left foot. He states the oral pain meds are working better than the dilaudid. Encouraged to try to keep oral meds in his system and let me know if they are not lasting long enough.       Review of Systems   All other systems reviewed and are negative.        Objective   Objective       Vital Signs  Temp:  [98.6 °F (37 °C)-99.9 °F (37.7 °C)] 99.9 °F (37.7 °C)  Heart Rate:  [75-83] 75  Resp:  [18-20] 18  BP: ()/() 163/94  Oxygen Therapy  SpO2: 99 %  Pulse Oximetry Type: Continuous  Device (Oxygen Therapy): room air  Flow (L/min): 1  Flowsheet Rows      Flowsheet Row First Filed Value   Admission Height 167.6 cm (66\") Documented at 2024 1500   Admission Weight 104 kg (230 lb 6.1 oz) Documented at 2024          Intake & Output (last 3 days)          07 07 07 07 07    Urine (mL/kg/hr)   300 200 (0.2)    Total Output   300 200    Net   -300 -200            Urine Unmeasured " Occurrence   2 x           Lines, Drains & Airways       Active LDAs       Name Placement date Placement time Site Days    Peripheral IV 02/08/24 1015 Anterior;Right Antecubital 02/08/24  1015  Antecubital  less than 1                      Physical Exam  Vitals reviewed.   Constitutional:       General: He is not in acute distress.     Appearance: He is obese.   HENT:      Head: Normocephalic and atraumatic.      Mouth/Throat:      Mouth: Mucous membranes are moist.      Comments: Poor dentition  Cardiovascular:      Rate and Rhythm: Normal rate and regular rhythm.      Heart sounds: No murmur heard.  Pulmonary:      Effort: Pulmonary effort is normal. No respiratory distress.      Breath sounds: No wheezing or rales.   Abdominal:      General: Bowel sounds are normal. There is no distension.      Palpations: Abdomen is soft.      Tenderness: There is no abdominal tenderness.   Musculoskeletal:      Comments: +1 np LLE edema, less ttp   Skin:     Comments: Dressing in place to LLE    Neurological:      Mental Status: He is alert.   Psychiatric:         Mood and Affect: Mood normal.         Behavior: Behavior normal.           Results Review:      Results from last 7 days   Lab Units 02/12/24  0547 02/11/24  0425 02/10/24  0302   WBC 10*3/mm3 23.93* 26.28* 24.04*   HEMOGLOBIN g/dL 11.4* 10.3* 10.4*   HEMATOCRIT % 35.1* 31.3* 31.7*   PLATELETS 10*3/mm3 254 192 204   MONOCYTES % %  --  5.0  --    EOSINOPHIL % %  --  1.0  --      Results from last 7 days   Lab Units 02/12/24  0547 02/11/24  0425 02/10/24  0302 02/08/24  0403 02/07/24  1533   SODIUM mmol/L 134* 133* 130*   < > 135*   POTASSIUM mmol/L 4.0 4.0 3.7   < > 3.5   CHLORIDE mmol/L 99 100 97*   < > 99   CO2 mmol/L 25.8 24.0 21.9*   < > 20.4*   BUN mg/dL 11 19 14   < > 14   CREATININE mg/dL 0.71* 0.71* 0.66*   < > 0.72*   CALCIUM mg/dL 8.4* 8.4* 8.4*   < > 9.1   BILIRUBIN mg/dL  --   --   --   --  0.9   ALK PHOS U/L  --   --   --   --  144*   ALT (SGPT) U/L  --    "--   --   --  18   AST (SGOT) U/L  --   --   --   --  22   GLUCOSE mg/dL 145* 139* 149*   < > 157*    < > = values in this interval not displayed.       Results from last 7 days   Lab Units 02/12/24  0547 02/10/24  0302 02/09/24  0618   MAGNESIUM mg/dL 2.2 2.2 2.2       No results found for: \"BLOODCX\"    No results found for: \"MRSACX\"    No results found for: \"STOOLCX\"    No results found for: \"URINECX\"    No results found for: \"WOUNDCX\"    Imaging Results (Last 24 Hours)       ** No results found for the last 24 hours. **            I have personally reviewed the patient's new imaging and lab results.          ECG/EMG Results (most recent)       Procedure Component Value Units Date/Time    ECG 12 Lead Pre-Op / Pre-Procedure [[id number]] Collected: 02/07/24 2109     Updated: 02/08/24 0904     QT Interval 341 ms      QTC Interval 428 ms     Narrative:      HEART RATE= 95  bpm  RR Interval= 636  ms  AL Interval= 167  ms  P Horizontal Axis= 7  deg  P Front Axis= 46  deg  QRSD Interval= 99  ms  QT Interval= 341  ms  QTcB= 428  ms  QRS Axis= 9  deg  T Wave Axis= 31  deg  - NORMAL ECG -  Sinus rhythm  No previous ECG available for comparison  Electronically Signed By: Jarrett Padilla ([facility]) 08-Feb-2024 09:04:41  Date and Time of Study: 2024-02-07 21:09:27    Adult Transthoracic Echo Complete W/ Cont if Necessary Per Protocol [[id number]] Resulted: 02/09/24 1439     Updated: 02/09/24 1442     EF(MOD-bp) 55.3 %      LVIDd 5.4 cm      LVIDs 3.8 cm      IVSd 0.97 cm      LVPWd 1.21 cm      FS 29.1 %      IVS/LVPW 0.80 cm      ESV(cubed) 56.4 ml      LV Sys Vol (BSA corrected) 29.0 cm2      EDV(cubed) 158.2 ml      LV Flowers Vol (BSA corrected) 81.0 cm2      LV mass(C)d 232.0 grams      LVOT area 3.9 cm2      LVOT diam 2.22 cm      EDV(MOD-sp2) 126.0 ml      EDV(MOD-sp4) 172.0 ml      ESV(MOD-sp2) 69.6 ml      ESV(MOD-sp4) 61.5 ml      SV(MOD-sp2) 56.4 ml      SV(MOD-sp4) 110.5 ml      SI(MOD-sp2) 26.6 ml/m2      SI(MOD-sp4) 52.1 " ml/m2      EF(MOD-sp2) 44.8 %      EF(MOD-sp4) 64.2 %      MV E max salvador 81.8 cm/sec      MV A max salvador 98.5 cm/sec      MV dec time 0.30 sec      MV E/A 0.83     LA ESV Index (BP) 35.6 ml/m2      Med Peak E' Salvador 7.2 cm/sec      Lat Peak E' Salvador 8.6 cm/sec      TR max salvador 259.3 cm/sec      Avg E/e' ratio 10.35     SV(LVOT) 87.7 ml      RVIDd 3.7 cm      TAPSE (>1.6) 3.3 cm      LV V1 max 113.5 cm/sec      LV V1 max PG 5.2 mmHg      LV V1 mean PG 3.6 mmHg      LV V1 VTI 22.6 cm      Ao pk salvador 158.8 cm/sec      Ao max PG 10.1 mmHg      Ao mean PG 5.6 mmHg      Ao V2 VTI 27.5 cm      EVERT(I,D) 3.2 cm2      MV dec slope 276.7 cm/sec2      TR max PG 26.9 mmHg      PA V2 max 109.9 cm/sec     Narrative:        Left ventricular systolic function is normal. Calculated left   ventricular EF = 55.3%    Estimated right ventricular systolic pressure from tricuspid   regurgitation is normal (<35 mmHg).    No obvious wall motion abnormalities    No obvious vegetations on any valve. If high clinical suspicion,   recommend TRACIE              Results for orders placed during the hospital encounter of 02/07/24    Adult Transthoracic Echo Complete W/ Cont if Necessary Per Protocol    Interpretation Summary    Left ventricular systolic function is normal. Calculated left ventricular EF = 55.3%    Estimated right ventricular systolic pressure from tricuspid regurgitation is normal (<35 mmHg).    No obvious wall motion abnormalities    No obvious vegetations on any valve. If high clinical suspicion, recommend TRACIE          Medication Review:     Scheduled Meds:heparin (porcine), 5,000 Units, Subcutaneous, Q8H  insulin lispro, 2-9 Units, Subcutaneous, 4x Daily AC & at Bedtime  lisinopril, 20 mg, Oral, Q24H  sodium chloride, 10 mL, Intravenous, Q12H  vancomycin, 1,750 mg, Intravenous, Q12H      Continuous Infusions:lactated ringers, 30 mL/hr, Last Rate: 30 mL/hr (02/10/24 0713)  Pharmacy to dose vancomycin,       PRN Meds:.  acetaminophen **OR**  acetaminophen    senna-docusate sodium **AND** polyethylene glycol **AND** bisacodyl **AND** bisacodyl    dextrose    dextrose    glucagon (human recombinant)    HYDROcodone-acetaminophen    HYDROcodone-acetaminophen    HYDROmorphone    melatonin    ondansetron    Pharmacy to dose vancomycin    sodium chloride    sodium chloride      I have reviewed the patient's current medication list    Assessment & Plan   Assessment / Plan       Active Hospital Problems    Diagnosis  POA    **Achilles rupture [S86.019A]  Yes    Acute hematogenous osteomyelitis [M86.00]  Unknown    Abscess of heel [L02.619]  Unknown       Left heel pain likely d/t achilles rupture and heel abscess s/p I&D left ankle and biopsy of left ankle 2/10/24  Acute hematogenous osteomyelitis  MRSA bacteremia   - continue Vancomycin   - blood cx- MRSA  - repeat blood cx 2/10- MRSA  - repeat blood cx 2/12- pending  - wound cx- MRSA, bone cx- MRSA  - CRP, ESR, Procal elevated, lactic acid wnl   - doppler negative for DVT  - increase dose of hydrocodone to q4h, try to limit IV dilaudid  - echocardiogram negative for any signs of vegetation  - discussed with podiatry and patient may require further surgery  - discussed with ID pharmacist and if 2/12 blood cultures still positive patient will need TRACIE    Prediabetes  - Hgb A1c 6.4  - continue SSI   - monitor with routine accu-checks and make adjustments as needed     Hypertension  - increase lisinopril 40mg   - add IV hydralazine prn   - monitor with routine vs and make adjustments as needed     Obesity  - BMI 37.1  - complicates all aspects of care  -  on lifestyle changes as appropriate     Mild anemia  - no signs of bleeding     DVT Prophylaxis  - heparin     Code Status  - Full Code    Patient remains high risk       Plan for disposition: pending progress     Rica Snowden DO  02/12/24  12:46 EST            Note disclaimer: At [facility], we believe that sharing information builds trust and  better relationships. You are receiving this note because you recently visited [facility]. It is possible you will see health information before a provider has talked with you about it. This kind of information can be easy to misunderstand. To help you fully understand what it means for your health, we urge you to discuss this note with your provider.

## 2024-02-12 NOTE — CONSULTS
Provided patient with written material, Diabetes Survival Skills, and covered the information pertinent to patients diagnosis of prediabetes. Provided education on HbA1c, currently 6.4%, and how that is 0.1% away from a diagnosis of diabetes. Discussed dietary changes that can help, especially avoiding beverages high in concentrated carbohydrates (fruit juices, sodas, sweet tea). Patient admits to being told previously that he was prediabetic but received no education at that time.     Patient will need a PCP to follow up with while he remains local to the area, and will also need to follow up with podiatry.

## 2024-02-12 NOTE — PROGRESS NOTES
[facility] - PODIATRY    Today's Date: 02/12/24    Patient Name: Karsten Johnson  MRN: [id number]  CSN: [id number]  PCP: Provider, No Known  Referring Provider: No ref. provider found  Attending Provider: Rica Snowden, *  Length of Stay: 5    SUBJECTIVE   Chief Complaint:     HPI: Karsten Johnson, a 48 y.o.male, .  Denies any constitutional symptoms. No other pedal complaints at this time. Pain to heel.    History reviewed. No pertinent past medical history.  Past Surgical History:   Procedure Laterality Date    INCISION AND DRAINAGE OF WOUND Left 2/10/2024    Procedure: INCISION AND DRAINAGE  LEFT ANKLE,BIOPSY LEFT ANKEL;  Surgeon: Alvarez Beck DPM;  Location: [facility] OR;  Service: Podiatry;  Laterality: Left;     History reviewed. No pertinent family history.  Social History     Socioeconomic History    Marital status:    Tobacco Use    Smoking status: Every Day     Packs/day: 1.00     Years: 25.00     Additional pack years: 0.00     Total pack years: 25.00     Types: Cigarettes     Passive exposure: Never    Smokeless tobacco: Never   Vaping Use    Vaping Use: Never used   Substance and Sexual Activity    Alcohol use: Yes     Alcohol/week: 3.0 standard drinks of alcohol     Types: 3 Cans of beer per week    Drug use: Yes     Types: Marijuana    Sexual activity: Defer     No Known Allergies  Current Facility-Administered Medications   Medication Dose Route Frequency Provider Last Rate Last Admin    acetaminophen (TYLENOL) tablet 650 mg  650 mg Oral Q6H PRN Alvarez Beck DPM   650 mg at 02/08/24 1616    Or    acetaminophen (TYLENOL) suppository 650 mg  650 mg Rectal Q6H PRN Alvarez Beck DPM        sennosides-docusate (PERICOLACE) 8.6-50 MG per tablet 2 tablet  2 tablet Oral BID PRN Alvarez Beck DPM   2 tablet at 02/11/24 2056    And    polyethylene glycol (MIRALAX) packet 17 g  17 g Oral Daily PRN Alvarez Beck DPM        And    bisacodyl  (DULCOLAX) EC tablet 5 mg  5 mg Oral Daily PRN Alvarez Beck, DPM        And    bisacodyl (DULCOLAX) suppository 10 mg  10 mg Rectal Daily PRN Alvarez Beck DPM        dextrose (D50W) (25 g/50 mL) IV injection 25 g  25 g Intravenous Q15 Min PRN Alvarez Beck DPANGEL        dextrose (GLUTOSE) oral gel 15 g  15 g Oral Q15 Min PRN Alvarez Beck DPM        glucagon (GLUCAGEN) injection 1 mg  1 mg Intramuscular Q15 Min PRN Alvarez Beck DPM        heparin (porcine) 5000 UNIT/ML injection 5,000 Units  5,000 Units Subcutaneous Q8H Rica Snowden, DO   5,000 Units at 02/08/24 1455    HYDROcodone-acetaminophen (NORCO) 7.5-325 MG per tablet 1 tablet  1 tablet Oral Q6H PRN Rica Snowden, DO   1 tablet at 02/11/24 0734    HYDROcodone-acetaminophen (NORCO) 7.5-325 MG per tablet 2 tablet  2 tablet Oral Q6H PRN SnowdenRica helm, DO   2 tablet at 02/12/24 1113    HYDROmorphone (DILAUDID) injection 1 mg  1 mg Intravenous Q3H PRN Rica Snowden, DO   1 mg at 02/12/24 0818    Insulin Lispro (humaLOG) injection 2-9 Units  2-9 Units Subcutaneous 4x Daily AC & at Bedtime Alvarez Beck DPM   2 Units at 02/11/24 2057    lactated ringers infusion  30 mL/hr Intravenous Continuous Alvarez Beck DPM 30 mL/hr at 02/10/24 0713 30 mL/hr at 02/10/24 0713    lisinopril (PRINIVIL,ZESTRIL) tablet 20 mg  20 mg Oral Q24H SnowdenRica, DO   20 mg at 02/12/24 0927    melatonin tablet 10 mg  10 mg Oral Nightly PRN Alvarez Beck DPM   10 mg at 02/08/24 2249    ondansetron (ZOFRAN) injection 4 mg  4 mg Intravenous Q6H PRN Alvarez Beck DPM   4 mg at 02/09/24 0511    Pharmacy to dose vancomycin   Does not apply Continuous PRN Rica Snowden,         sodium chloride 0.9 % flush 10 mL  10 mL Intravenous Q12H Alvarez Beck DPM   10 mL at 02/12/24 0830    sodium chloride 0.9 % flush 10 mL  10 mL Intravenous PRN Alvarez Beck, DPM        sodium chloride  0.9 % infusion 40 mL  40 mL Intravenous PRN Alvarez Beck, DPM        vancomycin 1750 mg/500 mL 0.9% NS IVPB ([facility])  1,750 mg Intravenous Q12H Rica Snowden DO         Review of Systems   All other systems reviewed and are negative.      OBJECTIVE     Vitals:    02/12/24 1107   BP: 163/94   Pulse: 75   Resp: 18   Temp: 99.9 °F (37.7 °C)   SpO2: 99%       PHYSICAL EXAM    GEN:   A&Ox3, NAD. Pt presents in hospital bed.     Foot/Ankle Exam     GENERAL  Appearance:  appears stated age  Orientation:  AAOx3  Affect:  appropriate  Gait:  unimpaired  Assistance:  independent  Right shoe gear: casual shoe  Left shoe gear: casual shoe     VASCULAR      Right Foot Vascularity   Normal vascular exam    Dorsalis pedis:  2+  Posterior tibial:  2+  Skin temperature:  warm  Edema grading:  None  CFT:  < 3 seconds  Pedal hair growth:  Present  Varicosities:  none      Left Foot Vascularity   Normal vascular exam    Dorsalis pedis:  2+  Posterior tibial:  2+  Skin temperature:  warm  Edema grading:  None  CFT:  < 3 seconds  Pedal hair growth:  Present  Varicosities:  none     NEUROLOGIC      Right Foot Neurologic   Normal sensation    Light touch sensation: normal  Vibratory sensation: normal  Hot/Cold sensation: normal  Protective Sensation using Wolverine-Flip Monofilament:   Sites intact: 10  Sites tested: 10      Left Foot Neurologic   Normal sensation    Light touch sensation: normal  Vibratory sensation: normal  Hot/Cold sensation:  normal  Protective Sensation using Wolverine-Flip Monofilament:   Sites intact: 10  Sites tested: 10     MUSCLE STRENGTH      Right Foot Muscle Strength   Foot dorsiflexion:  4  Foot plantar flexion:  4  Foot inversion:  4  Foot eversion:  4      Left Foot Muscle Strength   Foot dorsiflexion:  4  Foot plantar flexion:  4  Foot inversion:  4  Foot eversion:  4     RANGE OF MOTION      Right Foot Range of Motion   Foot and ankle ROM within normal limits        Left Foot Range of  Motion   Foot and ankle ROM within normal limits       DERMATOLOGIC       Right Foot Dermatologic   Skin  Right foot skin is intact.       Left Foot Dermatologic   Skin  Left foot skin is intact.     Wound to right posterior heel with mild fibrosis to the level of bone. No purulence. Decrease swelling to ankle and leg.    RADIOLOGY/NUCLEAR:  Adult Transthoracic Echo Complete W/ Cont if Necessary Per Protocol    Result Date: 2/9/2024  Narrative:   Left ventricular systolic function is normal. Calculated left ventricular EF = 55.3%   Estimated right ventricular systolic pressure from tricuspid regurgitation is normal (<35 mmHg).   No obvious wall motion abnormalities   No obvious vegetations on any valve. If high clinical suspicion, recommend TRACIE     MRI Ankle Left Without Contrast    Result Date: 2/8/2024  Narrative: PROCEDURE: MRI ANKLE LEFT  WO CONTRAST  COMPARISON: [facility], CR, XR ANKLE 3+ VW LEFT, 2/07/2024, 15:42.  INDICATIONS: FELT POP TO LEFT HEEL X 1 WEEK AGO AT WORK. SEVERE POSTERIOR ANKLE PAIN WITH A KNOT TO LEFT MEDIAL ANKLE AND SWELLING.     TECHNIQUE: A complete multi-planar examination was performed without contrast.  FINDINGS:  There is severe tendinosis distal Achilles tendon.  There is a full-thickness disruption of the tendon at its insertion site on the calcaneus.  This full-thickness gap measures 4.6 cm from craniocaudal.  No significant retraction of the tendon fibers.  There is high-grade partial-thickness interstitial tearing of the more watershed/musculotendinous junction.  There is edema in Kager's fat.  There is abnormal marrow edema of the calcaneus most pronounced at the insertion site.  There is erosive change at the Achilles tendon insertion site consistent with probable inflammatory arthritis and enthesopathy.  The calcifications seen in the distal tendon on radiograph are not well seen on MRI.  There is moderate subtalar joint space narrowing and moderate talonavicular  joint space narrowing.  There is mild midfoot degenerative change.  There is a moderate-sized os trigonum with mild marrow edema.  There is a small tibiotalar joint effusion.    There is fluid in the posterior tibialis and other flexor tendons consistent with tenosynovitis.  The tendons themselves are intact.  The peroneal tendons are normal appearance.  The extensor tendons are normal appearance.  The syndesmotic ligaments are intact.  The anterior and posterior talofibular ligament and calcaneofibular ligament are intact.  The medial band plantar fascia is thickened has high signal intensity.  There is a moderate-sized plantar calcaneal spur.  The sinus tarsi has fat signal intensity which is normal.  The tarsal tunnel is normal.  There is soft tissue edema most pronounced posteriorly.  There are no osteochondral lesions of the talar dome.  There is mild hindfoot valgus alignment.      Impression:   1. Severe tendinosis and enthesopathy at the distal Achilles tendon.  There is a full-thickness gap of the distal tendon from the insertion site measuring up to 4.6 cm consistent with full thickness tear.  Severe tendinosis of the musculotendinous junction of the Achilles.  Associated soft tissue edema. 2. Marrow edema calcaneus with erosive changes at the insertion site of the Achilles tendon likely related to inflammatory arthritis and reactive edema calcaneus. 3. Findings of plantar fasciitis medial band plantar fascia. 4. Mild hindfoot and midfoot degenerative change. 5. Tenosynovitis of the flexor tendons.     SAEED VIGIL MD       Electronically Signed and Approved By: SAEED VIGIL MD on 2/08/2024 at 14:57             Duplex Venous Lower Extremity - Left CAR    Result Date: 2/7/2024  Narrative:   Normal left lower extremity venous duplex scan.     XR Foot 3+ View Left    Result Date: 2/7/2024  Narrative: PROCEDURE: XR ANKLE 3+ VW LEFT, 2/07/2024, 15:42 XR FOOT 3+ VW LEFT, 2/07/2024, 15:44 XR TIBIA FIBULA 2 VW LEFT,  2/07/2024, 15:  COMPARISON: None  INDICATIONS: pain no known injury swelling and bruising medial aspect  FINDINGS:  Ankle:  There is soft tissue swelling about the medial portion of the ankle.  The ankle mortise looks intact.  There is osseous demineralization.  There is abnormal appearance to the distal Achilles tendon area with abnormal ossification in soft tissue swelling.  An Achilles tendon injury could account for this.  There is a plantar calcaneal spur.  There is ossification in the soft tissues adjacent to the medial tibial tubercle that may relate to more of the changes in the posterior ankle as opposed to an injury around this area.  Foot:  As noted on the ankle films there is an abnormal appearance to the posterior ankle around the distal Achilles tendon with abnormal ossification and soft tissue swelling that could relate to a more acute Achilles tendon injury.  There is no subluxation or dislocation of the digits.  There is osseous demineralization.  Tibia fibula:  There is no definite fracture.  There is osseous demineralization.      Impression:   1. Abnormal appearance to the area of the distal Achilles tendon.  Injury to this area could not be excluded. 2. Osseous demineralization 3. Soft tissue swelling about the medial ankle/foot. 4. Calcification/ossification adjacent to the medial tubercle of the talus that may relate to the findings in the posterior ankle area as opposed to an injury around the medial tubercle of the talus       AUSTIN HARRIS MD       Electronically Signed and Approved By: AUSTIN HARRIS MD on 2/07/2024 at 16:04             XR Ankle 3+ View Left    Result Date: 2/7/2024  Narrative: PROCEDURE: XR ANKLE 3+ VW LEFT, 2/07/2024, 15:42 XR FOOT 3+ VW LEFT, 2/07/2024, 15:44 XR TIBIA FIBULA 2 VW LEFT, 2/07/2024, 15:  COMPARISON: None  INDICATIONS: pain no known injury swelling and bruising medial aspect  FINDINGS:  Ankle:  There is soft tissue swelling about the medial portion of the  ankle.  The ankle mortise looks intact.  There is osseous demineralization.  There is abnormal appearance to the distal Achilles tendon area with abnormal ossification in soft tissue swelling.  An Achilles tendon injury could account for this.  There is a plantar calcaneal spur.  There is ossification in the soft tissues adjacent to the medial tibial tubercle that may relate to more of the changes in the posterior ankle as opposed to an injury around this area.  Foot:  As noted on the ankle films there is an abnormal appearance to the posterior ankle around the distal Achilles tendon with abnormal ossification and soft tissue swelling that could relate to a more acute Achilles tendon injury.  There is no subluxation or dislocation of the digits.  There is osseous demineralization.  Tibia fibula:  There is no definite fracture.  There is osseous demineralization.      Impression:   1. Abnormal appearance to the area of the distal Achilles tendon.  Injury to this area could not be excluded. 2. Osseous demineralization 3. Soft tissue swelling about the medial ankle/foot. 4. Calcification/ossification adjacent to the medial tubercle of the talus that may relate to the findings in the posterior ankle area as opposed to an injury around the medial tubercle of the talus       AUSTIN HARRIS MD       Electronically Signed and Approved By: AUSTIN HARRIS MD on 2/07/2024 at 16:04             XR Tibia Fibula 2 View Left    Result Date: 2/7/2024  Narrative: PROCEDURE: XR ANKLE 3+ VW LEFT, 2/07/2024, 15:42 XR FOOT 3+ VW LEFT, 2/07/2024, 15:44 XR TIBIA FIBULA 2 VW LEFT, 2/07/2024, 15:  COMPARISON: None  INDICATIONS: pain no known injury swelling and bruising medial aspect  FINDINGS:  Ankle:  There is soft tissue swelling about the medial portion of the ankle.  The ankle mortise looks intact.  There is osseous demineralization.  There is abnormal appearance to the distal Achilles tendon area with abnormal ossification in soft tissue  swelling.  An Achilles tendon injury could account for this.  There is a plantar calcaneal spur.  There is ossification in the soft tissues adjacent to the medial tibial tubercle that may relate to more of the changes in the posterior ankle as opposed to an injury around this area.  Foot:  As noted on the ankle films there is an abnormal appearance to the posterior ankle around the distal Achilles tendon with abnormal ossification and soft tissue swelling that could relate to a more acute Achilles tendon injury.  There is no subluxation or dislocation of the digits.  There is osseous demineralization.  Tibia fibula:  There is no definite fracture.  There is osseous demineralization.      Impression:   1. Abnormal appearance to the area of the distal Achilles tendon.  Injury to this area could not be excluded. 2. Osseous demineralization 3. Soft tissue swelling about the medial ankle/foot. 4. Calcification/ossification adjacent to the medial tubercle of the talus that may relate to the findings in the posterior ankle area as opposed to an injury around the medial tubercle of the talus       AUSTIN HARRIS MD       Electronically Signed and Approved By: AUSTIN HARRIS MD on 2/07/2024 at 16:04              LABORATORY/CULTURE RESULTS:  Results from last 7 days   Lab Units 02/12/24  0547 02/11/24  0425 02/10/24  0302   WBC 10*3/mm3 23.93* 26.28* 24.04*   HEMOGLOBIN g/dL 11.4* 10.3* 10.4*   HEMATOCRIT % 35.1* 31.3* 31.7*   PLATELETS 10*3/mm3 254 192 204     Results from last 7 days   Lab Units 02/12/24  0547 02/11/24  0425 02/10/24  0302 02/08/24  0403 02/07/24  1533   SODIUM mmol/L 134* 133* 130*   < > 135*   POTASSIUM mmol/L 4.0 4.0 3.7   < > 3.5   CHLORIDE mmol/L 99 100 97*   < > 99   CO2 mmol/L 25.8 24.0 21.9*   < > 20.4*   BUN mg/dL 11 19 14   < > 14   CREATININE mg/dL 0.71* 0.71* 0.66*   < > 0.72*   CALCIUM mg/dL 8.4* 8.4* 8.4*   < > 9.1   BILIRUBIN mg/dL  --   --   --   --  0.9   ALK PHOS U/L  --   --   --   --  144*    ALT (SGPT) U/L  --   --   --   --  18   AST (SGOT) U/L  --   --   --   --  22   GLUCOSE mg/dL 145* 139* 149*   < > 157*    < > = values in this interval not displayed.         Microbiology Results (last 10 days)       Procedure Component Value - Date/Time    Blood Culture - Blood, Hand, Left [[id number]]  (Abnormal) Collected: 02/10/24 1154    Lab Status: Preliminary result Specimen: Blood from Hand, Left Updated: 02/12/24 0703     Blood Culture Staphylococcus aureus, MRSA     Comment:   Infectious disease consultation is highly recommended to rule out distant foci of infection.  Methicillin resistant Staphylococcus aureus, Patient may be an isolation risk.        Isolated from Aerobic and Anaerobic Bottles     Gram Stain Aerobic Bottle Gram positive cocci in clusters      Anaerobic Bottle Gram positive cocci in clusters    Blood Culture - Blood, Arm, Left [[id number]]  (Abnormal) Collected: 02/10/24 1150    Lab Status: Preliminary result Specimen: Blood from Arm, Left Updated: 02/12/24 0704     Blood Culture Staphylococcus aureus, MRSA     Comment:   Infectious disease consultation is highly recommended to rule out distant foci of infection.  Methicillin resistant Staphylococcus aureus, Patient may be an isolation risk.        Isolated from Aerobic and Anaerobic Bottles     Gram Stain Aerobic Bottle Gram positive cocci in clusters      Anaerobic Bottle Gram positive cocci in clusters    Wound Culture - Wound, Ankle, Left [[id number]]  (Abnormal) Collected: 02/10/24 0840    Lab Status: Preliminary result Specimen: Wound from Ankle, Left Updated: 02/12/24 0949     Wound Culture Scant growth (1+) Staphylococcus aureus, MRSA     Comment:   Methicillin resistant Staphylococcus aureus, Patient may be an isolation risk.        Gram Stain No organisms seen    Tissue / Bone Culture - Bone, Ankle, Left [[id number]]  (Abnormal) Collected: 02/10/24 0817    Lab Status: Preliminary result Specimen: Bone from Ankle, Left Updated:  02/12/24 0951     Tissue Culture Scant growth (1+) Staphylococcus aureus, MRSA     Comment:   Methicillin resistant Staphylococcus aureus, Patient may be an isolation risk.        Gram Stain No organisms seen    AFB Culture - Bone, Ankle, Left [[id number]] Collected: 02/10/24 0817    Lab Status: Preliminary result Specimen: Bone from Ankle, Left Updated: 02/10/24 1413     AFB Stain No acid fast bacilli seen on direct smear    Blood Culture - Blood, Hand, Left [[id number]]  (Abnormal)  (Susceptibility) Collected: 02/08/24 1138    Lab Status: Final result Specimen: Blood from Hand, Left Updated: 02/11/24 0641     Blood Culture Staphylococcus aureus, MRSA     Comment:   Infectious disease consultation is highly recommended to rule out distant foci of infection.  Methicillin resistant Staphylococcus aureus, Patient may be an isolation risk.        Isolated from Aerobic and Anaerobic Bottles     Gram Stain Aerobic Bottle Gram positive cocci in clusters      Anaerobic Bottle Gram positive cocci in clusters    Susceptibility        Staphylococcus aureus, MRSA      JULIO      Gentamicin Susceptible      Oxacillin Resistant      Rifampin Susceptible      Vancomycin Susceptible                           Blood Culture - Blood, Hand, Right [[id number]]  (Abnormal) Collected: 02/08/24 1138    Lab Status: Final result Specimen: Blood from Hand, Right Updated: 02/11/24 0643     Blood Culture Staphylococcus aureus, MRSA     Comment:   Infectious disease consultation is highly recommended to rule out distant foci of infection.  Methicillin resistant Staphylococcus aureus, Patient may be an isolation risk.        Isolated from Aerobic and Anaerobic Bottles     Gram Stain Aerobic Bottle Gram positive cocci in clusters      Anaerobic Bottle Gram positive cocci in clusters    Narrative:      Refer to previous blood culture collected on 02/08/2024 at 1138 for MICs.        Blood Culture ID, PCR - Blood, Hand, Left [[id number]]  (Abnormal)  Collected: 02/08/24 1138    Lab Status: Final result Specimen: Blood from Hand, Left Updated: 02/09/24 0353     BCID, PCR Staph aureus. mecA/C and MREJ (methicillin resistance gene) detected. Identification by BCID2 PCR.     BOTTLE TYPE Aerobic Bottle    Narrative:      Infectious disease consultation is highly recommended to rule out distant foci of infection.             ASSESSMENT/PLAN     Active Hospital Problems:  Active Hospital Problems    Diagnosis     **Achilles rupture     Acute hematogenous osteomyelitis     Abscess of heel        Dressing changed, Betadine packing to posterior heel    IV antibiotics, follow cultures    Further treatment pending clinical response to treatment     Discussed findings and treatment plan including risks, benefits, and treatment options with patient in detail. Patient agreed with treatment plan.    This document has been electronically signed by Alvarez Beck DPM on February 12, 2024 12:45 EST

## 2024-02-13 ENCOUNTER — ANESTHESIA EVENT (OUTPATIENT)
Dept: PERIOP | Facility: HOSPITAL | Age: 49
End: 2024-02-13
Payer: COMMERCIAL

## 2024-02-13 LAB
ANION GAP SERPL CALCULATED.3IONS-SCNC: 10.4 MMOL/L (ref 5–15)
ANISOCYTOSIS BLD QL: ABNORMAL
BACTERIA SPEC AEROBE CULT: ABNORMAL
BASOPHILS # BLD MANUAL: 0.25 10*3/MM3 (ref 0–0.2)
BASOPHILS NFR BLD MANUAL: 1 % (ref 0–1.5)
BUN SERPL-MCNC: 8 MG/DL (ref 6–20)
BUN/CREAT SERPL: 13.6 (ref 7–25)
CALCIUM SPEC-SCNC: 8.1 MG/DL (ref 8.6–10.5)
CHLORIDE SERPL-SCNC: 100 MMOL/L (ref 98–107)
CO2 SERPL-SCNC: 24.6 MMOL/L (ref 22–29)
CREAT SERPL-MCNC: 0.59 MG/DL (ref 0.76–1.27)
CYTO UR: NORMAL
DEPRECATED RDW RBC AUTO: 46.1 FL (ref 37–54)
EGFRCR SERPLBLD CKD-EPI 2021: 118.2 ML/MIN/1.73
EOSINOPHIL # BLD MANUAL: 0.25 10*3/MM3 (ref 0–0.4)
EOSINOPHIL NFR BLD MANUAL: 1 % (ref 0.3–6.2)
ERYTHROCYTE [DISTWIDTH] IN BLOOD BY AUTOMATED COUNT: 14.1 % (ref 12.3–15.4)
GLUCOSE BLDC GLUCOMTR-MCNC: 128 MG/DL (ref 70–99)
GLUCOSE BLDC GLUCOMTR-MCNC: 160 MG/DL (ref 70–99)
GLUCOSE BLDC GLUCOMTR-MCNC: 176 MG/DL (ref 70–99)
GLUCOSE BLDC GLUCOMTR-MCNC: 191 MG/DL (ref 70–99)
GLUCOSE SERPL-MCNC: 147 MG/DL (ref 65–99)
GRAM STN SPEC: ABNORMAL
HCT VFR BLD AUTO: 33.2 % (ref 37.5–51)
HGB BLD-MCNC: 10.8 G/DL (ref 13–17.7)
ISOLATED FROM: ABNORMAL
ISOLATED FROM: ABNORMAL
LAB AP CASE REPORT: NORMAL
LAB AP CLINICAL INFORMATION: NORMAL
LARGE PLATELETS: ABNORMAL
LYMPHOCYTES # BLD MANUAL: 1.01 10*3/MM3 (ref 0.7–3.1)
LYMPHOCYTES NFR BLD MANUAL: 1 % (ref 5–12)
MCH RBC QN AUTO: 29.3 PG (ref 26.6–33)
MCHC RBC AUTO-ENTMCNC: 32.5 G/DL (ref 31.5–35.7)
MCV RBC AUTO: 90.2 FL (ref 79–97)
METAMYELOCYTES NFR BLD MANUAL: 5 % (ref 0–0)
MONOCYTES # BLD: 0.25 10*3/MM3 (ref 0.1–0.9)
NEUTROPHILS # BLD AUTO: 22.14 10*3/MM3 (ref 1.7–7)
NEUTROPHILS NFR BLD MANUAL: 86 % (ref 42.7–76)
NEUTS BAND NFR BLD MANUAL: 2 % (ref 0–5)
OVALOCYTES BLD QL SMEAR: ABNORMAL
PATH REPORT.FINAL DX SPEC: NORMAL
PATH REPORT.GROSS SPEC: NORMAL
PLATELET # BLD AUTO: 257 10*3/MM3 (ref 140–450)
PMV BLD AUTO: 10.7 FL (ref 6–12)
POIKILOCYTOSIS BLD QL SMEAR: ABNORMAL
POTASSIUM SERPL-SCNC: 3.6 MMOL/L (ref 3.5–5.2)
RBC # BLD AUTO: 3.68 10*6/MM3 (ref 4.14–5.8)
SODIUM SERPL-SCNC: 135 MMOL/L (ref 136–145)
VARIANT LYMPHS NFR BLD MANUAL: 4 % (ref 19.6–45.3)
WBC MORPH BLD: NORMAL
WBC NRBC COR # BLD AUTO: 25.16 10*3/MM3 (ref 3.4–10.8)

## 2024-02-13 PROCEDURE — 63710000001 DIPHENHYDRAMINE PER 50 MG: Performed by: FAMILY MEDICINE

## 2024-02-13 PROCEDURE — 25010000002 ONDANSETRON PER 1 MG: Performed by: PODIATRIST

## 2024-02-13 PROCEDURE — 02HV33Z INSERTION OF INFUSION DEVICE INTO SUPERIOR VENA CAVA, PERCUTANEOUS APPROACH: ICD-10-PCS | Performed by: FAMILY MEDICINE

## 2024-02-13 PROCEDURE — 25010000002 HYDROMORPHONE 1 MG/ML SOLUTION: Performed by: INTERNAL MEDICINE

## 2024-02-13 PROCEDURE — 85007 BL SMEAR W/DIFF WBC COUNT: CPT | Performed by: INTERNAL MEDICINE

## 2024-02-13 PROCEDURE — 82948 REAGENT STRIP/BLOOD GLUCOSE: CPT

## 2024-02-13 PROCEDURE — 63710000001 INSULIN LISPRO (HUMAN) PER 5 UNITS: Performed by: PODIATRIST

## 2024-02-13 PROCEDURE — 25010000002 VANCOMYCIN 5 G RECONSTITUTED SOLUTION: Performed by: INTERNAL MEDICINE

## 2024-02-13 PROCEDURE — 82948 REAGENT STRIP/BLOOD GLUCOSE: CPT | Performed by: PODIATRIST

## 2024-02-13 PROCEDURE — 80048 BASIC METABOLIC PNL TOTAL CA: CPT | Performed by: INTERNAL MEDICINE

## 2024-02-13 PROCEDURE — 99233 SBSQ HOSP IP/OBS HIGH 50: CPT | Performed by: FAMILY MEDICINE

## 2024-02-13 PROCEDURE — 85025 COMPLETE CBC W/AUTO DIFF WBC: CPT | Performed by: INTERNAL MEDICINE

## 2024-02-13 PROCEDURE — 25810000003 SODIUM CHLORIDE 0.9 % SOLUTION: Performed by: INTERNAL MEDICINE

## 2024-02-13 PROCEDURE — 97606 NEG PRS WND THER DME>50 SQCM: CPT

## 2024-02-13 PROCEDURE — 25010000002 HEPARIN (PORCINE) PER 1000 UNITS: Performed by: INTERNAL MEDICINE

## 2024-02-13 PROCEDURE — 99024 POSTOP FOLLOW-UP VISIT: CPT | Performed by: PODIATRIST

## 2024-02-13 PROCEDURE — C1751 CATH, INF, PER/CENT/MIDLINE: HCPCS

## 2024-02-13 RX ORDER — POTASSIUM CHLORIDE 750 MG/1
20 CAPSULE, EXTENDED RELEASE ORAL
Status: COMPLETED | OUTPATIENT
Start: 2024-02-13 | End: 2024-02-13

## 2024-02-13 RX ORDER — ENOXAPARIN SODIUM 100 MG/ML
40 INJECTION SUBCUTANEOUS DAILY
Status: DISCONTINUED | OUTPATIENT
Start: 2024-02-15 | End: 2024-02-16 | Stop reason: HOSPADM

## 2024-02-13 RX ORDER — SODIUM CHLORIDE 0.9 % (FLUSH) 0.9 %
20 SYRINGE (ML) INJECTION AS NEEDED
Status: DISCONTINUED | OUTPATIENT
Start: 2024-02-13 | End: 2024-02-16 | Stop reason: HOSPADM

## 2024-02-13 RX ORDER — SODIUM CHLORIDE 0.9 % (FLUSH) 0.9 %
10 SYRINGE (ML) INJECTION EVERY 12 HOURS SCHEDULED
Status: DISCONTINUED | OUTPATIENT
Start: 2024-02-13 | End: 2024-02-16 | Stop reason: HOSPADM

## 2024-02-13 RX ORDER — SODIUM CHLORIDE 0.9 % (FLUSH) 0.9 %
10 SYRINGE (ML) INJECTION AS NEEDED
Status: DISCONTINUED | OUTPATIENT
Start: 2024-02-13 | End: 2024-02-16 | Stop reason: HOSPADM

## 2024-02-13 RX ORDER — DIPHENHYDRAMINE HCL 50 MG
50 CAPSULE ORAL EVERY 6 HOURS PRN
Status: DISCONTINUED | OUTPATIENT
Start: 2024-02-13 | End: 2024-02-16 | Stop reason: HOSPADM

## 2024-02-13 RX ORDER — SODIUM CHLORIDE 9 MG/ML
40 INJECTION, SOLUTION INTRAVENOUS AS NEEDED
Status: DISCONTINUED | OUTPATIENT
Start: 2024-02-13 | End: 2024-02-16 | Stop reason: HOSPADM

## 2024-02-13 RX ADMIN — HYDROCODONE BITARTRATE AND ACETAMINOPHEN 2 TABLET: 7.5; 325 TABLET ORAL at 08:04

## 2024-02-13 RX ADMIN — POTASSIUM CHLORIDE 20 MEQ: 10 CAPSULE, COATED, EXTENDED RELEASE ORAL at 12:21

## 2024-02-13 RX ADMIN — DIPHENHYDRAMINE HYDROCHLORIDE 50 MG: 50 CAPSULE ORAL at 23:54

## 2024-02-13 RX ADMIN — VANCOMYCIN HYDROCHLORIDE 1750 MG: 5 INJECTION, POWDER, LYOPHILIZED, FOR SOLUTION INTRAVENOUS at 08:05

## 2024-02-13 RX ADMIN — Medication 10 ML: at 08:06

## 2024-02-13 RX ADMIN — ONDANSETRON 4 MG: 2 INJECTION INTRAMUSCULAR; INTRAVENOUS at 08:04

## 2024-02-13 RX ADMIN — HEPARIN SODIUM 5000 UNITS: 5000 INJECTION INTRAVENOUS; SUBCUTANEOUS at 14:47

## 2024-02-13 RX ADMIN — POTASSIUM CHLORIDE 20 MEQ: 10 CAPSULE, COATED, EXTENDED RELEASE ORAL at 17:29

## 2024-02-13 RX ADMIN — HYDROMORPHONE HYDROCHLORIDE 1 MG: 1 INJECTION, SOLUTION INTRAMUSCULAR; INTRAVENOUS; SUBCUTANEOUS at 11:42

## 2024-02-13 RX ADMIN — INSULIN LISPRO 2 UNITS: 100 INJECTION, SOLUTION INTRAVENOUS; SUBCUTANEOUS at 17:29

## 2024-02-13 RX ADMIN — HYDROCODONE BITARTRATE AND ACETAMINOPHEN 2 TABLET: 7.5; 325 TABLET ORAL at 22:59

## 2024-02-13 RX ADMIN — Medication 10 MG: at 22:59

## 2024-02-13 RX ADMIN — INSULIN LISPRO 2 UNITS: 100 INJECTION, SOLUTION INTRAVENOUS; SUBCUTANEOUS at 12:21

## 2024-02-13 RX ADMIN — HEPARIN SODIUM 5000 UNITS: 5000 INJECTION INTRAVENOUS; SUBCUTANEOUS at 05:37

## 2024-02-13 RX ADMIN — HYDROCODONE BITARTRATE AND ACETAMINOPHEN 2 TABLET: 7.5; 325 TABLET ORAL at 18:55

## 2024-02-13 RX ADMIN — Medication 10 ML: at 20:58

## 2024-02-13 RX ADMIN — HYDROCODONE BITARTRATE AND ACETAMINOPHEN 2 TABLET: 7.5; 325 TABLET ORAL at 14:54

## 2024-02-13 RX ADMIN — LISINOPRIL 40 MG: 20 TABLET ORAL at 08:05

## 2024-02-13 RX ADMIN — HYDROCODONE BITARTRATE AND ACETAMINOPHEN 2 TABLET: 7.5; 325 TABLET ORAL at 03:28

## 2024-02-13 RX ADMIN — VANCOMYCIN HYDROCHLORIDE 1750 MG: 5 INJECTION, POWDER, LYOPHILIZED, FOR SOLUTION INTRAVENOUS at 20:58

## 2024-02-13 RX ADMIN — INSULIN LISPRO 2 UNITS: 100 INJECTION, SOLUTION INTRAVENOUS; SUBCUTANEOUS at 20:58

## 2024-02-13 NOTE — PLAN OF CARE
Goal Outcome Evaluation:  Wound vac placed. Picc line placed. Surgery scheduled for tomorrow. Pain management overnight. Vital signs stable. Patient alert and oriented.

## 2024-02-13 NOTE — SIGNIFICANT NOTE
Wound Eval / Progress Noted     Jazmine     Patient Name: Karsten Johnson  : 1975  MRN: [id number]  Today's Date: 2024                 Admit Date: 2024    Visit Dx:    ICD-10-CM ICD-9-CM   1. Acute hematogenous osteomyelitis of left ankle  M86.072 730.07   2. Rupture of left Achilles tendon, initial encounter  S86.012A 845.09   3. Abscess of heel  L02.619 682.7         Achilles rupture    Abscess of heel    Acute hematogenous osteomyelitis        History reviewed. No pertinent past medical history.     Past Surgical History:   Procedure Laterality Date    INCISION AND DRAINAGE OF WOUND Left 2/10/2024    Procedure: INCISION AND DRAINAGE  LEFT ANKLE,BIOPSY LEFT ANKEL;  Surgeon: Alvarez Beck DPM;  Location: [facility] MAIN OR;  Service: Podiatry;  Laterality: Left;         Physical Assessment:  Wound 02/10/24 0800 Left posterior ankle Other (comment) (Active)   Wound Image   24 1154   Dressing Appearance intact;moist drainage 24 1154   Closure Sutures 24 1154   Base exposed structure;moist;necrotic;red;slough;yellow 24 1154   Periwound dry;ecchymotic;edematous;redness;swelling;warm 24 1154   Periwound Temperature warm 24 1154   Periwound Skin Turgor firm 24 1154   Edges open 24 1154   Wound Length (cm) 4.7 cm 24 1154   Wound Width (cm) 5.6 cm 24 1154   Wound Depth (cm) 2.1 cm 24 1154   Wound Surface Area (cm^2) 26.32 cm^2 24 1154   Wound Volume (cm^3) 55.272 cm^3 24 1154   Drainage Characteristics/Odor serosanguineous 24 1154   Drainage Amount small 24 1154   Care, Wound cleansed with;sterile normal saline 24 1154   Dressing Care dressing applied;other (see comments) 24 1154   Periwound Care barrier film applied 24 1154       NPWT (Negative Pressure Wound Therapy) 24 1154 left heel (Active)   Therapy Setting continuous therapy 24 1154   Dressing foam, silver;gauze, nonadherent  02/13/24 1154   Contact Layer Vaseline-embedded gauze 02/13/24 1154   Pressure Setting 100 mmHg 02/13/24 1154   Sponges Inserted 1 02/13/24 1154        Wound Check / Follow-up:  Patient seen today for a new wound care consult and to assess for need for wound vac placement. Patient laying in his bed when wound RN arrived . Patient states that his surgeon was just in and had dressed his wound. 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 He stated that he has been having pain in his ankle since last November but it started to get worse recently and he reported to the urgent care. Urgent care sent the patient to the ER. The patient was admitted for achilles rupture and abscess of heel. Podiatry states that patient has acute hematogenous osteomyelitis and is currently being treated with IV antibiotics.     RN removed the patient's current gauze dressing and irrigated the wound with NS. Wound bed has yellow slough attached to the base and covering 75% of the wound base. There is palpable bone near the five o'clock area. Wound RN cleansed and measured wound and applied skin prep to the periwound. Wound RN applied petroleum impregnated gauze over sutures present to proximal heel incision. RN then placed silver impregnated hydrofiber over petroleum impregnated gauze and covered with a Tegaderm dressing to protect incision site. RN also placed petroleum impregnated gauze to the wound base over exposed bone area. RN placed silver foam into patient's left heel wound bed and covered with clear drape. RN cut a quarter size hole in drape and attached the track pad. Patient's suction was set to 100 mmHg and patient tolerated without issue. Wound RN will return on Friday to complete patient's next wound vac dressing change.      Impression: Left heel surgical wound with proximal sutures present.     Short term goals:  Regain skin integrity, skin care, skin protection, MWF wound vac dressing  changes.     Daniella Duarte RN    2/13/2024    15:45 EST

## 2024-02-13 NOTE — PROGRESS NOTES
[facility]    Today's Date: 02/13/24    Patient Name: Karsten Johnson  MRN: [id number]  CSN: [id number]  PCP: Provider, No Known  Referring Provider: No ref. provider found  Attending Provider: Tono Kang MD  Length of Stay: 6    SUBJECTIVE   Chief Complaint:     HPI: Karsten Johnson, a 48 y.o.male, .  Denies any constitutional symptoms. No other pedal complaints at this time. Pain to heel.    History reviewed. No pertinent past medical history.  Past Surgical History:   Procedure Laterality Date    INCISION AND DRAINAGE OF WOUND Left 2/10/2024    Procedure: INCISION AND DRAINAGE  LEFT ANKLE,BIOPSY LEFT ANKEL;  Surgeon: Alvarez Beck DPM;  Location: [facility];  Service: Podiatry;  Laterality: Left;     History reviewed. No pertinent family history.  Social History     Socioeconomic History    Marital status:    Tobacco Use    Smoking status: Every Day     Packs/day: 1.00     Years: 25.00     Additional pack years: 0.00     Total pack years: 25.00     Types: Cigarettes     Passive exposure: Never    Smokeless tobacco: Never   Vaping Use    Vaping Use: Never used   Substance and Sexual Activity    Alcohol use: Yes     Alcohol/week: 3.0 standard drinks of alcohol     Types: 3 Cans of beer per week    Drug use: Yes     Types: Marijuana    Sexual activity: Defer     No Known Allergies  Current Facility-Administered Medications   Medication Dose Route Frequency Provider Last Rate Last Admin    acetaminophen (TYLENOL) tablet 650 mg  650 mg Oral Q6H PRN Alvarez Beck DPM   650 mg at 02/08/24 1616    Or    acetaminophen (TYLENOL) suppository 650 mg  650 mg Rectal Q6H PRN Alvarez Beck DPM        sennosides-docusate (PERICOLACE) 8.6-50 MG per tablet 2 tablet  2 tablet Oral BID PRN Alvarez Beck DPM   2 tablet at 02/11/24 2056    And    polyethylene glycol (MIRALAX) packet 17 g  17 g Oral Daily PRN Alvarez Beck DPM   17 g at 02/12/24 1836    And     bisacodyl (DULCOLAX) EC tablet 5 mg  5 mg Oral Daily PRN Alvarez Beck, DPM   5 mg at 02/12/24 2059    And    bisacodyl (DULCOLAX) suppository 10 mg  10 mg Rectal Daily PRN Alvarez Beck, DPM        dextrose (D50W) (25 g/50 mL) IV injection 25 g  25 g Intravenous Q15 Min PRN Alvarez Beck, DPM        dextrose (GLUTOSE) oral gel 15 g  15 g Oral Q15 Min PRN Alvarez Beck, DPM        glucagon (GLUCAGEN) injection 1 mg  1 mg Intramuscular Q15 Min PRN Alvarez Beck, DPM        heparin (porcine) 5000 UNIT/ML injection 5,000 Units  5,000 Units Subcutaneous Q8H Rica Snowden, DO   5,000 Units at 02/13/24 0537    hydrALAZINE (APRESOLINE) injection 10 mg  10 mg Intravenous Q6H PRN Rica Snowden, DO   10 mg at 02/12/24 1539    HYDROcodone-acetaminophen (NORCO) 7.5-325 MG per tablet 1 tablet  1 tablet Oral Q4H PRN Snowden, Hollandrilla Alena, DO        HYDROcodone-acetaminophen (NORCO) 7.5-325 MG per tablet 2 tablet  2 tablet Oral Q4H PRN Snowden, Rica Carvajal, DO   2 tablet at 02/13/24 0804    HYDROmorphone (DILAUDID) injection 1 mg  1 mg Intravenous Q3H PRN Rica Snowden, DO   1 mg at 02/12/24 0818    Insulin Lispro (humaLOG) injection 2-9 Units  2-9 Units Subcutaneous 4x Daily AC & at Bedtime Alvarez Beck, DPM   2 Units at 02/12/24 2101    lactated ringers infusion  30 mL/hr Intravenous Continuous Alvarez Beck DPM 30 mL/hr at 02/10/24 0713 30 mL/hr at 02/10/24 0713    lisinopril (PRINIVIL,ZESTRIL) tablet 40 mg  40 mg Oral Q24H Rica Snowden, DO   40 mg at 02/13/24 0805    melatonin tablet 10 mg  10 mg Oral Nightly PRN Alvarez Beck, DPM   10 mg at 02/12/24 2353    ondansetron (ZOFRAN) injection 4 mg  4 mg Intravenous Q6H PRN Alvarez Beck, DPM   4 mg at 02/13/24 0804    Pharmacy to dose vancomycin   Does not apply Continuous PRN Rica Snowden DO        potassium chloride (MICRO-K/KLOR-CON) CR capsule  20 mEq Oral TID With Meals Jorge Luis  MD Tono        sodium chloride 0.9 % flush 10 mL  10 mL Intravenous Q12H Rotterman, Alvarez T, DPM   10 mL at 02/13/24 0806    sodium chloride 0.9 % flush 10 mL  10 mL Intravenous PRN Rotterman, Alvarez T, DPM        sodium chloride 0.9 % infusion 40 mL  40 mL Intravenous PRN Rotterman, Alvarez T, DPM        vancomycin 1750 mg/500 mL 0.9% NS IVPB ([facility])  1,750 mg Intravenous Q12H Rica Snowden DO 0 mL/hr at 02/12/24 2259 1,750 mg at 02/13/24 0805     Review of Systems   All other systems reviewed and are negative.      OBJECTIVE     Vitals:    02/13/24 0710   BP: 175/95   Pulse: 74   Resp: 16   Temp: 98.6 °F (37 °C)   SpO2: 98%       PHYSICAL EXAM    GEN:   A&Ox3, NAD. Pt presents in hospital bed.     Foot/Ankle Exam     GENERAL  Appearance:  appears stated age  Orientation:  AAOx3  Affect:  appropriate  Gait:  unimpaired  Assistance:  independent  Right shoe gear: casual shoe  Left shoe gear: casual shoe     VASCULAR      Right Foot Vascularity   Normal vascular exam    Dorsalis pedis:  2+  Posterior tibial:  2+  Skin temperature:  warm  Edema grading:  None  CFT:  < 3 seconds  Pedal hair growth:  Present  Varicosities:  none      Left Foot Vascularity   Normal vascular exam    Dorsalis pedis:  2+  Posterior tibial:  2+  Skin temperature:  warm  Edema grading:  None  CFT:  < 3 seconds  Pedal hair growth:  Present  Varicosities:  none     NEUROLOGIC      Right Foot Neurologic   Normal sensation    Light touch sensation: normal  Vibratory sensation: normal  Hot/Cold sensation: normal  Protective Sensation using [place]-Flip Monofilament:   Sites intact: 10  Sites tested: 10      Left Foot Neurologic   Normal sensation    Light touch sensation: normal  Vibratory sensation: normal  Hot/Cold sensation:  normal  Protective Sensation using [place]-Flip Monofilament:   Sites intact: 10  Sites tested: 10     MUSCLE STRENGTH      Right Foot Muscle Strength   Foot dorsiflexion:  4  Foot plantar flexion:  4  Foot  inversion:  4  Foot eversion:  4      Left Foot Muscle Strength   Foot dorsiflexion:  4  Foot plantar flexion:  4  Foot inversion:  4  Foot eversion:  4     RANGE OF MOTION      Right Foot Range of Motion   Foot and ankle ROM within normal limits        Left Foot Range of Motion   Foot and ankle ROM within normal limits       DERMATOLOGIC       Right Foot Dermatologic   Skin  Right foot skin is intact.       Left Foot Dermatologic   Skin  Left foot skin is intact.     Wound to right posterior heel with mild fibrosis to the level of bone. No purulence. Decrease swelling to ankle and leg.    RADIOLOGY/NUCLEAR:  Adult Transthoracic Echo Complete W/ Cont if Necessary Per Protocol    Result Date: 2/9/2024  Narrative:   Left ventricular systolic function is normal. Calculated left ventricular EF = 55.3%   Estimated right ventricular systolic pressure from tricuspid regurgitation is normal (<35 mmHg).   No obvious wall motion abnormalities   No obvious vegetations on any valve. If high clinical suspicion, recommend TRACIE     MRI Ankle Left Without Contrast    Result Date: 2/8/2024  Narrative: PROCEDURE: MRI ANKLE LEFT  WO CONTRAST  COMPARISON: [facility], [facility], XR ANKLE 3+ VW LEFT, 2/07/2024, 15:42.  INDICATIONS: FELT POP TO LEFT HEEL X 1 WEEK AGO AT WORK. SEVERE POSTERIOR ANKLE PAIN WITH A KNOT TO LEFT MEDIAL ANKLE AND SWELLING.     TECHNIQUE: A complete multi-planar examination was performed without contrast.  FINDINGS:  There is severe tendinosis distal Achilles tendon.  There is a full-thickness disruption of the tendon at its insertion site on the calcaneus.  This full-thickness gap measures 4.6 cm from craniocaudal.  No significant retraction of the tendon fibers.  There is high-grade partial-thickness interstitial tearing of the more watershed/musculotendinous junction.  There is edema in Kager's fat.  There is abnormal marrow edema of the calcaneus most pronounced at the insertion site.  There is erosive  change at the Achilles tendon insertion site consistent with probable inflammatory arthritis and enthesopathy.  The calcifications seen in the distal tendon on radiograph are not well seen on MRI.  There is moderate subtalar joint space narrowing and moderate talonavicular joint space narrowing.  There is mild midfoot degenerative change.  There is a moderate-sized os trigonum with mild marrow edema.  There is a small tibiotalar joint effusion.    There is fluid in the posterior tibialis and other flexor tendons consistent with tenosynovitis.  The tendons themselves are intact.  The peroneal tendons are normal appearance.  The extensor tendons are normal appearance.  The syndesmotic ligaments are intact.  The anterior and posterior talofibular ligament and calcaneofibular ligament are intact.  The medial band plantar fascia is thickened has high signal intensity.  There is a moderate-sized plantar calcaneal spur.  The sinus tarsi has fat signal intensity which is normal.  The tarsal tunnel is normal.  There is soft tissue edema most pronounced posteriorly.  There are no osteochondral lesions of the talar dome.  There is mild hindfoot valgus alignment.      Impression:   1. Severe tendinosis and enthesopathy at the distal Achilles tendon.  There is a full-thickness gap of the distal tendon from the insertion site measuring up to 4.6 cm consistent with full thickness tear.  Severe tendinosis of the musculotendinous junction of the Achilles.  Associated soft tissue edema. 2. Marrow edema calcaneus with erosive changes at the insertion site of the Achilles tendon likely related to inflammatory arthritis and reactive edema calcaneus. 3. Findings of plantar fasciitis medial band plantar fascia. 4. Mild hindfoot and midfoot degenerative change. 5. Tenosynovitis of the flexor tendons.     SAEED VIGIL MD       Electronically Signed and Approved By: SAEED VIGIL MD on 2/08/2024 at 14:57             Duplex Venous Lower  Extremity - Left CAR    Result Date: 2/7/2024  Narrative:   Normal left lower extremity venous duplex scan.     XR Foot 3+ View Left    Result Date: 2/7/2024  Narrative: PROCEDURE: XR ANKLE 3+ VW LEFT, 2/07/2024, 15:42 XR FOOT 3+ VW LEFT, 2/07/2024, 15:44 XR TIBIA FIBULA 2 VW LEFT, 2/07/2024, 15:  COMPARISON: None  INDICATIONS: pain no known injury swelling and bruising medial aspect  FINDINGS:  Ankle:  There is soft tissue swelling about the medial portion of the ankle.  The ankle mortise looks intact.  There is osseous demineralization.  There is abnormal appearance to the distal Achilles tendon area with abnormal ossification in soft tissue swelling.  An Achilles tendon injury could account for this.  There is a plantar calcaneal spur.  There is ossification in the soft tissues adjacent to the medial tibial tubercle that may relate to more of the changes in the posterior ankle as opposed to an injury around this area.  Foot:  As noted on the ankle films there is an abnormal appearance to the posterior ankle around the distal Achilles tendon with abnormal ossification and soft tissue swelling that could relate to a more acute Achilles tendon injury.  There is no subluxation or dislocation of the digits.  There is osseous demineralization.  Tibia fibula:  There is no definite fracture.  There is osseous demineralization.      Impression:   1. Abnormal appearance to the area of the distal Achilles tendon.  Injury to this area could not be excluded. 2. Osseous demineralization 3. Soft tissue swelling about the medial ankle/foot. 4. Calcification/ossification adjacent to the medial tubercle of the talus that may relate to the findings in the posterior ankle area as opposed to an injury around the medial tubercle of the talus       AUSTIN HARRIS MD       Electronically Signed and Approved By: AUSTIN HARRIS MD on 2/07/2024 at 16:04             XR Ankle 3+ View Left    Result Date: 2/7/2024  Narrative: PROCEDURE: XR ANKLE  3+ VW LEFT, 2/07/2024, 15:42 XR FOOT 3+ VW LEFT, 2/07/2024, 15:44 XR TIBIA FIBULA 2 VW LEFT, 2/07/2024, 15:  COMPARISON: None  INDICATIONS: pain no known injury swelling and bruising medial aspect  FINDINGS:  Ankle:  There is soft tissue swelling about the medial portion of the ankle.  The ankle mortise looks intact.  There is osseous demineralization.  There is abnormal appearance to the distal Achilles tendon area with abnormal ossification in soft tissue swelling.  An Achilles tendon injury could account for this.  There is a plantar calcaneal spur.  There is ossification in the soft tissues adjacent to the medial tibial tubercle that may relate to more of the changes in the posterior ankle as opposed to an injury around this area.  Foot:  As noted on the ankle films there is an abnormal appearance to the posterior ankle around the distal Achilles tendon with abnormal ossification and soft tissue swelling that could relate to a more acute Achilles tendon injury.  There is no subluxation or dislocation of the digits.  There is osseous demineralization.  Tibia fibula:  There is no definite fracture.  There is osseous demineralization.      Impression:   1. Abnormal appearance to the area of the distal Achilles tendon.  Injury to this area could not be excluded. 2. Osseous demineralization 3. Soft tissue swelling about the medial ankle/foot. 4. Calcification/ossification adjacent to the medial tubercle of the talus that may relate to the findings in the posterior ankle area as opposed to an injury around the medial tubercle of the talus       AUSTIN HARRIS MD       Electronically Signed and Approved By: AUSTIN HARRIS MD on 2/07/2024 at 16:04             XR Tibia Fibula 2 View Left    Result Date: 2/7/2024  Narrative: PROCEDURE: XR ANKLE 3+ VW LEFT, 2/07/2024, 15:42 XR FOOT 3+ VW LEFT, 2/07/2024, 15:44 XR TIBIA FIBULA 2 VW LEFT, 2/07/2024, 15:  COMPARISON: None  INDICATIONS: pain no known injury swelling and bruising  medial aspect  FINDINGS:  Ankle:  There is soft tissue swelling about the medial portion of the ankle.  The ankle mortise looks intact.  There is osseous demineralization.  There is abnormal appearance to the distal Achilles tendon area with abnormal ossification in soft tissue swelling.  An Achilles tendon injury could account for this.  There is a plantar calcaneal spur.  There is ossification in the soft tissues adjacent to the medial tibial tubercle that may relate to more of the changes in the posterior ankle as opposed to an injury around this area.  Foot:  As noted on the ankle films there is an abnormal appearance to the posterior ankle around the distal Achilles tendon with abnormal ossification and soft tissue swelling that could relate to a more acute Achilles tendon injury.  There is no subluxation or dislocation of the digits.  There is osseous demineralization.  Tibia fibula:  There is no definite fracture.  There is osseous demineralization.      Impression:   1. Abnormal appearance to the area of the distal Achilles tendon.  Injury to this area could not be excluded. 2. Osseous demineralization 3. Soft tissue swelling about the medial ankle/foot. 4. Calcification/ossification adjacent to the medial tubercle of the talus that may relate to the findings in the posterior ankle area as opposed to an injury around the medial tubercle of the talus       AUSTIN HARRIS MD       Electronically Signed and Approved By: AUSTIN HARRIS MD on 2/07/2024 at 16:04              LABORATORY/CULTURE RESULTS:  Results from last 7 days   Lab Units 02/13/24  0411 02/12/24  0547 02/11/24  0425   WBC 10*3/mm3 25.16* 23.93* 26.28*   HEMOGLOBIN g/dL 10.8* 11.4* 10.3*   HEMATOCRIT % 33.2* 35.1* 31.3*   PLATELETS 10*3/mm3 257 254 192     Results from last 7 days   Lab Units 02/13/24  0411 02/12/24  0547 02/11/24  0425 02/08/24  0403 02/07/24  1533   SODIUM mmol/L 135* 134* 133*   < > 135*   POTASSIUM mmol/L 3.6 4.0 4.0   < > 3.5    CHLORIDE mmol/L 100 99 100   < > 99   CO2 mmol/L 24.6 25.8 24.0   < > 20.4*   BUN mg/dL 8 11 19   < > 14   CREATININE mg/dL 0.59* 0.71* 0.71*   < > 0.72*   CALCIUM mg/dL 8.1* 8.4* 8.4*   < > 9.1   BILIRUBIN mg/dL  --   --   --   --  0.9   ALK PHOS U/L  --   --   --   --  144*   ALT (SGPT) U/L  --   --   --   --  18   AST (SGOT) U/L  --   --   --   --  22   GLUCOSE mg/dL 147* 145* 139*   < > 157*    < > = values in this interval not displayed.         Microbiology Results (last 10 days)       Procedure Component Value - Date/Time    Blood Culture - Blood, Arm, Right [[id number]]  (Normal) Collected: 02/12/24 0548    Lab Status: Preliminary result Specimen: Blood from Arm, Right Updated: 02/13/24 0601     Blood Culture No growth at 24 hours    Blood Culture - Blood, Arm, Left [[id number]]  (Normal) Collected: 02/12/24 0547    Lab Status: Preliminary result Specimen: Blood from Arm, Left Updated: 02/13/24 0601     Blood Culture No growth at 24 hours    Blood Culture - Blood, Hand, Left [[id number]]  (Abnormal)  (Susceptibility) Collected: 02/10/24 1154    Lab Status: Final result Specimen: Blood from Hand, Left Updated: 02/13/24 0619     Blood Culture Staphylococcus aureus, MRSA     Comment:   Infectious disease consultation is highly recommended to rule out distant foci of infection.  Methicillin resistant Staphylococcus aureus, Patient may be an isolation risk.        Isolated from Aerobic and Anaerobic Bottles     Gram Stain Aerobic Bottle Gram positive cocci in clusters      Anaerobic Bottle Gram positive cocci in clusters    Susceptibility        Staphylococcus aureus, MRSA      JULIO      Gentamicin Susceptible      Oxacillin Resistant      Rifampin Susceptible      Vancomycin Susceptible                           Blood Culture - Blood, Arm, Left [[id number]]  (Abnormal) Collected: 02/10/24 1150    Lab Status: Final result Specimen: Blood from Arm, Left Updated: 02/13/24 0619     Blood Culture Staphylococcus aureus,  MRSA     Comment: Refer to previous blood culture collected on 02/10/2024 1154 for MICs    Infectious disease consultation is highly recommended to rule out distant foci of infection.  Methicillin resistant Staphylococcus aureus, Patient may be an isolation risk.        Isolated from Aerobic and Anaerobic Bottles     Gram Stain Aerobic Bottle Gram positive cocci in clusters      Anaerobic Bottle Gram positive cocci in clusters    Anaerobic Culture - Wound, Ankle, Left [[id number]]  (Normal) Collected: 02/10/24 0840    Lab Status: Preliminary result Specimen: Wound from Ankle, Left Updated: 02/13/24 0832     Anaerobic Culture No anaerobes isolated at 3 days    Wound Culture - Wound, Ankle, Left [[id number]]  (Abnormal)  (Susceptibility) Collected: 02/10/24 0840    Lab Status: Final result Specimen: Wound from Ankle, Left Updated: 02/13/24 0701     Wound Culture Scant growth (1+) Staphylococcus aureus, MRSA     Comment:   Methicillin resistant Staphylococcus aureus, Patient may be an isolation risk.        Gram Stain No organisms seen    Susceptibility        Staphylococcus aureus, MRSA      JULIO      Clindamycin Susceptible      Erythromycin Resistant      Oxacillin Resistant      Rifampin Susceptible      Tetracycline Susceptible      Trimethoprim + Sulfamethoxazole Susceptible      Vancomycin Susceptible                           Tissue / Bone Culture - Bone, Ankle, Left [[id number]]  (Abnormal) Collected: 02/10/24 0817    Lab Status: Final result Specimen: Bone from Ankle, Left Updated: 02/13/24 0701     Tissue Culture Scant growth (1+) Staphylococcus aureus, MRSA     Comment:   Methicillin resistant Staphylococcus aureus, Patient may be an isolation risk.        Gram Stain No organisms seen    Narrative:      Refer to previous wound culture collected on 02/10/2024 0840 for MICs      AFB Culture - Bone, Ankle, Left [[id number]] Collected: 02/10/24 0817    Lab Status: Preliminary result Specimen: Bone from Ankle, Left  Updated: 02/10/24 1413     AFB Stain No acid fast bacilli seen on direct smear    Blood Culture - Blood, Hand, Left [[id number]]  (Abnormal)  (Susceptibility) Collected: 02/08/24 1138    Lab Status: Final result Specimen: Blood from Hand, Left Updated: 02/11/24 0641     Blood Culture Staphylococcus aureus, MRSA     Comment:   Infectious disease consultation is highly recommended to rule out distant foci of infection.  Methicillin resistant Staphylococcus aureus, Patient may be an isolation risk.        Isolated from Aerobic and Anaerobic Bottles     Gram Stain Aerobic Bottle Gram positive cocci in clusters      Anaerobic Bottle Gram positive cocci in clusters    Susceptibility        Staphylococcus aureus, MRSA      JULIO      Gentamicin Susceptible      Oxacillin Resistant      Rifampin Susceptible      Vancomycin Susceptible                           Blood Culture - Blood, Hand, Right [[id number]]  (Abnormal) Collected: 02/08/24 1138    Lab Status: Final result Specimen: Blood from Hand, Right Updated: 02/11/24 0643     Blood Culture Staphylococcus aureus, MRSA     Comment:   Infectious disease consultation is highly recommended to rule out distant foci of infection.  Methicillin resistant Staphylococcus aureus, Patient may be an isolation risk.        Isolated from Aerobic and Anaerobic Bottles     Gram Stain Aerobic Bottle Gram positive cocci in clusters      Anaerobic Bottle Gram positive cocci in clusters    Narrative:      Refer to previous blood culture collected on 02/08/2024 at 1138 for MICs.        Blood Culture ID, PCR - Blood, Hand, Left [[id number]]  (Abnormal) Collected: 02/08/24 1138    Lab Status: Final result Specimen: Blood from Hand, Left Updated: 02/09/24 0356     BCID, PCR Staph aureus. mecA/C and MREJ (methicillin resistance gene) detected. Identification by BCID2 PCR.     BOTTLE TYPE Aerobic Bottle    Narrative:      Infectious disease consultation is highly recommended to rule out distant foci of  infection.             ASSESSMENT/PLAN     Active Hospital Problems:  Active Hospital Problems    Diagnosis     **Achilles rupture     Acute hematogenous osteomyelitis     Abscess of heel        Dressing changed, Betadine packing to posterior heel    Patient for debridement of heel wound, possible vac application    Follow cultures, IV antibiotics     Discussed findings and treatment plan including risks, benefits, and treatment options with patient in detail. Patient agreed with treatment plan.    This document has been electronically signed by Alvarez Beck DPM on February 13, 2024 11:17 EST

## 2024-02-13 NOTE — PLAN OF CARE
PT note:    Pt with strict bed rest orders per podiatry.  Will sign off for now. Please reconsult when podiatry clears for OOB activtiy.

## 2024-02-13 NOTE — PLAN OF CARE
Goal Outcome Evaluation:  Plan of Care Reviewed With: patient        Progress: improving  Outcome Evaluation: Pt alert and able to make needs known. Pain controlled with PRN Norco, pain improving. Pt urinating without difficulty, utilizing urinal. Pt cont. with dressing to left lower extremity/foot-tolerating well. Pt cont. on IV antibiotics.

## 2024-02-14 ENCOUNTER — ANESTHESIA (OUTPATIENT)
Dept: PERIOP | Facility: HOSPITAL | Age: 49
End: 2024-02-14
Payer: COMMERCIAL

## 2024-02-14 LAB
ANION GAP SERPL CALCULATED.3IONS-SCNC: 8.9 MMOL/L (ref 5–15)
BUN SERPL-MCNC: 8 MG/DL (ref 6–20)
BUN/CREAT SERPL: 12.9 (ref 7–25)
CALCIUM SPEC-SCNC: 8.6 MG/DL (ref 8.6–10.5)
CHLORIDE SERPL-SCNC: 99 MMOL/L (ref 98–107)
CO2 SERPL-SCNC: 26.1 MMOL/L (ref 22–29)
CREAT SERPL-MCNC: 0.62 MG/DL (ref 0.76–1.27)
DEPRECATED RDW RBC AUTO: 47.1 FL (ref 37–54)
EGFRCR SERPLBLD CKD-EPI 2021: 116.4 ML/MIN/1.73
ERYTHROCYTE [DISTWIDTH] IN BLOOD BY AUTOMATED COUNT: 14.3 % (ref 12.3–15.4)
GLUCOSE BLDC GLUCOMTR-MCNC: 147 MG/DL (ref 70–99)
GLUCOSE BLDC GLUCOMTR-MCNC: 157 MG/DL (ref 70–99)
GLUCOSE BLDC GLUCOMTR-MCNC: 157 MG/DL (ref 70–99)
GLUCOSE BLDC GLUCOMTR-MCNC: 193 MG/DL (ref 70–99)
GLUCOSE SERPL-MCNC: 150 MG/DL (ref 65–99)
HCT VFR BLD AUTO: 34.8 % (ref 37.5–51)
HGB BLD-MCNC: 11.4 G/DL (ref 13–17.7)
MCH RBC QN AUTO: 29.8 PG (ref 26.6–33)
MCHC RBC AUTO-ENTMCNC: 32.8 G/DL (ref 31.5–35.7)
MCV RBC AUTO: 90.9 FL (ref 79–97)
PLATELET # BLD AUTO: 286 10*3/MM3 (ref 140–450)
PMV BLD AUTO: 10.2 FL (ref 6–12)
POTASSIUM SERPL-SCNC: 4.1 MMOL/L (ref 3.5–5.2)
RBC # BLD AUTO: 3.83 10*6/MM3 (ref 4.14–5.8)
SODIUM SERPL-SCNC: 134 MMOL/L (ref 136–145)
VANCOMYCIN TROUGH SERPL-MCNC: 11.95 MCG/ML (ref 5–20)
WBC NRBC COR # BLD AUTO: 25.74 10*3/MM3 (ref 3.4–10.8)

## 2024-02-14 PROCEDURE — 80048 BASIC METABOLIC PNL TOTAL CA: CPT | Performed by: INTERNAL MEDICINE

## 2024-02-14 PROCEDURE — 80202 ASSAY OF VANCOMYCIN: CPT | Performed by: INTERNAL MEDICINE

## 2024-02-14 PROCEDURE — 25810000003 SODIUM CHLORIDE 0.9 % SOLUTION: Performed by: INTERNAL MEDICINE

## 2024-02-14 PROCEDURE — 25010000002 ONDANSETRON PER 1 MG

## 2024-02-14 PROCEDURE — 25010000002 HYDROMORPHONE 1 MG/ML SOLUTION

## 2024-02-14 PROCEDURE — 82948 REAGENT STRIP/BLOOD GLUCOSE: CPT

## 2024-02-14 PROCEDURE — 25010000002 VANCOMYCIN 5 G RECONSTITUTED SOLUTION: Performed by: INTERNAL MEDICINE

## 2024-02-14 PROCEDURE — 25010000002 DEXAMETHASONE PER 1 MG

## 2024-02-14 PROCEDURE — 25010000002 DIPHENHYDRAMINE PER 50 MG

## 2024-02-14 PROCEDURE — 99233 SBSQ HOSP IP/OBS HIGH 50: CPT | Performed by: FAMILY MEDICINE

## 2024-02-14 PROCEDURE — 25810000003 SODIUM CHLORIDE 0.9 % SOLUTION: Performed by: PODIATRIST

## 2024-02-14 PROCEDURE — 85027 COMPLETE CBC AUTOMATED: CPT | Performed by: FAMILY MEDICINE

## 2024-02-14 PROCEDURE — 0 HYDROMORPHONE 1 MG/ML SOLUTION

## 2024-02-14 PROCEDURE — 97161 PT EVAL LOW COMPLEX 20 MIN: CPT

## 2024-02-14 PROCEDURE — 25010000002 SUGAMMADEX 200 MG/2ML SOLUTION

## 2024-02-14 PROCEDURE — 25010000002 FENTANYL CITRATE (PF) 50 MCG/ML SOLUTION

## 2024-02-14 PROCEDURE — 25010000002 VANCOMYCIN 5 G RECONSTITUTED SOLUTION: Performed by: PODIATRIST

## 2024-02-14 PROCEDURE — 25810000003 LACTATED RINGERS PER 1000 ML: Performed by: ANESTHESIOLOGY

## 2024-02-14 PROCEDURE — 0J9R0ZZ DRAINAGE OF LEFT FOOT SUBCUTANEOUS TISSUE AND FASCIA, OPEN APPROACH: ICD-10-PCS | Performed by: PODIATRIST

## 2024-02-14 PROCEDURE — 28003 TREATMENT OF FOOT INFECTION: CPT

## 2024-02-14 PROCEDURE — 28003 TREATMENT OF FOOT INFECTION: CPT | Performed by: PODIATRIST

## 2024-02-14 PROCEDURE — 63710000001 INSULIN LISPRO (HUMAN) PER 5 UNITS: Performed by: PODIATRIST

## 2024-02-14 PROCEDURE — 25010000002 MIDAZOLAM PER 1MG: Performed by: ANESTHESIOLOGY

## 2024-02-14 PROCEDURE — 25010000002 ONDANSETRON PER 1 MG: Performed by: PODIATRIST

## 2024-02-14 PROCEDURE — 25010000002 PROPOFOL 10 MG/ML EMULSION

## 2024-02-14 PROCEDURE — 82948 REAGENT STRIP/BLOOD GLUCOSE: CPT | Performed by: PODIATRIST

## 2024-02-14 RX ORDER — ONDANSETRON 2 MG/ML
4 INJECTION INTRAMUSCULAR; INTRAVENOUS ONCE AS NEEDED
Status: DISCONTINUED | OUTPATIENT
Start: 2024-02-14 | End: 2024-02-14 | Stop reason: HOSPADM

## 2024-02-14 RX ORDER — PROMETHAZINE HYDROCHLORIDE 12.5 MG/1
25 TABLET ORAL ONCE AS NEEDED
Status: DISCONTINUED | OUTPATIENT
Start: 2024-02-14 | End: 2024-02-14 | Stop reason: HOSPADM

## 2024-02-14 RX ORDER — OXYCODONE HYDROCHLORIDE 5 MG/1
5 TABLET ORAL
Status: COMPLETED | OUTPATIENT
Start: 2024-02-14 | End: 2024-02-14

## 2024-02-14 RX ORDER — PROPOFOL 10 MG/ML
VIAL (ML) INTRAVENOUS AS NEEDED
Status: DISCONTINUED | OUTPATIENT
Start: 2024-02-14 | End: 2024-02-14 | Stop reason: SURG

## 2024-02-14 RX ORDER — SODIUM CHLORIDE, SODIUM LACTATE, POTASSIUM CHLORIDE, CALCIUM CHLORIDE 600; 310; 30; 20 MG/100ML; MG/100ML; MG/100ML; MG/100ML
9 INJECTION, SOLUTION INTRAVENOUS CONTINUOUS PRN
Status: DISCONTINUED | OUTPATIENT
Start: 2024-02-14 | End: 2024-02-16 | Stop reason: HOSPADM

## 2024-02-14 RX ORDER — DEXMEDETOMIDINE HYDROCHLORIDE 100 UG/ML
INJECTION, SOLUTION INTRAVENOUS AS NEEDED
Status: DISCONTINUED | OUTPATIENT
Start: 2024-02-14 | End: 2024-02-14 | Stop reason: SURG

## 2024-02-14 RX ORDER — DIPHENHYDRAMINE HYDROCHLORIDE 50 MG/ML
INJECTION INTRAMUSCULAR; INTRAVENOUS AS NEEDED
Status: DISCONTINUED | OUTPATIENT
Start: 2024-02-14 | End: 2024-02-14 | Stop reason: SURG

## 2024-02-14 RX ORDER — FENTANYL CITRATE 50 UG/ML
INJECTION, SOLUTION INTRAMUSCULAR; INTRAVENOUS AS NEEDED
Status: DISCONTINUED | OUTPATIENT
Start: 2024-02-14 | End: 2024-02-14 | Stop reason: SURG

## 2024-02-14 RX ORDER — MAGNESIUM HYDROXIDE 1200 MG/15ML
LIQUID ORAL AS NEEDED
Status: DISCONTINUED | OUTPATIENT
Start: 2024-02-14 | End: 2024-02-14 | Stop reason: HOSPADM

## 2024-02-14 RX ORDER — MIDAZOLAM HYDROCHLORIDE 2 MG/2ML
2 INJECTION, SOLUTION INTRAMUSCULAR; INTRAVENOUS ONCE
Status: COMPLETED | OUTPATIENT
Start: 2024-02-14 | End: 2024-02-14

## 2024-02-14 RX ORDER — PROMETHAZINE HYDROCHLORIDE 25 MG/1
25 SUPPOSITORY RECTAL ONCE AS NEEDED
Status: DISCONTINUED | OUTPATIENT
Start: 2024-02-14 | End: 2024-02-14 | Stop reason: HOSPADM

## 2024-02-14 RX ORDER — DEXAMETHASONE SODIUM PHOSPHATE 4 MG/ML
INJECTION, SOLUTION INTRA-ARTICULAR; INTRALESIONAL; INTRAMUSCULAR; INTRAVENOUS; SOFT TISSUE AS NEEDED
Status: DISCONTINUED | OUTPATIENT
Start: 2024-02-14 | End: 2024-02-14 | Stop reason: SURG

## 2024-02-14 RX ORDER — ROCURONIUM BROMIDE 10 MG/ML
INJECTION, SOLUTION INTRAVENOUS AS NEEDED
Status: DISCONTINUED | OUTPATIENT
Start: 2024-02-14 | End: 2024-02-14 | Stop reason: SURG

## 2024-02-14 RX ORDER — MEPERIDINE HYDROCHLORIDE 25 MG/ML
12.5 INJECTION INTRAMUSCULAR; INTRAVENOUS; SUBCUTANEOUS
Status: DISCONTINUED | OUTPATIENT
Start: 2024-02-14 | End: 2024-02-14 | Stop reason: HOSPADM

## 2024-02-14 RX ORDER — LIDOCAINE HYDROCHLORIDE 20 MG/ML
INJECTION, SOLUTION EPIDURAL; INFILTRATION; INTRACAUDAL; PERINEURAL AS NEEDED
Status: DISCONTINUED | OUTPATIENT
Start: 2024-02-14 | End: 2024-02-14 | Stop reason: SURG

## 2024-02-14 RX ORDER — ONDANSETRON 2 MG/ML
INJECTION INTRAMUSCULAR; INTRAVENOUS AS NEEDED
Status: DISCONTINUED | OUTPATIENT
Start: 2024-02-14 | End: 2024-02-14 | Stop reason: SURG

## 2024-02-14 RX ADMIN — ONDANSETRON HYDROCHLORIDE 4 MG: 2 SOLUTION INTRAMUSCULAR; INTRAVENOUS at 11:53

## 2024-02-14 RX ADMIN — PROPOFOL 190 MG: 10 INJECTION, EMULSION INTRAVENOUS at 11:43

## 2024-02-14 RX ADMIN — VANCOMYCIN HYDROCHLORIDE 1750 MG: 5 INJECTION, POWDER, LYOPHILIZED, FOR SOLUTION INTRAVENOUS at 08:22

## 2024-02-14 RX ADMIN — DEXAMETHASONE SODIUM PHOSPHATE 4 MG: 4 INJECTION, SOLUTION INTRAMUSCULAR; INTRAVENOUS at 11:53

## 2024-02-14 RX ADMIN — INSULIN LISPRO 2 UNITS: 100 INJECTION, SOLUTION INTRAVENOUS; SUBCUTANEOUS at 21:59

## 2024-02-14 RX ADMIN — SODIUM CHLORIDE, POTASSIUM CHLORIDE, SODIUM LACTATE AND CALCIUM CHLORIDE: 600; 310; 30; 20 INJECTION, SOLUTION INTRAVENOUS at 11:38

## 2024-02-14 RX ADMIN — MIDAZOLAM HYDROCHLORIDE 2 MG: 1 INJECTION, SOLUTION INTRAMUSCULAR; INTRAVENOUS at 11:34

## 2024-02-14 RX ADMIN — ONDANSETRON 4 MG: 2 INJECTION INTRAMUSCULAR; INTRAVENOUS at 08:06

## 2024-02-14 RX ADMIN — OXYCODONE 5 MG: 5 TABLET ORAL at 13:28

## 2024-02-14 RX ADMIN — OXYCODONE 5 MG: 5 TABLET ORAL at 13:46

## 2024-02-14 RX ADMIN — HYDROMORPHONE HYDROCHLORIDE 0.5 MG: 1 INJECTION, SOLUTION INTRAMUSCULAR; INTRAVENOUS; SUBCUTANEOUS at 12:58

## 2024-02-14 RX ADMIN — ROCURONIUM BROMIDE 20 MG: 10 INJECTION, SOLUTION INTRAVENOUS at 12:17

## 2024-02-14 RX ADMIN — SUGAMMADEX 200 MG: 100 INJECTION, SOLUTION INTRAVENOUS at 12:48

## 2024-02-14 RX ADMIN — ROCURONIUM BROMIDE 50 MG: 10 INJECTION, SOLUTION INTRAVENOUS at 11:43

## 2024-02-14 RX ADMIN — DEXMEDETOMIDINE HYDROCHLORIDE 10 MCG: 100 INJECTION, SOLUTION, CONCENTRATE INTRAVENOUS at 13:01

## 2024-02-14 RX ADMIN — Medication 10 ML: at 08:29

## 2024-02-14 RX ADMIN — INSULIN LISPRO 2 UNITS: 100 INJECTION, SOLUTION INTRAVENOUS; SUBCUTANEOUS at 18:50

## 2024-02-14 RX ADMIN — FENTANYL CITRATE 50 MCG: 50 INJECTION, SOLUTION INTRAMUSCULAR; INTRAVENOUS at 11:54

## 2024-02-14 RX ADMIN — HYDROCODONE BITARTRATE AND ACETAMINOPHEN 2 TABLET: 7.5; 325 TABLET ORAL at 23:26

## 2024-02-14 RX ADMIN — DIPHENHYDRAMINE HYDROCHLORIDE 25 MG: 50 INJECTION, SOLUTION INTRAMUSCULAR; INTRAVENOUS at 12:12

## 2024-02-14 RX ADMIN — HYDROCODONE BITARTRATE AND ACETAMINOPHEN 2 TABLET: 7.5; 325 TABLET ORAL at 18:52

## 2024-02-14 RX ADMIN — INSULIN LISPRO 2 UNITS: 100 INJECTION, SOLUTION INTRAVENOUS; SUBCUTANEOUS at 08:05

## 2024-02-14 RX ADMIN — FENTANYL CITRATE 50 MCG: 50 INJECTION, SOLUTION INTRAMUSCULAR; INTRAVENOUS at 11:43

## 2024-02-14 RX ADMIN — HYDROCODONE BITARTRATE AND ACETAMINOPHEN 2 TABLET: 7.5; 325 TABLET ORAL at 08:05

## 2024-02-14 RX ADMIN — LISINOPRIL 40 MG: 20 TABLET ORAL at 08:21

## 2024-02-14 RX ADMIN — LIDOCAINE HYDROCHLORIDE 100 MG: 20 INJECTION, SOLUTION EPIDURAL; INFILTRATION; INTRACAUDAL; PERINEURAL at 11:43

## 2024-02-14 RX ADMIN — DEXMEDETOMIDINE HYDROCHLORIDE 10 MCG: 100 INJECTION, SOLUTION, CONCENTRATE INTRAVENOUS at 12:09

## 2024-02-14 RX ADMIN — VANCOMYCIN HYDROCHLORIDE 1750 MG: 5 INJECTION, POWDER, LYOPHILIZED, FOR SOLUTION INTRAVENOUS at 19:54

## 2024-02-14 RX ADMIN — HYDROMORPHONE HYDROCHLORIDE 0.5 MG: 1 INJECTION, SOLUTION INTRAMUSCULAR; INTRAVENOUS; SUBCUTANEOUS at 13:08

## 2024-02-14 RX ADMIN — DEXMEDETOMIDINE HYDROCHLORIDE 10 MCG: 100 INJECTION, SOLUTION, CONCENTRATE INTRAVENOUS at 12:18

## 2024-02-14 NOTE — PROGRESS NOTES
"[facility] Clinical Pharmacy Services: Vancomycin Monitoring Note    Karsten Johnson is a 48 y.o. male who is on day 3/42 (6 weeks per provider note) of pharmacy to dose vancomycin for Bacteremia and Bone and/or Joint Infection.    Previous Vancomycin Dose:   1750 mg IV every  12  hours  Imaging Reviewed?: Yes     TTE: no obvious vegetations on any valve; if high clinical suspicion, recommend TRACIE    Updated Cultures and Sensitivities:    Blood cx : pending  2/10 Blood cx : MRSA  2/10 Wound cx, left ankle: MRSA  2/10 Tissue/bone culture, left ankle: MRSA    Blood Culture   Date Value Ref Range Status   2024 No growth at 2 days  Preliminary   2024 No growth at 2 days  Preliminary   02/10/2024 Staphylococcus aureus, MRSA (C)  Final     Comment:       Infectious disease consultation is highly recommended to rule out distant foci of infection.  Methicillin resistant Staphylococcus aureus, Patient may be an isolation risk.   02/10/2024 Staphylococcus aureus, MRSA (C)  Final     Comment:     Refer to previous blood culture collected on 02/10/2024 1154 for MICs    Infectious disease consultation is highly recommended to rule out distant foci of infection.  Methicillin resistant Staphylococcus aureus, Patient may be an isolation risk.   2024 Staphylococcus aureus, MRSA (C)  Final     Comment:       Infectious disease consultation is highly recommended to rule out distant foci of infection.  Methicillin resistant Staphylococcus aureus, Patient may be an isolation risk.   2024 Staphylococcus aureus, MRSA (C)  Final     Comment:       Infectious disease consultation is highly recommended to rule out distant foci of infection.  Methicillin resistant Staphylococcus aureus, Patient may be an isolation risk.             Vitals/Labs  Ht: 167.6 cm (66\"); Wt: 104 kg (230 lb 6.1 oz)   Temp (24hrs), Av.7 °F (37.1 °C), Min:98.1 °F (36.7 °C), Max:99.1 °F (37.3 °C)   Estimated Creatinine " Clearance: 161.9 mL/min (A) (by C-G formula based on SCr of 0.62 mg/dL (L)).       Results from last 7 days   Lab Units 02/14/24  0724 02/13/24  0411 02/12/24  0547 02/11/24  0425 02/10/24  0302   VANCOMYCIN RM mcg/mL  --   --  16.26  --  19.40   VANCOMYCIN TR mcg/mL 11.95  --   --   --   --    CREATININE mg/dL 0.62* 0.59* 0.71*   < > 0.66*   WBC 10*3/mm3 25.74* 25.16* 23.93*   < > 24.04*    < > = values in this interval not displayed.     Assessment/Plan    Current Vancomycin Dose:  1750 mg IV every 12 hours; which provides the following predicted parameters:  AUC24,ss: 521 mg/L.hr  PAUC*: 99 %  Ctrough,ss: 13.3 mg/L  Pconc*: 0 %  Tox.: 8 %  Next Vanc Trough planned for 2/16.  We will continue to monitor patient changes and renal function     Thank you for involving pharmacy in this patient's care. Please contact pharmacy with any questions or concerns.    Aster Chambers  Clinical Pharmacist

## 2024-02-14 NOTE — CONSULTS
Procedure: Insertion of Peripherally Inserted Central Catheter    Indications:  Home IV therapy; 6 weeks Vancomycin    Procedure Details:   Education, Including what a PICC line is, why it is used, how it is placed, and how to manage and care for the PICC line was completed. Patient's questions and concerns were answered and addressed. Informed consent was obtained for the procedure.  Risks of the PICC line, including, but not limited to infection, air embolism, catheter tip moving, catheter blockage and phlebitis were discussed.      Active Time Out:  An active time out was conducted with patient's nurse using 2 patient identifiers.    After using ultrasound to identify an appropriate vein, maximum sterile technique was used including antiseptics, cap, gloves, gown, hand hygiene, mask, and sheet.    PICC SIZE: 4 FR/18G PICC inserted to the L Basilic vein per hospital protocol.   Blood return:  yes    Lot #: [id number]  Expiration Date: 2025-03-31    Findings:  Catheter inserted to 45 cm, with 0 cm. Exposed. Mid upper arm circumference is 33 cm.   Catheter was flushed with 20 cc NS. Patient did tolerate procedure well.    Additional information:  3CG technology used to confirm placement.    PICC Brochure given to patient with teaching instruction.

## 2024-02-14 NOTE — OP NOTE
Operative Note   Foot and Ankle Surgery   Provider: Dr. Alvarez Beck DPM  Location: [facility]    Patient Name:  Karsten Johnson  YOB: 1975    Date of Surgery:  2/14/2024    Pre-op Diagnosis:   Acute hematogenous osteomyelitis of left ankle [M86.072]       Post-Op Diagnosis Codes:     * Acute hematogenous osteomyelitis of left ankle [M86.072]     * Abscess of right foot [L02.611]    Procedure/CPT® Codes:  MS I&D BELOW FASCIA FOOT MULTIPLE AREAS [98370]    Procedure(s):  INCISION AND DEBRIDEMENT WOUND OF LEFT ANKLE        Staff:  Surgeon(s):  Alvarez Beck DPM    Assistant: Leena Kidd CSA  was responsible for performing the following activities: Retraction, Suction, and Irrigation and their skilled assistance was necessary for the success of this case.    Anesthesia: General    Estimated Blood Loss: less than 5 ml    Implants:    Nothing was implanted during the procedure    Specimen:          None    Complications: none    Description of Procedure:     Procedure, risks, complications, and goals were discussed with the patient at bedside.  Risks include but are not limited to infection, complications from anesthesia (including death), chronic pain or numbness, hematoma/seroma, deep vein thrombosis, wound complications, and potential for additional surgical procedures.  Patient understands and elects to proceed with surgery at this time. Informed consent was obtained before proceeding to the operating suite.  All questions were answered to the patient's satisfaction. No guarantees or assurances were given or implied.    Left foot was prepped and draped in usual sterile manner.  Attention was directed to the posterior aspect of the heel where all nonviable soft tissue and bone was denuded from the ulceration site.  Incision was then made in the medial and lateral ankle deepened through skin continued down to the fascia.  No purulence was expressed medially mild purulence  was noted to the lateral incision.  All sites were flushed with copious amounts of saline.  Incisions were partially closed using 2-0 nylon.  Attention was then directed to the posterior Achilles tendon where incision was lengthened proximally.  All purulent drainage was expressed from the site.  Areas flushed with a pulse lavage and all nonviable soft tissue was debrided using a rongeur and 15 blade.  Only healthy bleeding tissue remained after removal of all nonviable soft tissue.  Site was packed with Adaptic and Betadine soaked 4 x 4 gauze, Kerlix, and Ace wrap.  Patient tolerated anesthesia procedure well.    Alvarez Beck DPM  [facility]   [phone]    Alvarez Beck DPM     Date: 2/14/2024  Time: 13:02 EST

## 2024-02-14 NOTE — PROGRESS NOTES
[facility]   Hospitalist Progress Note  Date: 2024  Patient Name: Karsten Johnson  : 1975  MRN: [id number]  Date of admission: 2024      Subjective   Subjective     Chief Complaint: Follow-up left foot pain    Summary:Karsten Johnson is a 48 y.o. male obese male with essentially no past medical history who presented to the emergency department with left heel/Achilles pain that began 5 days ago.  Patient says that happened while at work.  There was no trauma.  Patient said the pain started slowly but steadily increased.  Patient went to an urgent care in [facility] and was told that he most likely had a torn Achilles. Patient was found to have achilles rupture, heel abscess and osteomyelitis.  Blood cultures positive for MRSA.  Suspect infection seeded from maxillary incisor.  Blood cultures eventually cleared on 2024.  Plan on continuing IV vancomycin for 6 weeks.  Returned to the OR for repeat cleanout  on 24.  Discharge planning assisting with arranging home health for 6 weeks of IV vancomycin and wound VAC.    Interval Followup: Patient lying in bed appears to be resting comfortably following repeat incision and drainage. Patient indicates pain currently well-controlled.  Afebrile overnight.  Heart rate within normal limits.  Blood pressure within normal limits.  Satting well on room air.  Blood sugars fairly well-controlled.  White blood cell count trending back up.  Hemoglobin trending down slightly.  Blood cultures from 2024 negative to date.    Review of Systems  Constitutional: Negative for fatigue and fever.   HENT: Negative for sore throat and trouble swallowing.    Eyes: Negative for pain and discharge.   Respiratory: Negative for cough and shortness of breath.    Cardiovascular: Negative for chest pain and palpitations.   Gastrointestinal: Negative for abdominal pain, nausea and vomiting.   Endocrine: Negative for cold intolerance and heat intolerance.    Genitourinary: Negative for difficulty urinating and dysuria.   Musculoskeletal: Negative for back pain and neck stiffness.  Positive left ankle pain  Skin: Negative for color change and rash.   Neurological: Negative for syncope and headaches.   Hematological: Negative for adenopathy.   Psychiatric/Behavioral: Negative for confusion and hallucinations.    Objective   Objective     Vitals:   Temp:  [97.5 °F (36.4 °C)-99.1 °F (37.3 °C)] 98 °F (36.7 °C)  Heart Rate:  [69-90] 69  Resp:  [16-20] 20  BP: (138-171)/(65-98) 154/86  Flow (L/min):  [2-3] 2  Physical Exam   General: well-developed appearing stated age in no acute distress  HEENT: Normocephalic atraumatic moist membranes pupils equal round reactive light, no scleral icterus no conjunctival injection, discoloration of the left maxillary incisor  Cardiovascular: regular rate and rhythm no murmurs rubs or gallops S1-S2, no lower extremity edema appreciated  Pulmonary: Clear to auscultation bilaterally no wheezes rales or rhonchi symmetric chest expansion, unlabored, no conversational dyspnea appreciated  Gastrointestinal: Soft nontender nondistended positive bowel sounds all 4 quadrants no rebound or guarding  Musculoskeletal: No clubbing cyanosis, warm and well-perfused, calves soft symmetric nontender bilaterally  Skin: Clean dry surgical dressing over the posterior left ankle  Neuro: Cranial nerves II through XII intact grossly no sensorimotor deficits appreciated bilateral upper and lower extremities  Psych: Patient is calm cooperative and appropriate with exam not responding to internal stimuli  : No Kincaid catheter no bladder distention no suprapubic tenderness    Result Review    Result Review:  I have personally reviewed these results and agree with these findings:  [x]  Laboratory  LAB RESULTS:      Lab 02/14/24  0724 02/13/24  0411 02/12/24  0547 02/11/24  0425 02/10/24  0302 02/09/24  0618 02/08/24  1138 02/08/24  0403   WBC 25.74* 25.16* 23.93*  26.28* 24.04* 24.22*  --  24.32*   HEMOGLOBIN 11.4* 10.8* 11.4* 10.3* 10.4* 11.2*  --  11.4*   HEMATOCRIT 34.8* 33.2* 35.1* 31.3* 31.7* 34.5*  --  35.8*   PLATELETS 286 257 254 192 204 195  --  168   NEUTROS ABS  --  22.14* 17.65* 23.91* 20.38* 20.45*  --  20.91*   IMMATURE GRANS (ABS)  --   --  1.86*  --  0.27* 1.44*  --  1.09*   LYMPHS ABS  --   --  2.10  --  1.40 0.68*  --  0.77   MONOS ABS  --   --  1.83*  --  1.78* 1.59*  --  1.47*   EOS ABS  --  0.25 0.30 0.26 0.14 0.02  --  0.02   MCV 90.9 90.2 91.6 91.3 90.8 91.8  --  94.2   PROCALCITONIN  --   --   --   --   --   --   --  1.89*   LACTATE  --   --   --   --   --   --  1.1  --          Lab 02/14/24  0724 02/13/24  0411 02/12/24  0547 02/11/24  0425 02/10/24  0302 02/09/24  0618 02/08/24  0403   SODIUM 134* 135* 134* 133* 130* 132* 132*   POTASSIUM 4.1 3.6 4.0 4.0 3.7 3.7 4.0   CHLORIDE 99 100 99 100 97* 98 98   CO2 26.1 24.6 25.8 24.0 21.9* 24.2 23.5   ANION GAP 8.9 10.4 9.2 9.0 11.1 9.8 10.5   BUN 8 8 11 19 14 15 14   CREATININE 0.62* 0.59* 0.71* 0.71* 0.66* 0.74* 0.80   EGFR 116.4 118.2 111.8 111.8 114.3 110.4 107.8   GLUCOSE 150* 147* 145* 139* 149* 160* 126*   CALCIUM 8.6 8.1* 8.4* 8.4* 8.4* 8.9 9.1   MAGNESIUM  --   --  2.2  --  2.2 2.2 1.9   PHOSPHORUS  --   --  2.6  --   --   --   --    HEMOGLOBIN A1C  --   --   --   --   --  6.40*  --          Lab 02/12/24 0547   ALBUMIN 2.5*                     Brief Urine Lab Results       None          Microbiology Results (last 10 days)       Procedure Component Value - Date/Time    Blood Culture - Blood, Arm, Right [[id number]]  (Normal) Collected: 02/12/24 0548    Lab Status: Preliminary result Specimen: Blood from Arm, Right Updated: 02/14/24 0600     Blood Culture No growth at 2 days    Blood Culture - Blood, Arm, Left [[id number]]  (Normal) Collected: 02/12/24 0547    Lab Status: Preliminary result Specimen: Blood from Arm, Left Updated: 02/14/24 0600     Blood Culture No growth at 2 days    Blood Culture -  Blood, Hand, Left [[id number]]  (Abnormal)  (Susceptibility) Collected: 02/10/24 1154    Lab Status: Final result Specimen: Blood from Hand, Left Updated: 02/13/24 0619     Blood Culture Staphylococcus aureus, MRSA     Comment:   Infectious disease consultation is highly recommended to rule out distant foci of infection.  Methicillin resistant Staphylococcus aureus, Patient may be an isolation risk.        Isolated from Aerobic and Anaerobic Bottles     Gram Stain Aerobic Bottle Gram positive cocci in clusters      Anaerobic Bottle Gram positive cocci in clusters    Susceptibility        Staphylococcus aureus, MRSA      JULIO      Gentamicin Susceptible      Oxacillin Resistant      Rifampin Susceptible      Vancomycin Susceptible                           Blood Culture - Blood, Arm, Left [[id number]]  (Abnormal) Collected: 02/10/24 1150    Lab Status: Final result Specimen: Blood from Arm, Left Updated: 02/13/24 0619     Blood Culture Staphylococcus aureus, MRSA     Comment: Refer to previous blood culture collected on 02/10/2024 1154 for MICs    Infectious disease consultation is highly recommended to rule out distant foci of infection.  Methicillin resistant Staphylococcus aureus, Patient may be an isolation risk.        Isolated from Aerobic and Anaerobic Bottles     Gram Stain Aerobic Bottle Gram positive cocci in clusters      Anaerobic Bottle Gram positive cocci in clusters    Anaerobic Culture - Wound, Ankle, Left [[id number]]  (Normal) Collected: 02/10/24 0840    Lab Status: Preliminary result Specimen: Wound from Ankle, Left Updated: 02/13/24 0832     Anaerobic Culture No anaerobes isolated at 3 days    Wound Culture - Wound, Ankle, Left [[id number]]  (Abnormal)  (Susceptibility) Collected: 02/10/24 0840    Lab Status: Final result Specimen: Wound from Ankle, Left Updated: 02/13/24 0701     Wound Culture Scant growth (1+) Staphylococcus aureus, MRSA     Comment:   Methicillin resistant Staphylococcus aureus,  Patient may be an isolation risk.        Gram Stain No organisms seen    Susceptibility        Staphylococcus aureus, MRSA      JULIO      Clindamycin Susceptible      Erythromycin Resistant      Oxacillin Resistant      Rifampin Susceptible      Tetracycline Susceptible      Trimethoprim + Sulfamethoxazole Susceptible      Vancomycin Susceptible                           Tissue / Bone Culture - Bone, Ankle, Left [[id number]]  (Abnormal) Collected: 02/10/24 0817    Lab Status: Final result Specimen: Bone from Ankle, Left Updated: 02/13/24 0701     Tissue Culture Scant growth (1+) Staphylococcus aureus, MRSA     Comment:   Methicillin resistant Staphylococcus aureus, Patient may be an isolation risk.        Gram Stain No organisms seen    Narrative:      Refer to previous wound culture collected on 02/10/2024 0840 for MICs      AFB Culture - Bone, Ankle, Left [[id number]] Collected: 02/10/24 0817    Lab Status: Preliminary result Specimen: Bone from Ankle, Left Updated: 02/10/24 1413     AFB Stain No acid fast bacilli seen on direct smear    Blood Culture - Blood, Hand, Left [[id number]]  (Abnormal)  (Susceptibility) Collected: 02/08/24 1138    Lab Status: Final result Specimen: Blood from Hand, Left Updated: 02/11/24 0641     Blood Culture Staphylococcus aureus, MRSA     Comment:   Infectious disease consultation is highly recommended to rule out distant foci of infection.  Methicillin resistant Staphylococcus aureus, Patient may be an isolation risk.        Isolated from Aerobic and Anaerobic Bottles     Gram Stain Aerobic Bottle Gram positive cocci in clusters      Anaerobic Bottle Gram positive cocci in clusters    Susceptibility        Staphylococcus aureus, MRSA      JULIO      Gentamicin Susceptible      Oxacillin Resistant      Rifampin Susceptible      Vancomycin Susceptible                           Blood Culture - Blood, Hand, Right [[id number]]  (Abnormal) Collected: 02/08/24 1138    Lab Status: Final result  Specimen: Blood from Hand, Right Updated: 02/11/24 0643     Blood Culture Staphylococcus aureus, MRSA     Comment:   Infectious disease consultation is highly recommended to rule out distant foci of infection.  Methicillin resistant Staphylococcus aureus, Patient may be an isolation risk.        Isolated from Aerobic and Anaerobic Bottles     Gram Stain Aerobic Bottle Gram positive cocci in clusters      Anaerobic Bottle Gram positive cocci in clusters    Narrative:      Refer to previous blood culture collected on 02/08/2024 at 1138 for MICs.        Blood Culture ID, PCR - Blood, Hand, Left [[id number]]  (Abnormal) Collected: 02/08/24 1138    Lab Status: Final result Specimen: Blood from Hand, Left Updated: 02/09/24 0356     BCID, PCR Staph aureus. mecA/C and MREJ (methicillin resistance gene) detected. Identification by BCID2 PCR.     BOTTLE TYPE Aerobic Bottle    Narrative:      Infectious disease consultation is highly recommended to rule out distant foci of infection.            [x]  Microbiology  [x]  Radiology  MRI Ankle Left Without Contrast    Result Date: 2/8/2024    1. Severe tendinosis and enthesopathy at the distal Achilles tendon.  There is a full-thickness gap of the distal tendon from the insertion site measuring up to 4.6 cm consistent with full thickness tear.  Severe tendinosis of the musculotendinous junction of the Achilles.  Associated soft tissue edema. 2. Marrow edema calcaneus with erosive changes at the insertion site of the Achilles tendon likely related to inflammatory arthritis and reactive edema calcaneus. 3. Findings of plantar fasciitis medial band plantar fascia. 4. Mild hindfoot and midfoot degenerative change. 5. Tenosynovitis of the flexor tendons.     SAEED VIGIL MD       Electronically Signed and Approved By: SAEED VIGIL MD on 2/08/2024 at 14:57             XR Foot 3+ View Left    Result Date: 2/7/2024    1. Abnormal appearance to the area of the distal Achilles tendon.  Injury  to this area could not be excluded. 2. Osseous demineralization 3. Soft tissue swelling about the medial ankle/foot. 4. Calcification/ossification adjacent to the medial tubercle of the talus that may relate to the findings in the posterior ankle area as opposed to an injury around the medial tubercle of the talus       AUSTIN HARRIS MD       Electronically Signed and Approved By: AUSTIN HARRIS MD on 2/07/2024 at 16:04             XR Ankle 3+ View Left    Result Date: 2/7/2024    1. Abnormal appearance to the area of the distal Achilles tendon.  Injury to this area could not be excluded. 2. Osseous demineralization 3. Soft tissue swelling about the medial ankle/foot. 4. Calcification/ossification adjacent to the medial tubercle of the talus that may relate to the findings in the posterior ankle area as opposed to an injury around the medial tubercle of the talus       AUSTIN HARRIS MD       Electronically Signed and Approved By: AUSTIN HARRIS MD on 2/07/2024 at 16:04             XR Tibia Fibula 2 View Left    Result Date: 2/7/2024    1. Abnormal appearance to the area of the distal Achilles tendon.  Injury to this area could not be excluded. 2. Osseous demineralization 3. Soft tissue swelling about the medial ankle/foot. 4. Calcification/ossification adjacent to the medial tubercle of the talus that may relate to the findings in the posterior ankle area as opposed to an injury around the medial tubercle of the talus       AUSTIN HARRIS MD       Electronically Signed and Approved By: AUSTIN HARRIS MD on 2/07/2024 at 16:04              []  EKG/Telemetry   [x]  Cardiology/Vascular   Transthoracic echocardiogram 2/9/2024    Left ventricular systolic function is normal. Calculated left ventricular EF = 55.3%    Estimated right ventricular systolic pressure from tricuspid regurgitation is normal (<35 mmHg).    No obvious wall motion abnormalities    No obvious vegetations on any valve. If high clinical suspicion, recommend  TRACIE  []  Pathology  []  Old records  [x]  Other:  Scheduled Meds:[START ON 2/15/2024] enoxaparin, 40 mg, Subcutaneous, Daily  insulin lispro, 2-9 Units, Subcutaneous, 4x Daily AC & at Bedtime  lisinopril, 40 mg, Oral, Q24H  sodium chloride, 10 mL, Intravenous, Q12H  sodium chloride, 10 mL, Intravenous, Q12H  vancomycin, 1,750 mg, Intravenous, Q12H      Continuous Infusions:lactated ringers, 30 mL/hr, Last Rate: 30 mL/hr (02/10/24 0713)  lactated ringers, 9 mL/hr  Pharmacy to dose vancomycin,       PRN Meds:.  acetaminophen **OR** acetaminophen    senna-docusate sodium **AND** polyethylene glycol **AND** bisacodyl **AND** bisacodyl    dextrose    dextrose    diphenhydrAMINE    glucagon (human recombinant)    hydrALAZINE    HYDROcodone-acetaminophen    HYDROcodone-acetaminophen    HYDROmorphone    lactated ringers    melatonin    ondansetron    Pharmacy to dose vancomycin    sodium chloride    sodium chloride    sodium chloride    sodium chloride    sodium chloride      Assessment & Plan   Assessment / Plan     Assessment/Plan:  Left Achilles tendon rupture  Left heel abscess  Acute hematogenous osteomyelitis of the left calcaneus  Suspected abscessed maxillary left incisor  MRSA bacteremia  Prediabetes  Hypertension  Obesity BMI 37  Mild anemia  High risk drug monitoring vancomycin        Patient admitted for further evaluation and treatment  Podiatry consulted thank you for your assistance  Continue wound care per podiatry  Continue nonweightbearing to the left lower extremity  Continue vancomycin complete 6-week course starting from 2/12/2024 with first negative blood cultures  Patient will need to follow-up with dentistry as an outpatient for definitive treatment of left maxillary incisor prior to completion of antibiotics  Continue sliding scale insulin  Continue lisinopril and titrate as needed  Continue to monitor leukocytosis  Continue to monitor hemoglobin and transfuse as needed to keep hemoglobin greater  than 7  Continue to monitor renal function while on vancomycin  Continue to dose vancomycin area under the curve  Continue to monitor electrolytes replace as needed  Consult physical therapy Occupational Therapy  Further inpatient orders recommendations pending clinical course         Discussed plan with bedside RN as well as podiatry attending.    Disposition: Home with home health to complete 6-week course of IV vancomycin and wound VAC care pending podiatry clearance.    DVT prophylaxis:  Medical DVT prophylaxis orders are present.        CODE STATUS:   Code Status (Patient has no pulse and is not breathing): CPR (Attempt to Resuscitate)  Medical Interventions (Patient has pulse or is breathing): Full Support

## 2024-02-14 NOTE — THERAPY EVALUATION
Acute Care - Physical Therapy Initial Evaluation   [place]     Patient Name: Karsten Johnson  : 1975  MRN: [id number]  Today's Date: 2024 Admit date: 2024     Referring Physician: Tono Kang MD     Surgery Date:2024   Procedure(s) (LRB):  INCISION AND DRAINAGE  LEFT ANKLE,BIOPSY LEFT ANKEL (Left)       Visit Dx:     ICD-10-CM ICD-9-CM   1. Acute hematogenous osteomyelitis of left ankle  M86.072 730.07   2. Rupture of left Achilles tendon, initial encounter  S86.012A 845.09   3. Abscess of heel  L02.619 682.7   4. Difficulty in walking  R26.2 719.7     Patient Active Problem List   Diagnosis    Achilles rupture    Abscess of heel    Acute hematogenous osteomyelitis     History reviewed. No pertinent past medical history.  Past Surgical History:   Procedure Laterality Date    INCISION AND DRAINAGE OF WOUND Left 2/10/2024    Procedure: INCISION AND DRAINAGE  LEFT ANKLE,BIOPSY LEFT ANKEL;  Surgeon: Alvarez Beck DPM;  Location: [facility];  Service: Podiatry;  Laterality: Left;     PT Assessment (last 12 hours)       PT Evaluation and Treatment       Row Name 24 0942          Physical Therapy Time and Intention    Subjective Information complains of;pain (P)   -NM     Document Type evaluation (P)   -NM     Mode of Treatment individual therapy;physical therapy (P)   -NM     Patient Effort excellent (P)   -NM     Symptoms Noted During/After Treatment increased pain (P)   -NM       Row Name 24 0942          General Information    Patient Profile Reviewed yes (P)   -NM     Patient Observations cooperative;agree to therapy (P)   -NM     Prior Level of Function independent: (P)   -NM     Equipment Currently Used at Home none (P)   -NM     Existing Precautions/Restrictions no known precautions/restrictions (P)   -NM     Barriers to Rehab none identified (P)   -NM       Row Name 24 0942          Living Environment    Current Living Arrangements apartment (P)   -NM     Home  Accessibility stairs to enter home (P)   -NM     People in Home significant other (P)   -NM     Primary Care Provided by self (P)   -NM       Row Name 02/14/24 0942          Home Main Entrance    Number of Stairs, Main Entrance other (see comments) (P)   18  -NM     Stair Railings, Main Entrance railing on left side (ascending) (P)   -NM       Row Name 02/14/24 0942          Home Use of Assistive/Adaptive Equipment    Equipment Currently Used at Home none (P)   -NM       Row Name 02/14/24 0942          Pain    Pretreatment Pain Rating 9/10 (P)   -NM     Posttreatment Pain Rating 9/10 (P)   -NM     Pain Location - Side/Orientation Left (P)   -NM     Pain Location distal (P)   -NM     Pain Location - ankle (P)   -NM     Pain Intervention(s) Repositioned;Elevated (P)   -NM       Row Name 02/14/24 0942          Range of Motion (ROM)    Range of Motion left lower extremity ROM;right lower extremity ROM (P)   -NM     Left Lower Extremity (ROM) hip;knee;other (see comments) (P)   WFL  -NM       Row Name 02/14/24 0942          Strength (Manual Muscle Testing)    Strength (Manual Muscle Testing) left lower extremity strength;right lower extremity strength (P)   5/5  -NM     Left Lower Extremity Strength ankle (P)   -NM     Ankle, Left (Strength) not assessed due to significant pain (P)   -NM       Row Name 02/14/24 0942          Mobility    Extremity Weight-bearing Status left lower extremity (P)   -NM     Left Lower Extremity (Weight-bearing Status) non weight-bearing (NWB) (P)   -NM       Row Name 02/14/24 0942          Bed Mobility    Bed Mobility bed mobility (all) activities (P)   -NM     All Activities, [place] (Bed Mobility) minimum assist (75% patient effort) (P)   -NM     Bed Mobility, Safety Issues decreased use of legs for bridging/pushing (P)   -NM     Assistive Device (Bed Mobility) bed rails;head of bed elevated (P)   -NM       Row Name 02/14/24 0942          Transfers    Transfers bed-chair  transfer;sit-stand transfer;stand-sit transfer;stand pivot/stand step transfer (P)   -NM     Maintains Weight-bearing Status (Transfers) able to maintain (P)   -NM       Row Name 02/14/24 0942          Bed-Chair Transfer    Bed-Chair [place] (Transfers) minimum assist (75% patient effort) (P)   -NM     Assistive Device (Bed-Chair Transfers) walker, front-wheeled (P)   -NM       Row Name 02/14/24 0942          Sit-Stand Transfer    Sit-Stand [place] (Transfers) minimum assist (75% patient effort) (P)   -NM     Assistive Device (Sit-Stand Transfers) walker, front-wheeled (P)   -NM       Row Name 02/14/24 0942          Stand-Sit Transfer    Stand-Sit [place] (Transfers) minimum assist (75% patient effort) (P)   -NM     Assistive Device (Stand-Sit Transfers) walker, front-wheeled (P)   -NM       Row Name 02/14/24 0942          Stand Pivot/Stand Step Transfer    Stand Pivot/Stand Step [place] (Transfers) minimum assist (75% patient effort) (P)   -NM     Assistive Device (Stand Pivot Stand Step Transfer) walker, front-wheeled (P)   -NM       Row Name 02/14/24 0942          Gait/Stairs (Locomotion)    Gait/Stairs Locomotion gait/ambulation assistive device (P)   -NM     [place] Level (Gait) minimum assist (75% patient effort) (P)   -NM     Assistive Device (Gait) walker, front-wheeled (P)   -NM     Patient was able to Ambulate yes (P)   -NM     Distance in Feet (Gait) 3 (P)   -NM     Pattern (Gait) 3-point (P)   -NM     Deviations/Abnormal Patterns (Gait) right sided deviations;stride length decreased;gait speed decreased (P)   -NM       Row Name 02/14/24 0942          Balance    Balance Assessment standing dynamic balance (P)   -NM     Dynamic Standing Balance minimal assist (P)   -NM     Position/Device Used, Standing Balance walker, front-wheeled (P)   -NM       Row Name             Wound 02/10/24 0800 Left posterior ankle Other (comment)    Wound - Properties Group Placement Date: 02/10/24  -  Placement Time: 0800 -SG Present on Original Admission: Y  -SG Side: Left  -SG Orientation: posterior  -SG Location: ankle  -SG Primary Wound Type: Other  -SG, open wound, margin extended with I&D in surgery     Retired Wound - Properties Group Placement Date: 02/10/24  -SG Placement Time: 0800 -SG Present on Original Admission: Y  -SG Side: Left  -SG Orientation: posterior  -SG Location: ankle  -SG Primary Wound Type: Other  -SG, open wound, margin extended with I&D in surgery     Retired Wound - Properties Group Date first assessed: 02/10/24  -SG Time first assessed: 0800 -SG Present on Original Admission: Y  -SG Side: Left  -SG Location: ankle  -SG Primary Wound Type: Other  -SG, open wound, margin extended with I&D in surgery       Row Name             NPWT (Negative Pressure Wound Therapy) 02/13/24 1154 left heel    NPWT (Negative Pressure Wound Therapy) - Properties Group Placement Date: 02/13/24  -TB Placement Time: 1154  -TB Location: left heel  -TB    Retired NPWT (Negative Pressure Wound Therapy) - Properties Group Placement Date: 02/13/24  -TB Placement Time: 1154  -TB Location: left heel  -TB    Retired NPWT (Negative Pressure Wound Therapy) - Properties Group Placement Date: 02/13/24  -TB Placement Time: 1154  -TB Location: left heel  -TB      Row Name 02/14/24 0942          Plan of Care Review    Plan of Care Reviewed With patient (P)   -NM     Progress improving (P)   -NM     Outcome Evaluation Pt presents to therapy today with significant pain and weakness which makes it difficult to ambulate and transfer safely. Pt will benefit from skilled PT to address above deficits. (P)   -NM       Row Name 02/14/24 0942          Positioning and Restraints    Pre-Treatment Position in bed (P)   -NM     Post Treatment Position chair (P)   -NM     In Chair reclined;call light within reach;encouraged to call for assist;exit alarm on (P)   -NM       Row Name 02/14/24 0942          Therapy Assessment/Plan (PT)     Patient/Family Therapy Goals Statement (PT) Pt wants to be able to ambulate and transfer independently again (P)   -NM     Rehab Potential (PT) good, to achieve stated therapy goals (P)   -NM     Criteria for Skilled Interventions Met (PT) meets criteria (P)   -NM     Therapy Frequency (PT) daily (P)   -NM     Predicted Duration of Therapy Intervention (PT) 10 days (P)   -NM     Problem List (PT) problems related to;balance;strength;pain (P)   -NM     Activity Limitations Related to Problem List (PT) unable to ambulate safely;unable to transfer safely (P)   -NM       Row Name 02/14/24 0942          PT Evaluation Complexity    History, PT Evaluation Complexity 3 or more personal factors and/or comorbidities (P)   -NM     Examination of Body Systems (PT Eval Complexity) total of 4 or more elements (P)   -NM     Clinical Presentation (PT Evaluation Complexity) stable (P)   -NM     Clinical Decision Making (PT Evaluation Complexity) low complexity (P)   -NM     Overall Complexity (PT Evaluation Complexity) low complexity (P)   -NM       [facility] Name 02/14/24 0942          Therapy Plan Review/Discharge Plan (PT)    Therapy Plan Review (PT) evaluation/treatment results reviewed;care plan/treatment goals reviewed (P)   -NM       [facility] Name 02/14/24 0942          Physical Therapy Goals    Bed Mobility Goal Selection (PT) bed mobility, PT goal 1 (P)   -NM     Transfer Goal Selection (PT) transfer, PT goal 1 (P)   -NM     Gait Training Goal Selection (PT) gait training, PT goal 1 (P)   -NM       [facility] Name 02/14/24 0942          Bed Mobility Goal 1 (PT)    Activity/Assistive Device (Bed Mobility Goal 1, PT) bed mobility activities, all (P)   -NM     [place] Level/Cues Needed (Bed Mobility Goal 1, PT) independent (P)   -NM     Time Frame (Bed Mobility Goal 1, PT) 10 days (P)   -NM       Row Name 02/14/24 0942          Transfer Goal 1 (PT)    Activity/Assistive Device (Transfer Goal 1, PT) transfers, all (P)   -NM     [place]  Level/Cues Needed (Transfer Goal 1, PT) independent (P)   -NM     Time Frame (Transfer Goal 1, PT) 10 days (P)   -NM       Row Name 02/14/24 0942          Gait Training Goal 1 (PT)    Activity/Assistive Device (Gait Training Goal 1, PT) gait (walking locomotion);assistive device use (P)   -NM     [place] Level (Gait Training Goal 1, PT) independent (P)   -NM     Distance (Gait Training Goal 1, PT) 200ft (P)   -NM     Time Frame (Gait Training Goal 1, PT) 10 days (P)   -NM               User Key  (r) = Recorded By, (t) = Taken By, (c) = Cosigned By      Initials Name Provider Type    SG Alberta Scott, RN Registered Nurse    Daniella Vizcaino, RN Registered Nurse    Keshawn Swenson, PT Student PT Student                    Physical Therapy Education       Title: PT OT SLP Therapies (Done)       Topic: Physical Therapy (Done)       Point: Mobility training (Done)       Learning Progress Summary             Patient Acceptance, E,TB, VU by NM at 2/14/2024 0957                                         User Key       Initials Effective Dates Name Provider Type Discipline    NM 01/31/24 -  Keshawn Granados, PT Student PT Student PT                  PT Recommendation and Plan  Anticipated Discharge Disposition (PT): (P) home with home health  Planned Therapy Interventions (PT): (P) balance training, bed mobility training, gait training, transfer training, strengthening, stair training  Therapy Frequency (PT): (P) daily  Plan of Care Reviewed With: (P) patient  Progress: (P) improving  Outcome Evaluation: (P) Pt presents to therapy today with significant pain and weakness which makes it difficult to ambulate and transfer safely. Pt will benefit from skilled PT to address above deficits.   Outcome Measures       Row Name 02/14/24 0900             How much help from another person do you currently need...    Turning from your back to your side while in flat bed without using bedrails? 4 (P)   -NM      Moving from  lying on back to sitting on the side of a flat bed without bedrails? 4 (P)   -NM      Moving to and from a bed to a chair (including a wheelchair)? 3 (P)   -NM      Standing up from a chair using your arms (e.g., wheelchair, bedside chair)? 3 (P)   -NM      Climbing 3-5 steps with a railing? 2 (P)   -NM      To walk in hospital room? 2 (P)   -NM      AM-PAC 6 Clicks Score (PT) 18 (P)   -NM      Highest Level of Mobility Goal 6 --> Walk 10 steps or more (P)   -NM                User Key  (r) = Recorded By, (t) = Taken By, (c) = Cosigned By      Initials Name Provider Type    Keshawn Swenson, PT Student PT Student                     Time Calculation:    PT Charges       Row Name 02/14/24 0942             Time Calculation    PT Received On 02/14/24 (P)   -NM      PT Goal Re-Cert Due Date 02/23/24 (P)   -NM         Untimed Charges    PT Eval/Re-eval Minutes 35 (P)   -NM         Total Minutes    Untimed Charges Total Minutes 35 (P)   -NM       Total Minutes 35 (P)   -NM                User Key  (r) = Recorded By, (t) = Taken By, (c) = Cosigned By      Initials Name Provider Type    Keshawn Swenson, PT Student PT Student                  Therapy Charges for Today       Code Description Service Date Service Provider Modifiers Qty    [id number] HC PT EVAL LOW COMPLEXITY 3 2/14/2024 Kesahwn Granados, PT Student GP 1            PT G-Codes  AM-PAC 6 Clicks Score (PT): (P) 18    Keshawn Granados PT Student  2/14/2024

## 2024-02-14 NOTE — PLAN OF CARE
Goal Outcome Evaluation:  Plan of Care Reviewed With: patient        Progress: improving  Outcome Evaluation: Pt alert and able to make needs known. Pain controlled with PRN Norco. Pt urinating without difficulty, utilizing urinal. Pt given PRN Dulcolax supp CA, effective with small formed bowel movement. Pt NPO after 12MN in preparation of surgical debridement to left lower extremity wound.

## 2024-02-14 NOTE — PLAN OF CARE
Goal Outcome Evaluation:  Plan of Care Reviewed With: (P) patient        Progress: (P) improving  Outcome Evaluation: (P) Pt presents to therapy today with significant pain and weakness which makes it difficult to ambulate and transfer safely. Pt will benefit from skilled PT to address above deficits.      Anticipated Discharge Disposition (PT): (P) home with home health

## 2024-02-14 NOTE — PROGRESS NOTES
[facility]   Hospitalist Progress Note  Date: 2024  Patient Name: Karsten Johnson  : 1975  MRN: [id number]  Date of admission: 2024      Subjective   Subjective     Chief Complaint: Follow-up left foot pain    Summary:Karsten Johnson is a 48 y.o. male obese male with essentially no past medical history who presented to the emergency department with left heel/Achilles pain that began 5 days ago.  Patient says that happened while at work.  There was no trauma.  Patient said the pain started slowly but steadily increased.  Patient went to an urgent care in [facility] and was told that he most likely had a torn Achilles. Patient was found to have achilles rupture, heel abscess and osteomyelitis.  Blood cultures positive for MRSA.  Suspect infection seeded from maxillary incisor.  Blood cultures eventually cleared on 2024.  Plan on continuing IV vancomycin for 6 weeks.  Planning on returning to the OR for repeat cleanout and replacement of wound VAC.  Discharge planning assisting with arranging home health for antibiotics and wound VAC.    Interval Followup: Patient lying in bed appears to be resting fairly comfortably.  Patient states pain currently fairly well-controlled is feeling better overall.  Afebrile overnight.  Heart rate within normal limits.  Blood pressure within normal limits.  Satting well on room air.  Blood sugars fairly well-controlled.  White blood cell count trending back up.  Hemoglobin trending down slightly.  Blood cultures from 2024 negative to date.    Review of Systems  Constitutional: Negative for fatigue and fever.   HENT: Negative for sore throat and trouble swallowing.    Eyes: Negative for pain and discharge.   Respiratory: Negative for cough and shortness of breath.    Cardiovascular: Negative for chest pain and palpitations.   Gastrointestinal: Negative for abdominal pain, nausea and vomiting.   Endocrine: Negative for cold intolerance and heat  intolerance.   Genitourinary: Negative for difficulty urinating and dysuria.   Musculoskeletal: Negative for back pain and neck stiffness.  Positive left ankle pain  Skin: Negative for color change and rash.   Neurological: Negative for syncope and headaches.   Hematological: Negative for adenopathy.   Psychiatric/Behavioral: Negative for confusion and hallucinations.    Objective   Objective     Vitals:   Temp:  [98.2 °F (36.8 °C)-99 °F (37.2 °C)] 99 °F (37.2 °C)  Heart Rate:  [68-79] 79  Resp:  [16-18] 16  BP: (145-175)/(79-95) 145/94  Physical Exam   General: well-developed appearing stated age in no acute distress  HEENT: Normocephalic atraumatic moist membranes pupils equal round reactive light, no scleral icterus no conjunctival injection, discoloration of the left maxillary incisor  Cardiovascular: regular rate and rhythm no murmurs rubs or gallops S1-S2, no lower extremity edema appreciated  Pulmonary: Clear to auscultation bilaterally no wheezes rales or rhonchi symmetric chest expansion, unlabored, no conversational dyspnea appreciated  Gastrointestinal: Soft nontender nondistended positive bowel sounds all 4 quadrants no rebound or guarding  Musculoskeletal: No clubbing cyanosis, warm and well-perfused, calves soft symmetric nontender bilaterally  Skin: Clean dry wound VAC over the posterior left ankle  Neuro: Cranial nerves II through XII intact grossly no sensorimotor deficits appreciated bilateral upper and lower extremities  Psych: Patient is calm cooperative and appropriate with exam not responding to internal stimuli  : No Kincaid catheter no bladder distention no suprapubic tenderness    Result Review    Result Review:  I have personally reviewed these results and agree with these findings:  [x]  Laboratory  LAB RESULTS:      Lab 02/13/24  0411 02/12/24  0547 02/11/24  0425 02/10/24  0302 02/09/24  0618 02/08/24  1138 02/08/24  0403 02/07/24  1533   WBC 25.16* 23.93* 26.28* 24.04* 24.22*  --   24.32* 22.41*   HEMOGLOBIN 10.8* 11.4* 10.3* 10.4* 11.2*  --  11.4* 12.2*   HEMATOCRIT 33.2* 35.1* 31.3* 31.7* 34.5*  --  35.8* 36.4*   PLATELETS 257 254 192 204 195  --  168 165   NEUTROS ABS 22.14* 17.65* 23.91* 20.38* 20.45*  --  20.91* 20.16*   IMMATURE GRANS (ABS)  --  1.86*  --  0.27* 1.44*  --  1.09* 0.36*   LYMPHS ABS  --  2.10  --  1.40 0.68*  --  0.77 0.47*   MONOS ABS  --  1.83*  --  1.78* 1.59*  --  1.47* 1.35*   EOS ABS 0.25 0.30 0.26 0.14 0.02  --  0.02 0.03   MCV 90.2 91.6 91.3 90.8 91.8  --  94.2 92.2   SED RATE  --   --   --   --   --   --   --  54*   CRP  --   --   --   --   --   --   --  31.39*   PROCALCITONIN  --   --   --   --   --   --  1.89*  --    LACTATE  --   --   --   --   --  1.1  --  1.7         Lab 02/13/24  0411 02/12/24  0547 02/11/24  0425 02/10/24  0302 02/09/24  0618 02/08/24  0403   SODIUM 135* 134* 133* 130* 132* 132*   POTASSIUM 3.6 4.0 4.0 3.7 3.7 4.0   CHLORIDE 100 99 100 97* 98 98   CO2 24.6 25.8 24.0 21.9* 24.2 23.5   ANION GAP 10.4 9.2 9.0 11.1 9.8 10.5   BUN 8 11 19 14 15 14   CREATININE 0.59* 0.71* 0.71* 0.66* 0.74* 0.80   EGFR 118.2 111.8 111.8 114.3 110.4 107.8   GLUCOSE 147* 145* 139* 149* 160* 126*   CALCIUM 8.1* 8.4* 8.4* 8.4* 8.9 9.1   MAGNESIUM  --  2.2  --  2.2 2.2 1.9   PHOSPHORUS  --  2.6  --   --   --   --    HEMOGLOBIN A1C  --   --   --   --  6.40*  --          Lab 02/12/24  0547 02/07/24  1533   TOTAL PROTEIN  --  7.4   ALBUMIN 2.5* 3.4*   GLOBULIN  --  4.0   ALT (SGPT)  --  18   AST (SGOT)  --  22   BILIRUBIN  --  0.9   ALK PHOS  --  144*                     Brief Urine Lab Results       None          Microbiology Results (last 10 days)       Procedure Component Value - Date/Time    Blood Culture - Blood, Arm, Right [[id number]]  (Normal) Collected: 02/12/24 0548    Lab Status: Preliminary result Specimen: Blood from Arm, Right Updated: 02/13/24 0601     Blood Culture No growth at 24 hours    Blood Culture - Blood, Arm, Left [[id number]]  (Normal) Collected:  02/12/24 0547    Lab Status: Preliminary result Specimen: Blood from Arm, Left Updated: 02/13/24 0601     Blood Culture No growth at 24 hours    Blood Culture - Blood, Hand, Left [[id number]]  (Abnormal)  (Susceptibility) Collected: 02/10/24 1154    Lab Status: Final result Specimen: Blood from Hand, Left Updated: 02/13/24 0619     Blood Culture Staphylococcus aureus, MRSA     Comment:   Infectious disease consultation is highly recommended to rule out distant foci of infection.  Methicillin resistant Staphylococcus aureus, Patient may be an isolation risk.        Isolated from Aerobic and Anaerobic Bottles     Gram Stain Aerobic Bottle Gram positive cocci in clusters      Anaerobic Bottle Gram positive cocci in clusters    Susceptibility        Staphylococcus aureus, MRSA      JULIO      Gentamicin Susceptible      Oxacillin Resistant      Rifampin Susceptible      Vancomycin Susceptible                           Blood Culture - Blood, Arm, Left [[id number]]  (Abnormal) Collected: 02/10/24 1150    Lab Status: Final result Specimen: Blood from Arm, Left Updated: 02/13/24 0619     Blood Culture Staphylococcus aureus, MRSA     Comment: Refer to previous blood culture collected on 02/10/2024 1154 for MICs    Infectious disease consultation is highly recommended to rule out distant foci of infection.  Methicillin resistant Staphylococcus aureus, Patient may be an isolation risk.        Isolated from Aerobic and Anaerobic Bottles     Gram Stain Aerobic Bottle Gram positive cocci in clusters      Anaerobic Bottle Gram positive cocci in clusters    Anaerobic Culture - Wound, Ankle, Left [[id number]]  (Normal) Collected: 02/10/24 0840    Lab Status: Preliminary result Specimen: Wound from Ankle, Left Updated: 02/13/24 0832     Anaerobic Culture No anaerobes isolated at 3 days    Wound Culture - Wound, Ankle, Left [[id number]]  (Abnormal)  (Susceptibility) Collected: 02/10/24 0840    Lab Status: Final result Specimen: Wound from  Ankle, Left Updated: 02/13/24 0701     Wound Culture Scant growth (1+) Staphylococcus aureus, MRSA     Comment:   Methicillin resistant Staphylococcus aureus, Patient may be an isolation risk.        Gram Stain No organisms seen    Susceptibility        Staphylococcus aureus, MRSA      JULIO      Clindamycin Susceptible      Erythromycin Resistant      Oxacillin Resistant      Rifampin Susceptible      Tetracycline Susceptible      Trimethoprim + Sulfamethoxazole Susceptible      Vancomycin Susceptible                           Tissue / Bone Culture - Bone, Ankle, Left [[id number]]  (Abnormal) Collected: 02/10/24 0817    Lab Status: Final result Specimen: Bone from Ankle, Left Updated: 02/13/24 0701     Tissue Culture Scant growth (1+) Staphylococcus aureus, MRSA     Comment:   Methicillin resistant Staphylococcus aureus, Patient may be an isolation risk.        Gram Stain No organisms seen    Narrative:      Refer to previous wound culture collected on 02/10/2024 0840 for MICs      AFB Culture - Bone, Ankle, Left [[id number]] Collected: 02/10/24 0817    Lab Status: Preliminary result Specimen: Bone from Ankle, Left Updated: 02/10/24 1413     AFB Stain No acid fast bacilli seen on direct smear    Blood Culture - Blood, Hand, Left [[id number]]  (Abnormal)  (Susceptibility) Collected: 02/08/24 1138    Lab Status: Final result Specimen: Blood from Hand, Left Updated: 02/11/24 0641     Blood Culture Staphylococcus aureus, MRSA     Comment:   Infectious disease consultation is highly recommended to rule out distant foci of infection.  Methicillin resistant Staphylococcus aureus, Patient may be an isolation risk.        Isolated from Aerobic and Anaerobic Bottles     Gram Stain Aerobic Bottle Gram positive cocci in clusters      Anaerobic Bottle Gram positive cocci in clusters    Susceptibility        Staphylococcus aureus, MRSA      JULIO      Gentamicin Susceptible      Oxacillin Resistant      Rifampin Susceptible       Vancomycin Susceptible                           Blood Culture - Blood, Hand, Right [[id number]]  (Abnormal) Collected: 02/08/24 1138    Lab Status: Final result Specimen: Blood from Hand, Right Updated: 02/11/24 0643     Blood Culture Staphylococcus aureus, MRSA     Comment:   Infectious disease consultation is highly recommended to rule out distant foci of infection.  Methicillin resistant Staphylococcus aureus, Patient may be an isolation risk.        Isolated from Aerobic and Anaerobic Bottles     Gram Stain Aerobic Bottle Gram positive cocci in clusters      Anaerobic Bottle Gram positive cocci in clusters    Narrative:      Refer to previous blood culture collected on 02/08/2024 at 1138 for MICs.        Blood Culture ID, PCR - Blood, Hand, Left [[id number]]  (Abnormal) Collected: 02/08/24 1138    Lab Status: Final result Specimen: Blood from Hand, Left Updated: 02/09/24 0356     BCID, PCR Staph aureus. mecA/C and MREJ (methicillin resistance gene) detected. Identification by BCID2 PCR.     BOTTLE TYPE Aerobic Bottle    Narrative:      Infectious disease consultation is highly recommended to rule out distant foci of infection.            [x]  Microbiology  [x]  Radiology  MRI Ankle Left Without Contrast    Result Date: 2/8/2024    1. Severe tendinosis and enthesopathy at the distal Achilles tendon.  There is a full-thickness gap of the distal tendon from the insertion site measuring up to 4.6 cm consistent with full thickness tear.  Severe tendinosis of the musculotendinous junction of the Achilles.  Associated soft tissue edema. 2. Marrow edema calcaneus with erosive changes at the insertion site of the Achilles tendon likely related to inflammatory arthritis and reactive edema calcaneus. 3. Findings of plantar fasciitis medial band plantar fascia. 4. Mild hindfoot and midfoot degenerative change. 5. Tenosynovitis of the flexor tendons.     SAEED VIGIL MD       Electronically Signed and Approved By: SAEED VIGIL  MD on 2/08/2024 at 14:57             XR Foot 3+ View Left    Result Date: 2/7/2024    1. Abnormal appearance to the area of the distal Achilles tendon.  Injury to this area could not be excluded. 2. Osseous demineralization 3. Soft tissue swelling about the medial ankle/foot. 4. Calcification/ossification adjacent to the medial tubercle of the talus that may relate to the findings in the posterior ankle area as opposed to an injury around the medial tubercle of the talus       AUSTIN HARRIS MD       Electronically Signed and Approved By: AUSTIN HARRIS MD on 2/07/2024 at 16:04             XR Ankle 3+ View Left    Result Date: 2/7/2024    1. Abnormal appearance to the area of the distal Achilles tendon.  Injury to this area could not be excluded. 2. Osseous demineralization 3. Soft tissue swelling about the medial ankle/foot. 4. Calcification/ossification adjacent to the medial tubercle of the talus that may relate to the findings in the posterior ankle area as opposed to an injury around the medial tubercle of the talus       AUSTIN HARRIS MD       Electronically Signed and Approved By: AUSTIN HARRIS MD on 2/07/2024 at 16:04             XR Tibia Fibula 2 View Left    Result Date: 2/7/2024    1. Abnormal appearance to the area of the distal Achilles tendon.  Injury to this area could not be excluded. 2. Osseous demineralization 3. Soft tissue swelling about the medial ankle/foot. 4. Calcification/ossification adjacent to the medial tubercle of the talus that may relate to the findings in the posterior ankle area as opposed to an injury around the medial tubercle of the talus       AUSTIN HARRIS MD       Electronically Signed and Approved By: AUSTIN HARRIS MD on 2/07/2024 at 16:04              []  EKG/Telemetry   [x]  Cardiology/Vascular   Transthoracic echocardiogram 2/9/2024    Left ventricular systolic function is normal. Calculated left ventricular EF = 55.3%    Estimated right ventricular systolic pressure from  tricuspid regurgitation is normal (<35 mmHg).    No obvious wall motion abnormalities    No obvious vegetations on any valve. If high clinical suspicion, recommend TRACIE  []  Pathology  []  Old records  [x]  Other:  Scheduled Meds:heparin (porcine), 5,000 Units, Subcutaneous, Q8H  insulin lispro, 2-9 Units, Subcutaneous, 4x Daily AC & at Bedtime  lisinopril, 40 mg, Oral, Q24H  sodium chloride, 10 mL, Intravenous, Q12H  vancomycin, 1,750 mg, Intravenous, Q12H      Continuous Infusions:lactated ringers, 30 mL/hr, Last Rate: 30 mL/hr (02/10/24 0772)  Pharmacy to dose vancomycin,       PRN Meds:.  acetaminophen **OR** acetaminophen    senna-docusate sodium **AND** polyethylene glycol **AND** bisacodyl **AND** bisacodyl    dextrose    dextrose    diphenhydrAMINE    glucagon (human recombinant)    hydrALAZINE    HYDROcodone-acetaminophen    HYDROcodone-acetaminophen    HYDROmorphone    melatonin    ondansetron    Pharmacy to dose vancomycin    sodium chloride    sodium chloride      Assessment & Plan   Assessment / Plan     Assessment/Plan:  Left Achilles tendon rupture  Left heel abscess  Acute hematogenous osteomyelitis of the left calcaneus  Suspected abscessed maxillary left incisor  MRSA bacteremia  Prediabetes  Hypertension  Obesity BMI 37  Mild anemia  High risk drug monitoring vancomycin        Patient admitted for further evaluation and treatment  Podiatry consulted thank you for your assistance  Podiatry planning on returning to the OR for further debridement and wound VAC replacement  Continue vancomycin complete 6-week course starting from 2/12/2024 with first negative blood cultures  Patient will need to follow-up with dentistry as an outpatient for definitive treatment of left maxillary incisor prior to completion of antibiotics  Continue sliding scale insulin  Continue lisinopril and titrate as needed  Continue to monitor leukocytosis  Continue to monitor hemoglobin and transfuse as needed to keep hemoglobin  greater than 7  Continue to monitor renal function while on vancomycin  Dose vancomycin area under the curve  Continue to monitor electrolytes replace as needed  Consult physical therapy Occupational Therapy  Further inpatient orders recommendations pending clinical course         Discussed plan with bedside RN as well as podiatry attending.    Disposition: Home with home health to complete 6-week course of IV vancomycin and wound VAC care pending podiatry clearance.    DVT prophylaxis:  Medical DVT prophylaxis orders are present.        CODE STATUS:   Code Status (Patient has no pulse and is not breathing): CPR (Attempt to Resuscitate)  Medical Interventions (Patient has pulse or is breathing): Full Support

## 2024-02-15 LAB
ALBUMIN SERPL-MCNC: 2.3 G/DL (ref 3.5–5.2)
ANION GAP SERPL CALCULATED.3IONS-SCNC: 6.4 MMOL/L (ref 5–15)
BACTERIA SPEC ANAEROBE CULT: NORMAL
BUN SERPL-MCNC: 15 MG/DL (ref 6–20)
BUN/CREAT SERPL: 17.9 (ref 7–25)
CALCIUM SPEC-SCNC: 8 MG/DL (ref 8.6–10.5)
CHLORIDE SERPL-SCNC: 101 MMOL/L (ref 98–107)
CO2 SERPL-SCNC: 26.6 MMOL/L (ref 22–29)
CREAT SERPL-MCNC: 0.84 MG/DL (ref 0.76–1.27)
DEPRECATED RDW RBC AUTO: 48.3 FL (ref 37–54)
EGFRCR SERPLBLD CKD-EPI 2021: 106.2 ML/MIN/1.73
ERYTHROCYTE [DISTWIDTH] IN BLOOD BY AUTOMATED COUNT: 14.4 % (ref 12.3–15.4)
GLUCOSE BLDC GLUCOMTR-MCNC: 120 MG/DL (ref 70–99)
GLUCOSE BLDC GLUCOMTR-MCNC: 138 MG/DL (ref 70–99)
GLUCOSE BLDC GLUCOMTR-MCNC: 141 MG/DL (ref 70–99)
GLUCOSE BLDC GLUCOMTR-MCNC: 181 MG/DL (ref 70–99)
GLUCOSE SERPL-MCNC: 182 MG/DL (ref 65–99)
HCT VFR BLD AUTO: 30.2 % (ref 37.5–51)
HGB BLD-MCNC: 9.8 G/DL (ref 13–17.7)
MCH RBC QN AUTO: 30 PG (ref 26.6–33)
MCHC RBC AUTO-ENTMCNC: 32.5 G/DL (ref 31.5–35.7)
MCV RBC AUTO: 92.4 FL (ref 79–97)
PHOSPHATE SERPL-MCNC: 2.8 MG/DL (ref 2.5–4.5)
PLATELET # BLD AUTO: 276 10*3/MM3 (ref 140–450)
PMV BLD AUTO: 10.1 FL (ref 6–12)
POTASSIUM SERPL-SCNC: 4.6 MMOL/L (ref 3.5–5.2)
RBC # BLD AUTO: 3.27 10*6/MM3 (ref 4.14–5.8)
SODIUM SERPL-SCNC: 134 MMOL/L (ref 136–145)
WBC NRBC COR # BLD AUTO: 25.25 10*3/MM3 (ref 3.4–10.8)

## 2024-02-15 PROCEDURE — 97605 NEG PRS WND THER DME<=50SQCM: CPT

## 2024-02-15 PROCEDURE — 97110 THERAPEUTIC EXERCISES: CPT

## 2024-02-15 PROCEDURE — 99233 SBSQ HOSP IP/OBS HIGH 50: CPT | Performed by: FAMILY MEDICINE

## 2024-02-15 PROCEDURE — 25010000002 ENOXAPARIN PER 10 MG: Performed by: PODIATRIST

## 2024-02-15 PROCEDURE — 82948 REAGENT STRIP/BLOOD GLUCOSE: CPT

## 2024-02-15 PROCEDURE — 25010000002 VANCOMYCIN 5 G RECONSTITUTED SOLUTION: Performed by: FAMILY MEDICINE

## 2024-02-15 PROCEDURE — 82948 REAGENT STRIP/BLOOD GLUCOSE: CPT | Performed by: PODIATRIST

## 2024-02-15 PROCEDURE — 99024 POSTOP FOLLOW-UP VISIT: CPT | Performed by: PODIATRIST

## 2024-02-15 PROCEDURE — 63710000001 INSULIN LISPRO (HUMAN) PER 5 UNITS: Performed by: PODIATRIST

## 2024-02-15 PROCEDURE — 80069 RENAL FUNCTION PANEL: CPT | Performed by: FAMILY MEDICINE

## 2024-02-15 PROCEDURE — 25810000003 SODIUM CHLORIDE 0.9 % SOLUTION: Performed by: FAMILY MEDICINE

## 2024-02-15 PROCEDURE — 25010000002 HYDROMORPHONE 1 MG/ML SOLUTION: Performed by: PODIATRIST

## 2024-02-15 PROCEDURE — 97165 OT EVAL LOW COMPLEX 30 MIN: CPT

## 2024-02-15 PROCEDURE — 85027 COMPLETE CBC AUTOMATED: CPT | Performed by: FAMILY MEDICINE

## 2024-02-15 RX ADMIN — HYDROMORPHONE HYDROCHLORIDE 1 MG: 1 INJECTION, SOLUTION INTRAMUSCULAR; INTRAVENOUS; SUBCUTANEOUS at 15:44

## 2024-02-15 RX ADMIN — LISINOPRIL 40 MG: 20 TABLET ORAL at 08:27

## 2024-02-15 RX ADMIN — HYDROMORPHONE HYDROCHLORIDE 1 MG: 1 INJECTION, SOLUTION INTRAMUSCULAR; INTRAVENOUS; SUBCUTANEOUS at 21:45

## 2024-02-15 RX ADMIN — HYDROCODONE BITARTRATE AND ACETAMINOPHEN 2 TABLET: 7.5; 325 TABLET ORAL at 13:34

## 2024-02-15 RX ADMIN — VANCOMYCIN HYDROCHLORIDE 1750 MG: 5 INJECTION, POWDER, LYOPHILIZED, FOR SOLUTION INTRAVENOUS at 21:46

## 2024-02-15 RX ADMIN — Medication 10 ML: at 21:46

## 2024-02-15 RX ADMIN — Medication 10 ML: at 08:34

## 2024-02-15 RX ADMIN — HYDROCODONE BITARTRATE AND ACETAMINOPHEN 2 TABLET: 7.5; 325 TABLET ORAL at 07:33

## 2024-02-15 RX ADMIN — VANCOMYCIN HYDROCHLORIDE 1750 MG: 5 INJECTION, POWDER, LYOPHILIZED, FOR SOLUTION INTRAVENOUS at 10:04

## 2024-02-15 RX ADMIN — ENOXAPARIN SODIUM 40 MG: 100 INJECTION SUBCUTANEOUS at 08:27

## 2024-02-15 RX ADMIN — HYDROCODONE BITARTRATE AND ACETAMINOPHEN 2 TABLET: 7.5; 325 TABLET ORAL at 18:33

## 2024-02-15 RX ADMIN — HYDROCODONE BITARTRATE AND ACETAMINOPHEN 2 TABLET: 7.5; 325 TABLET ORAL at 03:28

## 2024-02-15 RX ADMIN — INSULIN LISPRO 2 UNITS: 100 INJECTION, SOLUTION INTRAVENOUS; SUBCUTANEOUS at 18:44

## 2024-02-15 NOTE — THERAPY EVALUATION
Patient Name: Karsten Johnson  : 1975    MRN: [id number]                              Today's Date: 2/15/2024       Admit Date: 2024    Visit Dx:     ICD-10-CM ICD-9-CM   1. Acute hematogenous osteomyelitis of left ankle  M86.072 730.07   2. Rupture of left Achilles tendon, initial encounter  S86.012A 845.09   3. Abscess of heel  L02.619 682.7   4. Difficulty in walking  R26.2 719.7   5. Decreased activities of daily living (ADL)  Z78.9 V49.89     Patient Active Problem List   Diagnosis    Achilles rupture    Abscess of heel    Acute hematogenous osteomyelitis     History reviewed. No pertinent past medical history.  Past Surgical History:   Procedure Laterality Date    INCISION AND DRAINAGE OF WOUND Left 2/10/2024    Procedure: INCISION AND DRAINAGE  LEFT ANKLE,BIOPSY LEFT ANKEL;  Surgeon: Alvarez Beck DPM;  Location: [facility];  Service: Podiatry;  Laterality: Left;    WOUND DEBRIDEMENT Left 2024    Procedure: INCISION AND DEBRIDEMENT WOUND OF LEFT ANKLE;  Surgeon: Alvarez Beck DPM;  Location: [facility] OR;  Service: Podiatry;  Laterality: Left;      General Information       Row Name 02/15/24 0922          OT Time and Intention    Document Type evaluation  -ES     Mode of Treatment individual therapy;occupational therapy  -ES       Row Name 02/15/24 0922          General Information    Patient Profile Reviewed yes  -ES     Prior Level of Function independent:;ADL's;all household mobility;community mobility;driving;work  Patient independent with B and IADLs at baseline. Patient is employed, here in [place] for a job, lives in [place]. No device for functional mobility. Standing shower and grooming completion. No home O2 use.  -ES     Existing Precautions/Restrictions weight bearing  NWB LLE  -ES     Barriers to Rehab none identified  -ES       Row Name 02/15/24 0922          Occupational Profile    Reason for Services/Referral (Occupational Profile) Patient is 48 yr old male  admitted [facility] on 2/7/2024 with reports od achilles rupture with infection. OT evaluation and treatment ordered d/t recent decline in ADLs/transfer ability and discharge planning recommendations. No previous OT services for current condition.  -ES       Row Name 02/15/24 0922          Living Environment    People in Home significant other  -ES       Row Name 02/15/24 0922          Home Main Entrance    Number of Stairs, Main Entrance other (see comments)  18 steps  -ES       Row Name 02/15/24 0922          Cognition    Orientation Status (Cognition) oriented x 4  Patient cooperative, agreeable to therapy evaluation.  -ES       Row Name 02/15/24 0922          Safety Issues, Functional Mobility    Impairments Affecting Function (Mobility) balance;strength;pain;endurance/activity tolerance  -ES               User Key  (r) = Recorded By, (t) = Taken By, (c) = Cosigned By      Initials Name Provider Type    ES Aster Pittman, OTR/L, CSRS Occupational Therapist                     Mobility/ADL's       Row Name 02/15/24 0924          Bed Mobility    Bed Mobility supine-sit;sit-supine  -ES     Supine-Sit [place] (Bed Mobility) standby assist  -ES     Sit-Supine [place] (Bed Mobility) standby assist  -ES       Row Name 02/15/24 0924          Transfers    Transfers sit-stand transfer;stand-sit transfer  -ES     Comment, (Transfers) Patient provided education and training on NWB LLE prior to functional transfes for compliance with MD orders. Patient is compliant with NWB with bed <> chair transfer.  -ES       Row Name 02/15/24 0924          Bed-Chair Transfer    Bed-Chair [place] (Transfers) verbal cues;contact guard;1 person assist  -ES     Assistive Device (Bed-Chair Transfers) walker, front-wheeled  -ES       Row Name 02/15/24 0924          Sit-Stand Transfer    Sit-Stand [place] (Transfers) contact guard;1 person assist  -ES     Assistive Device (Sit-Stand Transfers) walker,  front-wheeled  -ES       [facility] Name 02/15/24 0924          Stand-Sit Transfer    Stand-Sit [place] (Transfers) contact guard;1 person assist  -ES     Assistive Device (Stand-Sit Transfers) walker, front-wheeled  -ES       Row Name 02/15/24 0924          Activities of Daily Living    BADL Assessment/Intervention bathing;upper body dressing;lower body dressing;grooming;feeding;toileting  -ES       [facility] Name 02/15/24 0924          Mobility    Extremity Weight-bearing Status left lower extremity  -ES     Left Lower Extremity (Weight-bearing Status) non weight-bearing (NWB)  -ES       [facility] Name 02/15/24 0924          Bathing Assessment/Intervention    [place] Level (Bathing) bathing skills;upper body;set up;lower body;minimum assist (75% patient effort)  -ES       [facility] Name 02/15/24 0924          Upper Body Dressing Assessment/Training    [place] Level (Upper Body Dressing) upper body dressing skills;set up  -ES       [facility] Name 02/15/24 0924          Lower Body Dressing Assessment/Training    [place] Level (Lower Body Dressing) lower body dressing skills;minimum assist (75% patient effort)  -ES       [facility] Name 02/15/24 0924          Grooming Assessment/Training    [place] Level (Grooming) grooming skills;set up  -ES       [facility] Name 02/15/24 0924          Self-Feeding Assessment/Training    [place] Level (Feeding) feeding skills;set up  -ES       [facility] Name 02/15/24 0924          Toileting Assessment/Training    [place] Level (Toileting) toileting skills;minimum assist (75% patient effort)  -ES               User Key  (r) = Recorded By, (t) = Taken By, (c) = Cosigned By      Initials Name Provider Type    Aster Ladd, TIKIR/L, CSRS Occupational Therapist                   Obj/Interventions       [facility] Name 02/15/24 0925          Range of Motion Comprehensive    General Range of Motion bilateral upper extremity ROM WNL  -ES       [facility] Name 02/15/24 0925          Strength Comprehensive (MMT)     General Manual Muscle Testing (MMT) Assessment lower extremity strength deficits identified  -ES     Comment, General Manual Muscle Testing (MMT) Assessment BUEs 4+/5  -ES       Row Name 02/15/24 0925          Motor Skills    Motor Skills functional endurance  -ES     Functional Endurance fair  -ES       Row Name 02/15/24 0925          Balance    Balance Assessment sitting dynamic balance;standing dynamic balance  -ES     Dynamic Sitting Balance supervision  -ES     Position, Sitting Balance unsupported;sitting edge of bed  -ES     Dynamic Standing Balance contact guard;1-person assist  -ES     Position/Device Used, Standing Balance supported;walker, front-wheeled  -ES               User Key  (r) = Recorded By, (t) = Taken By, (c) = Cosigned By      Initials Name Provider Type    ES Aster Pittman, OTR/L, CSRS Occupational Therapist                   Goals/Plan       Row Name 02/15/24 0926          Bed Mobility Goal 1 (OT)    Activity/Assistive Device (Bed Mobility Goal 1, OT) bed mobility activities, all  -ES     [place] Level/Cues Needed (Bed Mobility Goal 1, OT) modified independence  -ES     Time Frame (Bed Mobility Goal 1, OT) long term goal (LTG);10 days  -ES       Row Name 02/15/24 0926          Transfer Goal 1 (OT)    Activity/Assistive Device (Transfer Goal 1, OT) transfers, all  -ES     [place] Level/Cues Needed (Transfer Goal 1, OT) modified independence  -ES     Time Frame (Transfer Goal 1, OT) long term goal (LTG);10 days  -ES       Row Name 02/15/24 0926          Bathing Goal 1 (OT)    Activity/Device (Bathing Goal 1, OT) bathing skills, all  -ES     [place] Level/Cues Needed (Bathing Goal 1, OT) modified independence  -ES     Time Frame (Bathing Goal 1, OT) long term goal (LTG);10 days  -ES       Row Name 02/15/24 0926          Dressing Goal 1 (OT)    Activity/Device (Dressing Goal 1, OT) dressing skills, all  -ES     [place]/Cues Needed (Dressing Goal 1, OT) modified independence   -ES     Time Frame (Dressing Goal 1, OT) long term goal (LTG);10 days  -ES       Row Name 02/15/24 0926          Toileting Goal 1 (OT)    Activity/Device (Toileting Goal 1, OT) toileting skills, all  -ES     [place] Level/Cues Needed (Toileting Goal 1, OT) modified independence  -ES     Time Frame (Toileting Goal 1, OT) long term goal (LTG);10 days  -ES       Row Name 02/15/24 0926          Grooming Goal 1 (OT)    Activity/Device (Grooming Goal 1, OT) grooming skills, all  -ES     [place] (Grooming Goal 1, OT) modified independence  -ES     Time Frame (Grooming Goal 1, OT) long term goal (LTG);10 days  -ES       Row Name 02/15/24 0926          Problem Specific Goal 1 (OT)    Problem Specific Goal 1 (OT) Patient will demonstrate fair plus task tolerance in preperation for independent ADL routine completion at time of discharge  -ES     Time Frame (Problem Specific Goal 1, OT) long term goal (LTG);10 days  -ES       Row Name 02/15/24 0926          Therapy Assessment/Plan (OT)    Planned Therapy Interventions (OT) activity tolerance training;BADL retraining;functional balance retraining;occupation/activity based interventions;patient/caregiver education/training;ROM/therapeutic exercise;strengthening exercise;transfer/mobility retraining  -ES               User Key  (r) = Recorded By, (t) = Taken By, (c) = Cosigned By      Initials Name Provider Type    Aster Ladd, OTR/L, CSRS Occupational Therapist                   Clinical Impression       Row Name 02/15/24 0926          Plan of Care Review    Plan of Care Reviewed With patient  -ES     Outcome Evaluation Patient has experienced decline in function from baseline status, presenting w/ deficits related to balance, pain, tolerance, transfers and mobility that impede patient independence with activities of daily living.  Patient would benefit from skilled Occupational Therapy intervention to maxamize patient safety, and promote return to baseline  independence.  -ES       Row Name 02/15/24 0926          Therapy Assessment/Plan (OT)    Rehab Potential (OT) good, to achieve stated therapy goals  -ES     Criteria for Skilled Therapeutic Interventions Met (OT) yes;meets criteria;skilled treatment is necessary  -ES     Therapy Frequency (OT) 5 times/wk  -ES       Row Name 02/15/24 0926          Therapy Plan Review/Discharge Plan (OT)    Anticipated Discharge Disposition (OT) home with outpatient therapy services  -ES       Row Name 02/15/24 0926          Positioning and Restraints    Pre-Treatment Position in bed  -ES     Post Treatment Position chair  -ES     In Chair reclined;call light within reach;encouraged to call for assist;exit alarm on  -ES               User Key  (r) = Recorded By, (t) = Taken By, (c) = Cosigned By      Initials Name Provider Type    Aster Ladd OTR/L, NORA Occupational Therapist                   Outcome Measures       Row Name 02/15/24 0927          How much help from another is currently needed...    Putting on and taking off regular lower body clothing? 3  -ES     Bathing (including washing, rinsing, and drying) 3  -ES     Toileting (which includes using toilet bed pan or urinal) 3  -ES     Putting on and taking off regular upper body clothing 4  -ES     Taking care of personal grooming (such as brushing teeth) 4  -ES     Eating meals 4  -ES     AM-PAC 6 Clicks Score (OT) 21  -ES       Row Name 02/15/24 0927          Functional Assessment    Outcome Measure Options AM-PAC 6 Clicks Daily Activity (OT)  -ES               User Key  (r) = Recorded By, (t) = Taken By, (c) = Cosigned By      Initials Name Provider Type    Aster Ladd OTR/L, CSRS Occupational Therapist                      OT Recommendation and Plan  Planned Therapy Interventions (OT): activity tolerance training, BADL retraining, functional balance retraining, occupation/activity based interventions, patient/caregiver education/training, ROM/therapeutic  exercise, strengthening exercise, transfer/mobility retraining  Therapy Frequency (OT): 5 times/wk  Plan of Care Review  Plan of Care Reviewed With: patient  Outcome Evaluation: Patient has experienced decline in function from baseline status, presenting w/ deficits related to balance, pain, tolerance, transfers and mobility that impede patient independence with activities of daily living.  Patient would benefit from skilled Occupational Therapy intervention to maxamize patient safety, and promote return to baseline independence.     Time Calculation:   Evaluation Complexity (OT)  Review Occupational Profile/Medical/Therapy History Complexity: brief/low complexity  Assessment, Occupational Performance/Identification of Deficit Complexity: 3-5 performance deficits  Clinical Decision Making Complexity (OT): problem focused assessment/low complexity  Overall Complexity of Evaluation (OT): low complexity     Time Calculation- OT       Row Name 02/15/24 0928             Time Calculation- OT    OT Received On 02/15/24  -ES      OT Goal Re-Cert Due Date 02/24/24  -ES         Untimed Charges    OT Eval/Re-eval Minutes 33  -ES         Total Minutes    Untimed Charges Total Minutes 33  -ES       Total Minutes 33  -ES                User Key  (r) = Recorded By, (t) = Taken By, (c) = Cosigned By      Initials Name Provider Type    ES Aster Pittman, OTR/L, CSRS Occupational Therapist                  Therapy Charges for Today       Code Description Service Date Service Provider Modifiers Qty    09704579843 HC OT EVAL LOW COMPLEXITY 3 2/15/2024 Aster Pittman, OTR/L, CSRS GO 1                 Aster Pittman OTR/L, CSRS  2/15/2024

## 2024-02-15 NOTE — NURSING NOTE
Pt is alert and oriented X 4, on room air, and non weight bearing to LLE. Pain managed with PRN medications. All scheduled medications given as ordered. Safety checks maintained throughout shift with pt resting in bed, wheels locked to bed, and personal items and call light within reach.

## 2024-02-15 NOTE — SIGNIFICANT NOTE
Wound Eval / Progress Noted     Jazmine     Patient Name: Karsten Johnson  : 1975  MRN: [id number]  Today's Date: 2/15/2024                 Admit Date: 2024    Visit Dx:    ICD-10-CM ICD-9-CM   1. Acute hematogenous osteomyelitis of left ankle  M86.072 730.07   2. Rupture of left Achilles tendon, initial encounter  S86.012A 845.09   3. Abscess of heel  L02.619 682.7   4. Difficulty in walking  R26.2 719.7   5. Decreased activities of daily living (ADL)  Z78.9 V49.89         Achilles rupture    Abscess of heel    Acute hematogenous osteomyelitis        History reviewed. No pertinent past medical history.     Past Surgical History:   Procedure Laterality Date    INCISION AND DRAINAGE OF WOUND Left 2/10/2024    Procedure: INCISION AND DRAINAGE  LEFT ANKLE,BIOPSY LEFT ANKEL;  Surgeon: Alvarez Beck DPM;  Location: [facility] MAIN OR;  Service: Podiatry;  Laterality: Left;    WOUND DEBRIDEMENT Left 2024    Procedure: INCISION AND DEBRIDEMENT WOUND OF LEFT ANKLE;  Surgeon: Alvarez Beck DPM;  Location: [facility] MAIN OR;  Service: Podiatry;  Laterality: Left;         Physical Assessment:  Wound 02/10/24 0800 Left posterior ankle Other (comment) (Active)   Wound Image   02/15/24 1320   Dressing Appearance intact;moist drainage 02/15/24 1320   Closure Surface sutures 02/15/24 1320   Base moist;red 02/15/24 1320   Periwound redness;swelling;warm;edematous 02/15/24 1320   Periwound Temperature warm 02/15/24 1320   Periwound Skin Turgor soft 02/15/24 1320   Edges open;rolled/closed 02/15/24 1320   Wound Length (cm) 4.3 cm 02/15/24 1320   Wound Width (cm) 6 cm 02/15/24 1320   Wound Depth (cm) 1.5 cm 02/15/24 1320   Wound Surface Area (cm^2) 25.8 cm^2 02/15/24 1320   Wound Volume (cm^3) 38.7 cm^3 02/15/24 1320   Drainage Characteristics/Odor serosanguineous 02/15/24 1320   Drainage Amount small 02/15/24 1320   Care, Wound cleansed with;irrigated with;sterile normal saline;negative pressure wound therapy  02/15/24 1320   Dressing Care dressing applied;foam;transparent film 02/15/24 1320   Periwound Care absorptive dressing applied 02/15/24 1320       Wound 02/14/24 1210 Left posterior Incision (Active)   Wound Image   02/15/24 1320   Dressing Appearance intact;no drainage 02/15/24 1320   Closure Surface sutures 02/15/24 1320   Base dry 02/15/24 1320   Periwound redness;swelling;warm;edematous 02/15/24 1320   Periwound Temperature warm 02/15/24 1320   Periwound Skin Turgor soft 02/15/24 1320   Edges rolled/closed 02/15/24 1320   Drainage Amount none 02/15/24 1320   Care, Wound cleansed with;sterile normal saline 02/15/24 1320   Dressing Care dressing applied;non-adherent;petroleum-based;silver impregnated;hydrofiber;transparent film 02/15/24 1320   Periwound Care absorptive dressing applied 02/15/24 1320       NPWT (Negative Pressure Wound Therapy) 02/15/24 1320 Left heel (Active)   Therapy Setting continuous therapy 02/15/24 1320   Dressing foam, black 02/15/24 1320   Pressure Setting 100 mmHg 02/15/24 1320   Sponges Inserted 1 02/15/24 1320   Finger sweep complete Yes 02/15/24 1320        Wound Check / Follow-up:  Patient seen today for wound re-evaluation s/p debridement on 02/14/24 per Podiatry request. Podiatry requesting wound vac placement if possible.     Dressing to Left knee removed. Wound base to left heel is red and moist with some yellow moist tissue noted. No necrosis noted at this time. Cleansed and irrigated wound copiously. Wound filled with black foam, skin prep applied to periwound tissue and then drape applied. Track pad connected and then vac initiated. Foam is well compressed with no leaks or lifting. Pressure set to 100mmhg at present time. Patient tolerated well.     Incision to posterior ankle, lateral ankle and to medial ankle with sutures noted. Incisions are closed and approximated. Cleansed with NS and gauze. Blotted dry. All incisions lines covered with non-adherent petroleum based gauze  and silver impregnated hydrofiber and secured with transparent drape.     Patient is pending home discharge with possible ONS follow-up for wound vac dressing changes.     Impression: Left heel wound with surgical incisions to left posterior, lateral and medial ankle    Short term goals:  Regain skin integrity. Negative pressure wound therapy MWF and topical dressing to incisions with wound vac dressing changes.     Keesha Patrick RN    2/15/2024    16:15 EST

## 2024-02-15 NOTE — PROGRESS NOTES
[facility] - PODIATRY    Today's Date: 02/15/24    Patient Name: Karsten Johnson  MRN: [id number]  CSN: [id number]  PCP: Provider, No Known  Referring Provider: No ref. provider found  Attending Provider: Tono Kang MD  Length of Stay: 8    SUBJECTIVE     HPI: Karsten Johnson, a 48 y.o.male,     Patient is seeing chair bedside with his wife present.    Patient denies any fevers, chills, nausea, vomiting, shortness of breathe, nor any other constitutional signs nor symptoms.  Pain to heel.    History reviewed. No pertinent past medical history.  Past Surgical History:   Procedure Laterality Date    INCISION AND DRAINAGE OF WOUND Left 2/10/2024    Procedure: INCISION AND DRAINAGE  LEFT ANKLE,BIOPSY LEFT ANKEL;  Surgeon: Alvarez Beck DPM;  Location: [facility];  Service: Podiatry;  Laterality: Left;    WOUND DEBRIDEMENT Left 2/14/2024    Procedure: INCISION AND DEBRIDEMENT WOUND OF LEFT ANKLE;  Surgeon: Alvarez Beck DPM;  Location: [facility];  Service: Podiatry;  Laterality: Left;     History reviewed. No pertinent family history.  Social History     Socioeconomic History    Marital status:    Tobacco Use    Smoking status: Every Day     Packs/day: 1.00     Years: 25.00     Additional pack years: 0.00     Total pack years: 25.00     Types: Cigarettes     Passive exposure: Never    Smokeless tobacco: Never   Vaping Use    Vaping Use: Never used   Substance and Sexual Activity    Alcohol use: Yes     Alcohol/week: 3.0 standard drinks of alcohol     Types: 3 Cans of beer per week    Drug use: Yes     Types: Marijuana    Sexual activity: Defer     No Known Allergies  Current Facility-Administered Medications   Medication Dose Route Frequency Provider Last Rate Last Admin    acetaminophen (TYLENOL) tablet 650 mg  650 mg Oral Q6H PRN Alvarez Beck DPM   650 mg at 02/08/24 1616    Or    acetaminophen (TYLENOL) suppository 650 mg  650 mg Rectal Q6H PRN Alvarez Beck  DPM        sennosides-docusate (PERICOLACE) 8.6-50 MG per tablet 2 tablet  2 tablet Oral BID PRN Alvarez Beck DPM   2 tablet at 02/11/24 2056    And    polyethylene glycol (MIRALAX) packet 17 g  17 g Oral Daily PRN Alvarez Beck, DPM   17 g at 02/12/24 1836    And    bisacodyl (DULCOLAX) EC tablet 5 mg  5 mg Oral Daily PRN Alvarez Beck DPM   5 mg at 02/12/24 2059    And    bisacodyl (DULCOLAX) suppository 10 mg  10 mg Rectal Daily PRN Alvarez Beck, DPM   10 mg at 02/13/24 2259    dextrose (D50W) (25 g/50 mL) IV injection 25 g  25 g Intravenous Q15 Min PRN Alvarez Beck, DPM        dextrose (GLUTOSE) oral gel 15 g  15 g Oral Q15 Min PRN Alvarez Beck, DPM        diphenhydrAMINE (BENADRYL) capsule 50 mg  50 mg Oral Q6H PRN Alvarez Beck, DPM   50 mg at 02/13/24 2354    Enoxaparin Sodium (LOVENOX) syringe 40 mg  40 mg Subcutaneous Daily Alvarez Beck DPM   40 mg at 02/15/24 0827    glucagon (GLUCAGEN) injection 1 mg  1 mg Intramuscular Q15 Min PRN Alvarez Beck, DPM        hydrALAZINE (APRESOLINE) injection 10 mg  10 mg Intravenous Q6H PRN Alvarez Beck, DPM   10 mg at 02/12/24 1539    HYDROcodone-acetaminophen (NORCO) 7.5-325 MG per tablet 1 tablet  1 tablet Oral Q4H PRN Alvarez Beck, DPM        HYDROcodone-acetaminophen (NORCO) 7.5-325 MG per tablet 2 tablet  2 tablet Oral Q4H PRN Alvarez Beck, DPM   2 tablet at 02/15/24 0733    HYDROmorphone (DILAUDID) injection 1 mg  1 mg Intravenous Q3H PRN Alvarez Beck, DPM   1 mg at 02/13/24 1142    Insulin Lispro (humaLOG) injection 2-9 Units  2-9 Units Subcutaneous 4x Daily AC & at Bedtime Alvarez Beck DPM   2 Units at 02/14/24 2159    lactated ringers infusion  30 mL/hr Intravenous Continuous Alvarez Beck DPM 30 mL/hr at 02/10/24 0713 30 mL/hr at 02/10/24 0713    lactated ringers infusion  9 mL/hr Intravenous Continuous PRN Alvarez Beck DPM   New Bag at 02/14/24 1138    lisinopril  (PRINIVIL,ZESTRIL) tablet 40 mg  40 mg Oral Q24H Rotterman, Alvarez T, DPM   40 mg at 02/15/24 0827    melatonin tablet 10 mg  10 mg Oral Nightly PRN Rotterman, Alvarez T, DPM   10 mg at 02/12/24 2353    ondansetron (ZOFRAN) injection 4 mg  4 mg Intravenous Q6H PRN Rotterman, Alvarez T, DPM   4 mg at 02/14/24 0806    Pharmacy to dose vancomycin   Does not apply Continuous PRN Tono Kang MD        sodium chloride 0.9 % flush 10 mL  10 mL Intravenous Q12H Rotterman, Alvarez T, DPM   10 mL at 02/15/24 0834    sodium chloride 0.9 % flush 10 mL  10 mL Intravenous PRN Rotterman, Alvarez T, DPM        sodium chloride 0.9 % flush 10 mL  10 mL Intravenous Q12H Rotterman, Alvarez T, DPM   10 mL at 02/14/24 0829    sodium chloride 0.9 % flush 10 mL  10 mL Intravenous PRN Rotterman, Alvarez T, DPM        sodium chloride 0.9 % flush 20 mL  20 mL Intravenous PRN Rotterman, Alvarez T, DPM        sodium chloride 0.9 % infusion 40 mL  40 mL Intravenous PRN Rotterman, Alvarez T, DPM        sodium chloride 0.9 % infusion 40 mL  40 mL Intravenous PRN Rotterman, Alvarez T, DPM        vancomycin 1750 mg/500 mL 0.9% NS IVPB ([facility])  1,750 mg Intravenous Q12H Tono Kang MD   1,750 mg at 02/15/24 1004     Review of Systems   All other systems reviewed and are negative.      OBJECTIVE     Vitals:    02/15/24 1100   BP: 134/72   Pulse: 68   Resp: 18   Temp: 98.5 °F (36.9 °C)   SpO2: 97%       PHYSICAL EXAM    GEN:   A&Ox3, NAD. Pt presents in hospital bed.     Foot/Ankle Exam     GENERAL  Appearance:  appears stated age  Orientation:  AAOx3  Affect:  appropriate  Gait:  unimpaired  Assistance:  independent  Right shoe gear: casual shoe  Left shoe gear: casual shoe     VASCULAR      Right Foot Vascularity   Normal vascular exam    Dorsalis pedis:  2+  Posterior tibial:  2+  Skin temperature:  warm  Edema grading:  None  CFT:  < 3 seconds  Pedal hair growth:  Present  Varicosities:  none      Left Foot Vascularity   Normal vascular exam    Dorsalis pedis:   2+  Posterior tibial:  2+  Skin temperature:  warm  Edema grading:  None  CFT:  < 3 seconds  Pedal hair growth:  Present  Varicosities:  none     NEUROLOGIC      Right Foot Neurologic   Normal sensation    Light touch sensation: normal  Vibratory sensation: normal  Hot/Cold sensation: normal  Protective Sensation using [place]-Flip Monofilament:   Sites intact: 10  Sites tested: 10      Left Foot Neurologic   Normal sensation    Light touch sensation: normal  Vibratory sensation: normal  Hot/Cold sensation:  normal  Protective Sensation using [place]-Flip Monofilament:   Sites intact: 10  Sites tested: 10     MUSCLE STRENGTH      Right Foot Muscle Strength   Foot dorsiflexion:  4  Foot plantar flexion:  4  Foot inversion:  4  Foot eversion:  4      Left Foot Muscle Strength   Foot dorsiflexion:  4  Foot plantar flexion:  4  Foot inversion:  4  Foot eversion:  4     RANGE OF MOTION      Right Foot Range of Motion   Foot and ankle ROM within normal limits        Left Foot Range of Motion   Foot and ankle ROM within normal limits       DERMATOLOGIC       Right Foot Dermatologic   Skin  Right foot and lower leg show dressing is dry and intact without signs of breakthrough.  Posterior and lateral aspect shows sutures intact with skin edges well-coapted with no signs of dehiscence.  Healthy surgical skin edges.  Blood-tinged serous drainage ulceration on lateral aspect of heel.  No edema, erythema, calor, lymphangitis, nor signs of infection seen.      Left Foot Dermatologic   Skin  Left foot skin is intact.             LABORATORY/CULTURE RESULTS:  Results from last 7 days   Lab Units 02/15/24  0530 02/14/24  0724 02/13/24  0411   WBC 10*3/mm3 25.25* 25.74* 25.16*   HEMOGLOBIN g/dL 9.8* 11.4* 10.8*   HEMATOCRIT % 30.2* 34.8* 33.2*   PLATELETS 10*3/mm3 276 286 257     Results from last 7 days   Lab Units 02/15/24  0530 02/14/24  0724 02/13/24  0411   SODIUM mmol/L 134* 134* 135*   POTASSIUM mmol/L 4.6 4.1 3.6    CHLORIDE mmol/L 101 99 100   CO2 mmol/L 26.6 26.1 24.6   BUN mg/dL 15 8 8   CREATININE mg/dL 0.84 0.62* 0.59*   CALCIUM mg/dL 8.0* 8.6 8.1*   GLUCOSE mg/dL 182* 150* 147*         Microbiology Results (last 10 days)       Procedure Component Value - Date/Time    Blood Culture - Blood, Arm, Right [[id number]]  (Normal) Collected: 02/12/24 0548    Lab Status: Preliminary result Specimen: Blood from Arm, Right Updated: 02/15/24 0600     Blood Culture No growth at 3 days    Blood Culture - Blood, Arm, Left [[id number]]  (Normal) Collected: 02/12/24 0547    Lab Status: Preliminary result Specimen: Blood from Arm, Left Updated: 02/15/24 0600     Blood Culture No growth at 3 days    Blood Culture - Blood, Hand, Left [[id number]]  (Abnormal)  (Susceptibility) Collected: 02/10/24 1154    Lab Status: Final result Specimen: Blood from Hand, Left Updated: 02/13/24 0619     Blood Culture Staphylococcus aureus, MRSA     Comment:   Infectious disease consultation is highly recommended to rule out distant foci of infection.  Methicillin resistant Staphylococcus aureus, Patient may be an isolation risk.        Isolated from Aerobic and Anaerobic Bottles     Gram Stain Aerobic Bottle Gram positive cocci in clusters      Anaerobic Bottle Gram positive cocci in clusters    Susceptibility        Staphylococcus aureus, MRSA      JULIO      Gentamicin Susceptible      Oxacillin Resistant      Rifampin Susceptible      Vancomycin Susceptible                           Blood Culture - Blood, Arm, Left [[id number]]  (Abnormal) Collected: 02/10/24 1150    Lab Status: Final result Specimen: Blood from Arm, Left Updated: 02/13/24 0619     Blood Culture Staphylococcus aureus, MRSA     Comment: Refer to previous blood culture collected on 02/10/2024 1154 for MICs    Infectious disease consultation is highly recommended to rule out distant foci of infection.  Methicillin resistant Staphylococcus aureus, Patient may be an isolation risk.        Isolated  from Aerobic and Anaerobic Bottles     Gram Stain Aerobic Bottle Gram positive cocci in clusters      Anaerobic Bottle Gram positive cocci in clusters    Anaerobic Culture - Wound, Ankle, Left [[id number]]  (Normal) Collected: 02/10/24 0840    Lab Status: Final result Specimen: Wound from Ankle, Left Updated: 02/15/24 0640     Anaerobic Culture No anaerobes isolated at 5 days    Wound Culture - Wound, Ankle, Left [[id number]]  (Abnormal)  (Susceptibility) Collected: 02/10/24 0840    Lab Status: Final result Specimen: Wound from Ankle, Left Updated: 02/13/24 0701     Wound Culture Scant growth (1+) Staphylococcus aureus, MRSA     Comment:   Methicillin resistant Staphylococcus aureus, Patient may be an isolation risk.        Gram Stain No organisms seen    Susceptibility        Staphylococcus aureus, MRSA      JULIO      Clindamycin Susceptible      Erythromycin Resistant      Oxacillin Resistant      Rifampin Susceptible      Tetracycline Susceptible      Trimethoprim + Sulfamethoxazole Susceptible      Vancomycin Susceptible                           Tissue / Bone Culture - Bone, Ankle, Left [[id number]]  (Abnormal) Collected: 02/10/24 0817    Lab Status: Final result Specimen: Bone from Ankle, Left Updated: 02/13/24 0701     Tissue Culture Scant growth (1+) Staphylococcus aureus, MRSA     Comment:   Methicillin resistant Staphylococcus aureus, Patient may be an isolation risk.        Gram Stain No organisms seen    Narrative:      Refer to previous wound culture collected on 02/10/2024 0840 for MICs      AFB Culture - Bone, Ankle, Left [[id number]] Collected: 02/10/24 0817    Lab Status: Preliminary result Specimen: Bone from Ankle, Left Updated: 02/15/24 0901     AFB Culture No AFB isolated at less than 1 week     AFB Stain No acid fast bacilli seen on direct smear    Blood Culture - Blood, Hand, Left [[id number]]  (Abnormal)  (Susceptibility) Collected: 02/08/24 1138    Lab Status: Final result Specimen: Blood from  Hand, Left Updated: 02/11/24 0641     Blood Culture Staphylococcus aureus, MRSA     Comment:   Infectious disease consultation is highly recommended to rule out distant foci of infection.  Methicillin resistant Staphylococcus aureus, Patient may be an isolation risk.        Isolated from Aerobic and Anaerobic Bottles     Gram Stain Aerobic Bottle Gram positive cocci in clusters      Anaerobic Bottle Gram positive cocci in clusters    Susceptibility        Staphylococcus aureus, MRSA      JULIO      Gentamicin Susceptible      Oxacillin Resistant      Rifampin Susceptible      Vancomycin Susceptible                           Blood Culture - Blood, Hand, Right [[id number]]  (Abnormal) Collected: 02/08/24 1138    Lab Status: Final result Specimen: Blood from Hand, Right Updated: 02/11/24 0643     Blood Culture Staphylococcus aureus, MRSA     Comment:   Infectious disease consultation is highly recommended to rule out distant foci of infection.  Methicillin resistant Staphylococcus aureus, Patient may be an isolation risk.        Isolated from Aerobic and Anaerobic Bottles     Gram Stain Aerobic Bottle Gram positive cocci in clusters      Anaerobic Bottle Gram positive cocci in clusters    Narrative:      Refer to previous blood culture collected on 02/08/2024 at 1138 for MICs.        Blood Culture ID, PCR - Blood, Hand, Left [[id number]]  (Abnormal) Collected: 02/08/24 1138    Lab Status: Final result Specimen: Blood from Hand, Left Updated: 02/09/24 0356     BCID, PCR Staph aureus. mecA/C and MREJ (methicillin resistance gene) detected. Identification by BCID2 PCR.     BOTTLE TYPE Aerobic Bottle    Narrative:      Infectious disease consultation is highly recommended to rule out distant foci of infection.             ASSESSMENT/PLAN     Active Hospital Problems:  Active Hospital Problems    Diagnosis     **Achilles rupture     Acute hematogenous osteomyelitis     Abscess of heel      Wound VAC ordered.    Discussed with  patient's wound care nurse.  Discussed with Dr. Alvarez Beck    Discussed findings and treatment plan including risks, benefits, and treatment options with patient in detail. Patient agreed with treatment plan.    This document has been electronically signed by Arun Cortez DPM on February 15, 2024 12:56 EST

## 2024-02-15 NOTE — THERAPY TREATMENT NOTE
Acute Care - Physical Therapy Treatment Note  [facility]     Patient Name: Karsten Johnson  : 1975  MRN: [id number]  Today's Date: 2/15/2024      Visit Dx:     ICD-10-CM ICD-9-CM   1. Acute hematogenous osteomyelitis of left ankle  M86.072 730.07   2. Rupture of left Achilles tendon, initial encounter  S86.012A 845.09   3. Abscess of heel  L02.619 682.7   4. Difficulty in walking  R26.2 719.7   5. Decreased activities of daily living (ADL)  Z78.9 V49.89     Patient Active Problem List   Diagnosis    Achilles rupture    Abscess of heel    Acute hematogenous osteomyelitis     History reviewed. No pertinent past medical history.  Past Surgical History:   Procedure Laterality Date    INCISION AND DRAINAGE OF WOUND Left 2/10/2024    Procedure: INCISION AND DRAINAGE  LEFT ANKLE,BIOPSY LEFT ANKEL;  Surgeon: Alvarez Beck DPM;  Location: [facility];  Service: Podiatry;  Laterality: Left;    WOUND DEBRIDEMENT Left 2024    Procedure: INCISION AND DEBRIDEMENT WOUND OF LEFT ANKLE;  Surgeon: Alvarez Beck DPM;  Location: [facility];  Service: Podiatry;  Laterality: Left;     PT Assessment (last 12 hours)       PT Evaluation and Treatment       Row Name 02/15/24 1157          Physical Therapy Time and Intention    Subjective Information complains of;weakness;pain  -RH     Document Type therapy note (daily note)  -     Mode of Treatment physical therapy;individual therapy  -     Patient Effort adequate  -       Row Name 02/15/24 1157          Pain    Posttreatment Pain Rating 7/10  -     Pain Location - Side/Orientation Left  -RH     Pain Location - ankle  -     Pain Intervention(s) Emotional support;Nursing Notified  -       Row Name 02/15/24 1157          Motor Skills    Therapeutic Exercise knee;ankle  -       Row Name 02/15/24 1157          Knee (Therapeutic Exercise)    Knee (Therapeutic Exercise) AROM (active range of motion);isometric exercises  -     Knee AROM (Therapeutic  Exercise) bilateral;quadriceps stretch;SAQ (short arc quad);supine;10 repetitions;2 sets  -     Knee Isometrics (Therapeutic Exercise) quad sets;bilateral;10 repetitions  -       Row Name 02/15/24 1157          Ankle (Therapeutic Exercise)    Ankle (Therapeutic Exercise) AROM (active range of motion)  -     Ankle AROM (Therapeutic Exercise) dorsiflexion;plantarflexion;right;supine;10 repetitions;2 sets  -       Row Name             Wound 02/10/24 0800 Left posterior ankle Other (comment)    Wound - Properties Group Placement Date: 02/10/24  -SG Placement Time: 0800  -SG Present on Original Admission: Y  -SG Side: Left  -SG Orientation: posterior  -SG Location: ankle  -SG Primary Wound Type: Other  -SG, open wound, margin extended with I&D in surgery     Retired Wound - Properties Group Placement Date: 02/10/24  -SG Placement Time: 0800  -SG Present on Original Admission: Y  -SG Side: Left  -SG Orientation: posterior  -SG Location: ankle  -SG Primary Wound Type: Other  -SG, open wound, margin extended with I&D in surgery     Retired Wound - Properties Group Date first assessed: 02/10/24  -SG Time first assessed: 0800  -SG Present on Original Admission: Y  -SG Side: Left  -SG Location: ankle  -SG Primary Wound Type: Other  -SG, open wound, margin extended with I&D in surgery       Row Name             Wound 02/14/24 1210 Left posterior Incision    Wound - Properties Group Placement Date: 02/14/24  -AN Placement Time: 1210  -AN Side: Left  -AN Orientation: posterior  -AN Primary Wound Type: Incision  -AN Additional Comments: left heel wound debridement wound vac was removed preop, - adaptic betadine 4x4 adb pad kerlex and ace drgs postop by dr leong  -AN    Retired Wound - Properties Group Placement Date: 02/14/24  -AN Placement Time: 1210  -AN Side: Left  -AN Orientation: posterior  -AN Primary Wound Type: Incision  -AN Additional Comments: left heel wound debridement wound vac was removed preop, - adaptic  betadine 4x4 adb pad kerlex and ace drgs postop by dr leong  -JEANMAREI    Retired Wound - Properties Group Date first assessed: 02/14/24  -AN Time first assessed: 1210  -AN Side: Left  -AN Primary Wound Type: Incision  -AN Additional Comments: left heel wound debridement wound vac was removed preop, - adaptic betadine 4x4 adb pad kerlex and ace drgs postop by dr leong  -AN      Row Name 02/15/24 1157          Vital Signs    O2 Delivery Intra Treatment room air  -       Row Name 02/15/24 1157          Positioning and Restraints    In Chair exit alarm on;encouraged to call for assist;call light within reach;reclined  -       Row Name 02/15/24 1157          Progress Summary (PT)    Progress Toward Functional Goals (PT) progress toward functional goals is fair  -               User Key  (r) = Recorded By, (t) = Taken By, (c) = Cosigned By      Initials Name Provider Type    Alberta Greenberg, RN Registered Nurse    Antonia Curtis RN Registered Nurse    Eddie Kaopor PTA Physical Therapist Assistant                    Physical Therapy Education       Title: PT OT SLP Therapies (Done)       Topic: Physical Therapy (Done)       Point: Mobility training (Done)       Learning Progress Summary             Patient Acceptance, E,TB, VU by NM at 2/14/2024 0957                                         User Key       Initials Effective Dates Name Provider Type Discipline    NM 01/31/24 -  Keshawn Granados, PT Student PT Student PT                  PT Recommendation and Plan     Progress Summary (PT)  Progress Toward Functional Goals (PT): progress toward functional goals is fair   Outcome Measures       Row Name 02/15/24 1200 02/14/24 0900          How much help from another person do you currently need...    Turning from your back to your side while in flat bed without using bedrails? 4  - 4  -REGLA (r) NM (t) REGLA (c)     Moving from lying on back to sitting on the side of a flat bed without bedrails? 4  - 4  -REGLA  (r) NM (t) REGLA (c)     Moving to and from a bed to a chair (including a wheelchair)? 3  -RH 3  -REGLA (r) NM (t) REGLA (c)     Standing up from a chair using your arms (e.g., wheelchair, bedside chair)? 3  -RH 3  -REGLA (r) NM (t) REGLA (c)     Climbing 3-5 steps with a railing? 2  -RH 2  -REGLA (r) NM (t) REGLA (c)     To walk in hospital room? 2  -RH 2  -REGLA (r) NM (t) REGLA (c)     AM-PAC 6 Clicks Score (PT) 18  -RH 18  -REGLA (r) NM (t)     Highest Level of Mobility Goal 6 --> Walk 10 steps or more  -RH 6 --> Walk 10 steps or more  -REGLA (r) NM (t)               User Key  (r) = Recorded By, (t) = Taken By, (c) = Cosigned By      Initials Name Provider Type     Eddie Connolly PTA Physical Therapist Assistant    Yang Martínez, PT Physical Therapist    NM Keshawn Granados, PT Student PT Student                     Time Calculation:    PT Charges       Row Name 02/15/24 1157             Time Calculation    PT Received On 02/15/24  -RH         Timed Charges    81345 - PT Therapeutic Exercise Minutes 12  -RH         Total Minutes    Timed Charges Total Minutes 12  -RH       Total Minutes 12  -RH                User Key  (r) = Recorded By, (t) = Taken By, (c) = Cosigned By      Initials Name Provider Type     Eddie Connolly PTA Physical Therapist Assistant                  Therapy Charges for Today       Code Description Service Date Service Provider Modifiers Qty    [id number] HC PT THER PROC EA 15 MIN 2/15/2024 Eddie Connolly PTA GP 1            PT G-Codes  Outcome Measure Options: AM-PAC 6 Clicks Daily Activity (OT)  AM-PAC 6 Clicks Score (PT): 18  AM-PAC 6 Clicks Score (OT): 21    Eddie Connolly PTA  2/15/2024

## 2024-02-15 NOTE — PROGRESS NOTES
[facility]   Hospitalist Progress Note  Date: 2/15/2024  Patient Name: Karsten Johnson  : 1975  MRN: [id number]  Date of admission: 2024      Subjective   Subjective     Chief Complaint: Follow-up left foot pain    Summary:Karsten Johnson is a 48 y.o. male obese male with essentially no past medical history who presented to the emergency department with left heel/Achilles pain that began 5 days ago.  Patient says that happened while at work.  There was no trauma.  Patient said the pain started slowly but steadily increased.  Patient went to an urgent care in [facility] and was told that he most likely had a torn Achilles. Patient was found to have achilles rupture, heel abscess and osteomyelitis.  Blood cultures positive for MRSA.  Suspect infection seeded from maxillary incisor.  Blood cultures eventually cleared on 2024.  Plan on continuing IV vancomycin for 6 weeks.  Returned to the OR for repeat cleanout  on 24.  Discharge planning assisting with arranging home health for 6 weeks of IV vancomycin and wound VAC.    Interval Followup: Patient sitting up in bedside chair appears to be resting comfortably with family at the bedside. Patient indicates pain remains fairly well-controlled.  Afebrile overnight.  Heart rate within normal limits.  Blood pressure within normal limits.  Satting well on room air.  Blood sugars fairly well-controlled.  White blood cell count elevated but stable.  Hemoglobin trending down .  Blood cultures from 2024 negative to date.    Review of Systems  Constitutional: Negative for fatigue and fever.   HENT: Negative for sore throat and trouble swallowing.    Eyes: Negative for pain and discharge.   Respiratory: Negative for cough and shortness of breath.    Cardiovascular: Negative for chest pain and palpitations.   Gastrointestinal: Negative for abdominal pain, nausea and vomiting.   Endocrine: Negative for cold intolerance and heat intolerance.    Genitourinary: Negative for difficulty urinating and dysuria.   Musculoskeletal: Negative for back pain and neck stiffness.  Positive left ankle pain  Skin: Negative for color change and rash.   Neurological: Negative for syncope and headaches.   Hematological: Negative for adenopathy.   Psychiatric/Behavioral: Negative for confusion and hallucinations.    Objective   Objective     Vitals:   Temp:  [97.7 °F (36.5 °C)-99.3 °F (37.4 °C)] 99.3 °F (37.4 °C)  Heart Rate:  [63-80] 80  Resp:  [18] 18  BP: (112-144)/(72-88) 140/78  Physical Exam   General: well-developed appearing stated age in no acute distress  HEENT: Normocephalic atraumatic moist membranes pupils equal round reactive light, no scleral icterus no conjunctival injection, discoloration of the left maxillary incisor  Cardiovascular: regular rate and rhythm no murmurs rubs or gallops S1-S2, no lower extremity edema appreciated  Pulmonary: Clear to auscultation bilaterally no wheezes rales or rhonchi symmetric chest expansion, unlabored, no conversational dyspnea appreciated  Gastrointestinal: Soft nontender nondistended positive bowel sounds all 4 quadrants no rebound or guarding  Musculoskeletal: No clubbing cyanosis, warm and well-perfused, calves soft symmetric nontender bilaterally  Skin: Clean dry surgical dressing over the posterior left ankle  Neuro: Cranial nerves II through XII intact grossly no sensorimotor deficits appreciated bilateral upper and lower extremities  Psych: Patient is calm cooperative and appropriate with exam not responding to internal stimuli  : No Kincaid catheter no bladder distention no suprapubic tenderness    Result Review    Result Review:  I have personally reviewed these results and agree with these findings:  [x]  Laboratory  LAB RESULTS:      Lab 02/15/24  0530 02/14/24  0724 02/13/24  0411 02/12/24  0547 02/11/24  0425 02/10/24  0302 02/09/24  0618   WBC 25.25* 25.74* 25.16* 23.93* 26.28* 24.04* 24.22*   HEMOGLOBIN  9.8* 11.4* 10.8* 11.4* 10.3* 10.4* 11.2*   HEMATOCRIT 30.2* 34.8* 33.2* 35.1* 31.3* 31.7* 34.5*   PLATELETS 276 286 257 254 192 204 195   NEUTROS ABS  --   --  22.14* 17.65* 23.91* 20.38* 20.45*   IMMATURE GRANS (ABS)  --   --   --  1.86*  --  0.27* 1.44*   LYMPHS ABS  --   --   --  2.10  --  1.40 0.68*   MONOS ABS  --   --   --  1.83*  --  1.78* 1.59*   EOS ABS  --   --  0.25 0.30 0.26 0.14 0.02   MCV 92.4 90.9 90.2 91.6 91.3 90.8 91.8         Lab 02/15/24  0530 02/14/24  0724 02/13/24  0411 02/12/24  0547 02/11/24  0425 02/10/24  0302 02/09/24  0618   SODIUM 134* 134* 135* 134* 133* 130* 132*   POTASSIUM 4.6 4.1 3.6 4.0 4.0 3.7 3.7   CHLORIDE 101 99 100 99 100 97* 98   CO2 26.6 26.1 24.6 25.8 24.0 21.9* 24.2   ANION GAP 6.4 8.9 10.4 9.2 9.0 11.1 9.8   BUN 15 8 8 11 19 14 15   CREATININE 0.84 0.62* 0.59* 0.71* 0.71* 0.66* 0.74*   EGFR 106.2 116.4 118.2 111.8 111.8 114.3 110.4   GLUCOSE 182* 150* 147* 145* 139* 149* 160*   CALCIUM 8.0* 8.6 8.1* 8.4* 8.4* 8.4* 8.9   MAGNESIUM  --   --   --  2.2  --  2.2 2.2   PHOSPHORUS 2.8  --   --  2.6  --   --   --    HEMOGLOBIN A1C  --   --   --   --   --   --  6.40*         Lab 02/15/24  0530 02/12/24  0547   ALBUMIN 2.3* 2.5*                     Brief Urine Lab Results       None          Microbiology Results (last 10 days)       Procedure Component Value - Date/Time    Blood Culture - Blood, Arm, Right [[id number]]  (Normal) Collected: 02/12/24 0548    Lab Status: Preliminary result Specimen: Blood from Arm, Right Updated: 02/15/24 0600     Blood Culture No growth at 3 days    Blood Culture - Blood, Arm, Left [[id number]]  (Normal) Collected: 02/12/24 0547    Lab Status: Preliminary result Specimen: Blood from Arm, Left Updated: 02/15/24 0600     Blood Culture No growth at 3 days    Blood Culture - Blood, Hand, Left [[id number]]  (Abnormal)  (Susceptibility) Collected: 02/10/24 1154    Lab Status: Final result Specimen: Blood from Hand, Left Updated: 02/13/24 0619     Blood  Culture Staphylococcus aureus, MRSA     Comment:   Infectious disease consultation is highly recommended to rule out distant foci of infection.  Methicillin resistant Staphylococcus aureus, Patient may be an isolation risk.        Isolated from Aerobic and Anaerobic Bottles     Gram Stain Aerobic Bottle Gram positive cocci in clusters      Anaerobic Bottle Gram positive cocci in clusters    Susceptibility        Staphylococcus aureus, MRSA      JULIO      Gentamicin Susceptible      Oxacillin Resistant      Rifampin Susceptible      Vancomycin Susceptible                           Blood Culture - Blood, Arm, Left [[id number]]  (Abnormal) Collected: 02/10/24 1150    Lab Status: Final result Specimen: Blood from Arm, Left Updated: 02/13/24 0619     Blood Culture Staphylococcus aureus, MRSA     Comment: Refer to previous blood culture collected on 02/10/2024 1154 for MICs    Infectious disease consultation is highly recommended to rule out distant foci of infection.  Methicillin resistant Staphylococcus aureus, Patient may be an isolation risk.        Isolated from Aerobic and Anaerobic Bottles     Gram Stain Aerobic Bottle Gram positive cocci in clusters      Anaerobic Bottle Gram positive cocci in clusters    Anaerobic Culture - Wound, Ankle, Left [[id number]]  (Normal) Collected: 02/10/24 0840    Lab Status: Final result Specimen: Wound from Ankle, Left Updated: 02/15/24 0640     Anaerobic Culture No anaerobes isolated at 5 days    Wound Culture - Wound, Ankle, Left [[id number]]  (Abnormal)  (Susceptibility) Collected: 02/10/24 0840    Lab Status: Final result Specimen: Wound from Ankle, Left Updated: 02/13/24 0701     Wound Culture Scant growth (1+) Staphylococcus aureus, MRSA     Comment:   Methicillin resistant Staphylococcus aureus, Patient may be an isolation risk.        Gram Stain No organisms seen    Susceptibility        Staphylococcus aureus, MRSA      JULIO      Clindamycin Susceptible      Erythromycin  Resistant      Oxacillin Resistant      Rifampin Susceptible      Tetracycline Susceptible      Trimethoprim + Sulfamethoxazole Susceptible      Vancomycin Susceptible                           Tissue / Bone Culture - Bone, Ankle, Left [[id number]]  (Abnormal) Collected: 02/10/24 0817    Lab Status: Final result Specimen: Bone from Ankle, Left Updated: 02/13/24 0701     Tissue Culture Scant growth (1+) Staphylococcus aureus, MRSA     Comment:   Methicillin resistant Staphylococcus aureus, Patient may be an isolation risk.        Gram Stain No organisms seen    Narrative:      Refer to previous wound culture collected on 02/10/2024 0840 for MICs      AFB Culture - Bone, Ankle, Left [[id number]] Collected: 02/10/24 0817    Lab Status: Preliminary result Specimen: Bone from Ankle, Left Updated: 02/15/24 0901     AFB Culture No AFB isolated at less than 1 week     AFB Stain No acid fast bacilli seen on direct smear    Blood Culture - Blood, Hand, Left [[id number]]  (Abnormal)  (Susceptibility) Collected: 02/08/24 1138    Lab Status: Final result Specimen: Blood from Hand, Left Updated: 02/11/24 0641     Blood Culture Staphylococcus aureus, MRSA     Comment:   Infectious disease consultation is highly recommended to rule out distant foci of infection.  Methicillin resistant Staphylococcus aureus, Patient may be an isolation risk.        Isolated from Aerobic and Anaerobic Bottles     Gram Stain Aerobic Bottle Gram positive cocci in clusters      Anaerobic Bottle Gram positive cocci in clusters    Susceptibility        Staphylococcus aureus, MRSA      JULIO      Gentamicin Susceptible      Oxacillin Resistant      Rifampin Susceptible      Vancomycin Susceptible                           Blood Culture - Blood, Hand, Right [[id number]]  (Abnormal) Collected: 02/08/24 1138    Lab Status: Final result Specimen: Blood from Hand, Right Updated: 02/11/24 0643     Blood Culture Staphylococcus aureus, MRSA     Comment:   Infectious  disease consultation is highly recommended to rule out distant foci of infection.  Methicillin resistant Staphylococcus aureus, Patient may be an isolation risk.        Isolated from Aerobic and Anaerobic Bottles     Gram Stain Aerobic Bottle Gram positive cocci in clusters      Anaerobic Bottle Gram positive cocci in clusters    Narrative:      Refer to previous blood culture collected on 02/08/2024 at 1138 for MICs.        Blood Culture ID, PCR - Blood, Hand, Left [[id number]]  (Abnormal) Collected: 02/08/24 1138    Lab Status: Final result Specimen: Blood from Hand, Left Updated: 02/09/24 0356     BCID, PCR Staph aureus. mecA/C and MREJ (methicillin resistance gene) detected. Identification by BCID2 PCR.     BOTTLE TYPE Aerobic Bottle    Narrative:      Infectious disease consultation is highly recommended to rule out distant foci of infection.            [x]  Microbiology  [x]  Radiology  MRI Ankle Left Without Contrast    Result Date: 2/8/2024    1. Severe tendinosis and enthesopathy at the distal Achilles tendon.  There is a full-thickness gap of the distal tendon from the insertion site measuring up to 4.6 cm consistent with full thickness tear.  Severe tendinosis of the musculotendinous junction of the Achilles.  Associated soft tissue edema. 2. Marrow edema calcaneus with erosive changes at the insertion site of the Achilles tendon likely related to inflammatory arthritis and reactive edema calcaneus. 3. Findings of plantar fasciitis medial band plantar fascia. 4. Mild hindfoot and midfoot degenerative change. 5. Tenosynovitis of the flexor tendons.     SAEED VIGIL MD       Electronically Signed and Approved By: SAEED VIGIL MD on 2/08/2024 at 14:57              []  EKG/Telemetry   [x]  Cardiology/Vascular   Transthoracic echocardiogram 2/9/2024    Left ventricular systolic function is normal. Calculated left ventricular EF = 55.3%    Estimated right ventricular systolic pressure from tricuspid regurgitation  is normal (<35 mmHg).    No obvious wall motion abnormalities    No obvious vegetations on any valve. If high clinical suspicion, recommend TRACIE  []  Pathology  []  Old records  [x]  Other:  Scheduled Meds:enoxaparin, 40 mg, Subcutaneous, Daily  insulin lispro, 2-9 Units, Subcutaneous, 4x Daily AC & at Bedtime  lisinopril, 40 mg, Oral, Q24H  sodium chloride, 10 mL, Intravenous, Q12H  sodium chloride, 10 mL, Intravenous, Q12H  vancomycin, 1,750 mg, Intravenous, Q12H      Continuous Infusions:lactated ringers, 30 mL/hr, Last Rate: 30 mL/hr (02/10/24 0713)  lactated ringers, 9 mL/hr  Pharmacy to dose vancomycin,       PRN Meds:.  acetaminophen **OR** acetaminophen    senna-docusate sodium **AND** polyethylene glycol **AND** bisacodyl **AND** bisacodyl    dextrose    dextrose    diphenhydrAMINE    glucagon (human recombinant)    hydrALAZINE    HYDROcodone-acetaminophen    HYDROcodone-acetaminophen    HYDROmorphone    lactated ringers    melatonin    ondansetron    Pharmacy to dose vancomycin    sodium chloride    sodium chloride    sodium chloride    sodium chloride    sodium chloride      Assessment & Plan   Assessment / Plan     Assessment/Plan:  Left Achilles tendon rupture  Left heel abscess  Acute hematogenous osteomyelitis of the left calcaneus  Suspected abscessed maxillary left incisor  MRSA bacteremia  Prediabetes  Hypertension  Obesity BMI 37  Mild anemia  High risk drug monitoring vancomycin        Patient admitted for further evaluation and treatment  Podiatry consulted thank you for your assistance  Continue wound care per podiatry.  Pending wound VAC replacement and approval for home unit  Continue nonweightbearing to the left lower extremity  Continue vancomycin complete 6-week course starting from 2/12/2024 with first negative blood cultures  Patient will need to follow-up with dentistry as an outpatient for definitive treatment of left maxillary incisor prior to completion of antibiotics  Continue  sliding scale insulin  Continue lisinopril and titrate as needed  Continue to monitor leukocytosis  Continue to monitor hemoglobin and transfuse as needed to keep hemoglobin greater than 7  Continue to monitor renal function while on vancomycin  Continue to dose vancomycin area under the curve  Continue to monitor electrolytes replace as needed  Consult physical therapy Occupational Therapy  Further inpatient orders recommendations pending clinical course         Discussed plan with bedside RN as well as podiatry attending.    Disposition: Home with home health to complete 6-week course of IV vancomycin and wound VAC care pending podiatry clearance.    DVT prophylaxis:  Medical DVT prophylaxis orders are present.        CODE STATUS:   Code Status (Patient has no pulse and is not breathing): CPR (Attempt to Resuscitate)  Medical Interventions (Patient has pulse or is breathing): Full Support

## 2024-02-15 NOTE — PLAN OF CARE
Goal Outcome Evaluation:  Plan of Care Reviewed With: patient           Outcome Evaluation: Patient has experienced decline in function from baseline status, presenting w/ deficits related to balance, pain, tolerance, transfers and mobility that impede patient independence with activities of daily living.  Patient would benefit from skilled Occupational Therapy intervention to maxamize patient safety, and promote return to baseline independence.      Anticipated Discharge Disposition (OT): home with outpatient therapy services

## 2024-02-15 NOTE — PLAN OF CARE
Goal Outcome Evaluation:  Plan of Care Reviewed With: patient        Progress: no change  Outcome Evaluation: pt. has been medicated x 2 for pain with relief noted.  voiding without difficulty.  VSS.  no significant changes noted this shift.

## 2024-02-16 ENCOUNTER — READMISSION MANAGEMENT (OUTPATIENT)
Dept: CALL CENTER | Facility: HOSPITAL | Age: 49
End: 2024-02-16
Payer: COMMERCIAL

## 2024-02-16 VITALS
OXYGEN SATURATION: 95 % | WEIGHT: 230.38 LBS | HEIGHT: 66 IN | SYSTOLIC BLOOD PRESSURE: 140 MMHG | BODY MASS INDEX: 37.03 KG/M2 | TEMPERATURE: 99.4 F | DIASTOLIC BLOOD PRESSURE: 78 MMHG | RESPIRATION RATE: 16 BRPM | HEART RATE: 68 BPM

## 2024-02-16 DIAGNOSIS — L02.619 ABSCESS OF HEEL: Primary | ICD-10-CM

## 2024-02-16 LAB
ANION GAP SERPL CALCULATED.3IONS-SCNC: 6 MMOL/L (ref 5–15)
BUN SERPL-MCNC: 10 MG/DL (ref 6–20)
BUN/CREAT SERPL: 15.2 (ref 7–25)
CALCIUM SPEC-SCNC: 8.4 MG/DL (ref 8.6–10.5)
CHLORIDE SERPL-SCNC: 100 MMOL/L (ref 98–107)
CO2 SERPL-SCNC: 27 MMOL/L (ref 22–29)
CREAT SERPL-MCNC: 0.66 MG/DL (ref 0.76–1.27)
DEPRECATED RDW RBC AUTO: 48 FL (ref 37–54)
EGFRCR SERPLBLD CKD-EPI 2021: 114.3 ML/MIN/1.73
ERYTHROCYTE [DISTWIDTH] IN BLOOD BY AUTOMATED COUNT: 14.3 % (ref 12.3–15.4)
GLUCOSE BLDC GLUCOMTR-MCNC: 123 MG/DL (ref 70–99)
GLUCOSE BLDC GLUCOMTR-MCNC: 183 MG/DL (ref 70–99)
GLUCOSE SERPL-MCNC: 124 MG/DL (ref 65–99)
HCT VFR BLD AUTO: 31.3 % (ref 37.5–51)
HGB BLD-MCNC: 10 G/DL (ref 13–17.7)
MCH RBC QN AUTO: 29.6 PG (ref 26.6–33)
MCHC RBC AUTO-ENTMCNC: 31.9 G/DL (ref 31.5–35.7)
MCV RBC AUTO: 92.6 FL (ref 79–97)
PLATELET # BLD AUTO: 306 10*3/MM3 (ref 140–450)
PMV BLD AUTO: 10.1 FL (ref 6–12)
POTASSIUM SERPL-SCNC: 4.3 MMOL/L (ref 3.5–5.2)
RBC # BLD AUTO: 3.38 10*6/MM3 (ref 4.14–5.8)
SODIUM SERPL-SCNC: 133 MMOL/L (ref 136–145)
VANCOMYCIN TROUGH SERPL-MCNC: 20.9 MCG/ML (ref 5–20)
WBC NRBC COR # BLD AUTO: 21.19 10*3/MM3 (ref 3.4–10.8)

## 2024-02-16 PROCEDURE — 25010000002 HYDROMORPHONE 1 MG/ML SOLUTION: Performed by: PODIATRIST

## 2024-02-16 PROCEDURE — 97110 THERAPEUTIC EXERCISES: CPT

## 2024-02-16 PROCEDURE — 80202 ASSAY OF VANCOMYCIN: CPT | Performed by: FAMILY MEDICINE

## 2024-02-16 PROCEDURE — 25010000002 ENOXAPARIN PER 10 MG: Performed by: PODIATRIST

## 2024-02-16 PROCEDURE — 99239 HOSP IP/OBS DSCHRG MGMT >30: CPT | Performed by: FAMILY MEDICINE

## 2024-02-16 PROCEDURE — 25010000002 VANCOMYCIN 5 G RECONSTITUTED SOLUTION: Performed by: FAMILY MEDICINE

## 2024-02-16 PROCEDURE — 99024 POSTOP FOLLOW-UP VISIT: CPT | Performed by: PODIATRIST

## 2024-02-16 PROCEDURE — 82948 REAGENT STRIP/BLOOD GLUCOSE: CPT

## 2024-02-16 PROCEDURE — 63710000001 INSULIN LISPRO (HUMAN) PER 5 UNITS: Performed by: PODIATRIST

## 2024-02-16 PROCEDURE — 25810000003 SODIUM CHLORIDE 0.9 % SOLUTION: Performed by: FAMILY MEDICINE

## 2024-02-16 PROCEDURE — 80048 BASIC METABOLIC PNL TOTAL CA: CPT | Performed by: FAMILY MEDICINE

## 2024-02-16 PROCEDURE — 85027 COMPLETE CBC AUTOMATED: CPT | Performed by: FAMILY MEDICINE

## 2024-02-16 PROCEDURE — 97605 NEG PRS WND THER DME<=50SQCM: CPT

## 2024-02-16 RX ORDER — LISINOPRIL 40 MG/1
40 TABLET ORAL
Qty: 30 TABLET | Refills: 0 | Status: SHIPPED | OUTPATIENT
Start: 2024-02-17 | End: 2024-03-18

## 2024-02-16 RX ORDER — NALOXONE HYDROCHLORIDE 4 MG/.1ML
SPRAY NASAL
Qty: 2 EACH | Refills: 0 | Status: SHIPPED | OUTPATIENT
Start: 2024-02-16

## 2024-02-16 RX ORDER — HYDROCODONE BITARTRATE AND ACETAMINOPHEN 7.5; 325 MG/1; MG/1
2 TABLET ORAL EVERY 4 HOURS PRN
Qty: 36 TABLET | Refills: 0 | Status: SHIPPED | OUTPATIENT
Start: 2024-02-16 | End: 2024-02-19

## 2024-02-16 RX ADMIN — HYDROCODONE BITARTRATE AND ACETAMINOPHEN 2 TABLET: 7.5; 325 TABLET ORAL at 07:48

## 2024-02-16 RX ADMIN — Medication 10 ML: at 10:45

## 2024-02-16 RX ADMIN — HYDROCODONE BITARTRATE AND ACETAMINOPHEN 2 TABLET: 7.5; 325 TABLET ORAL at 17:45

## 2024-02-16 RX ADMIN — VANCOMYCIN HYDROCHLORIDE 1750 MG: 5 INJECTION, POWDER, LYOPHILIZED, FOR SOLUTION INTRAVENOUS at 10:44

## 2024-02-16 RX ADMIN — ENOXAPARIN SODIUM 40 MG: 100 INJECTION SUBCUTANEOUS at 09:56

## 2024-02-16 RX ADMIN — INSULIN LISPRO 2 UNITS: 100 INJECTION, SOLUTION INTRAVENOUS; SUBCUTANEOUS at 11:42

## 2024-02-16 RX ADMIN — LISINOPRIL 40 MG: 20 TABLET ORAL at 09:56

## 2024-02-16 RX ADMIN — HYDROCODONE BITARTRATE AND ACETAMINOPHEN 2 TABLET: 7.5; 325 TABLET ORAL at 01:42

## 2024-02-16 RX ADMIN — HYDROMORPHONE HYDROCHLORIDE 1 MG: 1 INJECTION, SOLUTION INTRAMUSCULAR; INTRAVENOUS; SUBCUTANEOUS at 15:05

## 2024-02-16 RX ADMIN — HYDROCODONE BITARTRATE AND ACETAMINOPHEN 2 TABLET: 7.5; 325 TABLET ORAL at 11:44

## 2024-02-16 NOTE — PLAN OF CARE
Goal Outcome Evaluation:      Pt is alert and oriented X 4, on room air, and X 1 assist with walker. All scheduled medications given as ordered. Pain managed with PRN medications. Safety checks maintained throughout shift with pt resting in chair, wheels to chair locked, and personal items and call light within reach. Pt will discharge home with IV antibiotics Vancomycin that he and significant other will be taught via video conference tomorrow 02/17/24 from [facility]. Pt will visit the hospital for dressing changes and wound care. Educated pt on discharge instructions and follow up appointments. Pt verbalized understanding.

## 2024-02-16 NOTE — THERAPY TREATMENT NOTE
Acute Care - Physical Therapy Initial Evaluation   [place]     Patient Name: Karsten Johnson  : 1975  MRN: [id number]  Today's Date: 2024 Admit date: 2024     Referring Physician: Tono Kang MD     Surgery Date:2024 - 2024   Procedure(s) (LRB):  INCISION AND DEBRIDEMENT WOUND OF LEFT ANKLE (Left)       Visit Dx:     ICD-10-CM ICD-9-CM   1. Acute hematogenous osteomyelitis of left ankle  M86.072 730.07   2. Rupture of left Achilles tendon, initial encounter  S86.012A 845.09   3. Abscess of heel  L02.619 682.7   4. Difficulty in walking  R26.2 719.7   5. Decreased activities of daily living (ADL)  Z78.9 V49.89     Patient Active Problem List   Diagnosis    Achilles rupture    Abscess of heel    Acute hematogenous osteomyelitis     History reviewed. No pertinent past medical history.  Past Surgical History:   Procedure Laterality Date    INCISION AND DRAINAGE OF WOUND Left 2/10/2024    Procedure: INCISION AND DRAINAGE  LEFT ANKLE,BIOPSY LEFT ANKEL;  Surgeon: Alvarez Beck DPM;  Location: [facility];  Service: Podiatry;  Laterality: Left;    WOUND DEBRIDEMENT Left 2024    Procedure: INCISION AND DEBRIDEMENT WOUND OF LEFT ANKLE;  Surgeon: Alvarez Beck DPM;  Location: [facility];  Service: Podiatry;  Laterality: Left;     PT Assessment (last 12 hours)       PT Evaluation and Treatment       Row Name 24 1028          Physical Therapy Time and Intention    Subjective Information complains of;pain (P)   -NM     Document Type therapy note (daily note) (P)   -NM     Mode of Treatment physical therapy;individual therapy (P)   -NM     Patient Effort excellent (P)   -NM     Symptoms Noted During/After Treatment increased pain;fatigue (P)   -NM       Row Name 24 1028          General Information    Patient Observations cooperative;agree to therapy (P)   -NM     Barriers to Rehab none identified (P)   -NM       Row Name 24 1028          Pain    Pretreatment  Pain Rating 7/10 (P)   -NM     Posttreatment Pain Rating 7/10 (P)   -NM     Pain Location - Side/Orientation Left (P)   -NM     Pain Location distal (P)   -NM     Pain Location - ankle (P)   -NM     Pain Intervention(s) Repositioned (P)   -NM       [facility] Name 02/16/24 1028          Mobility    Extremity Weight-bearing Status left lower extremity (P)   -NM     Left Lower Extremity (Weight-bearing Status) non weight-bearing (NWB) (P)   -NM       Row Name 02/16/24 1028          Bed Mobility    Bed Mobility bed mobility (all) activities (P)   -NM     All Activities, [place] (Bed Mobility) minimum assist (75% patient effort) (P)   -NM       Row Name 02/16/24 1028          Transfers    Transfers sit-stand transfer;stand-sit transfer;bed-chair transfer (P)   -NM       Row Name 02/16/24 1028          Bed-Chair Transfer    Bed-Chair [place] (Transfers) contact guard (P)   -NM     Assistive Device (Bed-Chair Transfers) walker, front-wheeled (P)   -NM       Row Name 02/16/24 1028          Sit-Stand Transfer    Sit-Stand [place] (Transfers) contact guard (P)   -NM     Assistive Device (Sit-Stand Transfers) walker, front-wheeled (P)   -NM       Row Name 02/16/24 1028          Stand-Sit Transfer    Stand-Sit [place] (Transfers) contact guard (P)   -NM     Assistive Device (Stand-Sit Transfers) walker, front-wheeled (P)   -NM       Row Name 02/16/24 1028          Gait/Stairs (Locomotion)    Gait/Stairs Locomotion gait/ambulation assistive device (P)   -NM     [place] Level (Gait) contact guard (P)   -NM     Assistive Device (Gait) walker, front-wheeled (P)   -NM     Patient was able to Ambulate yes (P)   -NM     Distance in Feet (Gait) 3 (P)   -NM     Pattern (Gait) 3-point (P)   -NM     Deviations/Abnormal Patterns (Gait) right sided deviations;stride length decreased;gait speed decreased (P)   -NM       Row Name 02/16/24 1028          Safety Issues, Functional Mobility    Impairments Affecting Function  (Mobility) balance;strength;pain;endurance/activity tolerance (P)   -NM       Row Name 02/16/24 1028          Balance    Balance Assessment standing dynamic balance (P)   -NM     Dynamic Standing Balance contact guard (P)   -NM     Position/Device Used, Standing Balance walker, front-wheeled (P)   -NM       Row Name 02/16/24 1028          Motor Skills    Therapeutic Exercise hip;knee;ankle (P)   R only: SLT, quad sets, ankle pumps 2x10  -NM       Row Name             Wound 02/10/24 0800 Left posterior ankle Other (comment)    Wound - Properties Group Placement Date: 02/10/24  -SG Placement Time: 0800  -SG Present on Original Admission: Y  -SG Side: Left  -SG Orientation: posterior  -SG Location: ankle  -SG Primary Wound Type: Other  -SG, open wound, margin extended with I&D in surgery     Retired Wound - Properties Group Placement Date: 02/10/24  -SG Placement Time: 0800  -SG Present on Original Admission: Y  -SG Side: Left  -SG Orientation: posterior  -SG Location: ankle  -SG Primary Wound Type: Other  -SG, open wound, margin extended with I&D in surgery     Retired Wound - Properties Group Date first assessed: 02/10/24  -SG Time first assessed: 0800  -SG Present on Original Admission: Y  -SG Side: Left  -SG Location: ankle  -SG Primary Wound Type: Other  -SG, open wound, margin extended with I&D in surgery       Row Name             Wound 02/14/24 1210 Left posterior Incision    Wound - Properties Group Placement Date: 02/14/24  -AN Placement Time: 1210  -AN Side: Left  -AN Orientation: posterior  -AN Primary Wound Type: Incision  -AN Additional Comments: left heel wound debridement wound vac was removed preop, - adaptic betadine 4x4 adb pad kerlex and ace drgs postop by dr leong  -AN    Retired Wound - Properties Group Placement Date: 02/14/24  -AN Placement Time: 1210  -AN Side: Left  -AN Orientation: posterior  -AN Primary Wound Type: Incision  -AN Additional Comments: left heel wound debridement wound vac  was removed preop, - adaptic betadine 4x4 adb pad kerlex and ace drgs postop by dr leong  -JEANMARIE    Retired Wound - Properties Group Date first assessed: 02/14/24  -AN Time first assessed: 1210  -AN Side: Left  -AN Primary Wound Type: Incision  -AN Additional Comments: left heel wound debridement wound vac was removed preop, - adaptic betadine 4x4 adb pad kerlex and ace drgs postop by dr dang PIERRE      Row Name             NPWT (Negative Pressure Wound Therapy) 02/15/24 1320 Left heel    NPWT (Negative Pressure Wound Therapy) - Properties Group Placement Date: 02/15/24  -FP Placement Time: 1320  -FP Location: Left heel  -FP    Retired NPWT (Negative Pressure Wound Therapy) - Properties Group Placement Date: 02/15/24  -FP Placement Time: 1320  -FP Location: Left heel  -FP    Retired NPWT (Negative Pressure Wound Therapy) - Properties Group Placement Date: 02/15/24  -FP Placement Time: 1320  -FP Location: Left heel  -FP      Row Name 02/16/24 1028          Positioning and Restraints    Pre-Treatment Position in bed (P)   -NM     Post Treatment Position chair (P)   -NM     In Chair reclined;call light within reach;encouraged to call for assist;exit alarm on;with family/caregiver (P)   -NM       Row Name 02/16/24 1028          Progress Summary (PT)    Progress Toward Functional Goals (PT) progress toward functional goals as expected (P)   -NM     Daily Progress Summary (PT) Pt presents to therapy today with significant pain, as well as difficulty with transfers and ambulation. Pt will continue to benefit from skilled PT to address above deficits. (P)   -NM               User Key  (r) = Recorded By, (t) = Taken By, (c) = Cosigned By      Initials Name Provider Type    Alberta Greenberg, RN Registered Nurse    Antonia Curtis RN Registered Nurse    Keesha Tate, RN Registered Nurse    Keshawn Swenson, PT Student PT Student                    Physical Therapy Education       Title: PT OT SLP Therapies  (Done)       Topic: Physical Therapy (Done)       Point: Mobility training (Done)       Learning Progress Summary             Patient Acceptance, E,TB, VU by NM at 2/14/2024 0957                                         User Key       Initials Effective Dates Name Provider Type Discipline    NM 01/31/24 -  Keshawn Granados, PT Student PT Student PT                  PT Recommendation and Plan  Anticipated Discharge Disposition (PT): home with home health  Planned Therapy Interventions (PT): balance training, bed mobility training, gait training, transfer training, strengthening, stair training  Therapy Frequency (PT): daily  Progress Summary (PT)  Progress Toward Functional Goals (PT): (P) progress toward functional goals as expected  Daily Progress Summary (PT): (P) Pt presents to therapy today with significant pain, as well as difficulty with transfers and ambulation. Pt will continue to benefit from skilled PT to address above deficits.  Plan of Care Reviewed With: patient  Progress: improving  Outcome Evaluation: Pt presents to therapy today with significant pain and weakness which makes it difficult to ambulate and transfer safely. Pt will benefit from skilled PT to address above deficits.   Outcome Measures       Row Name 02/16/24 1000 02/15/24 1200 02/14/24 0900       How much help from another person do you currently need...    Turning from your back to your side while in flat bed without using bedrails? 4 (P)   -NM 4  -RH 4  -REGLA (r) NM (t) REGLA (c)    Moving from lying on back to sitting on the side of a flat bed without bedrails? 4 (P)   -NM 4  -RH 4  -REGLA (r) NM (t) REGLA (c)    Moving to and from a bed to a chair (including a wheelchair)? 3 (P)   -NM 3  -RH 3  -REGLA (r) NM (t) REGLA (c)    Standing up from a chair using your arms (e.g., wheelchair, bedside chair)? 3 (P)   -NM 3  -RH 3  -REGLA (r) NM (t) REGLA (c)    Climbing 3-5 steps with a railing? 2 (P)   -NM 2  -RH 2  -REGLA (r) NM (t) REGLA (c)    To walk in hospital  room? 2 (P)   -NM 2  -RH 2  -REGLA (r) NM (t) REGLA (c)    AM-PAC 6 Clicks Score (PT) 18 (P)   -NM 18  -RH 18  -REGLA (r) NM (t)    Highest Level of Mobility Goal 6 --> Walk 10 steps or more (P)   -NM 6 --> Walk 10 steps or more  -RH 6 --> Walk 10 steps or more  -REGLA (r) NM (t)              User Key  (r) = Recorded By, (t) = Taken By, (c) = Cosigned By      Initials Name Provider Type    RH Eddie Connolly, PTA Physical Therapist Assistant    Yang Martínez, PT Physical Therapist    NM Keshawn Granados, PT Student PT Student                     Time Calculation:    PT Charges       Row Name 02/16/24 1036             Time Calculation    PT Received On 02/16/24 (P)   -NM         Timed Charges    96149 - PT Therapeutic Exercise Minutes 8 (P)   -NM      72177 - PT Therapeutic Activity Minutes 4 (P)   -NM         Total Minutes    Timed Charges Total Minutes 12 (P)   -NM       Total Minutes 12 (P)   -NM                User Key  (r) = Recorded By, (t) = Taken By, (c) = Cosigned By      Initials Name Provider Type    NM Keshawn Granados, PT Student PT Student                  Therapy Charges for Today       Code Description Service Date Service Provider Modifiers Qty    [id number]  PT THER PROC EA 15 MIN 2/16/2024 Keshawn Granados, PT Student GP 1            PT G-Codes  Outcome Measure Options: AM-PAC 6 Clicks Daily Activity (OT)  AM-PAC 6 Clicks Score (PT): (P) 18  AM-PAC 6 Clicks Score (OT): 21    Keshawn Granados PT Student  2/16/2024

## 2024-02-16 NOTE — SIGNIFICANT NOTE
Wound Eval / Progress Noted     [place]     Patient Name: Karsten Johnson  : 1975  MRN: [id number]  Today's Date: 2024                 Admit Date: 2024    Visit Dx:    ICD-10-CM ICD-9-CM   1. Acute hematogenous osteomyelitis of left ankle  M86.072 730.07   2. Rupture of left Achilles tendon, initial encounter  S86.012A 845.09   3. Abscess of heel  L02.619 682.7   4. Difficulty in walking  R26.2 719.7   5. Decreased activities of daily living (ADL)  Z78.9 V49.89         Achilles rupture    Abscess of heel    Acute hematogenous osteomyelitis        History reviewed. No pertinent past medical history.     Past Surgical History:   Procedure Laterality Date    INCISION AND DRAINAGE OF WOUND Left 2/10/2024    Procedure: INCISION AND DRAINAGE  LEFT ANKLE,BIOPSY LEFT ANKEL;  Surgeon: Alvarez Beck DPM;  Location: [facility]  Service: Podiatry;  Laterality: Left;    WOUND DEBRIDEMENT Left 2024    Procedure: INCISION AND DEBRIDEMENT WOUND OF LEFT ANKLE;  Surgeon: Alvarez Beck DPM;  Location: [facility] OR;  Service: Podiatry;  Laterality: Left;         Physical Assessment:  Wound 02/10/24 0800 Left posterior ankle Other (comment) (Active)   Wound Image     24 1600   Dressing Appearance dry;intact 24 1600   Closure None;Sutures 24 1600   Base moist;red;yellow 24 1600   Periwound dry;intact;redness;edematous 24 1600   Periwound Temperature warm 24 1600   Periwound Skin Turgor soft 24 1600   Edges open;rolled/closed 24 1600   Drainage Characteristics/Odor serosanguineous 24 1600   Drainage Amount small 24 1600   Care, Wound cleansed with;irrigated with;sterile normal saline;negative pressure wound therapy 24 1600   Dressing Care dressing applied;petroleum-based;non-adherent;gauze;hydrofiber;silver impregnated;foam;transparent film 24 1600   Periwound Care absorptive dressing applied;barrier film applied 24 1600        NPWT (Negative Pressure Wound Therapy) 02/15/24 1320 Left heel (Active)   Therapy Setting continuous therapy 02/16/24 1600   Dressing foam, black;transparent dressing 02/16/24 1600   Pressure Setting 100 mmHg 02/16/24 1600   Sponges Inserted 1 02/16/24 1600   Sponges Removed 1 02/16/24 1600   Finger sweep complete Yes 02/16/24 1600      Wound Check / Follow-up: Patient seen today for wound follow-up, wound VAC dressing change, and home wound VAC placement. Patient status post debridement of left ankle on 2/14/24. Patient's significant other is present at bedside.    Wound VAC dressing to left foot is intact with no signs of lifting or leaking. VAC is functioning properly without alarms. Removed dressing with use of adhesive remover. Wound base is red and moist with some yellow moist tissue noted as well. Cleansed and irrigated with normal saline. Skin prep applied to periwound tissue. Filled wound with black foam and secured with transparent film. VAC attached, foam compressed, seal obtained, no signs of lifting or leaking.     Incisions to posterior ankle, lateral ankle, and medial ankle all closed with sutures. Cleansed incisions with normal saline and gauze. Blotted dry. Applied non-adherent petroleum based gauze and then applied silver impregnated hydrofiber and secured with transparent film.       Education provided to patient and significant other regarding use of home wound VAC, troubleshooting leaks and how to repair, changing canister, phone numbers / resources, returning VAC once therapy is complete, and that dressing can only be in place for 2 hours without suction before it must be removed and dressing replaced with normal saline gauze dressing. Patient and significant other verbalized understanding to all education provided and deny any questions / concerns at this time.    Patient to follow-up on a [facility] schedule for wound VAC dressing changes in [facility].      Impression: Left heel surgical wound and  surgical incisions.      Short term goals: Regain skin integrity, skin protection, negative pressure wound therapy, 3x weekly dressing changes on MWF.      Rae Worley RN    2/16/2024    16:19 EST

## 2024-02-16 NOTE — PLAN OF CARE
Goal Outcome Evaluation:  Plan of Care Reviewed With: patient        Progress: no change  Outcome Evaluation: pt. has been medicated x 2 with relief noted from medication.  wound vac is intact to left heel, pt. tolerating well.  VSS.  no significant changes noted this shift.

## 2024-02-16 NOTE — PROGRESS NOTES
"Enter Query Response Below      Query Response: Yea this would be more of an incision drainage rather than a debridement.             If applicable, please update the problem list.       Patient: Karsten Johnson        : 1975  Account: [id number]           Admit Date:         How to Respond to this query:       a. Click New Note     b. Answer query within the yellow box.                c. Update the Problem List, if applicable.      If you have any questions about this query contact me at: Sincerely,    Aravind Acuña RN, BSN  Clinical Documentation Integrity  [facility]  [email or web address]       ,     48 year old male acute hematogenous osteomyelitis of left ankle had repeat incision and debridement of left ankle performed on 2024.  Operative note states \"Attention was the directed to the posterior Achilles tendon where incision was lengthened proximally.  All purulent drainage was expressed from the site.  Areas flushed with a pulse lavage and all nonviable soft tissue was debrided using a rongeur and 15 blade.\"    Please further clarify debridement  procedure performed as:    Excisional debridement (Clarify- instrument used ________, deepest level of tissue debrided________)  Non-excisional debridement  Other- specify __________  Unable to determine      Definitions:  Excisional debridement- surgical removal or cutting away of devitalized tissue, necrosis, or slough.  Non-excisional debridement- describes nonoperative brushing, irrigating, scrubbing, or washing away of devitalized tissue, necrosis, or slough (e.g. Versajet, irrigation)      By submitting this query, we are merely seeking further clarification of documentation to accurately reflect all conditions that you are monitoring, evaluating, treating or that extend the hospitalization or utilize additional resources of care. Please utilize your independent clinical judgment when addressing the question(s) above. "     This query and your response, once completed, will be entered into the legal medical record.    Sincerely,  Aravind Acuña  Clinical Documentation Integrity Program

## 2024-02-16 NOTE — DISCHARGE SUMMARY
[facility]         HOSPITALIST  DISCHARGE SUMMARY    Patient Name: Karsten Johnson  : 1975  MRN: [id number]    Date of Admission: 2024  Date of Discharge: 2024  Primary Care Physician: Provider, No Known    Consults       Date and Time Order Name Status Description    2024  8:17 PM Inpatient Podiatry Consult Completed     2024  5:22 PM Inpatient Hospitalist Consult      2024  4:19 PM Inpatient Podiatry Consult              Active and Resolved Hospital Problems:  Left Achilles tendon rupture  Left heel abscess  Acute hematogenous osteomyelitis of the left calcaneus  Suspected abscessed maxillary left incisor  MRSA bacteremia  Prediabetes  Hypertension  Obesity BMI 37  Mild anemia  High risk drug monitoring vancomycin  Multiple dental caries  Active Hospital Problems    Diagnosis POA    **Achilles rupture [S86.019A] Yes    Acute hematogenous osteomyelitis [M86.00] Unknown    Abscess of heel [L02.619] Unknown      Resolved Hospital Problems   No resolved problems to display.       Hospital Course     Hospital Course:  Karsten Johnson is a 48 y.o. male with essentially no past medical history who presented to the emergency department with left heel/Achilles pain that began 5 days ago.  Patient says that happened while at work.  There was no trauma.  Patient said the pain started slowly but steadily increased.  Patient went to an urgent care in [facility] and was told that he most likely had a torn Achilles. Patient was found to have achilles rupture, heel abscess and osteomyelitis.  Blood cultures positive for MRSA.  Suspect infection seeded from maxillary incisor.  Blood cultures eventually cleared on 2024.  Plan on continuing IV vancomycin for 6 weeks.  Returned to the OR for repeat cleanout  on 24.  Discharge planning assisting with arranging home health for 6 weeks of IV vancomycin and wound VAC.  Patient will need extraction of infected teeth prior to  completing 6 weeks of vancomycin.  Patient seen and evaluated on date of discharge and thought stable for discharge home to follow-up with primary care provider and podiatry 1 to 2 weeks as well as wound care for wound VAC dressing changes as well as vancomycin level and BMP every 3 days.  Pharmacy to dose vancomycin.        The patient needs a bedside commode at home for the following reason: the patient is confined to a single room.       DISCHARGE Follow Up Recommendations for labs and diagnostics: As above      Day of Discharge     Vital Signs:  Temp:  [97.5 °F (36.4 °C)-100.8 °F (38.2 °C)] 99.1 °F (37.3 °C)  Heart Rate:  [68-83] 68  Resp:  [16-18] 16  BP: (131-157)/(68-88) 140/78  Physical Exam:   General: well-developed appearing stated age in no acute distress  HEENT: Normocephalic atraumatic moist membranes pupils equal round reactive light, no scleral icterus no conjunctival injection, discoloration of the left maxillary incisor as well as multiple dental caries along the gingival line of all maxillary incisors  Cardiovascular: regular rate and rhythm no murmurs rubs or gallops S1-S2, no lower extremity edema appreciated  Pulmonary: Clear to auscultation bilaterally no wheezes rales or rhonchi symmetric chest expansion, unlabored, no conversational dyspnea appreciated  Gastrointestinal: Soft nontender nondistended positive bowel sounds all 4 quadrants no rebound or guarding  Musculoskeletal: No clubbing cyanosis, warm and well-perfused, calves soft symmetric nontender bilaterally  Skin: Clean dry wound VAC over the posterior left ankle  Neuro: Cranial nerves II through XII intact grossly no sensorimotor deficits appreciated bilateral upper and lower extremities  Psych: Patient is calm cooperative and appropriate with exam not responding to internal stimuli  : No Kincaid catheter no bladder distention no suprapubic tenderness       Discharge Details        Discharge Medications        New Medications         Instructions Start Date   HYDROcodone-acetaminophen 7.5-325 MG per tablet  Commonly known as: NORCO   2 tablets, Oral, Every 4 Hours PRN      lisinopril 40 MG tablet  Commonly known as: PRINIVIL,ZESTRIL   40 mg, Oral, Every 24 Hours Scheduled   Start Date: February 17, 2024     naloxone 4 MG/0.1ML nasal spray  Commonly known as: NARCAN   Call 911. Don't prime. [place] in 1 nostril for overdose. Repeat in 2-3 minutes in other nostril if no or minimal breathing/responsiveness.      PHARMACY TO DOSE VANCOMYCIN   Does not apply, Continuous PRN      vancomycin   1,750 mg, Intravenous, Every 12 Hours               No Known Allergies    Discharge Disposition:  Home or Self Care    Diet:  Hospital:  Diet Order   Procedures    Diet: Diabetic Diets; Consistent Carbohydrate; Texture: Regular Texture (IDDSI 7); Fluid Consistency: Thin (IDDSI 0)       Discharge Activity:   Activity Instructions       Gradually Increase Activity Until at Pre-Hospitalization Level      Other Activity Restrictions      Type of Restriction: Other    Explain Other Restrictions: NONWEIGHT BEARING LEFT LOWER EXTREMITY TILL CLEARED BY PODIATRY            CODE STATUS:  Code Status and Medical Interventions:   Ordered at: 02/07/24 1743     Code Status (Patient has no pulse and is not breathing):    CPR (Attempt to Resuscitate)     Medical Interventions (Patient has pulse or is breathing):    Full Support         No future appointments.    Additional Instructions for the Follow-ups that You Need to Schedule       Discharge Follow-up with PCP   As directed       Currently Documented PCP:    Provider, No Known    PCP Phone Number:    None     Follow Up Details: Hospital discharge follow-up 1-2 weeks MRSA bacteremia with osteomyelitis of the left calcaneus and Achilles tendon rupture, prediabetes, hypertension        Discharge Follow-up with Specialty: Dr. Beck podiatry 1-2 weeks   As directed      Specialty: Dr. Beck podiatry 1-2 weeks   Follow Up  Details: Hospital discharge follow-up abscesses of the left foot with osteomyelitis of the left calcaneus and Achilles tendon rupture                Pertinent  and/or Most Recent Results     PROCEDURES:         Alvarez Beck DPM   Physician  Podiatry     Op Note     Addendum     Date of Service: 02/10/24 0750  Creation Time: 02/10/24 0824  Case Time: Procedures: Surgeons:   02/10/24 0750 INCISION AND DRAINAGE  LEFT ANKLE,BIOPSY LEFT ANKEL    Alvarez Beck DPM                 Operative Note   Foot and Ankle Surgery   Provider: Dr. Alvarez Beck DPM  Location: [facility]     Patient Name:  Karsten Johnson  YOB: 1975     Date of Surgery:  2/10/2024     Pre-op Diagnosis:   Abscess of heel [L02.619]       Post-Op Diagnosis Codes:     * Abscess of heel [L02.619]     * Acute hematogenous osteomyelitis [M86.00]     Procedure/CPT® Codes:  MS INCISION BONE CORTEX FOOT [82214]  MS PARTIAL EXCISION BONE TALUS/CALCANEUS [13839]     Procedure(s):  INCISION AND DRAINAGE  LEFT ANKLE,BIOPSY LEFT ANKEL           Staff:  Surgeon(s):  Alvarez Beck DPM              Anesthesia: General     Estimated Blood Loss: less than 5 ml     Implants:    Nothing was implanted during the procedure     Specimen:          Specimens         ID Source Type Tests Collected By Collected At Frozen?     1 Ankle, Left Bone TISSUE / BONE CULTURE    Alvarez Beck DPM 2/10/24 0802       Description: left ankle wound culture     This specimen was not marked as sent.     2 Ankle, Left Bone TISSUE / BONE CULTURE  AFB CULTURE    Alvarez Beck DPM 2/10/24 0817       Description: left ankle bone culture     This specimen was not marked as sent.     B Ankle, Left Bone TISSUE PATHOLOGY EXAM    Alvarez Beck DPM 2/10/24 0814       Description: left ankle bone biopsy for permanent     This specimen was not marked as sent.                Findings: Osteomyelitis calcaneus-20 cc of purulent drainage.      Complications: none     History: Patient is a 48-year-old male that presented with left heel pain.  This has been going on for several months.  Patient was seen in the urgent care 1 week ago for an Achilles rupture.  Patient had worsening symptoms and was brought to hospital.  Patient was noted to have an increased white count and positive blood cultures and confirmed Achilles rupture.  Patient was brought to the operating table today for an incision and drainage and biopsies of his calcaneus.      Description of Procedure:   Procedure, risks, complications, and goals were discussed with the patient at bedside.  Risks include but are not limited to infection, complications from anesthesia (including death), chronic pain or numbness, hematoma/seroma, deep vein thrombosis, wound complications, and potential for additional surgical procedures.  Patient understands and elects to proceed with surgery at this time. Informed consent was obtained before proceeding to the operating suite.  All questions were answered to the patient's satisfaction. No guarantees or assurances were given or implied.        Left foot was prepped and draped in usual sterile manner.  Attention was directed to the posterior aspect of the here where an incision was made deepened through subcutaneous tissue down to the calcaneus.  The necrotic tissue posterior medial was removed from the area leaving a large circular wound.  This probes down to bone and up the Achilles tendon.  An incision was then made over the Achilles tendon deepened through subcutaneous tissue down to the tendon sheath.  The tendon sheath was incised and the Achilles tendon was reflected up.  It was noted that the Achilles tendon was frayed and necrotic at the insertion.  Bone fragmentation was noted at the posterior calcaneus.  Approximately 20 cc of purulent drainage was expressed from the Achilles tendon sheath and the posterior compartment.  Site was flushed with pulse  lavage and all purulent drainage was flushed from the site.  Healthy bleeding tissue remained.  Bone cultures and biopsies were taking of the posterior calcaneus. all nonviable Achilles tendon distally was resected back using a 15 blade.  The proximal incision was closed using 2-0 nylon in the distal wound was packed with Betadine soaked Adaptic and gauze.     Alvarez Beck DPM  [facility]   [phone]     Alvarez Beck DPM      Date: 2/10/2024  Time: 08:50 EST        Plan: Patient will be readmitted to the floor, will follow cultures and response to IV antibiotics.   Will continue with daily dressing changes and packing.Patient likely will require wound VAC to posterior heel wound once stabilized.         Revision History           Alvarez Beck DPM   Physician  Podiatry     Op Note     Addendum     Date of Service: 02/10/24 0750  Creation Time: 02/10/24 0824  Case Time: Procedures: Surgeons:   02/10/24 0750 INCISION AND DRAINAGE  LEFT ANKLE,BIOPSY LEFT ANKEL    Alvarez Beck DPM                 Operative Note   Foot and Ankle Surgery   Provider: Dr. Alvarez Beck DPM  Location: [facility]     Patient Name:  Karsten Johnson  YOB: 1975     Date of Surgery:  2/10/2024     Pre-op Diagnosis:   Abscess of heel [L02.619]       Post-Op Diagnosis Codes:     * Abscess of heel [L02.619]     * Acute hematogenous osteomyelitis [M86.00]     Procedure/CPT® Codes:  [place] INCISION BONE CORTEX FOOT [81836]  [place] PARTIAL EXCISION BONE TALUS/CALCANEUS [19020]     Procedure(s):  INCISION AND DRAINAGE  LEFT ANKLE,BIOPSY LEFT ANKEL           Staff:  Surgeon(s):  Alvarez Beck DPM              Anesthesia: General     Estimated Blood Loss: less than 5 ml     Implants:    Nothing was implanted during the procedure     Specimen:          Specimens         ID Source Type Tests Collected By Collected At Frozen?     1 Ankle, Left Bone TISSUE / BONE CULTURE    [place]  Alvarez ALANIZ DPM 2/10/24 0802       Description: left ankle wound culture     This specimen was not marked as sent.     2 Ankle, Left Bone TISSUE / BONE CULTURE  AFB CULTURE    Alvarez Beck DPM 2/10/24 0817       Description: left ankle bone culture     This specimen was not marked as sent.     B Ankle, Left Bone TISSUE PATHOLOGY EXAM    Alvarez Beck DPM 2/10/24 0814       Description: left ankle bone biopsy for permanent     This specimen was not marked as sent.                Findings: Osteomyelitis calcaneus-20 cc of purulent drainage.     Complications: none     History: Patient is a 48-year-old male that presented with left heel pain.  This has been going on for several months.  Patient was seen in the urgent care 1 week ago for an Achilles rupture.  Patient had worsening symptoms and was brought to hospital.  Patient was noted to have an increased white count and positive blood cultures and confirmed Achilles rupture.  Patient was brought to the operating table today for an incision and drainage and biopsies of his calcaneus.      Description of Procedure:   Procedure, risks, complications, and goals were discussed with the patient at bedside.  Risks include but are not limited to infection, complications from anesthesia (including death), chronic pain or numbness, hematoma/seroma, deep vein thrombosis, wound complications, and potential for additional surgical procedures.  Patient understands and elects to proceed with surgery at this time. Informed consent was obtained before proceeding to the operating suite.  All questions were answered to the patient's satisfaction. No guarantees or assurances were given or implied.        Left foot was prepped and draped in usual sterile manner.  Attention was directed to the posterior aspect of the here where an incision was made deepened through subcutaneous tissue down to the calcaneus.  The necrotic tissue posterior medial was removed from the area leaving  a large circular wound.  This probes down to bone and up the Achilles tendon.  An incision was then made over the Achilles tendon deepened through subcutaneous tissue down to the tendon sheath.  The tendon sheath was incised and the Achilles tendon was reflected up.  It was noted that the Achilles tendon was frayed and necrotic at the insertion.  Bone fragmentation was noted at the posterior calcaneus.  Approximately 20 cc of purulent drainage was expressed from the Achilles tendon sheath and the posterior compartment.  Site was flushed with pulse lavage and all purulent drainage was flushed from the site.  Healthy bleeding tissue remained.  Bone cultures and biopsies were taking of the posterior calcaneus. all nonviable Achilles tendon distally was resected back using a 15 blade.  The proximal incision was closed using 2-0 nylon in the distal wound was packed with Betadine soaked Adaptic and gauze.     Alvarez Beck DPM  [facility]   [phone]     Alvarez Beck DPM      Date: 2/10/2024  Time: 08:50 EST        Plan: Patient will be readmitted to the floor, will follow cultures and response to IV antibiotics.   Will continue with daily dressing changes and packing.Patient likely will require wound VAC to posterior heel wound once stabilized.         Revision History         LAB RESULTS:      Lab 02/16/24  0605 02/15/24  0530 02/14/24  0724 02/13/24  0411 02/12/24  0547 02/11/24  0425 02/10/24  0302   WBC 21.19* 25.25* 25.74* 25.16* 23.93* 26.28* 24.04*   HEMOGLOBIN 10.0* 9.8* 11.4* 10.8* 11.4* 10.3* 10.4*   HEMATOCRIT 31.3* 30.2* 34.8* 33.2* 35.1* 31.3* 31.7*   PLATELETS 306 276 286 257 254 192 204   NEUTROS ABS  --   --   --  22.14* 17.65* 23.91* 20.38*   IMMATURE GRANS (ABS)  --   --   --   --  1.86*  --  0.27*   LYMPHS ABS  --   --   --   --  2.10  --  1.40   MONOS ABS  --   --   --   --  1.83*  --  1.78*   EOS ABS  --   --   --  0.25 0.30 0.26 0.14   MCV 92.6 92.4 90.9 90.2 91.6 91.3  90.8         Lab 02/16/24  0605 02/15/24  0530 02/14/24  0724 02/13/24  0411 02/12/24  0547 02/11/24  0425 02/10/24  0302   SODIUM 133* 134* 134* 135* 134*   < > 130*   POTASSIUM 4.3 4.6 4.1 3.6 4.0   < > 3.7   CHLORIDE 100 101 99 100 99   < > 97*   CO2 27.0 26.6 26.1 24.6 25.8   < > 21.9*   ANION GAP 6.0 6.4 8.9 10.4 9.2   < > 11.1   BUN 10 15 8 8 11   < > 14   CREATININE 0.66* 0.84 0.62* 0.59* 0.71*   < > 0.66*   EGFR 114.3 106.2 116.4 118.2 111.8   < > 114.3   GLUCOSE 124* 182* 150* 147* 145*   < > 149*   CALCIUM 8.4* 8.0* 8.6 8.1* 8.4*   < > 8.4*   MAGNESIUM  --   --   --   --  2.2  --  2.2   PHOSPHORUS  --  2.8  --   --  2.6  --   --     < > = values in this interval not displayed.         Lab 02/15/24  0530 02/12/24  0547   ALBUMIN 2.3* 2.5*                     Brief Urine Lab Results       None          Microbiology Results (last 10 days)       Procedure Component Value - Date/Time    Blood Culture - Blood, Arm, Right [[id number]]  (Normal) Collected: 02/12/24 0548    Lab Status: Preliminary result Specimen: Blood from Arm, Right Updated: 02/16/24 0600     Blood Culture No growth at 4 days    Blood Culture - Blood, Arm, Left [[id number]]  (Normal) Collected: 02/12/24 0547    Lab Status: Preliminary result Specimen: Blood from Arm, Left Updated: 02/16/24 0600     Blood Culture No growth at 4 days    Blood Culture - Blood, Hand, Left [[id number]]  (Abnormal)  (Susceptibility) Collected: 02/10/24 1154    Lab Status: Final result Specimen: Blood from Hand, Left Updated: 02/13/24 0619     Blood Culture Staphylococcus aureus, MRSA     Comment:   Infectious disease consultation is highly recommended to rule out distant foci of infection.  Methicillin resistant Staphylococcus aureus, Patient may be an isolation risk.        Isolated from Aerobic and Anaerobic Bottles     Gram Stain Aerobic Bottle Gram positive cocci in clusters      Anaerobic Bottle Gram positive cocci in clusters    Susceptibility        Staphylococcus  aureus, MRSA      JULIO      Gentamicin Susceptible      Oxacillin Resistant      Rifampin Susceptible      Vancomycin Susceptible                           Blood Culture - Blood, Arm, Left [[id number]]  (Abnormal) Collected: 02/10/24 1150    Lab Status: Final result Specimen: Blood from Arm, Left Updated: 02/13/24 0619     Blood Culture Staphylococcus aureus, MRSA     Comment: Refer to previous blood culture collected on 02/10/2024 1154 for MICs    Infectious disease consultation is highly recommended to rule out distant foci of infection.  Methicillin resistant Staphylococcus aureus, Patient may be an isolation risk.        Isolated from Aerobic and Anaerobic Bottles     Gram Stain Aerobic Bottle Gram positive cocci in clusters      Anaerobic Bottle Gram positive cocci in clusters    Anaerobic Culture - Wound, Ankle, Left [[id number]]  (Normal) Collected: 02/10/24 0840    Lab Status: Final result Specimen: Wound from Ankle, Left Updated: 02/15/24 0640     Anaerobic Culture No anaerobes isolated at 5 days    Wound Culture - Wound, Ankle, Left [[id number]]  (Abnormal)  (Susceptibility) Collected: 02/10/24 0840    Lab Status: Final result Specimen: Wound from Ankle, Left Updated: 02/13/24 0701     Wound Culture Scant growth (1+) Staphylococcus aureus, MRSA     Comment:   Methicillin resistant Staphylococcus aureus, Patient may be an isolation risk.        Gram Stain No organisms seen    Susceptibility        Staphylococcus aureus, MRSA      JULIO      Clindamycin Susceptible      Erythromycin Resistant      Oxacillin Resistant      Rifampin Susceptible      Tetracycline Susceptible      Trimethoprim + Sulfamethoxazole Susceptible      Vancomycin Susceptible                           Tissue / Bone Culture - Bone, Ankle, Left [[id number]]  (Abnormal) Collected: 02/10/24 0817    Lab Status: Final result Specimen: Bone from Ankle, Left Updated: 02/13/24 0701     Tissue Culture Scant growth (1+) Staphylococcus aureus, MRSA      Comment:   Methicillin resistant Staphylococcus aureus, Patient may be an isolation risk.        Gram Stain No organisms seen    Narrative:      Refer to previous wound culture collected on 02/10/2024 0840 for MICs      AFB Culture - Bone, Ankle, Left [[id number]] Collected: 02/10/24 0817    Lab Status: Preliminary result Specimen: Bone from Ankle, Left Updated: 02/15/24 0901     AFB Culture No AFB isolated at less than 1 week     AFB Stain No acid fast bacilli seen on direct smear    Blood Culture - Blood, Hand, Left [[id number]]  (Abnormal)  (Susceptibility) Collected: 02/08/24 1138    Lab Status: Final result Specimen: Blood from Hand, Left Updated: 02/11/24 0641     Blood Culture Staphylococcus aureus, MRSA     Comment:   Infectious disease consultation is highly recommended to rule out distant foci of infection.  Methicillin resistant Staphylococcus aureus, Patient may be an isolation risk.        Isolated from Aerobic and Anaerobic Bottles     Gram Stain Aerobic Bottle Gram positive cocci in clusters      Anaerobic Bottle Gram positive cocci in clusters    Susceptibility        Staphylococcus aureus, MRSA      JULIO      Gentamicin Susceptible      Oxacillin Resistant      Rifampin Susceptible      Vancomycin Susceptible                           Blood Culture - Blood, Hand, Right [[id number]]  (Abnormal) Collected: 02/08/24 1138    Lab Status: Final result Specimen: Blood from Hand, Right Updated: 02/11/24 0643     Blood Culture Staphylococcus aureus, MRSA     Comment:   Infectious disease consultation is highly recommended to rule out distant foci of infection.  Methicillin resistant Staphylococcus aureus, Patient may be an isolation risk.        Isolated from Aerobic and Anaerobic Bottles     Gram Stain Aerobic Bottle Gram positive cocci in clusters      Anaerobic Bottle Gram positive cocci in clusters    Narrative:      Refer to previous blood culture collected on 02/08/2024 at 1138 for MICs.        Blood  Culture ID, PCR - Blood, Hand, Left [[id number]]  (Abnormal) Collected: 02/08/24 1138    Lab Status: Final result Specimen: Blood from Hand, Left Updated: 02/09/24 0356     BCID, PCR Staph aureus. mecA/C and MREJ (methicillin resistance gene) detected. Identification by BCID2 PCR.     BOTTLE TYPE Aerobic Bottle    Narrative:      Infectious disease consultation is highly recommended to rule out distant foci of infection.            MRI Ankle Left Without Contrast    Result Date: 2/8/2024  Impression:   1. Severe tendinosis and enthesopathy at the distal Achilles tendon.  There is a full-thickness gap of the distal tendon from the insertion site measuring up to 4.6 cm consistent with full thickness tear.  Severe tendinosis of the musculotendinous junction of the Achilles.  Associated soft tissue edema. 2. Marrow edema calcaneus with erosive changes at the insertion site of the Achilles tendon likely related to inflammatory arthritis and reactive edema calcaneus. 3. Findings of plantar fasciitis medial band plantar fascia. 4. Mild hindfoot and midfoot degenerative change. 5. Tenosynovitis of the flexor tendons.     SAEED VIGIL MD       Electronically Signed and Approved By: SAEED VIGIL MD on 2/08/2024 at 14:57             XR Foot 3+ View Left    Result Date: 2/7/2024  Impression:   1. Abnormal appearance to the area of the distal Achilles tendon.  Injury to this area could not be excluded. 2. Osseous demineralization 3. Soft tissue swelling about the medial ankle/foot. 4. Calcification/ossification adjacent to the medial tubercle of the talus that may relate to the findings in the posterior ankle area as opposed to an injury around the medial tubercle of the talus       AUSTIN HARRIS MD       Electronically Signed and Approved By: AUSTIN HARRIS MD on 2/07/2024 at 16:04             XR Ankle 3+ View Left    Result Date: 2/7/2024  Impression:   1. Abnormal appearance to the area of the distal Achilles tendon.  Injury to  this area could not be excluded. 2. Osseous demineralization 3. Soft tissue swelling about the medial ankle/foot. 4. Calcification/ossification adjacent to the medial tubercle of the talus that may relate to the findings in the posterior ankle area as opposed to an injury around the medial tubercle of the talus       AUSTIN HARRIS MD       Electronically Signed and Approved By: AUSTIN HARRIS MD on 2/07/2024 at 16:04             XR Tibia Fibula 2 View Left    Result Date: 2/7/2024  Impression:   1. Abnormal appearance to the area of the distal Achilles tendon.  Injury to this area could not be excluded. 2. Osseous demineralization 3. Soft tissue swelling about the medial ankle/foot. 4. Calcification/ossification adjacent to the medial tubercle of the talus that may relate to the findings in the posterior ankle area as opposed to an injury around the medial tubercle of the talus       AUSTIN HARRIS MD       Electronically Signed and Approved By: AUSTIN HARRIS MD on 2/07/2024 at 16:04              Results for orders placed during the hospital encounter of 02/07/24    Duplex Venous Lower Extremity - Left CAR    Interpretation Summary    Normal left lower extremity venous duplex scan.      Results for orders placed during the hospital encounter of 02/07/24    Duplex Venous Lower Extremity - Left CAR    Interpretation Summary    Normal left lower extremity venous duplex scan.      Results for orders placed during the hospital encounter of 02/07/24    Adult Transthoracic Echo Complete W/ Cont if Necessary Per Protocol    Interpretation Summary    Left ventricular systolic function is normal. Calculated left ventricular EF = 55.3%    Estimated right ventricular systolic pressure from tricuspid regurgitation is normal (<35 mmHg).    No obvious wall motion abnormalities    No obvious vegetations on any valve. If high clinical suspicion, recommend TRACIE      Labs Pending at Discharge:  Pending Labs       Order Current Status    AFB  Culture - Bone, Ankle, Left Preliminary result    Blood Culture - Blood, Arm, Left Preliminary result    Blood Culture - Blood, Arm, Right Preliminary result              Time spent on Discharge including face to face service: Greater than 35 minutes    Electronically signed by Tono Kang MD, 02/16/24, 12:53 PM EST.

## 2024-02-16 NOTE — PROGRESS NOTES
"[facility] Clinical Pharmacy Services: Vancomycin Monitoring Note    Karsten Johnson is a 48 y.o. male who is on day  of pharmacy to dose vancomycin for Bacteremia and Bone and/or Joint Infection.    Previous Vancomycin Dose:   1750 mg IV every  12  hours  Imaging Reviewed?: Yes     TTE: no obvious vegetations on any valve; if high clinical suspicion, recommend TRACIE    Updated Cultures and Sensitivities:    Blood cx : NGTD  2/10 Blood cx : MRSA  2/10 Anaerobic wound cx, left ankle -NGTD  2/10 Wound cx, left ankle: MRSA  2/10 Tissue/bone culture, left ankle: MRSA  2/10 AFB , bone  left ankle- preliminary, No AFB seen on direct smear   Blood cx  MRSA        Vitals/Labs  Ht: 167.6 cm (66\"); Wt: 104 kg (230 lb 6.1 oz)   Temp (24hrs), Av °F (37.2 °C), Min:97.5 °F (36.4 °C), Max:100.8 °F (38.2 °C)   Estimated Creatinine Clearance: 152.1 mL/min (A) (by C-G formula based on SCr of 0.66 mg/dL (L)).       Results from last 7 days   Lab Units 24  0605 02/15/24  0530 24  0724 24  0411 24  0547 24  0425 02/10/24  0302   VANCOMYCIN RM mcg/mL  --   --   --   --  16.26  --  19.40   VANCOMYCIN TR mcg/mL 20.90*  --  11.95  --   --   --   --    CREATININE mg/dL 0.66* 0.84 0.62*   < > 0.71*   < > 0.66*   WBC 10*3/mm3 21.19* 25.25* 25.74*   < > 23.93*   < > 24.04*    < > = values in this interval not displayed.     Assessment/Plan    Current Vancomycin Dose:  1750 mg IV every 12 hours; which provides the following predicted parameters:  AUC24,ss: 540 mg/L.hr  PAUC*: 100 %  Ctrough,ss: 14.2 mg/L  Pconc*: 0 %  Tox.: 9 %      We will continue to monitor patient changes and renal function     Thank you for involving pharmacy in this patient's care. Please contact pharmacy with any questions or concerns.    Tisha Mcbride  Clinical Pharmacist          "

## 2024-02-16 NOTE — PROGRESS NOTES
[facility] - PODIATRY    Today's Date: 02/16/24    Patient Name: Karsten Johnson  MRN: [id number]  CSN: [id number]  PCP: Provider, No Known  Referring Provider: No ref. provider found  Attending Provider: Tono Kang MD  Length of Stay: 9    SUBJECTIVE   Chief Complaint:     HPI: Karsten Johnson, a 48 y.o.male, .  Denies any constitutional symptoms. No other pedal complaints at this time.  Wound VAC in place.  No complaints.    History reviewed. No pertinent past medical history.  Past Surgical History:   Procedure Laterality Date    INCISION AND DRAINAGE OF WOUND Left 2/10/2024    Procedure: INCISION AND DRAINAGE  LEFT ANKLE,BIOPSY LEFT ANKEL;  Surgeon: Alvarez Beck DPM;  Location: [facility];  Service: Podiatry;  Laterality: Left;    WOUND DEBRIDEMENT Left 2/14/2024    Procedure: INCISION AND DEBRIDEMENT WOUND OF LEFT ANKLE;  Surgeon: Alvarez Beck DPM;  Location: [facility];  Service: Podiatry;  Laterality: Left;     History reviewed. No pertinent family history.  Social History     Socioeconomic History    Marital status:    Tobacco Use    Smoking status: Every Day     Packs/day: 1.00     Years: 25.00     Additional pack years: 0.00     Total pack years: 25.00     Types: Cigarettes     Passive exposure: Never    Smokeless tobacco: Never   Vaping Use    Vaping Use: Never used   Substance and Sexual Activity    Alcohol use: Yes     Alcohol/week: 3.0 standard drinks of alcohol     Types: 3 Cans of beer per week    Drug use: Yes     Types: Marijuana    Sexual activity: Defer     No Known Allergies  Current Facility-Administered Medications   Medication Dose Route Frequency Provider Last Rate Last Admin    acetaminophen (TYLENOL) tablet 650 mg  650 mg Oral Q6H PRN Alvarez Beck DPM   650 mg at 02/08/24 1616    Or    acetaminophen (TYLENOL) suppository 650 mg  650 mg Rectal Q6H PRN Alvarez Beck DPM        sennosides-docusate (PERICOLACE) 8.6-50 MG per tablet 2  tablet  2 tablet Oral BID PRN Alvarez Beck, DPM   2 tablet at 02/11/24 2056    And    polyethylene glycol (MIRALAX) packet 17 g  17 g Oral Daily PRN Alvarez Beck, DPM   17 g at 02/12/24 1836    And    bisacodyl (DULCOLAX) EC tablet 5 mg  5 mg Oral Daily PRN Alvarez Beck, DPM   5 mg at 02/12/24 2059    And    bisacodyl (DULCOLAX) suppository 10 mg  10 mg Rectal Daily PRN Alvarez Beck, DPM   10 mg at 02/13/24 2259    dextrose (D50W) (25 g/50 mL) IV injection 25 g  25 g Intravenous Q15 Min PRN Alvarez Beck DPM        dextrose (GLUTOSE) oral gel 15 g  15 g Oral Q15 Min PRN Alvarez Beck, DPM        diphenhydrAMINE (BENADRYL) capsule 50 mg  50 mg Oral Q6H PRN Alvarez Beck, DPM   50 mg at 02/13/24 2354    Enoxaparin Sodium (LOVENOX) syringe 40 mg  40 mg Subcutaneous Daily Alvarze Beck, DPM   40 mg at 02/16/24 0956    glucagon (GLUCAGEN) injection 1 mg  1 mg Intramuscular Q15 Min PRN Alvarez Beck, DPM        hydrALAZINE (APRESOLINE) injection 10 mg  10 mg Intravenous Q6H PRN Alvarez Beck, DPM   10 mg at 02/12/24 1539    HYDROcodone-acetaminophen (NORCO) 7.5-325 MG per tablet 1 tablet  1 tablet Oral Q4H PRN Alvarez Beck, DPM        HYDROcodone-acetaminophen (NORCO) 7.5-325 MG per tablet 2 tablet  2 tablet Oral Q4H PRN Alvarez Beck, DPM   2 tablet at 02/16/24 1144    HYDROmorphone (DILAUDID) injection 1 mg  1 mg Intravenous Q3H PRN Alvarez Beck, DPM   1 mg at 02/15/24 2145    Insulin Lispro (humaLOG) injection 2-9 Units  2-9 Units Subcutaneous 4x Daily AC & at Bedtime Alvarez Beck DPM   2 Units at 02/16/24 1142    lactated ringers infusion  9 mL/hr Intravenous Continuous PRN Alvarez Beck DPM   New Bag at 02/14/24 1138    lisinopril (PRINIVIL,ZESTRIL) tablet 40 mg  40 mg Oral Q24H Alvarez Beck DPM   40 mg at 02/16/24 0956    melatonin tablet 10 mg  10 mg Oral Nightly PRN Alvarez Beck DPM   10 mg at 02/12/24 3969     ondansetron (ZOFRAN) injection 4 mg  4 mg Intravenous Q6H PRN Rotterman, Alvarez T, DPM   4 mg at 02/14/24 0806    Pharmacy to dose vancomycin   Does not apply Continuous PRN Tono Kang MD        sodium chloride 0.9 % flush 10 mL  10 mL Intravenous Q12H Rotterman, Alvarez T, DPM   10 mL at 02/16/24 1045    sodium chloride 0.9 % flush 10 mL  10 mL Intravenous PRN Rotterman, Alvarez T, DPM        sodium chloride 0.9 % flush 10 mL  10 mL Intravenous Q12H Rotterman, Alvarez T, DPM   10 mL at 02/15/24 2146    sodium chloride 0.9 % flush 10 mL  10 mL Intravenous PRN Rotterman, Alvarez T, DPM        sodium chloride 0.9 % flush 20 mL  20 mL Intravenous PRN Rotterman, Alvarez T, DPM        sodium chloride 0.9 % infusion 40 mL  40 mL Intravenous PRN Rotterman, Alvarez T, DPM        sodium chloride 0.9 % infusion 40 mL  40 mL Intravenous PRN Rotterman, Alvarez T, DPM        vancomycin 1750 mg/500 mL 0.9% NS IVPB ([facility])  1,750 mg Intravenous Q12H Tono Kang MD 0 mL/hr at 02/16/24 0014 1,750 mg at 02/16/24 1044     Review of Systems   All other systems reviewed and are negative.      OBJECTIVE     Vitals:    02/16/24 1100   BP: 140/78   Pulse: 68   Resp: 16   Temp: 99.1 °F (37.3 °C)   SpO2: 99%       PHYSICAL EXAM    GEN:   A&Ox3, NAD. Pt presents in hospital bed.     Foot/Ankle Exam     GENERAL  Appearance:  appears stated age  Orientation:  AAOx3  Affect:  appropriate  Gait:  unimpaired  Assistance:  independent       VASCULAR      Right Foot Vascularity   Normal vascular exam    Dorsalis pedis:  2+  Posterior tibial:  2+  Skin temperature:  warm  Edema grading:  None  CFT:  < 3 seconds  Pedal hair growth:  Present  Varicosities:  none      Left Foot Vascularity   Normal vascular exam    Dorsalis pedis:  2+  Posterior tibial:  2+  Skin temperature:  warm  Edema grading:  None  CFT:  < 3 seconds  Pedal hair growth:  Present  Varicosities:  none     NEUROLOGIC      Right Foot Neurologic   Normal sensation    Light touch sensation:  normal  Vibratory sensation: normal  Hot/Cold sensation: normal  Protective Sensation using [place]-Flip Monofilament:   Sites intact: 10  Sites tested: 10      Left Foot Neurologic   Normal sensation    Light touch sensation: normal  Vibratory sensation: normal  Hot/Cold sensation:  normal  Protective Sensation using [place]-Flip Monofilament:   Sites intact: 10  Sites tested: 10     MUSCLE STRENGTH      Right Foot Muscle Strength   Foot dorsiflexion:  4  Foot plantar flexion:  4  Foot inversion:  4  Foot eversion:  4      Left Foot Muscle Strength   Foot dorsiflexion:  4  Foot plantar flexion:  4  Foot inversion:  4  Foot eversion:  4     RANGE OF MOTION      Right Foot Range of Motion   Foot and ankle ROM within normal limits        Left Foot Range of Motion   Foot and ankle ROM within normal limits       DERMATOLOGIC       Right Foot Dermatologic   Skin  Right foot skin is intact.      Wound VAC in place to left heel.     RADIOLOGY/NUCLEAR:  Adult Transthoracic Echo Complete W/ Cont if Necessary Per Protocol    Result Date: 2/9/2024  Narrative:   Left ventricular systolic function is normal. Calculated left ventricular EF = 55.3%   Estimated right ventricular systolic pressure from tricuspid regurgitation is normal (<35 mmHg).   No obvious wall motion abnormalities   No obvious vegetations on any valve. If high clinical suspicion, recommend TRACIE     MRI Ankle Left Without Contrast    Result Date: 2/8/2024  Narrative: PROCEDURE: MRI ANKLE LEFT  WO CONTRAST  COMPARISON: [facility], CR, XR ANKLE 3+ VW LEFT, 2/07/2024, 15:42.  INDICATIONS: FELT POP TO LEFT HEEL X 1 WEEK AGO AT WORK. SEVERE POSTERIOR ANKLE PAIN WITH A KNOT TO LEFT MEDIAL ANKLE AND SWELLING.     TECHNIQUE: A complete multi-planar examination was performed without contrast.  FINDINGS:  There is severe tendinosis distal Achilles tendon.  There is a full-thickness disruption of the tendon at its insertion site on the calcaneus.  This  full-thickness gap measures 4.6 cm from craniocaudal.  No significant retraction of the tendon fibers.  There is high-grade partial-thickness interstitial tearing of the more watershed/musculotendinous junction.  There is edema in Kager's fat.  There is abnormal marrow edema of the calcaneus most pronounced at the insertion site.  There is erosive change at the Achilles tendon insertion site consistent with probable inflammatory arthritis and enthesopathy.  The calcifications seen in the distal tendon on radiograph are not well seen on MRI.  There is moderate subtalar joint space narrowing and moderate talonavicular joint space narrowing.  There is mild midfoot degenerative change.  There is a moderate-sized os trigonum with mild marrow edema.  There is a small tibiotalar joint effusion.    There is fluid in the posterior tibialis and other flexor tendons consistent with tenosynovitis.  The tendons themselves are intact.  The peroneal tendons are normal appearance.  The extensor tendons are normal appearance.  The syndesmotic ligaments are intact.  The anterior and posterior talofibular ligament and calcaneofibular ligament are intact.  The medial band plantar fascia is thickened has high signal intensity.  There is a moderate-sized plantar calcaneal spur.  The sinus tarsi has fat signal intensity which is normal.  The tarsal tunnel is normal.  There is soft tissue edema most pronounced posteriorly.  There are no osteochondral lesions of the talar dome.  There is mild hindfoot valgus alignment.      Impression:   1. Severe tendinosis and enthesopathy at the distal Achilles tendon.  There is a full-thickness gap of the distal tendon from the insertion site measuring up to 4.6 cm consistent with full thickness tear.  Severe tendinosis of the musculotendinous junction of the Achilles.  Associated soft tissue edema. 2. Marrow edema calcaneus with erosive changes at the insertion site of the Achilles tendon likely  related to inflammatory arthritis and reactive edema calcaneus. 3. Findings of plantar fasciitis medial band plantar fascia. 4. Mild hindfoot and midfoot degenerative change. 5. Tenosynovitis of the flexor tendons.     SAEED VIGLI MD       Electronically Signed and Approved By: SAEED VIGIL MD on 2/08/2024 at 14:57             Duplex Venous Lower Extremity - Left CAR    Result Date: 2/7/2024  Narrative:   Normal left lower extremity venous duplex scan.     XR Foot 3+ View Left    Result Date: 2/7/2024  Narrative: PROCEDURE: XR ANKLE 3+ VW LEFT, 2/07/2024, 15:42 XR FOOT 3+ VW LEFT, 2/07/2024, 15:44 XR TIBIA FIBULA 2 VW LEFT, 2/07/2024, 15:  COMPARISON: None  INDICATIONS: pain no known injury swelling and bruising medial aspect  FINDINGS:  Ankle:  There is soft tissue swelling about the medial portion of the ankle.  The ankle mortise looks intact.  There is osseous demineralization.  There is abnormal appearance to the distal Achilles tendon area with abnormal ossification in soft tissue swelling.  An Achilles tendon injury could account for this.  There is a plantar calcaneal spur.  There is ossification in the soft tissues adjacent to the medial tibial tubercle that may relate to more of the changes in the posterior ankle as opposed to an injury around this area.  Foot:  As noted on the ankle films there is an abnormal appearance to the posterior ankle around the distal Achilles tendon with abnormal ossification and soft tissue swelling that could relate to a more acute Achilles tendon injury.  There is no subluxation or dislocation of the digits.  There is osseous demineralization.  Tibia fibula:  There is no definite fracture.  There is osseous demineralization.      Impression:   1. Abnormal appearance to the area of the distal Achilles tendon.  Injury to this area could not be excluded. 2. Osseous demineralization 3. Soft tissue swelling about the medial ankle/foot. 4. Calcification/ossification adjacent to the  medial tubercle of the talus that may relate to the findings in the posterior ankle area as opposed to an injury around the medial tubercle of the talus       AUSTIN HARRIS MD       Electronically Signed and Approved By: AUSTIN HARRIS MD on 2/07/2024 at 16:04             XR Ankle 3+ View Left    Result Date: 2/7/2024  Narrative: PROCEDURE: XR ANKLE 3+ VW LEFT, 2/07/2024, 15:42 XR FOOT 3+ VW LEFT, 2/07/2024, 15:44 XR TIBIA FIBULA 2 VW LEFT, 2/07/2024, 15:  COMPARISON: None  INDICATIONS: pain no known injury swelling and bruising medial aspect  FINDINGS:  Ankle:  There is soft tissue swelling about the medial portion of the ankle.  The ankle mortise looks intact.  There is osseous demineralization.  There is abnormal appearance to the distal Achilles tendon area with abnormal ossification in soft tissue swelling.  An Achilles tendon injury could account for this.  There is a plantar calcaneal spur.  There is ossification in the soft tissues adjacent to the medial tibial tubercle that may relate to more of the changes in the posterior ankle as opposed to an injury around this area.  Foot:  As noted on the ankle films there is an abnormal appearance to the posterior ankle around the distal Achilles tendon with abnormal ossification and soft tissue swelling that could relate to a more acute Achilles tendon injury.  There is no subluxation or dislocation of the digits.  There is osseous demineralization.  Tibia fibula:  There is no definite fracture.  There is osseous demineralization.      Impression:   1. Abnormal appearance to the area of the distal Achilles tendon.  Injury to this area could not be excluded. 2. Osseous demineralization 3. Soft tissue swelling about the medial ankle/foot. 4. Calcification/ossification adjacent to the medial tubercle of the talus that may relate to the findings in the posterior ankle area as opposed to an injury around the medial tubercle of the talus       AUSTIN HARRIS MD        Electronically Signed and Approved By: AUSTIN HARRIS MD on 2/07/2024 at 16:04             XR Tibia Fibula 2 View Left    Result Date: 2/7/2024  Narrative: PROCEDURE: XR ANKLE 3+ VW LEFT, 2/07/2024, 15:42 XR FOOT 3+ VW LEFT, 2/07/2024, 15:44 XR TIBIA FIBULA 2 VW LEFT, 2/07/2024, 15:  COMPARISON: None  INDICATIONS: pain no known injury swelling and bruising medial aspect  FINDINGS:  Ankle:  There is soft tissue swelling about the medial portion of the ankle.  The ankle mortise looks intact.  There is osseous demineralization.  There is abnormal appearance to the distal Achilles tendon area with abnormal ossification in soft tissue swelling.  An Achilles tendon injury could account for this.  There is a plantar calcaneal spur.  There is ossification in the soft tissues adjacent to the medial tibial tubercle that may relate to more of the changes in the posterior ankle as opposed to an injury around this area.  Foot:  As noted on the ankle films there is an abnormal appearance to the posterior ankle around the distal Achilles tendon with abnormal ossification and soft tissue swelling that could relate to a more acute Achilles tendon injury.  There is no subluxation or dislocation of the digits.  There is osseous demineralization.  Tibia fibula:  There is no definite fracture.  There is osseous demineralization.      Impression:   1. Abnormal appearance to the area of the distal Achilles tendon.  Injury to this area could not be excluded. 2. Osseous demineralization 3. Soft tissue swelling about the medial ankle/foot. 4. Calcification/ossification adjacent to the medial tubercle of the talus that may relate to the findings in the posterior ankle area as opposed to an injury around the medial tubercle of the talus       AUSTIN HARRIS MD       Electronically Signed and Approved By: AUSTIN HARRIS MD on 2/07/2024 at 16:04              LABORATORY/CULTURE RESULTS:  Results from last 7 days   Lab Units 02/16/24  0605  02/15/24  0530 02/14/24  0724   WBC 10*3/mm3 21.19* 25.25* 25.74*   HEMOGLOBIN g/dL 10.0* 9.8* 11.4*   HEMATOCRIT % 31.3* 30.2* 34.8*   PLATELETS 10*3/mm3 306 276 286     Results from last 7 days   Lab Units 02/16/24  0605 02/15/24  0530 02/14/24  0724   SODIUM mmol/L 133* 134* 134*   POTASSIUM mmol/L 4.3 4.6 4.1   CHLORIDE mmol/L 100 101 99   CO2 mmol/L 27.0 26.6 26.1   BUN mg/dL 10 15 8   CREATININE mg/dL 0.66* 0.84 0.62*   CALCIUM mg/dL 8.4* 8.0* 8.6   GLUCOSE mg/dL 124* 182* 150*         Microbiology Results (last 10 days)       Procedure Component Value - Date/Time    Blood Culture - Blood, Arm, Right [[id number]]  (Normal) Collected: 02/12/24 0548    Lab Status: Preliminary result Specimen: Blood from Arm, Right Updated: 02/16/24 0600     Blood Culture No growth at 4 days    Blood Culture - Blood, Arm, Left [[id number]]  (Normal) Collected: 02/12/24 0547    Lab Status: Preliminary result Specimen: Blood from Arm, Left Updated: 02/16/24 0600     Blood Culture No growth at 4 days    Blood Culture - Blood, Hand, Left [[id number]]  (Abnormal)  (Susceptibility) Collected: 02/10/24 1154    Lab Status: Final result Specimen: Blood from Hand, Left Updated: 02/13/24 0619     Blood Culture Staphylococcus aureus, MRSA     Comment:   Infectious disease consultation is highly recommended to rule out distant foci of infection.  Methicillin resistant Staphylococcus aureus, Patient may be an isolation risk.        Isolated from Aerobic and Anaerobic Bottles     Gram Stain Aerobic Bottle Gram positive cocci in clusters      Anaerobic Bottle Gram positive cocci in clusters    Susceptibility        Staphylococcus aureus, MRSA      JULIO      Gentamicin Susceptible      Oxacillin Resistant      Rifampin Susceptible      Vancomycin Susceptible                           Blood Culture - Blood, Arm, Left [[id number]]  (Abnormal) Collected: 02/10/24 1150    Lab Status: Final result Specimen: Blood from Arm, Left Updated: 02/13/24 0619      Blood Culture Staphylococcus aureus, MRSA     Comment: Refer to previous blood culture collected on 02/10/2024 1154 for MICs    Infectious disease consultation is highly recommended to rule out distant foci of infection.  Methicillin resistant Staphylococcus aureus, Patient may be an isolation risk.        Isolated from Aerobic and Anaerobic Bottles     Gram Stain Aerobic Bottle Gram positive cocci in clusters      Anaerobic Bottle Gram positive cocci in clusters    Anaerobic Culture - Wound, Ankle, Left [[id number]]  (Normal) Collected: 02/10/24 0840    Lab Status: Final result Specimen: Wound from Ankle, Left Updated: 02/15/24 0640     Anaerobic Culture No anaerobes isolated at 5 days    Wound Culture - Wound, Ankle, Left [[id number]]  (Abnormal)  (Susceptibility) Collected: 02/10/24 0840    Lab Status: Final result Specimen: Wound from Ankle, Left Updated: 02/13/24 0701     Wound Culture Scant growth (1+) Staphylococcus aureus, MRSA     Comment:   Methicillin resistant Staphylococcus aureus, Patient may be an isolation risk.        Gram Stain No organisms seen    Susceptibility        Staphylococcus aureus, MRSA      JULIO      Clindamycin Susceptible      Erythromycin Resistant      Oxacillin Resistant      Rifampin Susceptible      Tetracycline Susceptible      Trimethoprim + Sulfamethoxazole Susceptible      Vancomycin Susceptible                           Tissue / Bone Culture - Bone, Ankle, Left [[id number]]  (Abnormal) Collected: 02/10/24 0817    Lab Status: Final result Specimen: Bone from Ankle, Left Updated: 02/13/24 0701     Tissue Culture Scant growth (1+) Staphylococcus aureus, MRSA     Comment:   Methicillin resistant Staphylococcus aureus, Patient may be an isolation risk.        Gram Stain No organisms seen    Narrative:      Refer to previous wound culture collected on 02/10/2024 0840 for MICs      AFB Culture - Bone, Ankle, Left [[id number]] Collected: 02/10/24 0817    Lab Status: Preliminary result  Specimen: Bone from Ankle, Left Updated: 02/15/24 0901     AFB Culture No AFB isolated at less than 1 week     AFB Stain No acid fast bacilli seen on direct smear    Blood Culture - Blood, Hand, Left [[id number]]  (Abnormal)  (Susceptibility) Collected: 02/08/24 1138    Lab Status: Final result Specimen: Blood from Hand, Left Updated: 02/11/24 0641     Blood Culture Staphylococcus aureus, MRSA     Comment:   Infectious disease consultation is highly recommended to rule out distant foci of infection.  Methicillin resistant Staphylococcus aureus, Patient may be an isolation risk.        Isolated from Aerobic and Anaerobic Bottles     Gram Stain Aerobic Bottle Gram positive cocci in clusters      Anaerobic Bottle Gram positive cocci in clusters    Susceptibility        Staphylococcus aureus, MRSA      JULIO      Gentamicin Susceptible      Oxacillin Resistant      Rifampin Susceptible      Vancomycin Susceptible                           Blood Culture - Blood, Hand, Right [[id number]]  (Abnormal) Collected: 02/08/24 1138    Lab Status: Final result Specimen: Blood from Hand, Right Updated: 02/11/24 0643     Blood Culture Staphylococcus aureus, MRSA     Comment:   Infectious disease consultation is highly recommended to rule out distant foci of infection.  Methicillin resistant Staphylococcus aureus, Patient may be an isolation risk.        Isolated from Aerobic and Anaerobic Bottles     Gram Stain Aerobic Bottle Gram positive cocci in clusters      Anaerobic Bottle Gram positive cocci in clusters    Narrative:      Refer to previous blood culture collected on 02/08/2024 at 1138 for MICs.        Blood Culture ID, PCR - Blood, Hand, Left [[id number]]  (Abnormal) Collected: 02/08/24 1138    Lab Status: Final result Specimen: Blood from Hand, Left Updated: 02/09/24 0356     BCID, PCR Staph aureus. mecA/C and MREJ (methicillin resistance gene) detected. Identification by BCID2 PCR.     BOTTLE TYPE Aerobic Bottle    Narrative:       Infectious disease consultation is highly recommended to rule out distant foci of infection.             ASSESSMENT/PLAN     Active Hospital Problems:  Active Hospital Problems    Diagnosis     **Achilles rupture     Acute hematogenous osteomyelitis     Abscess of heel        Wound VAC in place to left heel  No further surgical intervention planned from podiatry standpoint  Follow-up in 2 to 4 weeks after discharge  Discussed plan with patient    Discussed findings and treatment plan including risks, benefits, and treatment options with patient in detail. Patient agreed with treatment plan.    This document has been electronically signed by Alvarez Beck DPM on February 16, 2024 12:52 EST

## 2024-02-17 LAB
BACTERIA SPEC AEROBE CULT: NORMAL
BACTERIA SPEC AEROBE CULT: NORMAL
MYCOBACTERIUM SPEC CULT: NORMAL
NIGHT BLUE STAIN TISS: NORMAL

## 2024-02-19 ENCOUNTER — HOSPITAL ENCOUNTER (OUTPATIENT)
Dept: INFUSION THERAPY | Facility: HOSPITAL | Age: 49
Discharge: HOME | End: 2024-02-19
Admitting: FAMILY MEDICINE
Payer: COMMERCIAL

## 2024-02-19 VITALS
DIASTOLIC BLOOD PRESSURE: 88 MMHG | OXYGEN SATURATION: 97 % | SYSTOLIC BLOOD PRESSURE: 173 MMHG | HEART RATE: 80 BPM | TEMPERATURE: 98.2 F | RESPIRATION RATE: 20 BRPM

## 2024-02-19 DIAGNOSIS — L02.619 ABSCESS OF HEEL: Primary | ICD-10-CM

## 2024-02-19 DIAGNOSIS — M86.071 ACUTE HEMATOGENOUS OSTEOMYELITIS OF RIGHT FOOT: Primary | ICD-10-CM

## 2024-02-19 DIAGNOSIS — M86.072 ACUTE HEMATOGENOUS OSTEOMYELITIS OF LEFT FOOT: ICD-10-CM

## 2024-02-19 LAB
ANION GAP SERPL CALCULATED.3IONS-SCNC: 7.9 MMOL/L (ref 5–15)
BUN SERPL-MCNC: 10 MG/DL (ref 6–20)
BUN/CREAT SERPL: 14.9 (ref 7–25)
CALCIUM SPEC-SCNC: 8.6 MG/DL (ref 8.6–10.5)
CHLORIDE SERPL-SCNC: 96 MMOL/L (ref 98–107)
CO2 SERPL-SCNC: 27.1 MMOL/L (ref 22–29)
CREAT SERPL-MCNC: 0.67 MG/DL (ref 0.76–1.27)
EGFRCR SERPLBLD CKD-EPI 2021: 113.7 ML/MIN/1.73
GLUCOSE SERPL-MCNC: 175 MG/DL (ref 65–99)
POTASSIUM SERPL-SCNC: 4 MMOL/L (ref 3.5–5.2)
SODIUM SERPL-SCNC: 131 MMOL/L (ref 136–145)
VANCOMYCIN SERPL-MCNC: 26 MCG/ML (ref 5–40)

## 2024-02-19 PROCEDURE — G0463 HOSPITAL OUTPT CLINIC VISIT: HCPCS

## 2024-02-19 PROCEDURE — 80202 ASSAY OF VANCOMYCIN: CPT | Performed by: FAMILY MEDICINE

## 2024-02-19 PROCEDURE — 36592 COLLECT BLOOD FROM PICC: CPT

## 2024-02-19 PROCEDURE — 80048 BASIC METABOLIC PNL TOTAL CA: CPT | Performed by: FAMILY MEDICINE

## 2024-02-19 PROCEDURE — 97605 NEG PRS WND THER DME<=50SQCM: CPT

## 2024-02-19 RX ORDER — HYDROCODONE BITARTRATE AND ACETAMINOPHEN 7.5; 325 MG/1; MG/1
1 TABLET ORAL EVERY 6 HOURS PRN
Qty: 30 TABLET | Refills: 0 | Status: SHIPPED | OUTPATIENT
Start: 2024-02-19

## 2024-02-19 NOTE — SIGNIFICANT NOTE
Wound Eval / Progress Noted     [facility]     Patient Name: Karsten Johnson  : 1975  MRN: [id number]  Today's Date: 2024                 Admit Date: 2024    Visit Dx:    ICD-10-CM ICD-9-CM   1. Abscess of heel  L02.619 682.7   2. Acute hematogenous osteomyelitis of left foot  M86.072 730.07         * No active hospital problems. *        History reviewed. No pertinent past medical history.     Past Surgical History:   Procedure Laterality Date    INCISION AND DRAINAGE OF WOUND Left 2/10/2024    Procedure: INCISION AND DRAINAGE  LEFT ANKLE,BIOPSY LEFT ANKEL;  Surgeon: Alvarez Beck DPM;  [facility];  Service: Podiatry;  Laterality: Left;    WOUND DEBRIDEMENT Left 2024    Procedure: INCISION AND DEBRIDEMENT WOUND OF LEFT ANKLE;  Surgeon: Alvarez Beck DPM;  Location: [facility];  Service: Podiatry;  Laterality: Left;         Physical Assessment:  Wound 02/10/24 0800 Left posterior ankle Other (comment) (Active)   Wound Image   24 1436   Dressing Appearance intact;moist drainage 24 1436   Closure None 24 1436   Base moist;red;granulating;yellow 24 1436   Red (%), Wound Tissue Color 95 24 1436   Yellow (%), Wound Tissue Color 5 24 1436   Periwound intact;pink 24 1436   Periwound Temperature warm 24 1436   Periwound Skin Turgor soft 24 1436   Edges open 24 1436   Wound Length (cm) 3.8 cm 24 1436   Wound Width (cm) 5.4 cm 24 1436   Wound Depth (cm) 1.7 cm 24 1436   Wound Surface Area (cm^2) 20.52 cm^2 24 1436   Wound Volume (cm^3) 34.884 cm^3 24 1436   Drainage Characteristics/Odor serosanguineous 24 1436   Drainage Amount small 24 1436   Care, Wound cleansed with;irrigated with;sterile normal saline;negative pressure wound therapy 24 1436   Dressing Care dressing applied;foam;transparent film 24 1436   Periwound Care absorptive dressing applied 24 1436        Wound 02/14/24 1210 Left posterior Incision (Active)   Wound Image     02/19/24 1436   Dressing Appearance intact;moist drainage 02/19/24 1436   Closure Surface sutures 02/19/24 1436   Base moist;pink 02/19/24 1436   Periwound pink;warm 02/19/24 1436   Periwound Temperature warm 02/19/24 1436   Periwound Skin Turgor soft 02/19/24 1436   Edges rolled/closed 02/19/24 1436   Drainage Characteristics/Odor serosanguineous 02/19/24 1436   Drainage Amount small 02/19/24 1436   Care, Wound cleansed with;irrigated with;sterile normal saline 02/19/24 1436   Dressing Care dressing applied;non-adherent;petroleum-based;hydrofiber;silver impregnated;transparent film 02/19/24 1436   Periwound Care absorptive dressing applied 02/19/24 1436       NPWT (Negative Pressure Wound Therapy) 02/15/24 1320 Left heel (Active)   Therapy Setting continuous therapy 02/19/24 1436   Dressing foam, black 02/19/24 1436   Pressure Setting 100 mmHg 02/19/24 1436   Sponges Inserted 1 02/19/24 1436        Wound Check / Follow-up:  Patient seen today for wound eval s/p inpatient admission with discharge on 02/16/24. During that admission he underwent two debridements to his left heel / lower leg over the achilles tendon. Today he presents with his significant other. Wound vac is intact with no leaking or alarms noted upon arrival.     Wound vac dressing and suture dressings removed.. Currently he has three incisions with sutures. There is one to left medial ankle, left lateral ankle and posterior ankle along lower leg. Cleansed all incisions with NS and gauze. Blotted dry. Recommending to continue protecting incisions with non-adherent petroleum based gauze and silver impregnated hydrofiber.     Left open heel wound with red moist granulating tissue. One small are of yellow tissue along the deepest portion of the wound. Cleansed and irrigated wound with NS. Recommending to continue negative pressure wound therapy. Wound filled with black foam  after apply skin barrier to gladys-wound and then drape applied. Track pad attached and vac connected. Foam compressed. Tubing bolstered and left foot wrapped with gauze roll and elastic bandage.     Wound vac pressure remains at 100mmhg at this time and is tolerated well by patient.     Impression: Surgical incision and debridement to left heel; Three surgical incisions along left ankle area    Short term goals:  Regain skin integrity. Negative pressure wound therapy to left heel as well as dressings to incisions. Recommending dressing changes MWF.     Keesha Patrick RN    2/19/2024    18:09 EST

## 2024-02-19 NOTE — PROGRESS NOTES
"[facility] Clinical Pharmacy Services: Vancomycin Monitoring Note    Karsten Johnson is a 48 y.o. male who is on day 8/42 of pharmacy to dose vancomycin for Bacteremia and Bone and/or Joint Infection.    Previous Vancomycin Dose:   1750 mg IV every  12  hours  Imaging Reviewed?: Yes    2/9 TTE: no obvious vegetations on any valve; if high clinical suspicion, recommend TRACIE    Updated Cultures and Sensitivities:   2/12 Blood cx 2/2: NGTD  2/10 Blood cx 2/2: MRSA  2/10 Anaerobic wound cx, left ankle -NGTD  2/10 Wound cx, left ankle: MRSA  2/10 Tissue/bone culture, left ankle: MRSA  2/10 AFB , bone  left ankle- preliminary, No AFB seen on direct smear  2/8 Blood cx 2/2 MRSA        Vitals/Labs  Ht: 167.6 cm (66\"); Wt: 104 kg (230 lb 6.1 oz)   No data recorded.   Estimated Creatinine Clearance: 149.8 mL/min (A) (by C-G formula based on SCr of 0.67 mg/dL (L)).       Results from last 7 days   Lab Units 02/19/24  1439 02/16/24  0605 02/15/24  0530 02/14/24  0724   VANCOMYCIN RM mcg/mL 26.00  --   --   --    VANCOMYCIN TR mcg/mL  --  20.90*  --  11.95   CREATININE mg/dL 0.67* 0.66* 0.84 0.62*   WBC 10*3/mm3  --  21.19* 25.25* 25.74*     Assessment/Plan    Random level this afternoon of 26.00 mcg/mL. Spoke to patient who stated no missed doses and gives 1750 mg infusion over 1.5 hours at 10 am and 10 pm. Will continue with current dosing     Current Vancomycin Dose: 1750 mg IV every 12 hours; which provides the following predicted parameters:  Exposure target: AUC24 (range)400-600 mg/L.hr   AUC24,ss: 548 mg/L.hr  PAUC*: 100 %  Ctrough,ss: 15.1 mg/L  Pconc*: 4 %  Tox.: 10 %    We will continue to monitor patient changes and renal function     Thank you for involving pharmacy in this patient's care. Please contact pharmacy with any questions or concerns.    Lupillo Leon, [facility]  Clinical Pharmacist    "

## 2024-02-20 ENCOUNTER — READMISSION MANAGEMENT (OUTPATIENT)
Dept: CALL CENTER | Facility: HOSPITAL | Age: 49
End: 2024-02-20
Payer: COMMERCIAL

## 2024-02-20 NOTE — OUTREACH NOTE
General Surgery Week 1 Survey      Flowsheet Row Responses   [facility] patient discharged from? [facility]   Does the patient have one of the following disease processes/diagnoses(primary or secondary)? General Surgery   Week 1 attempt successful? Yes   Call start time 1656   Call end time 1701   Discharge diagnosis Left Achilles tendon rupture  Left heel abscess  Acute hematogenous osteomyelitis of the left calcaneus  Suspected abscessed maxillary left incisor  MRSA bacteremia  Prediabetes  Hypertension  Obesity BMI 37  Mild anemia  High risk drug monitoring vancomycin  Multiple dental caries, I and D of left ankle wound   Is patient permission given to speak with other caregiver? Yes   Person spoke with today (if not patient) and relationship Patient and sig other Adriana   Medication alerts for this patient IV antibiotics per PICC. Reports appt in place for PICC line dressing change and lab work every Monday   Meds reviewed with patient/caregiver? Yes   Does the patient have all medications related to this admission filled (includes all antibiotics, pain medications, etc.) Yes   Is the patient taking all medications as directed (includes completed medication regime)? Yes   Does the patient have a follow up appointment scheduled with their surgeon? Yes   Has the patient kept scheduled appointments due by today? Yes   Has home health visited the patient within 72 hours of discharge? N/A  [Outpatient appt every 3rd day for wound vac change.]   Psychosocial issues? No   Did the patient receive a copy of their discharge instructions? Yes   Nursing interventions Reviewed instructions with patient   What is the patient's perception of their health status since discharge? Improving   Is the patient/caregiver able to teach back signs and symptoms of incisional infection? Fever, Increased redness, swelling or pain at the incisonal site   Is the patient/caregiver able to teach back steps to recovery at home? Set small,  achievable goals for return to baseline health   Is the patient/caregiver able to teach back the hierarchy of who to call/visit for symptoms/problems? PCP, Specialist, Home health nurse, Urgent Care, ED, 911 Yes   Week 1 call completed? Yes   Is the patient interested in additional calls from an ambulatory ? No   Would this patient benefit from a Referral to Amb Social Work? No   Call end time 1701            ASHLEIGH VILLAVICENCIO - Registered Nurse

## 2024-02-20 NOTE — PHARMACY RECOMMENDATION
There was some confusion on who was reviewing Vanc levels for this pt.   Spoke with [facility] and [facility] any [facility] and adjust dosing as needed.   Added to therapy plan - fax results to [phone]    No additional follow up need for inpatient pharmacy.     Thank you,     Alcira Miguel, Pharm D, BCPS  [phone]

## 2024-02-21 ENCOUNTER — HOSPITAL ENCOUNTER (OUTPATIENT)
Dept: INFUSION THERAPY | Facility: HOSPITAL | Age: 49
Discharge: HOME | End: 2024-02-21
Admitting: FAMILY MEDICINE
Payer: COMMERCIAL

## 2024-02-21 VITALS
SYSTOLIC BLOOD PRESSURE: 144 MMHG | RESPIRATION RATE: 20 BRPM | OXYGEN SATURATION: 99 % | DIASTOLIC BLOOD PRESSURE: 90 MMHG | TEMPERATURE: 98.3 F | HEART RATE: 70 BPM

## 2024-02-21 DIAGNOSIS — L02.619 ABSCESS OF HEEL: Primary | ICD-10-CM

## 2024-02-21 DIAGNOSIS — M86.072 ACUTE HEMATOGENOUS OSTEOMYELITIS OF LEFT FOOT: ICD-10-CM

## 2024-02-21 LAB
BASOPHILS # BLD AUTO: 0.06 10*3/MM3 (ref 0–0.2)
BASOPHILS NFR BLD AUTO: 0.6 % (ref 0–1.5)
CRP SERPL-MCNC: 5.18 MG/DL (ref 0–0.5)
DEPRECATED RDW RBC AUTO: 47 FL (ref 37–54)
EOSINOPHIL # BLD AUTO: 0.22 10*3/MM3 (ref 0–0.4)
EOSINOPHIL NFR BLD AUTO: 2 % (ref 0.3–6.2)
ERYTHROCYTE [DISTWIDTH] IN BLOOD BY AUTOMATED COUNT: 13.9 % (ref 12.3–15.4)
ERYTHROCYTE [SEDIMENTATION RATE] IN BLOOD: 67 MM/HR (ref 0–15)
HCT VFR BLD AUTO: 31.8 % (ref 37.5–51)
HGB BLD-MCNC: 10 G/DL (ref 13–17.7)
IMM GRANULOCYTES # BLD AUTO: 0.06 10*3/MM3 (ref 0–0.05)
IMM GRANULOCYTES NFR BLD AUTO: 0.6 % (ref 0–0.5)
LYMPHOCYTES # BLD AUTO: 1.94 10*3/MM3 (ref 0.7–3.1)
LYMPHOCYTES NFR BLD AUTO: 17.9 % (ref 19.6–45.3)
MCH RBC QN AUTO: 29.5 PG (ref 26.6–33)
MCHC RBC AUTO-ENTMCNC: 31.4 G/DL (ref 31.5–35.7)
MCV RBC AUTO: 93.8 FL (ref 79–97)
MONOCYTES # BLD AUTO: 0.85 10*3/MM3 (ref 0.1–0.9)
MONOCYTES NFR BLD AUTO: 7.8 % (ref 5–12)
NEUTROPHILS NFR BLD AUTO: 7.73 10*3/MM3 (ref 1.7–7)
NEUTROPHILS NFR BLD AUTO: 71.1 % (ref 42.7–76)
NRBC BLD AUTO-RTO: 0 /100 WBC (ref 0–0.2)
PLATELET # BLD AUTO: 453 10*3/MM3 (ref 140–450)
PMV BLD AUTO: 9.8 FL (ref 6–12)
RBC # BLD AUTO: 3.39 10*6/MM3 (ref 4.14–5.8)
WBC NRBC COR # BLD AUTO: 10.86 10*3/MM3 (ref 3.4–10.8)

## 2024-02-21 PROCEDURE — 86140 C-REACTIVE PROTEIN: CPT

## 2024-02-21 PROCEDURE — 97605 NEG PRS WND THER DME<=50SQCM: CPT

## 2024-02-21 PROCEDURE — 85025 COMPLETE CBC W/AUTO DIFF WBC: CPT

## 2024-02-21 PROCEDURE — 85652 RBC SED RATE AUTOMATED: CPT

## 2024-02-23 ENCOUNTER — HOSPITAL ENCOUNTER (OUTPATIENT)
Dept: INFUSION THERAPY | Facility: HOSPITAL | Age: 49
Discharge: HOME | End: 2024-02-23
Payer: COMMERCIAL

## 2024-02-23 VITALS
TEMPERATURE: 97.8 F | OXYGEN SATURATION: 96 % | DIASTOLIC BLOOD PRESSURE: 69 MMHG | SYSTOLIC BLOOD PRESSURE: 133 MMHG | HEART RATE: 65 BPM | RESPIRATION RATE: 20 BRPM

## 2024-02-23 DIAGNOSIS — L02.619 ABSCESS OF HEEL: Primary | ICD-10-CM

## 2024-02-23 DIAGNOSIS — M86.072 ACUTE HEMATOGENOUS OSTEOMYELITIS OF LEFT FOOT: ICD-10-CM

## 2024-02-23 PROCEDURE — 97605 NEG PRS WND THER DME<=50SQCM: CPT

## 2024-02-24 LAB
MYCOBACTERIUM SPEC CULT: NORMAL
NIGHT BLUE STAIN TISS: NORMAL

## 2024-02-26 ENCOUNTER — HOSPITAL ENCOUNTER (OUTPATIENT)
Dept: INFUSION THERAPY | Facility: HOSPITAL | Age: 49
Discharge: HOME | End: 2024-02-26
Admitting: FAMILY MEDICINE
Payer: COMMERCIAL

## 2024-02-26 VITALS
TEMPERATURE: 98.1 F | RESPIRATION RATE: 16 BRPM | OXYGEN SATURATION: 100 % | SYSTOLIC BLOOD PRESSURE: 153 MMHG | HEART RATE: 71 BPM | DIASTOLIC BLOOD PRESSURE: 93 MMHG

## 2024-02-26 DIAGNOSIS — L02.619 ABSCESS OF HEEL: Primary | ICD-10-CM

## 2024-02-26 DIAGNOSIS — M86.072 ACUTE HEMATOGENOUS OSTEOMYELITIS OF LEFT FOOT: ICD-10-CM

## 2024-02-26 LAB
ALBUMIN SERPL-MCNC: 3.1 G/DL (ref 3.5–5.2)
ALBUMIN/GLOB SERPL: 0.7 G/DL
ALP SERPL-CCNC: 129 U/L (ref 39–117)
ALT SERPL W P-5'-P-CCNC: 37 U/L (ref 1–41)
ANION GAP SERPL CALCULATED.3IONS-SCNC: 8.6 MMOL/L (ref 5–15)
AST SERPL-CCNC: 25 U/L (ref 1–40)
BASOPHILS # BLD AUTO: 0.06 10*3/MM3 (ref 0–0.2)
BASOPHILS NFR BLD AUTO: 0.9 % (ref 0–1.5)
BILIRUB SERPL-MCNC: 0.4 MG/DL (ref 0–1.2)
BUN SERPL-MCNC: 14 MG/DL (ref 6–20)
BUN/CREAT SERPL: 19.7 (ref 7–25)
CALCIUM SPEC-SCNC: 9.2 MG/DL (ref 8.6–10.5)
CHLORIDE SERPL-SCNC: 98 MMOL/L (ref 98–107)
CO2 SERPL-SCNC: 26.4 MMOL/L (ref 22–29)
CREAT SERPL-MCNC: 0.71 MG/DL (ref 0.76–1.27)
CRP SERPL-MCNC: 4.42 MG/DL (ref 0–0.5)
DEPRECATED RDW RBC AUTO: 45.5 FL (ref 37–54)
EGFRCR SERPLBLD CKD-EPI 2021: 111.8 ML/MIN/1.73
EOSINOPHIL # BLD AUTO: 0.28 10*3/MM3 (ref 0–0.4)
EOSINOPHIL NFR BLD AUTO: 4 % (ref 0.3–6.2)
ERYTHROCYTE [DISTWIDTH] IN BLOOD BY AUTOMATED COUNT: 13.5 % (ref 12.3–15.4)
ERYTHROCYTE [SEDIMENTATION RATE] IN BLOOD: 73 MM/HR (ref 0–15)
GLOBULIN UR ELPH-MCNC: 4.3 GM/DL
GLUCOSE SERPL-MCNC: 96 MG/DL (ref 65–99)
HCT VFR BLD AUTO: 32 % (ref 37.5–51)
HGB BLD-MCNC: 10 G/DL (ref 13–17.7)
IMM GRANULOCYTES # BLD AUTO: 0.02 10*3/MM3 (ref 0–0.05)
IMM GRANULOCYTES NFR BLD AUTO: 0.3 % (ref 0–0.5)
LYMPHOCYTES # BLD AUTO: 1.67 10*3/MM3 (ref 0.7–3.1)
LYMPHOCYTES NFR BLD AUTO: 24.1 % (ref 19.6–45.3)
MCH RBC QN AUTO: 28.8 PG (ref 26.6–33)
MCHC RBC AUTO-ENTMCNC: 31.3 G/DL (ref 31.5–35.7)
MCV RBC AUTO: 92.2 FL (ref 79–97)
MONOCYTES # BLD AUTO: 0.79 10*3/MM3 (ref 0.1–0.9)
MONOCYTES NFR BLD AUTO: 11.4 % (ref 5–12)
NEUTROPHILS NFR BLD AUTO: 4.1 10*3/MM3 (ref 1.7–7)
NEUTROPHILS NFR BLD AUTO: 59.3 % (ref 42.7–76)
NRBC BLD AUTO-RTO: 0 /100 WBC (ref 0–0.2)
PLATELET # BLD AUTO: 282 10*3/MM3 (ref 140–450)
PMV BLD AUTO: 9.6 FL (ref 6–12)
POTASSIUM SERPL-SCNC: 4.2 MMOL/L (ref 3.5–5.2)
PROT SERPL-MCNC: 7.4 G/DL (ref 6–8.5)
RBC # BLD AUTO: 3.47 10*6/MM3 (ref 4.14–5.8)
SODIUM SERPL-SCNC: 133 MMOL/L (ref 136–145)
VANCOMYCIN SERPL-MCNC: 18.44 MCG/ML (ref 5–40)
WBC NRBC COR # BLD AUTO: 6.92 10*3/MM3 (ref 3.4–10.8)

## 2024-02-26 PROCEDURE — 97606 NEG PRS WND THER DME>50 SQCM: CPT

## 2024-02-26 PROCEDURE — 80053 COMPREHEN METABOLIC PANEL: CPT

## 2024-02-26 PROCEDURE — 86140 C-REACTIVE PROTEIN: CPT

## 2024-02-26 PROCEDURE — 85025 COMPLETE CBC W/AUTO DIFF WBC: CPT

## 2024-02-26 PROCEDURE — G0463 HOSPITAL OUTPT CLINIC VISIT: HCPCS

## 2024-02-26 PROCEDURE — 36592 COLLECT BLOOD FROM PICC: CPT

## 2024-02-26 PROCEDURE — 80202 ASSAY OF VANCOMYCIN: CPT | Performed by: FAMILY MEDICINE

## 2024-02-26 PROCEDURE — 85652 RBC SED RATE AUTOMATED: CPT

## 2024-02-26 NOTE — ADDENDUM NOTE
Encounter addended by: Yasmin Scott RN on: 2/26/2024 5:09 PM   Actions taken: Clinical Note Signed, Flowsheet accepted

## 2024-02-26 NOTE — SIGNIFICANT NOTE
Wound Eval / Progress Noted     [facility]     Patient Name: Karsten Johnson  : 1975  MRN: [id number]  Today's Date: 2024                 Admit Date: 2024    Visit Dx:    ICD-10-CM ICD-9-CM   1. Abscess of heel  L02.619 682.7   2. Acute hematogenous osteomyelitis of left foot  M86.072 730.07         * No active hospital problems. *        History reviewed. No pertinent past medical history.     Past Surgical History:   Procedure Laterality Date    INCISION AND DRAINAGE OF WOUND Left 2/10/2024    Procedure: INCISION AND DRAINAGE  LEFT ANKLE,BIOPSY LEFT ANKEL;  Surgeon: Alvarez Beck DPM;  Location: [facility] MAIN OR;  Service: Podiatry;  Laterality: Left;    WOUND DEBRIDEMENT Left 2024    Procedure: INCISION AND DEBRIDEMENT WOUND OF LEFT ANKLE;  Surgeon: Alvarez Beck DPM;  Location: [facility] MAIN OR;  Service: Podiatry;  Laterality: Left;         Physical Assessment:           Wound 02/10/24 0800 Left posterior ankle Other (comment) (Active)   Dressing Appearance intact;dry 24 1340   Closure None 24 1340   Base granulating;moist;red 24 1340   Periwound intact;dry 24 1340   Periwound Temperature warm 24 1340   Periwound Skin Turgor soft 24 1340   Edges open 24 1340   Wound Length (cm) 5.5 cm 24 1340   Wound Width (cm) 3 cm 24 1340   Wound Depth (cm) 0.9 cm 24 1340   Wound Surface Area (cm^2) 16.5 cm^2 24 1340   Wound Volume (cm^3) 14.85 cm^3 24 1340   Drainage Characteristics/Odor bleeding controlled;serosanguineous 24 1340   Drainage Amount scant 24 1340   Care, Wound cleansed with;sterile normal saline;barrier applied 24 1340   Dressing Care foam;transparent film 24 1340   Periwound Care other (see comments) 24 1340       Wound 24 1210 Left posterior Incision (Active)   Dressing Appearance dry;intact 24 1340   Closure Sutures 24 1340   Base dry;pink 24 1340    Periwound intact;dry;pink 02/26/24 1340   Periwound Temperature warm 02/26/24 1340   Periwound Skin Turgor soft 02/26/24 1340   Edges rolled/closed 02/26/24 1340   Drainage Amount none 02/26/24 1340   Care, Wound cleansed with;sterile normal saline 02/26/24 1340   Dressing Care dressing applied;border dressing;gauze;petroleum-based;silicone;hydrofiber;silver impregnated 02/26/24 1340   Periwound Care dry periwound area maintained;barrier film applied 02/26/24 1340       NPWT (Negative Pressure Wound Therapy) 02/15/24 1320 Left heel (Active)   Therapy Setting continuous therapy 02/26/24 1340   Dressing foam, black 02/26/24 1340   Pressure Setting 125 mmHg 02/26/24 1340   Sponges Inserted 1 02/26/24 1340   Sponges Removed 1 02/26/24 1340        Wound Check / Follow-up:  Patient seen today for ONS wound care follow up with wound RN. Patient's significant other accompanied him to his appointment. Patient's wound vac remains intact without leaks. Patient is also being seen in ONS for a picc line dressing change today as well.     RN used adhesive remover to remove patient's transparent dressing over his sutures and wound vac. Patient does have a moderate amount of dry peeling skin around his sutures and heel area. All sutures remain intact at this time. RN cleansed suture areas with NS and pat dry. RN cleansed wound bed with NS and pat dry. Wound bed is red, moist, and granulating. RN covered all sutures with petroleum impregnated gauze and silver impregnated hydrofiber, then covered with transparent dressings. RN skin prepped patient's periwound skin and allowed to dry. One piece of black foam was cut to fit and placed into the wound bed, then covered with transparent drape. RN attached track pad to dressing and connected the VAC. Foam compressed without leaks and pressure of 125 mmHg was obtained. Tubing was bolstered and the left foot was wrapped with gauze roll and elastic bandage. Patient tolerated dressing change  without issue. Recommending to continue wound vac dressing changes MWF.     Impression: Surgical incision and debridement to left heel; Three surgical incisions along left ankle area     Short term goals:  Regain skin integrity. Negative pressure wound therapy to left heel as well as dressings to incisions. Recommending dressing changes MW.      Daniella Duarte RN    2/26/2024    15:13 EST

## 2024-02-26 NOTE — ADDENDUM NOTE
Encounter addended by: Lexi Marquez, RN on: 2/26/2024 4:29 PM   Actions taken: Chief Complaint modified, Medication List reviewed, Flowsheet accepted, Charge Capture section accepted

## 2024-02-26 NOTE — SIGNIFICANT NOTE
Called to assess PICC line with no blood return.  Pt reports home infusions are taking longer to admin.  After power flush, PICC has blood return and flushes easily.  Labs were drawn (2 tubes) with ease.  Notified pt if we cannot obtain blood return in the future cathflo would be indicated.

## 2024-02-27 DIAGNOSIS — M86.071 ACUTE HEMATOGENOUS OSTEOMYELITIS OF RIGHT FOOT: Primary | ICD-10-CM

## 2024-02-27 RX ORDER — TRAMADOL HYDROCHLORIDE 50 MG/1
50 TABLET ORAL EVERY 6 HOURS PRN
Qty: 30 TABLET | Refills: 0 | Status: SHIPPED | OUTPATIENT
Start: 2024-02-27

## 2024-02-28 ENCOUNTER — READMISSION MANAGEMENT (OUTPATIENT)
Dept: CALL CENTER | Facility: HOSPITAL | Age: 49
End: 2024-02-28
Payer: COMMERCIAL

## 2024-02-28 ENCOUNTER — HOSPITAL ENCOUNTER (OUTPATIENT)
Dept: INFUSION THERAPY | Facility: HOSPITAL | Age: 49
Discharge: HOME | End: 2024-02-28
Admitting: FAMILY MEDICINE
Payer: COMMERCIAL

## 2024-02-28 VITALS
DIASTOLIC BLOOD PRESSURE: 96 MMHG | SYSTOLIC BLOOD PRESSURE: 157 MMHG | RESPIRATION RATE: 20 BRPM | HEART RATE: 92 BPM | OXYGEN SATURATION: 98 % | TEMPERATURE: 98.5 F

## 2024-02-28 DIAGNOSIS — L02.619 ABSCESS OF HEEL: Primary | ICD-10-CM

## 2024-02-28 DIAGNOSIS — M86.072 ACUTE HEMATOGENOUS OSTEOMYELITIS OF LEFT FOOT: ICD-10-CM

## 2024-02-28 PROCEDURE — 97605 NEG PRS WND THER DME<=50SQCM: CPT

## 2024-02-28 NOTE — OUTREACH NOTE
General Surgery Week 2 Survey      Flowsheet Row Responses   [facility] patient discharged from? [facility]   Does the patient have one of the following disease processes/diagnoses(primary or secondary)? General Surgery   Week 2 attempt successful? Yes   Call start time 1258   Call end time 1307   Discharge diagnosis Left Achilles tendon rupture  Left heel abscess  Acute hematogenous osteomyelitis of the left calcaneus  Suspected abscessed maxillary left incisor  MRSA bacteremia  Prediabetes  Hypertension  Obesity BMI 37  Mild anemia  High risk drug monitoring vancomycin  Multiple dental caries, I and D of left ankle wound   Person spoke with today (if not patient) and relationship Adriana, sig other   Meds reviewed with patient/caregiver? Yes   Is the patient having any side effects they believe may be caused by any medication additions or changes? No   Does the patient have all medications related to this admission filled (includes all antibiotics, pain medications, etc.) Yes   Is the patient taking all medications as directed (includes completed medication regime)? Yes   Does the patient have a follow up appointment scheduled with their surgeon? Yes   Has the patient kept scheduled appointments due by today? Yes   Has home health visited the patient within 72 hours of discharge? N/A   What DME was ordered? [employer] for wound vac   Has all DME been delivered? Yes   Psychosocial issues? No   Comments Vancomycin infusions going well in PICC, Adriana assisting with infusions   Did the patient receive a copy of their discharge instructions? Yes   Nursing interventions Reviewed instructions with patient   What is the patient's perception of their health status since discharge? Improving   Nursing interventions Nurse provided patient education   Is the patient /caregiver able to teach back basic post-op care? Keep incision areas clean,dry and protected   Is the patient/caregiver able to teach back signs and symptoms of  incisional infection? Increased redness, swelling or pain at the incisonal site, Increased drainage or bleeding, Incisional warmth, Pus or odor from incision, Fever   Is the patient/caregiver able to teach back steps to recovery at home? Set small, achievable goals for return to baseline health, Rest and rebuild strength, gradually increase activity, Eat a well-balance diet   If the patient is a current smoker, are they able to teach back resources for cessation? --  [has been cutting back]   Is the patient/caregiver able to teach back the hierarchy of who to call/visit for symptoms/problems? PCP, Specialist, Home health nurse, Urgent Care, ED, 911 Yes   Additional teach back comments non-weight bearing on foot, states pt has mod amt pain   Week 2 call completed? Yes   Call end time 1307            Linh RICE - Registered Nurse

## 2024-02-29 ENCOUNTER — HOSPITAL ENCOUNTER (EMERGENCY)
Facility: HOSPITAL | Age: 49
Discharge: HOME | End: 2024-02-29
Attending: EMERGENCY MEDICINE
Payer: COMMERCIAL

## 2024-02-29 VITALS
OXYGEN SATURATION: 99 % | WEIGHT: 217.15 LBS | SYSTOLIC BLOOD PRESSURE: 134 MMHG | BODY MASS INDEX: 34.9 KG/M2 | TEMPERATURE: 98.5 F | HEART RATE: 73 BPM | HEIGHT: 66 IN | RESPIRATION RATE: 18 BRPM | DIASTOLIC BLOOD PRESSURE: 86 MMHG

## 2024-02-29 DIAGNOSIS — T82.898A OCCLUSION OF PERIPHERALLY INSERTED CENTRAL CATHETER (PICC) LINE, INITIAL ENCOUNTER: Primary | ICD-10-CM

## 2024-02-29 PROCEDURE — 99282 EMERGENCY DEPT VISIT SF MDM: CPT

## 2024-02-29 NOTE — ED PROVIDER NOTES
Time: 3:05 PM EST  Date of encounter:  2/29/2024  Independent Historian/Clinical History and Information was obtained by:   Patient and Family    History is limited by: N/A    Chief Complaint   Patient presents with    Vascular Access Problem         History of Present Illness:  Patient is a 48 y.o. year old male who presents to the emergency department for evaluation of PICC line not wanting to flush that began several days ago.  Family and patient state they had it flushed out 3 days ago at wound care.  He has been receiving vancomycin infusions twice daily per family for MRSA infection of left lower extremity and has been following with podiatrist Dr. Beck and wound care. Patient was seen by provider in triage, Alvarez Braden PA-C      Patient Care Team  Primary Care Provider: Provider, No Known    Past Medical History:     No Known Allergies  No past medical history on file.  Past Surgical History:   Procedure Laterality Date    INCISION AND DRAINAGE OF WOUND Left 2/10/2024    Procedure: INCISION AND DRAINAGE  LEFT ANKLE,BIOPSY LEFT ANKEL;  Surgeon: Alvarez Beck DPM;  Location: [facility];  Service: Podiatry;  Laterality: Left;    WOUND DEBRIDEMENT Left 2/14/2024    Procedure: INCISION AND DEBRIDEMENT WOUND OF LEFT ANKLE;  Surgeon: Alvarez Beck DPM;  Location: [facility] OR;  Service: Podiatry;  Laterality: Left;     No family history on file.    Home Medications:  Prior to Admission medications    Medication Sig Start Date End Date Taking? Authorizing Provider   HYDROcodone-acetaminophen (Norco) 7.5-325 MG per tablet Take 1 tablet by mouth Every 6 (Six) Hours As Needed for Moderate Pain. 2/19/24   Alvarez Beck DPM   lisinopril (PRINIVIL,ZESTRIL) 40 MG tablet Take 1 tablet by mouth Daily for 30 days. 2/17/24 3/18/24  Tono Kang MD   naloxone (NARCAN) 4 MG/0.1ML nasal spray Call 911. Don't prime. [place] in 1 nostril for overdose. Repeat in 2-3 minutes in other nostril if no or  minimal breathing/responsiveness. 2/16/24   Tono Kang MD   traMADol (ULTRAM) 50 MG tablet Take 1 tablet by mouth Every 6 (Six) Hours As Needed for Moderate Pain (pain). Take one to two every 4-6 hours prn pain. 2/27/24   Alvarez Beck, DPM   vancomycin 1750 mg/500 mL 0.9% NS IVPB ([facility]) Infuse 500 mL into a venous catheter Every 12 (Twelve) Hours for 41 days. Indications: Bacteria in the Blood, Bone and/or Joint Infection, Infection of the Skin and/or Soft Tissue 2/13/24 3/25/24  Tono Kang MD   Vancomycin HCl-Dextrose (PHARMACY TO DOSE VANCOMYCIN) Use Continuous As Needed (osteo MRSA) for up to 38 days. Indications: Bacteria in the Blood, Bone and/or Joint Infection, Infection of the Skin and/or Soft Tissue 2/16/24 3/25/24  Tono Kang MD        Social History:   Social History     Tobacco Use    Smoking status: Every Day     Packs/day: 1.00     Years: 25.00     Additional pack years: 0.00     Total pack years: 25.00     Types: Cigarettes     Passive exposure: Never    Smokeless tobacco: Never   Vaping Use    Vaping Use: Never used   Substance Use Topics    Alcohol use: Yes     Alcohol/week: 3.0 standard drinks of alcohol     Types: 3 Cans of beer per week    Drug use: Yes     Types: Marijuana         Review of Systems:  Review of Systems   Gastrointestinal:  Negative for nausea and vomiting.        Physical Exam:  There were no vitals taken for this visit.        Physical Exam  Constitutional:       Appearance: Normal appearance.   HENT:      Head: Normocephalic.   Eyes:      Extraocular Movements: Extraocular movements intact.      Conjunctiva/sclera: Conjunctivae normal.   Pulmonary:      Effort: Pulmonary effort is normal.   Abdominal:      General: There is no distension.   Skin:     General: Skin is warm.      Coloration: Skin is not cyanotic.      Comments: PICC line placed to left upper extremity.   Neurological:      Mental Status: He is alert and oriented to person, place, and time.    Psychiatric:         Attention and Perception: Attention and perception normal.         Mood and Affect: Mood normal.        ***              Procedures:  Procedures      Medical Decision Making:      Comorbidities that affect care:    {Comorbidities that affect care:04623}    External Notes reviewed:    {External Note review (Optional):49713}      The following orders were placed and all results were independently analyzed by me:  No orders of the defined types were placed in this encounter.      Medications Given in the Emergency Department:  Medications - No data to display     ED Course:    The patient was initially evaluated in the triage area where orders were placed. The patient was later dispositioned by Alvarez Braden PA-C.      The patient was advised to stay for completion of workup which includes but is not limited to communication of labs and radiological results, reassessment and plan. The patient was advised that leaving prior to disposition by a provider could result in critical findings that are not communicated to the patient.          Labs:    Lab Results (last 24 hours)       ** No results found for the last 24 hours. **             Imaging:    No Radiology Exams Resulted Within Past 24 Hours      Differential Diagnosis and Discussion:      {Differentials:64554}    {Independent Review of (Optional):12010}    MDM       {Critical Care:71237}    {SEPSIS RECOGNITION:70022}    Patient Care Considerations:    {Considerations (Optional):64254}      Consultants/Shared Management Plan:    {Shared Management Plan (Optional):21866}    Social Determinants of Health:    {Social Determinants of Health (Optional):36637}      Disposition and Care Coordination:    {Admission consideration:57456}    {Discharge (Optional):44124}    Final diagnoses:   None        ED Disposition       None            This medical record created using voice recognition software.           have explained the patient´s condition, diagnoses and treatment plan based on the information available to me at this time. I have answered questions and addressed any concerns. The patient has a good  understanding of the patient´s diagnosis, condition, and treatment plan as can be expected at this point. The vital signs have been stable. The patient´s condition is stable and appropriate for discharge from the emergency department.      The patient will pursue further outpatient evaluation with the primary care physician or other designated or consulting physician as outlined in the discharge instructions. They are agreeable to this plan of care and follow-up instructions have been explained in detail. The patient has received these instructions in written format and has expressed an understanding of the discharge instructions. The patient is aware that any significant change in condition or worsening of symptoms should prompt an immediate return to this or the closest emergency department or call to 911.    Final diagnoses:   Occlusion of peripherally inserted central catheter (PICC) line, initial encounter        ED Disposition       ED Disposition   Discharge    Condition   Stable    Comment   --               This medical record created using voice recognition software.             Jose Manuel Kaufman DO  03/08/24 [id number]

## 2024-02-29 NOTE — CONSULTS
Patient came to the ED to have his PICC assessed. Per patient, patient's wife, and RN, the PICC doesn't flush or give blood.     Assessment: Left upper PICC with a well-adhered intact dressing. The patient denies pain or tenderness in the area of the insertion site. There is no redness, swelling, tracking, or discharge. I did not remove the dressing.    After sterilizing the IV lock, I flushed the PICC with 10 ML of normal saline. There was brisk blood return. I performed this flushing and blood pull-back an additional five times with 10 ML normal saline syringes. Blood withdrew from the PICC each time without difficulty.     At this time, I cannot duplicate the issue of inability to flush or give back blood that has been reported. There was no intermittent quality for blood return, as it returned blood each time I pulled back on the syringe. Therefore, Cathflo is not indicated at this time.    Conclusion: Properly functioning PICC.    Avery Obregon RN

## 2024-03-01 ENCOUNTER — HOSPITAL ENCOUNTER (OUTPATIENT)
Dept: INFUSION THERAPY | Facility: HOSPITAL | Age: 49
Discharge: HOME | End: 2024-03-01
Payer: COMMERCIAL

## 2024-03-01 ENCOUNTER — OFFICE VISIT (OUTPATIENT)
Dept: PODIATRY | Facility: CLINIC | Age: 49
End: 2024-03-01
Payer: COMMERCIAL

## 2024-03-01 VITALS
DIASTOLIC BLOOD PRESSURE: 91 MMHG | OXYGEN SATURATION: 97 % | TEMPERATURE: 99.1 F | SYSTOLIC BLOOD PRESSURE: 191 MMHG | RESPIRATION RATE: 20 BRPM | HEART RATE: 89 BPM

## 2024-03-01 VITALS
OXYGEN SATURATION: 96 % | WEIGHT: 217 LBS | SYSTOLIC BLOOD PRESSURE: 169 MMHG | HEART RATE: 89 BPM | BODY MASS INDEX: 42.6 KG/M2 | HEIGHT: 60 IN | DIASTOLIC BLOOD PRESSURE: 98 MMHG | TEMPERATURE: 98.6 F

## 2024-03-01 DIAGNOSIS — L02.619 ABSCESS OF HEEL: Primary | ICD-10-CM

## 2024-03-01 DIAGNOSIS — M86.072 ACUTE HEMATOGENOUS OSTEOMYELITIS OF LEFT FOOT: ICD-10-CM

## 2024-03-01 DIAGNOSIS — M86.071 ACUTE HEMATOGENOUS OSTEOMYELITIS OF RIGHT FOOT: Primary | ICD-10-CM

## 2024-03-01 DIAGNOSIS — S91.302A WOUND OF LEFT FOOT: ICD-10-CM

## 2024-03-01 PROCEDURE — 97606 NEG PRS WND THER DME>50 SQCM: CPT

## 2024-03-01 PROCEDURE — 99024 POSTOP FOLLOW-UP VISIT: CPT | Performed by: PODIATRIST

## 2024-03-01 PROCEDURE — 97605 NEG PRS WND THER DME<=50SQCM: CPT

## 2024-03-02 LAB
MYCOBACTERIUM SPEC CULT: NORMAL
NIGHT BLUE STAIN TISS: NORMAL

## 2024-03-04 ENCOUNTER — HOSPITAL ENCOUNTER (OUTPATIENT)
Dept: INFUSION THERAPY | Facility: HOSPITAL | Age: 49
Discharge: HOME | End: 2024-03-04
Admitting: FAMILY MEDICINE
Payer: COMMERCIAL

## 2024-03-04 VITALS
SYSTOLIC BLOOD PRESSURE: 149 MMHG | DIASTOLIC BLOOD PRESSURE: 84 MMHG | OXYGEN SATURATION: 95 % | TEMPERATURE: 98.8 F | RESPIRATION RATE: 20 BRPM | HEART RATE: 81 BPM

## 2024-03-04 DIAGNOSIS — M86.072 ACUTE HEMATOGENOUS OSTEOMYELITIS OF LEFT FOOT: ICD-10-CM

## 2024-03-04 DIAGNOSIS — L02.619 ABSCESS OF HEEL: Primary | ICD-10-CM

## 2024-03-04 LAB
ALBUMIN SERPL-MCNC: 3.4 G/DL (ref 3.5–5.2)
ALBUMIN/GLOB SERPL: 0.9 G/DL
ALP SERPL-CCNC: 116 U/L (ref 39–117)
ALT SERPL W P-5'-P-CCNC: 28 U/L (ref 1–41)
ANION GAP SERPL CALCULATED.3IONS-SCNC: 7.6 MMOL/L (ref 5–15)
AST SERPL-CCNC: 34 U/L (ref 1–40)
BASOPHILS # BLD AUTO: 0.02 10*3/MM3 (ref 0–0.2)
BASOPHILS NFR BLD AUTO: 0.6 % (ref 0–1.5)
BILIRUB SERPL-MCNC: 0.3 MG/DL (ref 0–1.2)
BUN SERPL-MCNC: 10 MG/DL (ref 6–20)
BUN/CREAT SERPL: 12.5 (ref 7–25)
CALCIUM SPEC-SCNC: 8.6 MG/DL (ref 8.6–10.5)
CHLORIDE SERPL-SCNC: 98 MMOL/L (ref 98–107)
CO2 SERPL-SCNC: 25.4 MMOL/L (ref 22–29)
CREAT SERPL-MCNC: 0.8 MG/DL (ref 0.76–1.27)
CRP SERPL-MCNC: 2.64 MG/DL (ref 0–0.5)
DEPRECATED RDW RBC AUTO: 43.7 FL (ref 37–54)
EGFRCR SERPLBLD CKD-EPI 2021: 107.8 ML/MIN/1.73
EOSINOPHIL # BLD AUTO: 0.46 10*3/MM3 (ref 0–0.4)
EOSINOPHIL NFR BLD AUTO: 14.4 % (ref 0.3–6.2)
ERYTHROCYTE [DISTWIDTH] IN BLOOD BY AUTOMATED COUNT: 13.2 % (ref 12.3–15.4)
ERYTHROCYTE [SEDIMENTATION RATE] IN BLOOD: 61 MM/HR (ref 0–15)
GLOBULIN UR ELPH-MCNC: 3.8 GM/DL
GLUCOSE SERPL-MCNC: 111 MG/DL (ref 65–99)
HCT VFR BLD AUTO: 32.2 % (ref 37.5–51)
HGB BLD-MCNC: 10.2 G/DL (ref 13–17.7)
IMM GRANULOCYTES # BLD AUTO: 0.01 10*3/MM3 (ref 0–0.05)
IMM GRANULOCYTES NFR BLD AUTO: 0.3 % (ref 0–0.5)
LYMPHOCYTES # BLD AUTO: 0.82 10*3/MM3 (ref 0.7–3.1)
LYMPHOCYTES NFR BLD AUTO: 25.6 % (ref 19.6–45.3)
MCH RBC QN AUTO: 28.3 PG (ref 26.6–33)
MCHC RBC AUTO-ENTMCNC: 31.7 G/DL (ref 31.5–35.7)
MCV RBC AUTO: 89.2 FL (ref 79–97)
MONOCYTES # BLD AUTO: 0.46 10*3/MM3 (ref 0.1–0.9)
MONOCYTES NFR BLD AUTO: 14.4 % (ref 5–12)
NEUTROPHILS NFR BLD AUTO: 1.43 10*3/MM3 (ref 1.7–7)
NEUTROPHILS NFR BLD AUTO: 44.7 % (ref 42.7–76)
NRBC BLD AUTO-RTO: 0 /100 WBC (ref 0–0.2)
PLATELET # BLD AUTO: 167 10*3/MM3 (ref 140–450)
PMV BLD AUTO: 11.5 FL (ref 6–12)
POTASSIUM SERPL-SCNC: 4.3 MMOL/L (ref 3.5–5.2)
PROT SERPL-MCNC: 7.2 G/DL (ref 6–8.5)
RBC # BLD AUTO: 3.61 10*6/MM3 (ref 4.14–5.8)
SODIUM SERPL-SCNC: 131 MMOL/L (ref 136–145)
VANCOMYCIN SERPL-MCNC: 35.12 MCG/ML (ref 5–40)
WBC NRBC COR # BLD AUTO: 3.2 10*3/MM3 (ref 3.4–10.8)

## 2024-03-04 PROCEDURE — 36592 COLLECT BLOOD FROM PICC: CPT

## 2024-03-04 PROCEDURE — 85652 RBC SED RATE AUTOMATED: CPT

## 2024-03-04 PROCEDURE — 80202 ASSAY OF VANCOMYCIN: CPT | Performed by: FAMILY MEDICINE

## 2024-03-04 PROCEDURE — G0463 HOSPITAL OUTPT CLINIC VISIT: HCPCS

## 2024-03-04 PROCEDURE — 85025 COMPLETE CBC W/AUTO DIFF WBC: CPT

## 2024-03-04 PROCEDURE — 86140 C-REACTIVE PROTEIN: CPT

## 2024-03-04 PROCEDURE — 97605 NEG PRS WND THER DME<=50SQCM: CPT

## 2024-03-04 PROCEDURE — 80053 COMPREHEN METABOLIC PANEL: CPT

## 2024-03-04 NOTE — PROGRESS NOTES
[facility] - PODIATRY    Today's Date: 03/04/24    Patient Name: Karsten Johnson  MRN: [id number]  CSN: [id number]  PCP: Provider, No Known,   Referring Provider: No ref. provider found    SUBJECTIVE     Chief Complaint   Patient presents with    Left Foot - Post-op Follow-up     2 weeks post op  Continues with IV antibiotics and has wound VAC     HPI: Karsten Johnson, a 48 y.o.male, presents to clinic.    Patient is a 48-year-old male presenting with left foot wound.  Patient had acute hematogenous osteomyelitis with Achilles rupture to his left foot recently and was admitted to the hospital.  Patient underwent several incision and drainages/debridements.  He currently is on IV antibiotics and is being treated with a wound VAC to his left foot wound.  Patient has had significant improvement to the wound.    Past Medical History:   Diagnosis Date    Hypertension      Past Surgical History:   Procedure Laterality Date    INCISION AND DRAINAGE OF WOUND Left 02/10/2024    Procedure: INCISION AND DRAINAGE  LEFT ANKLE,BIOPSY LEFT ANKEL;  Surgeon: Alvarez Beck DPM;  Location: [facility];  Service: Podiatry;  Laterality: Left;    TONSILLECTOMY      WOUND DEBRIDEMENT Left 02/14/2024    Procedure: INCISION AND DEBRIDEMENT WOUND OF LEFT ANKLE;  Surgeon: Alvarez Beck DPM;  Location: [facility];  Service: Podiatry;  Laterality: Left;     History reviewed. No pertinent family history.  Social History     Socioeconomic History    Marital status:    Tobacco Use    Smoking status: Every Day     Current packs/day: 1.00     Average packs/day: 1 pack/day for 25.0 years (25.0 ttl pk-yrs)     Types: Cigarettes     Passive exposure: Never    Smokeless tobacco: Never   Vaping Use    Vaping status: Never Used   Substance and Sexual Activity    Alcohol use: Yes     Alcohol/week: 3.0 standard drinks of alcohol     Types: 3 Cans of beer per week    Drug use: Yes     Types: Marijuana    Sexual  activity: Defer     No Known Allergies  Current Outpatient Medications   Medication Sig Dispense Refill    lisinopril (PRINIVIL,ZESTRIL) 40 MG tablet Take 1 tablet by mouth Daily for 30 days. 30 tablet 0    naloxone (NARCAN) 4 MG/0.1ML nasal spray Call 911. Don't prime. [place] in 1 nostril for overdose. Repeat in 2-3 minutes in other nostril if no or minimal breathing/responsiveness. 2 each 0    traMADol (ULTRAM) 50 MG tablet Take 1 tablet by mouth Every 6 (Six) Hours As Needed for Moderate Pain (pain). Take one to two every 4-6 hours prn pain. 30 tablet 0    vancomycin 1750 mg/500 mL 0.9% NS IVPB ([facility]) Infuse 500 mL into a venous catheter Every 12 (Twelve) Hours for 41 days. Indications: Bacteria in the Blood, Bone and/or Joint Infection, Infection of the Skin and/or Soft Tissue      Vancomycin HCl-Dextrose (PHARMACY TO DOSE VANCOMYCIN) Use Continuous As Needed (osteo MRSA) for up to 38 days. Indications: Bacteria in the Blood, Bone and/or Joint Infection, Infection of the Skin and/or Soft Tissue      HYDROcodone-acetaminophen (Norco) 7.5-325 MG per tablet Take 1 tablet by mouth Every 6 (Six) Hours As Needed for Moderate Pain. (Patient not taking: Reported on 3/1/2024) 30 tablet 0     No current facility-administered medications for this visit.     Review of Systems   All other systems reviewed and are negative.      OBJECTIVE     Vitals:    03/01/24 1332   BP: 169/98   Pulse: 89   Temp: 98.6 °F (37 °C)   SpO2: 96%       WBC   Date Value Ref Range Status   02/26/2024 6.92 3.40 - 10.80 10*3/mm3 Final     RBC   Date Value Ref Range Status   02/26/2024 3.47 (L) 4.14 - 5.80 10*6/mm3 Final     Hemoglobin   Date Value Ref Range Status   02/26/2024 10.0 (L) 13.0 - 17.7 g/dL Final     Hematocrit   Date Value Ref Range Status   02/26/2024 32.0 (L) 37.5 - 51.0 % Final     MCV   Date Value Ref Range Status   02/26/2024 92.2 79.0 - 97.0 fL Final     MCH   Date Value Ref Range Status   02/26/2024 28.8 26.6 - 33.0 pg Final      MCHC   Date Value Ref Range Status   02/26/2024 31.3 (L) 31.5 - 35.7 g/dL Final     RDW   Date Value Ref Range Status   02/26/2024 13.5 12.3 - 15.4 % Final     RDW-SD   Date Value Ref Range Status   02/26/2024 45.5 37.0 - 54.0 fl Final     MPV   Date Value Ref Range Status   02/26/2024 9.6 6.0 - 12.0 fL Final     Platelets   Date Value Ref Range Status   02/26/2024 282 140 - 450 10*3/mm3 Final     Neutrophil %   Date Value Ref Range Status   02/26/2024 59.3 42.7 - 76.0 % Final     Lymphocyte %   Date Value Ref Range Status   02/26/2024 24.1 19.6 - 45.3 % Final     Monocyte %   Date Value Ref Range Status   02/26/2024 11.4 5.0 - 12.0 % Final     Eosinophil %   Date Value Ref Range Status   02/26/2024 4.0 0.3 - 6.2 % Final     Basophil %   Date Value Ref Range Status   02/26/2024 0.9 0.0 - 1.5 % Final     Immature Grans %   Date Value Ref Range Status   02/26/2024 0.3 0.0 - 0.5 % Final     Neutrophils, Absolute   Date Value Ref Range Status   02/26/2024 4.10 1.70 - 7.00 10*3/mm3 Final     Lymphocytes, Absolute   Date Value Ref Range Status   02/26/2024 1.67 0.70 - 3.10 10*3/mm3 Final     Monocytes, Absolute   Date Value Ref Range Status   02/26/2024 0.79 0.10 - 0.90 10*3/mm3 Final     Eosinophils, Absolute   Date Value Ref Range Status   02/26/2024 0.28 0.00 - 0.40 10*3/mm3 Final     Basophils, Absolute   Date Value Ref Range Status   02/26/2024 0.06 0.00 - 0.20 10*3/mm3 Final     Immature Grans, Absolute   Date Value Ref Range Status   02/26/2024 0.02 0.00 - 0.05 10*3/mm3 Final     nRBC   Date Value Ref Range Status   02/26/2024 0.0 0.0 - 0.2 /100 WBC Final         Lab Results   Component Value Date    GLUCOSE 96 02/26/2024    BUN 14 02/26/2024    CREATININE 0.71 (L) 02/26/2024    BCR 19.7 02/26/2024    K 4.2 02/26/2024    CO2 26.4 02/26/2024    CALCIUM 9.2 02/26/2024    ALBUMIN 3.1 (L) 02/26/2024    AST 25 02/26/2024    ALT 37 02/26/2024       Patient seen in no apparent distress.      PHYSICAL EXAM:      Foot/Ankle Exam    GENERAL  Appearance:  appears stated age  Orientation:  AAOx3  Affect:  appropriate  Gait:  unimpaired  Assistance:  independent  Right shoe gear: casual shoe  Left shoe gear: casual shoe    VASCULAR     Right Foot Vascularity   Normal vascular exam    Dorsalis pedis:  2+  Posterior tibial:  2+  Skin temperature:  warm  Edema grading:  None  CFT:  < 3 seconds  Pedal hair growth:  Present  Varicosities:  none     Left Foot Vascularity   Normal vascular exam    Dorsalis pedis:  2+  Posterior tibial:  2+  Skin temperature:  warm  Edema grading:  None  CFT:  < 3 seconds  Pedal hair growth:  Present  Varicosities:  none     NEUROLOGIC     Right Foot Neurologic   Normal sensation    Light touch sensation: normal  Vibratory sensation: normal  Hot/Cold sensation: normal  Protective Sensation using [place]-Flip Monofilament:   Sites intact: 10  Sites tested: 10     Left Foot Neurologic   Normal sensation    Light touch sensation: normal  Vibratory sensation: normal  Hot/Cold sensation:  normal  Protective Sensation using [place]-Flip Monofilament:   Sites intact: 10  Sites tested: 10    MUSCLE STRENGTH     Right Foot Muscle Strength   Foot dorsiflexion:  4  Foot plantar flexion:  4  Foot inversion:  4  Foot eversion:  4     Left Foot Muscle Strength   Foot dorsiflexion:  4  Foot plantar flexion:  1  Foot inversion:  4  Foot eversion:  4    RANGE OF MOTION     Right Foot Range of Motion   Foot and ankle ROM within normal limits       Left Foot Range of Motion   Foot and ankle ROM within normal limits      DERMATOLOGIC      Right Foot Dermatologic   Skin  Right foot skin is intact.      Left Foot Dermatologic   Skin  Left foot skin is intact.      Left foot additional comments: Full-thickness ulceration to left posterior heel, 100% granular, no erythema no malodor no drainage.      RADIOLOGY:        Adult Transthoracic Echo Complete W/ Cont if Necessary Per Protocol    Result Date:  2/9/2024  Narrative:   Left ventricular systolic function is normal. Calculated left ventricular EF = 55.3%   Estimated right ventricular systolic pressure from tricuspid regurgitation is normal (<35 mmHg).   No obvious wall motion abnormalities   No obvious vegetations on any valve. If high clinical suspicion, recommend TRACIE     MRI Ankle Left Without Contrast    Result Date: 2/8/2024  Narrative: PROCEDURE: MRI ANKLE LEFT  WO CONTRAST  COMPARISON: [facility], CR, XR ANKLE 3+ VW LEFT, 2/07/2024, 15:42.  INDICATIONS: FELT POP TO LEFT HEEL X 1 WEEK AGO AT WORK. SEVERE POSTERIOR ANKLE PAIN WITH A KNOT TO LEFT MEDIAL ANKLE AND SWELLING.     TECHNIQUE: A complete multi-planar examination was performed without contrast.  FINDINGS:  There is severe tendinosis distal Achilles tendon.  There is a full-thickness disruption of the tendon at its insertion site on the calcaneus.  This full-thickness gap measures 4.6 cm from craniocaudal.  No significant retraction of the tendon fibers.  There is high-grade partial-thickness interstitial tearing of the more watershed/musculotendinous junction.  There is edema in Kager's fat.  There is abnormal marrow edema of the calcaneus most pronounced at the insertion site.  There is erosive change at the Achilles tendon insertion site consistent with probable inflammatory arthritis and enthesopathy.  The calcifications seen in the distal tendon on radiograph are not well seen on MRI.  There is moderate subtalar joint space narrowing and moderate talonavicular joint space narrowing.  There is mild midfoot degenerative change.  There is a moderate-sized os trigonum with mild marrow edema.  There is a small tibiotalar joint effusion.    There is fluid in the posterior tibialis and other flexor tendons consistent with tenosynovitis.  The tendons themselves are intact.  The peroneal tendons are normal appearance.  The extensor tendons are normal appearance.  The syndesmotic ligaments  are intact.  The anterior and posterior talofibular ligament and calcaneofibular ligament are intact.  The medial band plantar fascia is thickened has high signal intensity.  There is a moderate-sized plantar calcaneal spur.  The sinus tarsi has fat signal intensity which is normal.  The tarsal tunnel is normal.  There is soft tissue edema most pronounced posteriorly.  There are no osteochondral lesions of the talar dome.  There is mild hindfoot valgus alignment.      Impression:   1. Severe tendinosis and enthesopathy at the distal Achilles tendon.  There is a full-thickness gap of the distal tendon from the insertion site measuring up to 4.6 cm consistent with full thickness tear.  Severe tendinosis of the musculotendinous junction of the Achilles.  Associated soft tissue edema. 2. Marrow edema calcaneus with erosive changes at the insertion site of the Achilles tendon likely related to inflammatory arthritis and reactive edema calcaneus. 3. Findings of plantar fasciitis medial band plantar fascia. 4. Mild hindfoot and midfoot degenerative change. 5. Tenosynovitis of the flexor tendons.     SAEED VIGIL MD       Electronically Signed and Approved By: SAEED VIGIL MD on 2/08/2024 at 14:57             Duplex Venous Lower Extremity - Left CAR    Result Date: 2/7/2024  Narrative:   Normal left lower extremity venous duplex scan.     XR Foot 3+ View Left    Result Date: 2/7/2024  Narrative: PROCEDURE: XR ANKLE 3+ VW LEFT, 2/07/2024, 15:42 XR FOOT 3+ VW LEFT, 2/07/2024, 15:44 XR TIBIA FIBULA 2 VW LEFT, 2/07/2024, 15:  COMPARISON: None  INDICATIONS: pain no known injury swelling and bruising medial aspect  FINDINGS:  Ankle:  There is soft tissue swelling about the medial portion of the ankle.  The ankle mortise looks intact.  There is osseous demineralization.  There is abnormal appearance to the distal Achilles tendon area with abnormal ossification in soft tissue swelling.  An Achilles tendon injury could account for  this.  There is a plantar calcaneal spur.  There is ossification in the soft tissues adjacent to the medial tibial tubercle that may relate to more of the changes in the posterior ankle as opposed to an injury around this area.  Foot:  As noted on the ankle films there is an abnormal appearance to the posterior ankle around the distal Achilles tendon with abnormal ossification and soft tissue swelling that could relate to a more acute Achilles tendon injury.  There is no subluxation or dislocation of the digits.  There is osseous demineralization.  Tibia fibula:  There is no definite fracture.  There is osseous demineralization.      Impression:   1. Abnormal appearance to the area of the distal Achilles tendon.  Injury to this area could not be excluded. 2. Osseous demineralization 3. Soft tissue swelling about the medial ankle/foot. 4. Calcification/ossification adjacent to the medial tubercle of the talus that may relate to the findings in the posterior ankle area as opposed to an injury around the medial tubercle of the talus       AUSTIN HARRIS MD       Electronically Signed and Approved By: AUSTIN HARRIS MD on 2/07/2024 at 16:04             XR Ankle 3+ View Left    Result Date: 2/7/2024  Narrative: PROCEDURE: XR ANKLE 3+ VW LEFT, 2/07/2024, 15:42 XR FOOT 3+ VW LEFT, 2/07/2024, 15:44 XR TIBIA FIBULA 2 VW LEFT, 2/07/2024, 15:  COMPARISON: None  INDICATIONS: pain no known injury swelling and bruising medial aspect  FINDINGS:  Ankle:  There is soft tissue swelling about the medial portion of the ankle.  The ankle mortise looks intact.  There is osseous demineralization.  There is abnormal appearance to the distal Achilles tendon area with abnormal ossification in soft tissue swelling.  An Achilles tendon injury could account for this.  There is a plantar calcaneal spur.  There is ossification in the soft tissues adjacent to the medial tibial tubercle that may relate to more of the changes in the posterior ankle as  opposed to an injury around this area.  Foot:  As noted on the ankle films there is an abnormal appearance to the posterior ankle around the distal Achilles tendon with abnormal ossification and soft tissue swelling that could relate to a more acute Achilles tendon injury.  There is no subluxation or dislocation of the digits.  There is osseous demineralization.  Tibia fibula:  There is no definite fracture.  There is osseous demineralization.      Impression:   1. Abnormal appearance to the area of the distal Achilles tendon.  Injury to this area could not be excluded. 2. Osseous demineralization 3. Soft tissue swelling about the medial ankle/foot. 4. Calcification/ossification adjacent to the medial tubercle of the talus that may relate to the findings in the posterior ankle area as opposed to an injury around the medial tubercle of the talus       AUSTIN HARRIS MD       Electronically Signed and Approved By: AUSTIN HARRIS MD on 2/07/2024 at 16:04             XR Tibia Fibula 2 View Left    Result Date: 2/7/2024  Narrative: PROCEDURE: XR ANKLE 3+ VW LEFT, 2/07/2024, 15:42 XR FOOT 3+ VW LEFT, 2/07/2024, 15:44 XR TIBIA FIBULA 2 VW LEFT, 2/07/2024, 15:  COMPARISON: None  INDICATIONS: pain no known injury swelling and bruising medial aspect  FINDINGS:  Ankle:  There is soft tissue swelling about the medial portion of the ankle.  The ankle mortise looks intact.  There is osseous demineralization.  There is abnormal appearance to the distal Achilles tendon area with abnormal ossification in soft tissue swelling.  An Achilles tendon injury could account for this.  There is a plantar calcaneal spur.  There is ossification in the soft tissues adjacent to the medial tibial tubercle that may relate to more of the changes in the posterior ankle as opposed to an injury around this area.  Foot:  As noted on the ankle films there is an abnormal appearance to the posterior ankle around the distal Achilles tendon with abnormal  ossification and soft tissue swelling that could relate to a more acute Achilles tendon injury.  There is no subluxation or dislocation of the digits.  There is osseous demineralization.  Tibia fibula:  There is no definite fracture.  There is osseous demineralization.      Impression:   1. Abnormal appearance to the area of the distal Achilles tendon.  Injury to this area could not be excluded. 2. Osseous demineralization 3. Soft tissue swelling about the medial ankle/foot. 4. Calcification/ossification adjacent to the medial tubercle of the talus that may relate to the findings in the posterior ankle area as opposed to an injury around the medial tubercle of the talus       AUSTIN HARRIS MD       Electronically Signed and Approved By: AUSTIN HARRIS MD on 2/07/2024 at 16:04              ASSESSMENT/PLAN     Diagnoses and all orders for this visit:    1. Acute hematogenous osteomyelitis of right foot (Primary)    2. Wound of left foot      Continue wound VAC, wound significantly improved  Continue IV antibiotics  Will have him return to clinic in 3 to 4 weeks for wound check    Comprehensive lower extremity examination and evaluation was performed.    Discussed findings and treatment plan including risks, benefits, and treatment options with patient in detail. Patient agreed with treatment plan.    Medications and allergies reviewed.  Reviewed available lab values along with other pertinent labs.  These were discussed with the patient.    An After Visit Summary was printed and given to the patient at discharge, including (if requested) any available informative/educational handouts regarding diagnosis, treatment, or medications. All questions were answered to patient/family satisfaction. Should symptoms fail to improve or worsen they agree to call or return to clinic or to go to the Emergency Department. Discussed the importance of following up with any needed screening tests/labs/specialist appointments and any  requested follow-up recommended by me today. Importance of maintaining follow-up discussed and patient accepts that missed appointments can delay diagnosis and potentially lead to worsening of conditions.    Return in about 1 month (around 4/1/2024)., or sooner if acute issues arise.    This document has been electronically signed by Alvarez Beck DPM on March 4, 2024 07:31 EST

## 2024-03-04 NOTE — SIGNIFICANT NOTE
Wound Eval / Progress Noted     Jazmine     Patient Name: Karsten Johnson  : 1975  MRN: [id number]  Today's Date: 3/4/2024                 Admit Date: 3/4/2024    Visit Dx:    ICD-10-CM ICD-9-CM   1. Abscess of heel  L02.619 682.7   2. Acute hematogenous osteomyelitis of left foot  M86.072 730.07         * No active hospital problems. *        Past Medical History:   Diagnosis Date    Hypertension         Past Surgical History:   Procedure Laterality Date    INCISION AND DRAINAGE OF WOUND Left 02/10/2024    Procedure: INCISION AND DRAINAGE  LEFT ANKLE,BIOPSY LEFT ANKEL;  Surgeon: Alvarez Beck DPM;  Location: [facility] MAIN OR;  Service: Podiatry;  Laterality: Left;    TONSILLECTOMY      WOUND DEBRIDEMENT Left 2024    Procedure: INCISION AND DEBRIDEMENT WOUND OF LEFT ANKLE;  Surgeon: Alvarez Beck DPM;  Location: [facility] MAIN OR;  Service: Podiatry;  Laterality: Left;         Physical Assessment:  Wound 02/10/24 0800 Left posterior ankle Other (comment) (Active)   Wound Image   24 1400   Dressing Appearance dry;intact 24 1400   Closure None 24 1400   Base red;moist;granulating 24 1400   Periwound intact;macerated 24 1400   Periwound Temperature warm 24 1400   Periwound Skin Turgor soft 24 1400   Edges open 24 1400   Wound Length (cm) 3.8 cm 24 1400   Wound Width (cm) 4.1 cm 24 1400   Wound Depth (cm) 0.2 cm 24 1400   Wound Surface Area (cm^2) 15.58 cm^2 24 1400   Wound Volume (cm^3) 3.116 cm^3 24 1400   Drainage Characteristics/Odor serosanguineous 24 1400   Drainage Amount small 24 1400   Care, Wound cleansed with;irrigated with;sterile normal saline;negative pressure wound therapy 24 1400   Dressing Care dressing applied;foam;transparent film;gauze, dry;elastic bandage 24 1400   Periwound Care absorptive dressing applied;barrier film applied 24 1400       NPWT (Negative Pressure Wound  Therapy) 02/15/24 1320 Left heel (Active)   Therapy Setting continuous therapy 03/04/24 1400   Dressing foam, black;transparent dressing 03/04/24 1400   Pressure Setting 125 mmHg 03/04/24 1400   Sponges Inserted 1 03/04/24 1400   Sponges Removed 1 03/04/24 1400   Finger sweep complete Yes 03/04/24 1400      Wound Check / Follow-up: Patient seen today for wound check / follow-up and wound VAC dressing change in outpatient nursing services. Patient presents with intact wound VAC dressing. Wound VAC is functioning properly without alarms. Patient reports no issues. Patient was seen at podiatry office on Friday 3/1/2024 where DPM removed sutures from left lower extremity. Removed wound VAC dressing with use of adhesive remover. Wound base to left posterior ankle is red, moist, and granulating. Periwound tissue with minor maceration. Cleansed and irrigated wound with normal saline. Applied black foam to wound base then secured with transparent drape. VAC attached, foam compressed, seal obtained. No signs of lifting or leaking. VAC is functioning properly without alarms. Tubing bolstered with gauze then wrapped with gauze roll and elastic bandage. Recommending to continue current treatment.      Impression: Surgical wound with closure by secondary intent.      Short term goals: Regain skin integrity, skin protection, negative pressure wound therapy, 3x weekly dressing changes on MW.       Rae Worley RN    3/4/2024    14:39 EST

## 2024-03-06 ENCOUNTER — HOSPITAL ENCOUNTER (OUTPATIENT)
Dept: INFUSION THERAPY | Facility: HOSPITAL | Age: 49
Discharge: HOME | End: 2024-03-06
Admitting: FAMILY MEDICINE
Payer: COMMERCIAL

## 2024-03-06 VITALS
TEMPERATURE: 98.2 F | HEART RATE: 70 BPM | SYSTOLIC BLOOD PRESSURE: 137 MMHG | RESPIRATION RATE: 20 BRPM | OXYGEN SATURATION: 96 % | DIASTOLIC BLOOD PRESSURE: 80 MMHG

## 2024-03-06 DIAGNOSIS — L02.619 ABSCESS OF HEEL: Primary | ICD-10-CM

## 2024-03-06 DIAGNOSIS — M86.072 ACUTE HEMATOGENOUS OSTEOMYELITIS OF LEFT FOOT: ICD-10-CM

## 2024-03-06 PROCEDURE — 97605 NEG PRS WND THER DME<=50SQCM: CPT

## 2024-03-08 ENCOUNTER — TELEPHONE (OUTPATIENT)
Dept: PODIATRY | Facility: CLINIC | Age: 49
End: 2024-03-08
Payer: COMMERCIAL

## 2024-03-08 ENCOUNTER — HOSPITAL ENCOUNTER (OUTPATIENT)
Dept: INFUSION THERAPY | Facility: HOSPITAL | Age: 49
Discharge: HOME | End: 2024-03-08
Payer: COMMERCIAL

## 2024-03-08 ENCOUNTER — READMISSION MANAGEMENT (OUTPATIENT)
Dept: CALL CENTER | Facility: HOSPITAL | Age: 49
End: 2024-03-08
Payer: COMMERCIAL

## 2024-03-08 VITALS
TEMPERATURE: 98.4 F | OXYGEN SATURATION: 99 % | RESPIRATION RATE: 18 BRPM | DIASTOLIC BLOOD PRESSURE: 91 MMHG | HEART RATE: 60 BPM | SYSTOLIC BLOOD PRESSURE: 169 MMHG

## 2024-03-08 DIAGNOSIS — M86.071 ACUTE HEMATOGENOUS OSTEOMYELITIS OF RIGHT FOOT: Primary | ICD-10-CM

## 2024-03-08 DIAGNOSIS — L02.619 ABSCESS OF HEEL: Primary | ICD-10-CM

## 2024-03-08 DIAGNOSIS — M86.072 ACUTE HEMATOGENOUS OSTEOMYELITIS OF LEFT FOOT: ICD-10-CM

## 2024-03-08 DIAGNOSIS — S91.302A WOUND OF LEFT FOOT: ICD-10-CM

## 2024-03-08 PROCEDURE — 97605 NEG PRS WND THER DME<=50SQCM: CPT

## 2024-03-08 PROCEDURE — 97606 NEG PRS WND THER DME>50 SQCM: CPT

## 2024-03-08 RX ORDER — TRAMADOL HYDROCHLORIDE 50 MG/1
50 TABLET ORAL EVERY 8 HOURS PRN
Qty: 30 TABLET | Refills: 0 | Status: SHIPPED | OUTPATIENT
Start: 2024-03-08

## 2024-03-08 NOTE — OUTREACH NOTE
General Surgery Week 3 Survey      Flowsheet Row Responses   [facility] patient discharged from? [facility]   Does the patient have one of the following disease processes/diagnoses(primary or secondary)? General Surgery   Week 3 attempt successful? Yes   Call start time 1550   Call end time 1555   Discharge diagnosis Left Achilles tendon rupture  Left heel abscess  Acute hematogenous osteomyelitis of the left calcaneus  Suspected abscessed maxillary left incisor  MRSA bacteremia  Prediabetes  Hypertension  Obesity BMI 37  Mild anemia  High risk drug monitoring vancomycin  Multiple dental caries, I and D of left ankle wound   Is patient permission given to speak with other caregiver? Yes   Person spoke with today (if not patient) and relationship tita Wright other   Medication alerts for this patient IV antibiotics per PICC. Reports appt in place for PICC line dressing change and lab work every Monday   Meds reviewed with patient/caregiver? Yes   Does the patient have all medications related to this admission filled (includes all antibiotics, pain medications, etc.) Yes   Is the patient taking all medications as directed (includes completed medication regime)? Yes   Does the patient have a follow up appointment scheduled with their surgeon? Yes   Has the patient kept scheduled appointments due by today? Yes   Comments Reports has appt 3/19 for teeth extraction   What is the Home health agency?  No HH. Patient goes to out for wound vac change MWF.   Has home health visited the patient within 72 hours of discharge? N/A   Psychosocial issues? No   Did the patient receive a copy of their discharge instructions? Yes   Nursing interventions Reviewed instructions with patient   What is the patient's perception of their health status since discharge? Improving   Is the patient/caregiver able to teach back steps to recovery at home? Set small, achievable goals for return to baseline health, Rest and rebuild strength,  gradually increase activity   Is the patient/caregiver able to teach back the hierarchy of who to call/visit for symptoms/problems? PCP, Specialist, Home health nurse, Urgent Care, ED, 911 Yes   Week 3 call completed? Yes   Graduated Yes  [No calls post week 3]   Is the patient interested in additional calls from an ambulatory ? No   Would this patient benefit from a Referral to Amb Social Work? Yes   Reason for Social Work Referral Financial  [Sig other voices needing to speak to someone about any financial resources. Agreeable for  to contact.]   Call end time 0823            ASHLEIGH VILLAVICENCIO - Registered Nurse

## 2024-03-09 LAB
MYCOBACTERIUM SPEC CULT: NORMAL
NIGHT BLUE STAIN TISS: NORMAL

## 2024-03-11 ENCOUNTER — HOSPITAL ENCOUNTER (OUTPATIENT)
Dept: INFUSION THERAPY | Facility: HOSPITAL | Age: 49
Discharge: HOME | End: 2024-03-11
Admitting: FAMILY MEDICINE
Payer: COMMERCIAL

## 2024-03-11 VITALS
OXYGEN SATURATION: 100 % | HEART RATE: 72 BPM | SYSTOLIC BLOOD PRESSURE: 155 MMHG | RESPIRATION RATE: 20 BRPM | TEMPERATURE: 98.5 F | DIASTOLIC BLOOD PRESSURE: 94 MMHG

## 2024-03-11 DIAGNOSIS — M86.072 ACUTE HEMATOGENOUS OSTEOMYELITIS OF LEFT FOOT: ICD-10-CM

## 2024-03-11 DIAGNOSIS — L02.619 ABSCESS OF HEEL: Primary | ICD-10-CM

## 2024-03-11 LAB
ALBUMIN SERPL-MCNC: 3 G/DL (ref 3.5–5.2)
ALBUMIN/GLOB SERPL: 0.8 G/DL
ALP SERPL-CCNC: 103 U/L (ref 39–117)
ALT SERPL W P-5'-P-CCNC: 17 U/L (ref 1–41)
ANION GAP SERPL CALCULATED.3IONS-SCNC: 10.4 MMOL/L (ref 5–15)
AST SERPL-CCNC: 19 U/L (ref 1–40)
BASOPHILS # BLD AUTO: 0.02 10*3/MM3 (ref 0–0.2)
BASOPHILS NFR BLD AUTO: 0.4 % (ref 0–1.5)
BILIRUB SERPL-MCNC: 0.4 MG/DL (ref 0–1.2)
BUN SERPL-MCNC: 10 MG/DL (ref 6–20)
BUN/CREAT SERPL: 11.6 (ref 7–25)
CALCIUM SPEC-SCNC: 8.5 MG/DL (ref 8.6–10.5)
CHLORIDE SERPL-SCNC: 100 MMOL/L (ref 98–107)
CO2 SERPL-SCNC: 24.6 MMOL/L (ref 22–29)
CREAT SERPL-MCNC: 0.86 MG/DL (ref 0.76–1.27)
CRP SERPL-MCNC: 2.95 MG/DL (ref 0–0.5)
DEPRECATED RDW RBC AUTO: 43.3 FL (ref 37–54)
EGFRCR SERPLBLD CKD-EPI 2021: 105.5 ML/MIN/1.73
EOSINOPHIL # BLD AUTO: 0.61 10*3/MM3 (ref 0–0.4)
EOSINOPHIL NFR BLD AUTO: 12.2 % (ref 0.3–6.2)
ERYTHROCYTE [DISTWIDTH] IN BLOOD BY AUTOMATED COUNT: 13 % (ref 12.3–15.4)
ERYTHROCYTE [SEDIMENTATION RATE] IN BLOOD: 50 MM/HR (ref 0–15)
GLOBULIN UR ELPH-MCNC: 3.6 GM/DL
GLUCOSE SERPL-MCNC: 86 MG/DL (ref 65–99)
HCT VFR BLD AUTO: 30.9 % (ref 37.5–51)
HGB BLD-MCNC: 9.7 G/DL (ref 13–17.7)
IMM GRANULOCYTES # BLD AUTO: 0.04 10*3/MM3 (ref 0–0.05)
IMM GRANULOCYTES NFR BLD AUTO: 0.8 % (ref 0–0.5)
LYMPHOCYTES # BLD AUTO: 1.14 10*3/MM3 (ref 0.7–3.1)
LYMPHOCYTES NFR BLD AUTO: 22.8 % (ref 19.6–45.3)
MCH RBC QN AUTO: 28.3 PG (ref 26.6–33)
MCHC RBC AUTO-ENTMCNC: 31.4 G/DL (ref 31.5–35.7)
MCV RBC AUTO: 90.1 FL (ref 79–97)
MONOCYTES # BLD AUTO: 0.57 10*3/MM3 (ref 0.1–0.9)
MONOCYTES NFR BLD AUTO: 11.4 % (ref 5–12)
NEUTROPHILS NFR BLD AUTO: 2.63 10*3/MM3 (ref 1.7–7)
NEUTROPHILS NFR BLD AUTO: 52.4 % (ref 42.7–76)
NRBC BLD AUTO-RTO: 0 /100 WBC (ref 0–0.2)
PLATELET # BLD AUTO: 174 10*3/MM3 (ref 140–450)
PMV BLD AUTO: 10.7 FL (ref 6–12)
POTASSIUM SERPL-SCNC: 3.8 MMOL/L (ref 3.5–5.2)
PROT SERPL-MCNC: 6.6 G/DL (ref 6–8.5)
RBC # BLD AUTO: 3.43 10*6/MM3 (ref 4.14–5.8)
SODIUM SERPL-SCNC: 135 MMOL/L (ref 136–145)
VANCOMYCIN SERPL-MCNC: 11.7 MCG/ML (ref 5–40)
WBC NRBC COR # BLD AUTO: 5.01 10*3/MM3 (ref 3.4–10.8)

## 2024-03-11 PROCEDURE — G0463 HOSPITAL OUTPT CLINIC VISIT: HCPCS

## 2024-03-11 PROCEDURE — 25010000002 ALTEPLASE 2 MG RECONSTITUTED SOLUTION: Performed by: FAMILY MEDICINE

## 2024-03-11 PROCEDURE — 36593 DECLOT VASCULAR DEVICE: CPT

## 2024-03-11 PROCEDURE — 85025 COMPLETE CBC W/AUTO DIFF WBC: CPT

## 2024-03-11 PROCEDURE — 85652 RBC SED RATE AUTOMATED: CPT

## 2024-03-11 PROCEDURE — 80053 COMPREHEN METABOLIC PANEL: CPT

## 2024-03-11 PROCEDURE — 80202 ASSAY OF VANCOMYCIN: CPT | Performed by: FAMILY MEDICINE

## 2024-03-11 PROCEDURE — 97605 NEG PRS WND THER DME<=50SQCM: CPT

## 2024-03-11 PROCEDURE — 86140 C-REACTIVE PROTEIN: CPT

## 2024-03-11 RX ADMIN — ALTEPLASE: 2.2 INJECTION, POWDER, LYOPHILIZED, FOR SOLUTION INTRAVENOUS at 14:30

## 2024-03-11 RX ADMIN — ALTEPLASE: 2.2 INJECTION, POWDER, LYOPHILIZED, FOR SOLUTION INTRAVENOUS at 16:28

## 2024-03-11 NOTE — SIGNIFICANT NOTE
PICC is still slightly sluggish, but flushes easier than earlier.  Blood return present but sluggish also.  Was able to draw labs x2.  Instructed pt if he continues to have problems with slow infusions or no blood return we may need to replace the PICC line.  S/O he will be on IV abx through 3/23

## 2024-03-11 NOTE — SIGNIFICANT NOTE
Wound Eval / Progress Noted     Jazmine     Patient Name: Karsten Johnson  : 1975  MRN: [id number]  Today's Date: 3/11/2024                 Admit Date: 3/11/2024    Visit Dx:    ICD-10-CM ICD-9-CM   1. Abscess of heel  L02.619 682.7   2. Acute hematogenous osteomyelitis of left foot  M86.072 730.07         * No active hospital problems. *        Past Medical History:   Diagnosis Date    Hypertension         Past Surgical History:   Procedure Laterality Date    INCISION AND DRAINAGE OF WOUND Left 02/10/2024    Procedure: INCISION AND DRAINAGE  LEFT ANKLE,BIOPSY LEFT ANKEL;  Surgeon: Alvarez Beck DPM;  Location: [facility] MAIN OR;  Service: Podiatry;  Laterality: Left;    TONSILLECTOMY      WOUND DEBRIDEMENT Left 2024    Procedure: INCISION AND DEBRIDEMENT WOUND OF LEFT ANKLE;  Surgeon: Alvarez Beck DPM;  Location: [facility] MAIN OR;  Service: Podiatry;  Laterality: Left;         Physical Assessment:  Wound 02/10/24 0800 Left posterior ankle Other (comment) (Active)   Wound Image   24 1330   Dressing Appearance dry;intact 24 1330   Closure None 24 1330   Base red;moist;granulating 24 1330   Periwound intact;pink 24 1330   Periwound Temperature warm 24 1330   Periwound Skin Turgor soft 24 1330   Edges open 24 1330   Wound Length (cm) 2.3 cm 24 1330   Wound Width (cm) 4.2 cm 24 1330   Wound Depth (cm) 0.2 cm 24 1330   Wound Surface Area (cm^2) 9.66 cm^2 24 1330   Wound Volume (cm^3) 1.932 cm^3 24 1330   Drainage Characteristics/Odor serosanguineous 24 1330   Drainage Amount small 24 1330   Care, Wound cleansed with;irrigated with;sterile normal saline;negative pressure wound therapy 24 1330   Dressing Care dressing applied;foam;transparent film;gauze, dry;elastic bandage 24 1330   Periwound Care absorptive dressing applied;barrier film applied 24 1330       NPWT (Negative Pressure Wound  Therapy) 02/15/24 1320 Left heel (Active)   Therapy Setting continuous therapy 03/11/24 1330   Dressing foam, black;transparent dressing 03/11/24 1330   Pressure Setting 125 mmHg 03/11/24 1330   Sponges Inserted 2 03/11/24 1330   Sponges Removed 1 03/11/24 1330   Finger sweep complete Yes 03/11/24 1330      Wound Check / Follow-up: Patient seen today for wound check / follow-up and wound VAC dressing change in outpatient nursing services. Patient's significant other is present at bedside. Patient presents with intact wound VAC dressing. Wound VAC is functioning properly without alarms. Patient reports no issues.     Removed wound VAC dressing with use of adhesive remover. Wound base to left posterior ankle is red, moist, and granulating. Periwound tissue is pink and intact. Wound margins ksenia. Cleansed and irrigated wound with normal saline. Applied skin barrier wipe to periwound tissue. Applied black foam to wound base then secured with transparent drape. Second piece of black foam added as landing pad and secured with transparent dressing. VAC attached, foam compressed, seal obtained. No signs of lifting or leaking. Vac is functioning properly without alarms. Tubing bolstered then wrapped with gauze roll and elastic bandage. Patient tolerated well. Recommending to continue current treatment.       Impression: Surgical wound with closure by secondary intent.      Short term goals: Regain skin integrity, skin protection, negative pressure wound therapy, 3x weekly dressing changes on MWF.      Rae Worley RN    3/11/2024    14:14 EDT

## 2024-03-11 NOTE — SIGNIFICANT NOTE
Dressing changed per protocol.  PICC is sluggish to flush and does not have blood return.  Obtained order to cathflo.  [facility] initiated at 1430

## 2024-03-11 NOTE — ADDENDUM NOTE
Encounter addended by: Yasmin Scott RN on: 3/11/2024 7:41 PM   Actions taken: Clinical Note Signed, Flowsheet accepted, Charge Capture section accepted

## 2024-03-11 NOTE — CODE DOCUMENTATION
1330 PICC line is refusing to pull labs. It is hard to push saline and wife states they have been having trouble getting meds in. IV therapy notified and they are going to eval.

## 2024-03-13 ENCOUNTER — HOSPITAL ENCOUNTER (OUTPATIENT)
Dept: INFUSION THERAPY | Facility: HOSPITAL | Age: 49
Discharge: HOME | End: 2024-03-13
Admitting: FAMILY MEDICINE
Payer: OTHER MISCELLANEOUS

## 2024-03-13 VITALS — DIASTOLIC BLOOD PRESSURE: 93 MMHG | HEART RATE: 71 BPM | SYSTOLIC BLOOD PRESSURE: 136 MMHG | OXYGEN SATURATION: 100 %

## 2024-03-13 DIAGNOSIS — M86.072 ACUTE HEMATOGENOUS OSTEOMYELITIS OF LEFT FOOT: ICD-10-CM

## 2024-03-13 DIAGNOSIS — L02.619 ABSCESS OF HEEL: Primary | ICD-10-CM

## 2024-03-13 PROCEDURE — 97606 NEG PRS WND THER DME>50 SQCM: CPT

## 2024-03-13 PROCEDURE — 97605 NEG PRS WND THER DME<=50SQCM: CPT

## 2024-03-14 ENCOUNTER — PATIENT OUTREACH (OUTPATIENT)
Age: 49
End: 2024-03-14
Payer: COMMERCIAL

## 2024-03-14 NOTE — OUTREACH NOTE
Bedside and Verbal shift change report given to Jacey Doss (oncoming nurse) by Paty Ocasio (offgoing nurse). Report included the following information SBAR, Kardex, Intake/Output and MAR. SW attempted to contact pt. SW will attempt again in a couple of business days.    Tristian GIRALDO -   Ambulatory Case Management    3/14/2024, 13:15 EDT

## 2024-03-15 ENCOUNTER — HOSPITAL ENCOUNTER (OUTPATIENT)
Dept: INFUSION THERAPY | Facility: HOSPITAL | Age: 49
Discharge: HOME | End: 2024-03-15
Payer: OTHER MISCELLANEOUS

## 2024-03-15 VITALS
SYSTOLIC BLOOD PRESSURE: 145 MMHG | TEMPERATURE: 97.9 F | OXYGEN SATURATION: 99 % | DIASTOLIC BLOOD PRESSURE: 87 MMHG | HEART RATE: 66 BPM | RESPIRATION RATE: 18 BRPM

## 2024-03-15 DIAGNOSIS — M86.072 ACUTE HEMATOGENOUS OSTEOMYELITIS OF LEFT FOOT: ICD-10-CM

## 2024-03-15 DIAGNOSIS — L02.619 ABSCESS OF HEEL: Primary | ICD-10-CM

## 2024-03-15 PROCEDURE — 97605 NEG PRS WND THER DME<=50SQCM: CPT

## 2024-03-16 LAB
MYCOBACTERIUM SPEC CULT: NORMAL
NIGHT BLUE STAIN TISS: NORMAL

## 2024-03-18 ENCOUNTER — HOSPITAL ENCOUNTER (OUTPATIENT)
Dept: INFUSION THERAPY | Facility: HOSPITAL | Age: 49
Discharge: HOME | End: 2024-03-18
Admitting: FAMILY MEDICINE
Payer: OTHER MISCELLANEOUS

## 2024-03-18 ENCOUNTER — PATIENT OUTREACH (OUTPATIENT)
Age: 49
End: 2024-03-18
Payer: COMMERCIAL

## 2024-03-18 VITALS
TEMPERATURE: 98.4 F | RESPIRATION RATE: 20 BRPM | DIASTOLIC BLOOD PRESSURE: 80 MMHG | HEART RATE: 74 BPM | OXYGEN SATURATION: 100 % | SYSTOLIC BLOOD PRESSURE: 170 MMHG

## 2024-03-18 DIAGNOSIS — M86.072 ACUTE HEMATOGENOUS OSTEOMYELITIS OF LEFT FOOT: ICD-10-CM

## 2024-03-18 DIAGNOSIS — L02.619 ABSCESS OF HEEL: Primary | ICD-10-CM

## 2024-03-18 LAB
ALBUMIN SERPL-MCNC: 3.2 G/DL (ref 3.5–5.2)
ALBUMIN/GLOB SERPL: 0.9 G/DL
ALP SERPL-CCNC: 104 U/L (ref 39–117)
ALT SERPL W P-5'-P-CCNC: 17 U/L (ref 1–41)
ANION GAP SERPL CALCULATED.3IONS-SCNC: 8.5 MMOL/L (ref 5–15)
AST SERPL-CCNC: 15 U/L (ref 1–40)
BASOPHILS # BLD AUTO: 0.02 10*3/MM3 (ref 0–0.2)
BASOPHILS NFR BLD AUTO: 0.4 % (ref 0–1.5)
BILIRUB SERPL-MCNC: 0.4 MG/DL (ref 0–1.2)
BUN SERPL-MCNC: 14 MG/DL (ref 6–20)
BUN/CREAT SERPL: 9.5 (ref 7–25)
CALCIUM SPEC-SCNC: 8.7 MG/DL (ref 8.6–10.5)
CHLORIDE SERPL-SCNC: 103 MMOL/L (ref 98–107)
CO2 SERPL-SCNC: 24.5 MMOL/L (ref 22–29)
CREAT SERPL-MCNC: 1.48 MG/DL (ref 0.76–1.27)
CRP SERPL-MCNC: 3.87 MG/DL (ref 0–0.5)
DEPRECATED RDW RBC AUTO: 43.5 FL (ref 37–54)
EGFRCR SERPLBLD CKD-EPI 2021: 57.3 ML/MIN/1.73
EOSINOPHIL # BLD AUTO: 0.72 10*3/MM3 (ref 0–0.4)
EOSINOPHIL NFR BLD AUTO: 13.5 % (ref 0.3–6.2)
ERYTHROCYTE [DISTWIDTH] IN BLOOD BY AUTOMATED COUNT: 13.2 % (ref 12.3–15.4)
ERYTHROCYTE [SEDIMENTATION RATE] IN BLOOD: 26 MM/HR (ref 0–15)
GLOBULIN UR ELPH-MCNC: 3.6 GM/DL
GLUCOSE SERPL-MCNC: 129 MG/DL (ref 65–99)
HCT VFR BLD AUTO: 30.4 % (ref 37.5–51)
HGB BLD-MCNC: 9.7 G/DL (ref 13–17.7)
IMM GRANULOCYTES # BLD AUTO: 0.01 10*3/MM3 (ref 0–0.05)
IMM GRANULOCYTES NFR BLD AUTO: 0.2 % (ref 0–0.5)
LYMPHOCYTES # BLD AUTO: 1.02 10*3/MM3 (ref 0.7–3.1)
LYMPHOCYTES NFR BLD AUTO: 19.1 % (ref 19.6–45.3)
MCH RBC QN AUTO: 28.5 PG (ref 26.6–33)
MCHC RBC AUTO-ENTMCNC: 31.9 G/DL (ref 31.5–35.7)
MCV RBC AUTO: 89.4 FL (ref 79–97)
MONOCYTES # BLD AUTO: 0.62 10*3/MM3 (ref 0.1–0.9)
MONOCYTES NFR BLD AUTO: 11.6 % (ref 5–12)
NEUTROPHILS NFR BLD AUTO: 2.94 10*3/MM3 (ref 1.7–7)
NEUTROPHILS NFR BLD AUTO: 55.2 % (ref 42.7–76)
NRBC BLD AUTO-RTO: 0 /100 WBC (ref 0–0.2)
PLATELET # BLD AUTO: 171 10*3/MM3 (ref 140–450)
PMV BLD AUTO: 10.8 FL (ref 6–12)
POTASSIUM SERPL-SCNC: 3.9 MMOL/L (ref 3.5–5.2)
PROT SERPL-MCNC: 6.8 G/DL (ref 6–8.5)
RBC # BLD AUTO: 3.4 10*6/MM3 (ref 4.14–5.8)
SODIUM SERPL-SCNC: 136 MMOL/L (ref 136–145)
VANCOMYCIN SERPL-MCNC: 58.02 MCG/ML (ref 5–40)
WBC NRBC COR # BLD AUTO: 5.33 10*3/MM3 (ref 3.4–10.8)

## 2024-03-18 PROCEDURE — 86140 C-REACTIVE PROTEIN: CPT

## 2024-03-18 PROCEDURE — 85652 RBC SED RATE AUTOMATED: CPT

## 2024-03-18 PROCEDURE — G0463 HOSPITAL OUTPT CLINIC VISIT: HCPCS

## 2024-03-18 PROCEDURE — 80202 ASSAY OF VANCOMYCIN: CPT | Performed by: FAMILY MEDICINE

## 2024-03-18 PROCEDURE — 80053 COMPREHEN METABOLIC PANEL: CPT

## 2024-03-18 PROCEDURE — 85025 COMPLETE CBC W/AUTO DIFF WBC: CPT

## 2024-03-18 PROCEDURE — 97605 NEG PRS WND THER DME<=50SQCM: CPT

## 2024-03-18 PROCEDURE — 36592 COLLECT BLOOD FROM PICC: CPT

## 2024-03-18 NOTE — OUTREACH NOTE
Social Work Assessment  Questions/Answers      Flowsheet Row Most Recent Value   Referral Source nursing   Reason for Consult community resources, financial concerns, insurance concerns   Preferred Language English   People in Home significant other   Current Living Arrangements apartment   Potentially Unsafe Housing Conditions unable to assess   In the past 12 months has the electric, gas, oil, or water company threatened to shut off services in your home? No   Primary Care Provided by self   Provides Primary Care For no one   Family Caregiver if Needed significant other   Quality of Family Relationships unable to assess   Source of Income workman's compensation   Financial/Environmental Concerns insurance inadequate   Application for Public Assistance obtained public assistance pending number   Q1: How often do you have a drink containing alcohol? Pt Declined   Q2: How many drinks containing alcohol do you have on a typical day when you are drinking? Pt Declined   Q3: How often do you have six or more drinks on one occasion? Pt Declined   Audit-C Score -1        SDOH updated and reviewed with the patient during this program:  --     Alcohol Use: Patient Declined (3/18/2024)    AUDIT-C     Frequency of Alcohol Consumption: Patient declined     Average Number of Drinks: Patient declined     Frequency of Binge Drinking: Patient declined      --     Depression: Not at risk (2/8/2024)    PHQ-2     PHQ-2 Score: 0      --     Disabilities: Not At Risk (2/8/2024)    Disabilities     Concentrating, Remembering, or Making Decisions Difficulty: no     Doing Errands Independently Difficulty: no      --     Employment: Not At Risk (2/8/2024)    Employment     Do you want help finding or keeping work or a job?: I do not need or want help      Financial Resource Strain: Medium Risk (3/18/2024)    Overall Financial Resource Strain (CARDIA)     Difficulty of Paying Living Expenses: Somewhat hard      --     Food Insecurity: Food  Insecurity Present (3/18/2024)    Hunger Vital Sign     Worried About Running Out of Food in the Last Year: Sometimes true     Ran Out of Food in the Last Year: Never true      --     Health Literacy: Not At Risk (3/18/2024)    Education     Help with school or training?: No     Preferred Language: English      --     Housing Stability: Unknown (3/18/2024)    Housing Stability     Current Living Arrangements: apartment     Potentially Unsafe Housing Conditions: unable to assess   Recent Concern: Housing Stability - High Risk (3/18/2024)    Housing Stability Vital Sign     Unable to Pay for Housing in the Last Year: Yes     Homeless in the Last Year: No      --     Interpersonal Safety: Not At Risk (3/18/2024)    Humiliation, Afraid, Rape, and Kick questionnaire     Fear of Current or Ex-Partner: No     Emotionally Abused: No     Physically Abused: No     Sexually Abused: No      --     Physical Activity: Sufficiently Active (2/8/2024)    Exercise Vital Sign     Days of Exercise per Week: 5 days     Minutes of Exercise per Session: 60 min      --     Social Connections: Not At Risk (2/8/2024)    Family and Community Support     Help with Day-to-Day Activities: I don't need any help     Lonely or Isolated: Never      --     Stress: Stress Concern Present (3/18/2024)    [employer] [place] [employer] - Occupational Stress Questionnaire     Feeling of Stress : To some extent      --     Tobacco Use: High Risk (3/18/2024)    Patient History     Smoking Tobacco Use: Every Day     Smokeless Tobacco Use: Never     Passive Exposure: Never      --     Transportation Needs: No Transportation Needs (3/18/2024)    PRAPARE - Transportation     Lack of Transportation (Medical): No     Lack of Transportation (Non-Medical): No      --     Utilities: Not At Risk (3/18/2024)    [facility] Utilities     Threatened with loss of utilities: No      Continuing Care   Community & [facility] MEDICA[facility]    [address]  [place] [address]    Phone: [phone]    Resource for: Financial Resource Strain, Utilities   [place] [facility]    [address], [place] [address]    Phone: [phone]    Resource for: Food Insecurity   Patient Outreach    SW spoke with pt significant other, at pt verbal request. SW and pt significant other discussed pt applying for Medicaid, SNAP benefits and receiving the [facility] resource directory. Pt and significant other were agreeable to receiving the directory. Pt has a Workman's Comp claim in. SW will D/c due to meeting initial outreach goal.     Tristian GIRALDO -   Ambulatory Case Management    3/18/2024, 13:05 EDT

## 2024-03-20 ENCOUNTER — OFFICE VISIT (OUTPATIENT)
Dept: INTERNAL MEDICINE | Facility: CLINIC | Age: 49
End: 2024-03-20
Payer: COMMERCIAL

## 2024-03-20 ENCOUNTER — TELEPHONE (OUTPATIENT)
Dept: PODIATRY | Facility: CLINIC | Age: 49
End: 2024-03-20

## 2024-03-20 ENCOUNTER — HOSPITAL ENCOUNTER (OUTPATIENT)
Dept: INFUSION THERAPY | Facility: HOSPITAL | Age: 49
Discharge: HOME | End: 2024-03-20
Admitting: FAMILY MEDICINE
Payer: OTHER MISCELLANEOUS

## 2024-03-20 VITALS — OXYGEN SATURATION: 97 % | HEART RATE: 72 BPM | SYSTOLIC BLOOD PRESSURE: 165 MMHG | DIASTOLIC BLOOD PRESSURE: 90 MMHG

## 2024-03-20 VITALS
DIASTOLIC BLOOD PRESSURE: 84 MMHG | HEART RATE: 83 BPM | RESPIRATION RATE: 18 BRPM | TEMPERATURE: 98.2 F | BODY MASS INDEX: 225.97 KG/M2 | SYSTOLIC BLOOD PRESSURE: 132 MMHG | OXYGEN SATURATION: 97 % | HEIGHT: 60 IN

## 2024-03-20 DIAGNOSIS — Z11.59 SCREENING FOR VIRAL DISEASE: ICD-10-CM

## 2024-03-20 DIAGNOSIS — F17.210 CIGARETTE NICOTINE DEPENDENCE WITHOUT COMPLICATION: ICD-10-CM

## 2024-03-20 DIAGNOSIS — Z12.11 SCREENING FOR COLON CANCER: ICD-10-CM

## 2024-03-20 DIAGNOSIS — I10 ESSENTIAL HYPERTENSION: Primary | ICD-10-CM

## 2024-03-20 DIAGNOSIS — M86.071 ACUTE HEMATOGENOUS OSTEOMYELITIS OF RIGHT FOOT: ICD-10-CM

## 2024-03-20 DIAGNOSIS — L02.619 ABSCESS OF HEEL: Primary | ICD-10-CM

## 2024-03-20 DIAGNOSIS — S86.012A RUPTURE OF LEFT ACHILLES TENDON, INITIAL ENCOUNTER: ICD-10-CM

## 2024-03-20 DIAGNOSIS — Z80.0 FAMILY HISTORY OF COLON CANCER: ICD-10-CM

## 2024-03-20 DIAGNOSIS — L02.619 ABSCESS OF HEEL: ICD-10-CM

## 2024-03-20 DIAGNOSIS — R73.03 PREDIABETES: ICD-10-CM

## 2024-03-20 DIAGNOSIS — M86.072 ACUTE HEMATOGENOUS OSTEOMYELITIS OF LEFT FOOT: ICD-10-CM

## 2024-03-20 PROCEDURE — 97606 NEG PRS WND THER DME>50 SQCM: CPT

## 2024-03-20 PROCEDURE — 97605 NEG PRS WND THER DME<=50SQCM: CPT

## 2024-03-20 RX ORDER — LISINOPRIL 40 MG/1
40 TABLET ORAL
Qty: 30 TABLET | Refills: 2 | Status: SHIPPED | OUTPATIENT
Start: 2024-03-20 | End: 2024-04-19

## 2024-03-20 NOTE — PROGRESS NOTES
Chief Complaint  Establish Care, Wound Check, and Prediabetes    Subjective          Karsten Johnson presents to [facility]  History of Present Illness  Last PCP: none  Previous Specialists: podiatry- Dr. Beck, wound care  Last labs: in hospital  Last colonoscopy: never,  family history of colon cancer     Pt admitted on 2/7 for L achilles tendon rupture, L heel abscess, acute hematogenous osteomyelitis of left calcaneous, suspected abscessed maxillary left incisor, MRSA bacteremia, prediabetes, HTN, anemia, multiple dental caries.   Discharged with IV vanc x 6 wk. OR cleanout on 2/14.   He has been seeing wound care regularly and has f/u scheduled with Dr. Beck     Predm: no recent pcp   HTN: has been taking lisinopril daily  Denies chest pain, palpitations, ha, dizziness  Nicotine use: pt does not want to quit      Past Medical History:   Diagnosis Date    Hypertension         Past Surgical History:   Procedure Laterality Date    INCISION AND DRAINAGE OF WOUND Left 02/10/2024    Procedure: INCISION AND DRAINAGE  LEFT ANKLE,BIOPSY LEFT ANKEL;  Surgeon: Alvarez Beck DPM;  Location: [facility];  Service: Podiatry;  Laterality: Left;    TONSILLECTOMY      WOUND DEBRIDEMENT Left 02/14/2024    Procedure: INCISION AND DEBRIDEMENT WOUND OF LEFT ANKLE;  Surgeon: Alvarez Beck DPM;  Location: [facility];  Service: Podiatry;  Laterality: Left;        Current Outpatient Medications on File Prior to Visit   Medication Sig Dispense Refill    traMADol (ULTRAM) 50 MG tablet Take 1 tablet by mouth Every 8 (Eight) Hours As Needed for Moderate Pain (pain). 30 tablet 0    vancomycin 1750 mg/500 mL 0.9% NS IVPB ([facility]) Infuse 500 mL into a venous catheter Every 12 (Twelve) Hours for 41 days. Indications: Bacteria in the Blood, Bone and/or Joint Infection, Infection of the Skin and/or Soft Tissue      Vancomycin HCl-Dextrose (PHARMACY TO DOSE VANCOMYCIN) Use  "Continuous As Needed (osteo MRSA) for up to 38 days. Indications: Bacteria in the Blood, Bone and/or Joint Infection, Infection of the Skin and/or Soft Tissue       No current facility-administered medications on file prior to visit.        No Known Allergies    Social History     Tobacco Use   Smoking Status Every Day    Current packs/day: 1.00    Average packs/day: 1 pack/day for 25.0 years (25.0 ttl pk-yrs)    Types: Cigarettes    Passive exposure: Never   Smokeless Tobacco Never          Objective   Vital Signs:   /84 (BP Location: Right arm, Patient Position: Sitting, Cuff Size: Adult)   Pulse 83   Temp 98.2 °F (36.8 °C) (Temporal)   Resp 18   Ht 66 cm (25.98\")   SpO2 97%   .97 kg/m²     Physical Exam  Vitals reviewed.   Constitutional:       Appearance: Normal appearance.   HENT:      Head: Normocephalic and atraumatic.      Nose: Nose normal.      Mouth/Throat:      Mouth: Mucous membranes are moist.   Eyes:      Extraocular Movements: Extraocular movements intact.      Conjunctiva/sclera: Conjunctivae normal.      Pupils: Pupils are equal, round, and reactive to light.   Cardiovascular:      Rate and Rhythm: Normal rate and regular rhythm.   Pulmonary:      Effort: Pulmonary effort is normal.      Breath sounds: Normal breath sounds.   Abdominal:      General: Abdomen is flat. Bowel sounds are normal.      Palpations: Abdomen is soft.   Musculoskeletal:         General: Normal range of motion.   Neurological:      General: No focal deficit present.      Mental Status: He is alert and oriented to person, place, and time.   Psychiatric:         Mood and Affect: Mood normal.        Result Review :                          Assessment and Plan    Diagnoses and all orders for this visit:    1. Essential hypertension (Primary)  Assessment & Plan:  Hypertension is stable and controlled  Continue current treatment regimen.  Blood pressure will be reassessed in 3 months.    Orders:  -     " Comprehensive Metabolic Panel; Future  -     CBC & Differential; Future  -     TSH; Future  -     Lipid Panel; Future    2. Rupture of left Achilles tendon, initial encounter    3. Abscess of heel  Assessment & Plan:  Continue follow up with podiatry/wound care as directed. Spoke with Dr. Beck who will continue to prescribe tramadol prn pain while needed during wound healing. Discussed with pt that pain medication risks long term, would recommend d/c as soon as d/c by podiatry.      4. Acute hematogenous osteomyelitis of right foot    5. Prediabetes  Assessment & Plan:  Lab shows prediabetes, this is close to needing medication for diabetes. I recommend a low carb diet (limit white bread, rice, potatoes, pasta, and cookies/candy/sweets). Increase exercise and weight loss. We will monitor at next labs in 3 months.    Orders:  -     Hemoglobin A1c; Future    6. Screening for colon cancer  -     Ambulatory Referral For Screening Colonoscopy    7. Family history of colon cancer  -     Ambulatory Referral For Screening Colonoscopy    8. Screening for viral disease  -     Hepatitis C Antibody; Future    9. Cigarette nicotine dependence without complication  Assessment & Plan:  Discussed risks of smoking with patient including death, blood pressure elevation, heart attack, stroke, kidney disese, blindness, slow wound healing. Pt is not ready to quit smoking at this time, encouraged to RTC once ready to quit.    Orders:  -      CT Chest Low Dose Cancer Screening WO; Future    Other orders  -     lisinopril (PRINIVIL,ZESTRIL) 40 MG tablet; Take 1 tablet by mouth Daily for 30 days.  Dispense: 30 tablet; Refill: 2        Follow Up   Return in about 6 weeks (around 5/1/2024).  Patient was given instructions and counseling regarding his condition or for health maintenance advice. Please see specific information pulled into the AVS if appropriate.

## 2024-03-20 NOTE — TELEPHONE ENCOUNTER
Caller: TAMARA     Relationship: WORK COMP     Best call back number: [phone]    What form or medical record are you requesting: PROGNOTES FROM 03/01/24     How would you like to receive the form or medical records (pick-up, mail, fax): FAX  If fax, what is the fax number: [phone]   ATTN: TAMARA

## 2024-03-21 ENCOUNTER — TELEPHONE (OUTPATIENT)
Dept: INTERNAL MEDICINE | Facility: CLINIC | Age: 49
End: 2024-03-21
Payer: COMMERCIAL

## 2024-03-21 DIAGNOSIS — S91.302A WOUND OF LEFT FOOT: ICD-10-CM

## 2024-03-21 DIAGNOSIS — M86.071 ACUTE HEMATOGENOUS OSTEOMYELITIS OF RIGHT FOOT: Primary | ICD-10-CM

## 2024-03-21 PROBLEM — F17.210 CIGARETTE NICOTINE DEPENDENCE WITHOUT COMPLICATION: Status: ACTIVE | Noted: 2024-03-21

## 2024-03-21 RX ORDER — TRAMADOL HYDROCHLORIDE 50 MG/1
50 TABLET ORAL EVERY 12 HOURS PRN
Qty: 30 TABLET | Refills: 0 | Status: SHIPPED | OUTPATIENT
Start: 2024-03-21

## 2024-03-21 NOTE — TELEPHONE ENCOUNTER
1110 - reached out to patient to confirm with him that a prescription for Tramadol had been sent to [facility] in [place] per Dr. Beck's office.  Confirmed with him as well that his office would be providing his pain medication for his continued treatment.  No further questions or concerns at this time.  Offered further assistance as needed.          1030 Per request from Luz Nam PA-C, reached out to Dr. Beck's office #[phone] in reference to patient's pain medication (Tramadol) and that he voiced at his appointment with Luz that he would be out of medication today.  Confirmed with Rani that she would message Dr. Beck who was out of office today.  She said he would send prescription in for the patient.  Rani confirmed she would touch base with the patient as well.

## 2024-03-21 NOTE — ASSESSMENT & PLAN NOTE
Lab shows prediabetes, this is close to needing medication for diabetes. I recommend a low carb diet (limit white bread, rice, potatoes, pasta, and cookies/candy/sweets). Increase exercise and weight loss. We will monitor at next labs in 3 months.

## 2024-03-21 NOTE — ASSESSMENT & PLAN NOTE
Continue follow up with podiatry/wound care as directed. Spoke with Dr. Beck who will continue to prescribe tramadol prn pain while needed during wound healing. Discussed with pt that pain medication risks long term, would recommend d/c as soon as d/c by podiatry.

## 2024-03-22 ENCOUNTER — HOSPITAL ENCOUNTER (OUTPATIENT)
Dept: INFUSION THERAPY | Facility: HOSPITAL | Age: 49
Discharge: HOME | End: 2024-03-22
Payer: COMMERCIAL

## 2024-03-22 VITALS
OXYGEN SATURATION: 99 % | SYSTOLIC BLOOD PRESSURE: 177 MMHG | HEART RATE: 78 BPM | DIASTOLIC BLOOD PRESSURE: 91 MMHG | RESPIRATION RATE: 20 BRPM | TEMPERATURE: 98.1 F

## 2024-03-22 DIAGNOSIS — L02.619 ABSCESS OF HEEL: Primary | ICD-10-CM

## 2024-03-22 DIAGNOSIS — M86.072 ACUTE HEMATOGENOUS OSTEOMYELITIS OF LEFT FOOT: ICD-10-CM

## 2024-03-22 PROCEDURE — 97605 NEG PRS WND THER DME<=50SQCM: CPT

## 2024-03-22 NOTE — SIGNIFICANT NOTE
Wound Eval / Progress Noted     Jazmine     Patient Name: Karsten Johnson  : 1975  MRN: [id number]  Today's Date: 3/22/2024                 Admit Date: 3/22/2024    Visit Dx:    ICD-10-CM ICD-9-CM   1. Abscess of heel  L02.619 682.7   2. Acute hematogenous osteomyelitis of left foot  M86.072 730.07         * No active hospital problems. *        Past Medical History:   Diagnosis Date    Hypertension         Past Surgical History:   Procedure Laterality Date    INCISION AND DRAINAGE OF WOUND Left 02/10/2024    Procedure: INCISION AND DRAINAGE  LEFT ANKLE,BIOPSY LEFT ANKEL;  Surgeon: Alvarez Beck DPM;  Location: [facility] MAIN OR;  Service: Podiatry;  Laterality: Left;    TONSILLECTOMY      WOUND DEBRIDEMENT Left 2024    Procedure: INCISION AND DEBRIDEMENT WOUND OF LEFT ANKLE;  Surgeon: Alvarez Beck DPM;  Location: [facility] MAIN OR;  Service: Podiatry;  Laterality: Left;         Physical Assessment:  Wound 02/10/24 0800 Left posterior ankle Other (comment) (Active)   Dressing Appearance intact;dry 24 1225   Closure None 24 1225   Base red;moist;granulating;epithelialization 24 1225   Red (%), Wound Tissue Color 100 24 1225   Periwound intact;pink 24 1225   Periwound Temperature warm 24 1225   Periwound Skin Turgor soft 24 1225   Edges open 24 1225   Wound Length (cm) 2.1 cm 24 1225   Wound Width (cm) 4.5 cm 24 1225   Wound Depth (cm) 0.3 cm 24 1225   Wound Surface Area (cm^2) 9.45 cm^2 24 1225   Wound Volume (cm^3) 2.835 cm^3 24 1225   Drainage Characteristics/Odor serosanguineous 24 1225   Drainage Amount small 24 1225   Care, Wound cleansed with;irrigated with;sterile normal saline 24 1225   Dressing Care dressing applied;silver impregnated;hydrofiber 24 1225   Periwound Care barrier ointment applied 24 1225       NPWT (Negative Pressure Wound Therapy) 02/15/24 1320 Left heel (Active)    Therapy Setting continuous therapy 03/22/24 1225   Dressing foam, black 03/22/24 1225   Pressure Setting 125 mmHg 03/22/24 1225   Sponges Inserted 2 03/22/24 1225   Sponges Removed 1 03/22/24 1225   Finger sweep complete Yes 03/22/24 1225        Wound Check / Follow-up: Patient seen today for a wound vac dressing change. Spouse accompanied patient; patient denies any issues at this time.   Wound vac functioning appropriate with no alarms; minimal drainage to cannister.     Removed wound vac dressing with use of the adhesive remover. Wound base is red moist and granulating; pink epithelium with intact tissue. Wound margins continue ksenia. Cleansed and irrigated with NS and gauze. Applied skin barrier to the periwound and then window paned the wound edges with silver impregnated hydrofiber and drape. Placed one black foam to the wound base and secured with transparent drape. Applied a second black foam for a track pad up the medial ankle to the anterior lower leg. Wound vac connected, foam compressed. No alarms. Wrapped the leg with gauze roll and elastic bandage.    Impression: surgical wound with closure by secondary intent    Short term goals:  regain skin integrity, skin protection, negative pressure wound therapy ENA Madison RN    3/22/2024    13:09 EDT

## 2024-03-23 LAB
MYCOBACTERIUM SPEC CULT: NORMAL
NIGHT BLUE STAIN TISS: NORMAL

## 2024-03-25 ENCOUNTER — HOSPITAL ENCOUNTER (OUTPATIENT)
Dept: INFUSION THERAPY | Facility: HOSPITAL | Age: 49
Discharge: HOME | End: 2024-03-25
Admitting: FAMILY MEDICINE
Payer: OTHER MISCELLANEOUS

## 2024-03-25 ENCOUNTER — TELEPHONE (OUTPATIENT)
Dept: PODIATRY | Facility: CLINIC | Age: 49
End: 2024-03-25

## 2024-03-25 VITALS
OXYGEN SATURATION: 100 % | TEMPERATURE: 98.1 F | HEART RATE: 66 BPM | DIASTOLIC BLOOD PRESSURE: 89 MMHG | SYSTOLIC BLOOD PRESSURE: 162 MMHG | RESPIRATION RATE: 16 BRPM

## 2024-03-25 DIAGNOSIS — M86.072 ACUTE HEMATOGENOUS OSTEOMYELITIS OF LEFT FOOT: ICD-10-CM

## 2024-03-25 DIAGNOSIS — L02.619 ABSCESS OF HEEL: Primary | ICD-10-CM

## 2024-03-25 LAB
ALBUMIN SERPL-MCNC: 3.3 G/DL (ref 3.5–5.2)
ALBUMIN/GLOB SERPL: 0.9 G/DL
ALP SERPL-CCNC: 100 U/L (ref 39–117)
ALT SERPL W P-5'-P-CCNC: 10 U/L (ref 1–41)
ANION GAP SERPL CALCULATED.3IONS-SCNC: 9.1 MMOL/L (ref 5–15)
AST SERPL-CCNC: 11 U/L (ref 1–40)
BASOPHILS # BLD AUTO: 0.03 10*3/MM3 (ref 0–0.2)
BASOPHILS NFR BLD AUTO: 0.4 % (ref 0–1.5)
BILIRUB SERPL-MCNC: 0.3 MG/DL (ref 0–1.2)
BUN SERPL-MCNC: 15 MG/DL (ref 6–20)
BUN/CREAT SERPL: 8.5 (ref 7–25)
CALCIUM SPEC-SCNC: 8.9 MG/DL (ref 8.6–10.5)
CHLORIDE SERPL-SCNC: 101 MMOL/L (ref 98–107)
CO2 SERPL-SCNC: 26.9 MMOL/L (ref 22–29)
CREAT SERPL-MCNC: 1.76 MG/DL (ref 0.76–1.27)
CRP SERPL-MCNC: 1.86 MG/DL (ref 0–0.5)
DEPRECATED RDW RBC AUTO: 43.5 FL (ref 37–54)
EGFRCR SERPLBLD CKD-EPI 2021: 46.5 ML/MIN/1.73
EOSINOPHIL # BLD AUTO: 0.77 10*3/MM3 (ref 0–0.4)
EOSINOPHIL NFR BLD AUTO: 11.3 % (ref 0.3–6.2)
ERYTHROCYTE [DISTWIDTH] IN BLOOD BY AUTOMATED COUNT: 13.3 % (ref 12.3–15.4)
ERYTHROCYTE [SEDIMENTATION RATE] IN BLOOD: 47 MM/HR (ref 0–15)
GLOBULIN UR ELPH-MCNC: 3.7 GM/DL
GLUCOSE SERPL-MCNC: 96 MG/DL (ref 65–99)
HCT VFR BLD AUTO: 30.6 % (ref 37.5–51)
HGB BLD-MCNC: 9.8 G/DL (ref 13–17.7)
IMM GRANULOCYTES # BLD AUTO: 0.02 10*3/MM3 (ref 0–0.05)
IMM GRANULOCYTES NFR BLD AUTO: 0.3 % (ref 0–0.5)
LYMPHOCYTES # BLD AUTO: 1.5 10*3/MM3 (ref 0.7–3.1)
LYMPHOCYTES NFR BLD AUTO: 22.1 % (ref 19.6–45.3)
MCH RBC QN AUTO: 28.7 PG (ref 26.6–33)
MCHC RBC AUTO-ENTMCNC: 32 G/DL (ref 31.5–35.7)
MCV RBC AUTO: 89.5 FL (ref 79–97)
MONOCYTES # BLD AUTO: 0.71 10*3/MM3 (ref 0.1–0.9)
MONOCYTES NFR BLD AUTO: 10.4 % (ref 5–12)
NEUTROPHILS NFR BLD AUTO: 3.77 10*3/MM3 (ref 1.7–7)
NEUTROPHILS NFR BLD AUTO: 55.5 % (ref 42.7–76)
NRBC BLD AUTO-RTO: 0 /100 WBC (ref 0–0.2)
PLATELET # BLD AUTO: 210 10*3/MM3 (ref 140–450)
PMV BLD AUTO: 9.6 FL (ref 6–12)
POTASSIUM SERPL-SCNC: 4 MMOL/L (ref 3.5–5.2)
PROT SERPL-MCNC: 7 G/DL (ref 6–8.5)
RBC # BLD AUTO: 3.42 10*6/MM3 (ref 4.14–5.8)
SODIUM SERPL-SCNC: 137 MMOL/L (ref 136–145)
VANCOMYCIN SERPL-MCNC: 27.8 MCG/ML (ref 5–40)
WBC NRBC COR # BLD AUTO: 6.8 10*3/MM3 (ref 3.4–10.8)

## 2024-03-25 PROCEDURE — 97605 NEG PRS WND THER DME<=50SQCM: CPT

## 2024-03-25 PROCEDURE — 86140 C-REACTIVE PROTEIN: CPT

## 2024-03-25 PROCEDURE — 85652 RBC SED RATE AUTOMATED: CPT

## 2024-03-25 PROCEDURE — 80053 COMPREHEN METABOLIC PANEL: CPT

## 2024-03-25 PROCEDURE — 85025 COMPLETE CBC W/AUTO DIFF WBC: CPT

## 2024-03-25 PROCEDURE — 97606 NEG PRS WND THER DME>50 SQCM: CPT

## 2024-03-25 PROCEDURE — 80202 ASSAY OF VANCOMYCIN: CPT | Performed by: FAMILY MEDICINE

## 2024-03-25 NOTE — TELEPHONE ENCOUNTER
Caller: ALISHA ALBRECHT    Relationship: Emergency Contact    Best call back number: [phone]    What is the best time to reach you: ANY     Who are you requesting to speak with (clinical staff, provider,  specific staff member): ANY     Do you know the name of the person who called: YES     What was the call regarding: WANTED TO SEE IF THE PAPERWORK THAT WAS DROPPED OFF FRIDAY 03/22/2024 HAS BEEN FILLED OUT

## 2024-03-27 ENCOUNTER — HOSPITAL ENCOUNTER (OUTPATIENT)
Dept: INFUSION THERAPY | Facility: HOSPITAL | Age: 49
Discharge: HOME | End: 2024-03-27
Admitting: FAMILY MEDICINE
Payer: OTHER MISCELLANEOUS

## 2024-03-27 VITALS
DIASTOLIC BLOOD PRESSURE: 101 MMHG | SYSTOLIC BLOOD PRESSURE: 165 MMHG | TEMPERATURE: 98.2 F | RESPIRATION RATE: 20 BRPM | HEART RATE: 67 BPM | OXYGEN SATURATION: 100 %

## 2024-03-27 DIAGNOSIS — L02.619 ABSCESS OF HEEL: Primary | ICD-10-CM

## 2024-03-27 DIAGNOSIS — M86.072 ACUTE HEMATOGENOUS OSTEOMYELITIS OF LEFT FOOT: ICD-10-CM

## 2024-03-27 PROCEDURE — 97606 NEG PRS WND THER DME>50 SQCM: CPT

## 2024-03-27 PROCEDURE — 97605 NEG PRS WND THER DME<=50SQCM: CPT

## 2024-03-29 ENCOUNTER — OFFICE VISIT (OUTPATIENT)
Dept: PODIATRY | Facility: CLINIC | Age: 49
End: 2024-03-29
Payer: OTHER MISCELLANEOUS

## 2024-03-29 ENCOUNTER — HOSPITAL ENCOUNTER (OUTPATIENT)
Dept: INFUSION THERAPY | Facility: HOSPITAL | Age: 49
Discharge: HOME | End: 2024-03-29
Payer: OTHER MISCELLANEOUS

## 2024-03-29 VITALS
BODY MASS INDEX: 34.87 KG/M2 | HEIGHT: 66 IN | SYSTOLIC BLOOD PRESSURE: 157 MMHG | TEMPERATURE: 97.8 F | WEIGHT: 217 LBS | OXYGEN SATURATION: 96 % | HEART RATE: 88 BPM | DIASTOLIC BLOOD PRESSURE: 101 MMHG

## 2024-03-29 VITALS
TEMPERATURE: 98.6 F | HEART RATE: 70 BPM | RESPIRATION RATE: 20 BRPM | DIASTOLIC BLOOD PRESSURE: 97 MMHG | OXYGEN SATURATION: 99 % | SYSTOLIC BLOOD PRESSURE: 153 MMHG

## 2024-03-29 DIAGNOSIS — M86.072 ACUTE HEMATOGENOUS OSTEOMYELITIS OF LEFT FOOT: ICD-10-CM

## 2024-03-29 DIAGNOSIS — S91.302A WOUND OF LEFT FOOT: ICD-10-CM

## 2024-03-29 DIAGNOSIS — M86.071 ACUTE HEMATOGENOUS OSTEOMYELITIS OF RIGHT FOOT: Primary | ICD-10-CM

## 2024-03-29 DIAGNOSIS — L02.619 ABSCESS OF HEEL: Primary | ICD-10-CM

## 2024-03-29 PROCEDURE — 99024 POSTOP FOLLOW-UP VISIT: CPT | Performed by: PODIATRIST

## 2024-03-29 PROCEDURE — G0463 HOSPITAL OUTPT CLINIC VISIT: HCPCS

## 2024-03-29 NOTE — SIGNIFICANT NOTE
Wound Eval / Progress Noted     Jazmine     Patient Name: Karsten Johnson  : 1975  MRN: [id number]  Today's Date: 3/29/2024                 Admit Date: 3/29/2024    Visit Dx:    ICD-10-CM ICD-9-CM   1. Abscess of heel  L02.619 682.7   2. Acute hematogenous osteomyelitis of left foot  M86.072 730.07         * No active hospital problems. *        Past Medical History:   Diagnosis Date    Acute hematogenous osteomyelitis of right foot     Hypertension     Wound of left foot         Past Surgical History:   Procedure Laterality Date    INCISION AND DRAINAGE OF WOUND Left 02/10/2024    Procedure: INCISION AND DRAINAGE  LEFT ANKLE,BIOPSY LEFT ANKEL;  Surgeon: Alvarez Beck DPM;  Location: [facility] MAIN OR;  Service: Podiatry;  Laterality: Left;    TONSILLECTOMY      WOUND DEBRIDEMENT Left 2024    Procedure: INCISION AND DEBRIDEMENT WOUND OF LEFT ANKLE;  Surgeon: Alvarez Beck DPM;  Location: [facility] MAIN OR;  Service: Podiatry;  Laterality: Left;         Physical Assessment:  Wound 02/10/24 0800 Left posterior ankle Other (comment) (Active)   Wound Image   24 1515   Dressing Appearance intact;dry 24 1515   Closure None 24 151   Base red;moist;granulating;epithelialization 24 1515   Red (%), Wound Tissue Color 100 24 1515   Periwound intact;pink;edematous 24   Periwound Temperature warm 24   Periwound Skin Turgor soft 24 1515   Edges open 24 1515   Wound Length (cm) 1.9 cm 24 1515   Wound Width (cm) 4.6 cm 24 1515   Wound Depth (cm) 0.1 cm 24 1515   Wound Surface Area (cm^2) 8.74 cm^2 24 1515   Wound Volume (cm^3) 0.874 cm^3 24 1515   Drainage Characteristics/Odor serosanguineous 24 1515   Drainage Amount scant 24 1515   Care, Wound cleansed with;sterile normal saline 24 1515   Dressing Care dressing applied;silver impregnated;hydrofiber;silicone;border dressing 24 1511   Periwound  Care absorptive dressing applied 03/29/24 1515        Wound Check / Follow-up:  Patient seen today for a wound check and dressing change. Patient reports that the surgeon removed the wound vac today and requesting daily dressing changes. Spouse accompanied the patient.     Wound base is red and moist with granular tissue present. The periwound is pale white and macerated, tissue is soft. Cleansed and irrigated with NS. Placed silver impregnated hydrofiber to the wound base     Discussed dressing options / recommendations with the surgeon; surgeon recommending daily dressing changes with silver impregnated hydrofiber to the wound base. Patient is to return every other day for dressing changes and perform dressing changes in the absence of the WON.    Impression: surgical incision with delayed closure    Short term goals:  regain skin integrity, skin protection, daily dressing changes    Daria Madison RN    3/29/2024    15:28 EDT

## 2024-04-01 ENCOUNTER — HOSPITAL ENCOUNTER (OUTPATIENT)
Dept: INFUSION THERAPY | Facility: HOSPITAL | Age: 49
Discharge: HOME | End: 2024-04-01
Admitting: FAMILY MEDICINE
Payer: COMMERCIAL

## 2024-04-01 VITALS
RESPIRATION RATE: 20 BRPM | OXYGEN SATURATION: 99 % | DIASTOLIC BLOOD PRESSURE: 97 MMHG | HEART RATE: 71 BPM | TEMPERATURE: 98.1 F | SYSTOLIC BLOOD PRESSURE: 170 MMHG

## 2024-04-01 DIAGNOSIS — L02.619 ABSCESS OF HEEL: Primary | ICD-10-CM

## 2024-04-01 DIAGNOSIS — M86.072 ACUTE HEMATOGENOUS OSTEOMYELITIS OF LEFT FOOT: ICD-10-CM

## 2024-04-01 PROCEDURE — G0463 HOSPITAL OUTPT CLINIC VISIT: HCPCS

## 2024-04-01 NOTE — SIGNIFICANT NOTE
Wound Eval / Progress Noted     Jazmine     Patient Name: Karsten Johnson  : 1975  MRN: [id number]  Today's Date: 2024                 Admit Date: 2024    Visit Dx:    ICD-10-CM ICD-9-CM   1. Abscess of heel  L02.619 682.7   2. Acute hematogenous osteomyelitis of left foot  M86.072 730.07         * No active hospital problems. *        Past Medical History:   Diagnosis Date    Acute hematogenous osteomyelitis of right foot     Hypertension     Wound of left foot         Past Surgical History:   Procedure Laterality Date    INCISION AND DRAINAGE OF WOUND Left 02/10/2024    Procedure: INCISION AND DRAINAGE  LEFT ANKLE,BIOPSY LEFT ANKEL;  Surgeon: Alvarez Beck DPM;  Location: [facility] MAIN OR;  Service: Podiatry;  Laterality: Left;    TONSILLECTOMY      WOUND DEBRIDEMENT Left 2024    Procedure: INCISION AND DEBRIDEMENT WOUND OF LEFT ANKLE;  Surgeon: Alvarez Beck DPM;  Location: [facility] MAIN OR;  Service: Podiatry;  Laterality: Left;         Physical Assessment:  Wound 02/10/24 0800 Left posterior ankle Other (comment) (Active)   Wound Image   24 1625   Dressing Appearance intact;moist drainage 24 1625   Closure None 24 1625   Base moist;red;granulating 24 1625   Red (%), Wound Tissue Color 100 24 1625   Periwound macerated;moist;pink 24 1625   Periwound Temperature warm 24 1625   Periwound Skin Turgor soft 24 1625   Edges open 24 1625   Wound Length (cm) 1.3 cm 24 1625   Wound Width (cm) 3.6 cm 24 1625   Wound Depth (cm) 0.1 cm 24 1625   Wound Surface Area (cm^2) 4.68 cm^2 24 1625   Wound Volume (cm^3) 0.468 cm^3 24 1625   Drainage Characteristics/Odor serosanguineous;yellow 24 1625   Drainage Amount moderate 24 1625   Care, Wound cleansed with;irrigated with;sterile normal saline 24 1625   Dressing Care dressing applied;border dressing;silicone;silver impregnated;hydrofiber 24  1625   Periwound Care absorptive dressing applied 04/01/24 1625        Wound Check / Follow-up:  Patient seen today for wound follow-up / wound check. Patient presents with intact dressing. He did apply additional drape over dressing for added securement. Patient states he remains non-weight bearing to left foot and that he has to wait a while longer before being able to apply weight to foot. His PICC line has been removed.     Dressing to left posterior heel removed. Dressing with moderate amount of serosanguinous and yellow drainage noted. Wound base is red and moist with granulation. Gladys-wound maceration with pale discoloration noted. Cleansed and irrigated wound and gladys-wound tissue with NS and gauze. Recommending to continue current wound care with silver impregnated hydrofiber and silicone border dressings daily. Patient to follow-up this week in [facility], then may be able to consider weekly checks, pending progress. After applying dressing per order, gauze roll and elastic bandage applied to foot and ankle for added support and edema management.     Patient transferred to wheelchair and significant other transported patient out.     Impression: Surgical wound to left posterior heel with closure by secondary intent    Short term goals:  Regain skin integrity. Daily dressing changes. Skin protection, moisture prevention, non-weight bearing.     Keesha Patrick RN    4/1/2024    16:32 EDT

## 2024-04-01 NOTE — PROGRESS NOTES
[facility] - PODIATRY    Today's Date: 04/01/24    Patient Name: Karsten Johnson  MRN: [id number]  CSN: [id number]  PCP: Luz Nam PA-C,   Referring Provider: No ref. provider found    SUBJECTIVE     Chief Complaint   Patient presents with    Left Foot - Post-op Follow-up     Has wound vac   Stopped IV abx      HPI: Karsten Johnson, a 48 y.o.male, presents to clinic.    Patient is a 48-year-old male presenting with left foot wound.  Patient had acute hematogenous osteomyelitis with Achilles rupture to his left foot recently and was admitted to the hospital.  Patient underwent several incision and drainages/debridements.  He currently is on IV antibiotics and is being treated with a wound VAC to his left foot wound.  Patient has had significant improvement to the wound.    3/29/2024-patient has finished his antibiotics and has been using the wound VAC on his wound.  Patient significant other and patient states that the wound is significantly improved.    Past Medical History:   Diagnosis Date    Acute hematogenous osteomyelitis of right foot     Hypertension     Wound of left foot      Past Surgical History:   Procedure Laterality Date    INCISION AND DRAINAGE OF WOUND Left 02/10/2024    Procedure: INCISION AND DRAINAGE  LEFT ANKLE,BIOPSY LEFT ANKEL;  Surgeon: Alvarez Beck DPM;  Location: [facility] OR;  Service: Podiatry;  Laterality: Left;    TONSILLECTOMY      WOUND DEBRIDEMENT Left 02/14/2024    Procedure: INCISION AND DEBRIDEMENT WOUND OF LEFT ANKLE;  Surgeon: Alvarez Beck DPM;  Location: [facility] OR;  Service: Podiatry;  Laterality: Left;     Family History   Problem Relation Age of Onset    Breast cancer Mother 40    Colon cancer Mother 55    Hypertension Father     Diabetes Father      Social History     Socioeconomic History    Marital status:    Tobacco Use    Smoking status: Every Day     Current packs/day: 1.00     Average packs/day: 1 pack/day for 25.0 years  (25.0 ttl pk-yrs)     Types: Cigarettes     Passive exposure: Never    Smokeless tobacco: Never   Vaping Use    Vaping status: Never Used   Substance and Sexual Activity    Alcohol use: Yes     Alcohol/week: 3.0 standard drinks of alcohol     Types: 3 Cans of beer per week    Drug use: Yes     Types: Marijuana    Sexual activity: Yes     Partners: Female     No Known Allergies  Current Outpatient Medications   Medication Sig Dispense Refill    lisinopril (PRINIVIL,ZESTRIL) 40 MG tablet Take 1 tablet by mouth Daily for 30 days. 30 tablet 2    traMADol (ULTRAM) 50 MG tablet Take 1 tablet by mouth Every 8 (Eight) Hours As Needed for Moderate Pain (pain). 30 tablet 0    traMADol (ULTRAM) 50 MG tablet Take 1 tablet by mouth Every 12 (Twelve) Hours As Needed for Moderate Pain (pain). 30 tablet 0     No current facility-administered medications for this visit.     Review of Systems   All other systems reviewed and are negative.      OBJECTIVE     Vitals:    03/29/24 1357   BP: (!) 157/101   Pulse:    Temp:    SpO2:        WBC   Date Value Ref Range Status   03/25/2024 6.80 3.40 - 10.80 10*3/mm3 Final     RBC   Date Value Ref Range Status   03/25/2024 3.42 (L) 4.14 - 5.80 10*6/mm3 Final     Hemoglobin   Date Value Ref Range Status   03/25/2024 9.8 (L) 13.0 - 17.7 g/dL Final     Hematocrit   Date Value Ref Range Status   03/25/2024 30.6 (L) 37.5 - 51.0 % Final     MCV   Date Value Ref Range Status   03/25/2024 89.5 79.0 - 97.0 fL Final     MCH   Date Value Ref Range Status   03/25/2024 28.7 26.6 - 33.0 pg Final     MCHC   Date Value Ref Range Status   03/25/2024 32.0 31.5 - 35.7 g/dL Final     RDW   Date Value Ref Range Status   03/25/2024 13.3 12.3 - 15.4 % Final     RDW-SD   Date Value Ref Range Status   03/25/2024 43.5 37.0 - 54.0 fl Final     MPV   Date Value Ref Range Status   03/25/2024 9.6 6.0 - 12.0 fL Final     Platelets   Date Value Ref Range Status   03/25/2024 210 140 - 450 10*3/mm3 Final     Neutrophil %   Date  Value Ref Range Status   03/25/2024 55.5 42.7 - 76.0 % Final     Lymphocyte %   Date Value Ref Range Status   03/25/2024 22.1 19.6 - 45.3 % Final     Monocyte %   Date Value Ref Range Status   03/25/2024 10.4 5.0 - 12.0 % Final     Eosinophil %   Date Value Ref Range Status   03/25/2024 11.3 (H) 0.3 - 6.2 % Final     Basophil %   Date Value Ref Range Status   03/25/2024 0.4 0.0 - 1.5 % Final     Immature Grans %   Date Value Ref Range Status   03/25/2024 0.3 0.0 - 0.5 % Final     Neutrophils, Absolute   Date Value Ref Range Status   03/25/2024 3.77 1.70 - 7.00 10*3/mm3 Final     Lymphocytes, Absolute   Date Value Ref Range Status   03/25/2024 1.50 0.70 - 3.10 10*3/mm3 Final     Monocytes, Absolute   Date Value Ref Range Status   03/25/2024 0.71 0.10 - 0.90 10*3/mm3 Final     Eosinophils, Absolute   Date Value Ref Range Status   03/25/2024 0.77 (H) 0.00 - 0.40 10*3/mm3 Final     Basophils, Absolute   Date Value Ref Range Status   03/25/2024 0.03 0.00 - 0.20 10*3/mm3 Final     Immature Grans, Absolute   Date Value Ref Range Status   03/25/2024 0.02 0.00 - 0.05 10*3/mm3 Final     nRBC   Date Value Ref Range Status   03/25/2024 0.0 0.0 - 0.2 /100 WBC Final         Lab Results   Component Value Date    GLUCOSE 96 03/25/2024    BUN 15 03/25/2024    CREATININE 1.76 (H) 03/25/2024    BCR 8.5 03/25/2024    K 4.0 03/25/2024    CO2 26.9 03/25/2024    CALCIUM 8.9 03/25/2024    ALBUMIN 3.3 (L) 03/25/2024    AST 11 03/25/2024    ALT 10 03/25/2024       Patient seen in no apparent distress.      PHYSICAL EXAM:     Foot/Ankle Exam    GENERAL  Appearance:  appears stated age  Orientation:  AAOx3  Affect:  appropriate  Gait:  unimpaired  Assistance:  independent  Right shoe gear: casual shoe  Left shoe gear: casual shoe    VASCULAR     Right Foot Vascularity   Normal vascular exam    Dorsalis pedis:  2+  Posterior tibial:  2+  Skin temperature:  warm  Edema grading:  None  CFT:  < 3 seconds  Pedal hair growth:  Present  Varicosities:   none     Left Foot Vascularity   Normal vascular exam    Dorsalis pedis:  2+  Posterior tibial:  2+  Skin temperature:  warm  Edema grading:  None  CFT:  < 3 seconds  Pedal hair growth:  Present  Varicosities:  none     NEUROLOGIC     Right Foot Neurologic   Normal sensation    Light touch sensation: normal  Vibratory sensation: normal  Hot/Cold sensation: normal  Protective Sensation using [place]-Flip Monofilament:   Sites intact: 10  Sites tested: 10     Left Foot Neurologic   Normal sensation    Light touch sensation: normal  Vibratory sensation: normal  Hot/Cold sensation:  normal  Protective Sensation using [place]-Flip Monofilament:   Sites intact: 10  Sites tested: 10    MUSCLE STRENGTH     Right Foot Muscle Strength   Foot dorsiflexion:  4  Foot plantar flexion:  4  Foot inversion:  4  Foot eversion:  4     Left Foot Muscle Strength   Foot dorsiflexion:  4  Foot plantar flexion:  1  Foot inversion:  4  Foot eversion:  4    RANGE OF MOTION     Right Foot Range of Motion   Foot and ankle ROM within normal limits       Left Foot Range of Motion   Foot and ankle ROM within normal limits      DERMATOLOGIC      Right Foot Dermatologic   Skin  Right foot skin is intact.      Left Foot Dermatologic   Skin  Left foot skin is intact.      Left foot additional comments: Full-thickness ulceration to left posterior heel, 100% granular, no erythema no malodor no drainage.      RADIOLOGY:        No results found.    ASSESSMENT/PLAN     Diagnoses and all orders for this visit:    1. Acute hematogenous osteomyelitis of right foot (Primary)  -     PICC Line Removal; Standing  -     PICC Line Removal    2. Wound of left foot      Discontinue wound VAC.  Aquacel Ag daily to posterior heel wound.  Continue offloading in the boot.  Return to clinic in 2 to 3 weeks.    Wound has continued to improve.    Comprehensive lower extremity examination and evaluation was performed.    Discussed findings and treatment plan  including risks, benefits, and treatment options with patient in detail. Patient agreed with treatment plan.    Medications and allergies reviewed.  Reviewed available lab values along with other pertinent labs.  These were discussed with the patient.    An After Visit Summary was printed and given to the patient at discharge, including (if requested) any available informative/educational handouts regarding diagnosis, treatment, or medications. All questions were answered to patient/family satisfaction. Should symptoms fail to improve or worsen they agree to call or return to clinic or to go to the Emergency Department. Discussed the importance of following up with any needed screening tests/labs/specialist appointments and any requested follow-up recommended by me today. Importance of maintaining follow-up discussed and patient accepts that missed appointments can delay diagnosis and potentially lead to worsening of conditions.    No follow-ups on file., or sooner if acute issues arise.    This document has been electronically signed by Alvarez Beck DPM on April 1, 2024 07:22 EDT

## 2024-04-02 ENCOUNTER — TELEPHONE (OUTPATIENT)
Dept: PODIATRY | Facility: CLINIC | Age: 49
End: 2024-04-02
Payer: COMMERCIAL

## 2024-04-02 NOTE — TELEPHONE ENCOUNTER
Received call from Nicol Diego from patient's insurance working on his auth for wound VAC. P) [phone] x. [phone]. Requested most recent office note. FAXed note to [phone].

## 2024-04-03 ENCOUNTER — HOSPITAL ENCOUNTER (OUTPATIENT)
Dept: INFUSION THERAPY | Facility: HOSPITAL | Age: 49
Discharge: HOME | End: 2024-04-03
Admitting: FAMILY MEDICINE
Payer: OTHER MISCELLANEOUS

## 2024-04-03 VITALS — TEMPERATURE: 98 F | HEART RATE: 70 BPM | OXYGEN SATURATION: 99 % | RESPIRATION RATE: 20 BRPM

## 2024-04-03 DIAGNOSIS — M86.072 ACUTE HEMATOGENOUS OSTEOMYELITIS OF LEFT FOOT: ICD-10-CM

## 2024-04-03 DIAGNOSIS — L02.619 ABSCESS OF HEEL: Primary | ICD-10-CM

## 2024-04-03 PROCEDURE — G0463 HOSPITAL OUTPT CLINIC VISIT: HCPCS

## 2024-04-05 ENCOUNTER — HOSPITAL ENCOUNTER (OUTPATIENT)
Dept: INFUSION THERAPY | Facility: HOSPITAL | Age: 49
Discharge: HOME | End: 2024-04-05
Payer: COMMERCIAL

## 2024-04-05 VITALS
HEART RATE: 66 BPM | TEMPERATURE: 98.2 F | SYSTOLIC BLOOD PRESSURE: 169 MMHG | OXYGEN SATURATION: 100 % | DIASTOLIC BLOOD PRESSURE: 100 MMHG | RESPIRATION RATE: 16 BRPM

## 2024-04-05 DIAGNOSIS — L02.619 ABSCESS OF HEEL: Primary | ICD-10-CM

## 2024-04-05 DIAGNOSIS — M86.072 ACUTE HEMATOGENOUS OSTEOMYELITIS OF LEFT FOOT: ICD-10-CM

## 2024-04-05 PROCEDURE — G0463 HOSPITAL OUTPT CLINIC VISIT: HCPCS

## 2024-04-10 ENCOUNTER — TELEPHONE (OUTPATIENT)
Dept: INTERNAL MEDICINE | Facility: CLINIC | Age: 49
End: 2024-04-10
Payer: COMMERCIAL

## 2024-04-11 ENCOUNTER — TELEPHONE (OUTPATIENT)
Dept: PODIATRY | Facility: CLINIC | Age: 49
End: 2024-04-11
Payer: COMMERCIAL

## 2024-04-11 ENCOUNTER — HOSPITAL ENCOUNTER (OUTPATIENT)
Dept: INFUSION THERAPY | Facility: HOSPITAL | Age: 49
Discharge: HOME | End: 2024-04-11
Payer: COMMERCIAL

## 2024-04-11 VITALS
TEMPERATURE: 98.1 F | OXYGEN SATURATION: 100 % | SYSTOLIC BLOOD PRESSURE: 161 MMHG | HEART RATE: 73 BPM | DIASTOLIC BLOOD PRESSURE: 90 MMHG | RESPIRATION RATE: 20 BRPM

## 2024-04-11 DIAGNOSIS — L02.619 ABSCESS OF HEEL: Primary | ICD-10-CM

## 2024-04-11 DIAGNOSIS — M86.072 ACUTE HEMATOGENOUS OSTEOMYELITIS OF LEFT FOOT: ICD-10-CM

## 2024-04-11 PROCEDURE — G0463 HOSPITAL OUTPT CLINIC VISIT: HCPCS

## 2024-04-11 NOTE — SIGNIFICANT NOTE
Wound Eval / Progress Noted     Jazmine     Patient Name: Karsten Johnson  : 1975  MRN: [id number]  Today's Date: 2024                 Admit Date: 2024    Visit Dx:  No diagnosis found.      * No active hospital problems. *        Past Medical History:   Diagnosis Date    Acute hematogenous osteomyelitis of right foot     Hypertension     Wound of left foot         Past Surgical History:   Procedure Laterality Date    INCISION AND DRAINAGE OF WOUND Left 02/10/2024    Procedure: INCISION AND DRAINAGE  LEFT ANKLE,BIOPSY LEFT ANKEL;  Surgeon: Alvarez Beck DPM;  Location: [facility] MAIN OR;  Service: Podiatry;  Laterality: Left;    TONSILLECTOMY      WOUND DEBRIDEMENT Left 2024    Procedure: INCISION AND DEBRIDEMENT WOUND OF LEFT ANKLE;  Surgeon: Alvarez Beck DPM;  Location: [facility] MAIN OR;  Service: Podiatry;  Laterality: Left;         Physical Assessment:  Wound 02/10/24 0800 Left posterior ankle Other (comment) (Active)   Wound Image   24 1545   Dressing Appearance intact;moist drainage 24 1545   Closure None 24 1545   Base moist;red;granulating;epithelialization 24 1545   Red (%), Wound Tissue Color 100 24 1545   Periwound pale white;pink;intact 24 1545   Periwound Temperature warm 24 1545   Periwound Skin Turgor soft 24 1545   Edges open 24 1545   Wound Length (cm) 0.5 cm 24 1545   Wound Width (cm) 2.6 cm 24 1545   Wound Depth (cm) 0.1 cm 24 1545   Wound Surface Area (cm^2) 1.3 cm^2 24 1545   Wound Volume (cm^3) 0.13 cm^3 24 1545   Drainage Characteristics/Odor serosanguineous 24 1545   Drainage Amount small 24 1545   Care, Wound cleansed with;irrigated with;sterile normal saline 24 1545   Dressing Care dressing applied;border dressing;silicone;silver impregnated;hydrofiber;elastic bandage;gauze 24 1545   Periwound Care absorptive dressing applied 24 1548        Wound  Check / Follow-up:  Patient seen today for wound follow-up / wound check. Patient presents today with significant other. He has an intact wrap and dressing to his foot.     Dressings removed. Wound cleansed with NS and gauze. Gladys-wound cleansed with NS and gauze. Blotted dry. Wound base is red and moist with granulation tissue noted. There is pale pink white gladys-wound tissue. Wound margins are approximating and epithelialization is increasing. Edema is noted to ankle. Recommending to continue dressing changes with silver impregnated hydrofiber every other day and then to secure with silicone border dressing. Due to nature of injury, recommending to continue wrapping with gauze roll and elastic bandage for added support.       Impression: Surgical wound to heel with closure by secondary intent, Edema to ankle / foot    Short term goals:  Regain skin integrity. Dressing changes every other day. Compression for support and edema management.     Keesha Patrick RN    4/11/2024    16:37 EDT

## 2024-04-12 DIAGNOSIS — S91.302A WOUND OF LEFT FOOT: Primary | ICD-10-CM

## 2024-04-12 RX ORDER — TRAMADOL HYDROCHLORIDE 50 MG/1
50 TABLET ORAL DAILY
Qty: 30 TABLET | Refills: 0 | Status: SHIPPED | OUTPATIENT
Start: 2024-04-12

## 2024-04-18 ENCOUNTER — HOSPITAL ENCOUNTER (OUTPATIENT)
Dept: INFUSION THERAPY | Facility: HOSPITAL | Age: 49
Discharge: HOME | End: 2024-04-18
Admitting: FAMILY MEDICINE
Payer: COMMERCIAL

## 2024-04-18 VITALS
SYSTOLIC BLOOD PRESSURE: 147 MMHG | HEART RATE: 73 BPM | RESPIRATION RATE: 16 BRPM | OXYGEN SATURATION: 100 % | TEMPERATURE: 98.4 F | DIASTOLIC BLOOD PRESSURE: 91 MMHG

## 2024-04-18 DIAGNOSIS — L02.619 ABSCESS OF HEEL: Primary | ICD-10-CM

## 2024-04-18 DIAGNOSIS — M86.072 ACUTE HEMATOGENOUS OSTEOMYELITIS OF LEFT FOOT: ICD-10-CM

## 2024-04-18 PROCEDURE — G0463 HOSPITAL OUTPT CLINIC VISIT: HCPCS

## 2024-04-19 ENCOUNTER — OFFICE VISIT (OUTPATIENT)
Dept: PODIATRY | Facility: CLINIC | Age: 49
End: 2024-04-19
Payer: OTHER MISCELLANEOUS

## 2024-04-19 VITALS
DIASTOLIC BLOOD PRESSURE: 99 MMHG | TEMPERATURE: 97.8 F | HEART RATE: 75 BPM | BODY MASS INDEX: 34.87 KG/M2 | WEIGHT: 217 LBS | HEIGHT: 66 IN | SYSTOLIC BLOOD PRESSURE: 164 MMHG | OXYGEN SATURATION: 97 %

## 2024-04-19 DIAGNOSIS — S91.302A WOUND OF LEFT FOOT: ICD-10-CM

## 2024-04-19 DIAGNOSIS — M86.071 ACUTE HEMATOGENOUS OSTEOMYELITIS OF RIGHT FOOT: Primary | ICD-10-CM

## 2024-04-19 PROCEDURE — 99213 OFFICE O/P EST LOW 20 MIN: CPT | Performed by: PODIATRIST

## 2024-04-22 NOTE — PROGRESS NOTES
[facility] - PODIATRY    Today's Date: 04/22/24    Patient Name: Karsten Johnson  MRN: [id number]  CSN: [id number]  PCP: Luz Nam PA-C,   Referring Provider: No ref. provider found    SUBJECTIVE     Chief Complaint   Patient presents with   • Left Foot - Post-op Follow-up     HPI: Karsten Johnson, a 48 y.o.male, presents to clinic.    Patient is here for follow-up, has discontinued the wound VAC.The wound continues to improve and going to [facility].    Past Medical History:   Diagnosis Date   • Acute hematogenous osteomyelitis of right foot    • Hypertension    • Wound of left foot      Past Surgical History:   Procedure Laterality Date   • INCISION AND DRAINAGE OF WOUND Left 02/10/2024    Procedure: INCISION AND DRAINAGE  LEFT ANKLE,BIOPSY LEFT ANKEL;  Surgeon: Alvarez Beck DPM;  Location: [facility];  Service: Podiatry;  Laterality: Left;   • TONSILLECTOMY     • WOUND DEBRIDEMENT Left 02/14/2024    Procedure: INCISION AND DEBRIDEMENT WOUND OF LEFT ANKLE;  Surgeon: Alvarez Beck DPM;  Location: [facility];  Service: Podiatry;  Laterality: Left;     Family History   Problem Relation Age of Onset   • Breast cancer Mother 40   • Colon cancer Mother 55   • Hypertension Father    • Diabetes Father      Social History     Socioeconomic History   • Marital status:    Tobacco Use   • Smoking status: Every Day     Current packs/day: 1.00     Average packs/day: 1 pack/day for 25.0 years (25.0 ttl pk-yrs)     Types: Cigarettes     Passive exposure: Never   • Smokeless tobacco: Never   Vaping Use   • Vaping status: Never Used   Substance and Sexual Activity   • Alcohol use: Yes     Alcohol/week: 3.0 standard drinks of alcohol     Types: 3 Cans of beer per week   • Drug use: Yes     Types: Marijuana   • Sexual activity: Yes     Partners: Female     No Known Allergies  Current Outpatient Medications   Medication Sig Dispense Refill   • lisinopril (PRINIVIL,ZESTRIL) 40 MG tablet Take 1  tablet by mouth Daily for 30 days. 30 tablet 2   • traMADol (ULTRAM) 50 MG tablet Take 1 tablet by mouth Daily. 1 tablet daily for dressing changes 30 tablet 0     No current facility-administered medications for this visit.     Review of Systems   All other systems reviewed and are negative.      OBJECTIVE     Vitals:    04/19/24 1040   BP: 164/99   Pulse:    Temp:    SpO2:        WBC   Date Value Ref Range Status   03/25/2024 6.80 3.40 - 10.80 10*3/mm3 Final     RBC   Date Value Ref Range Status   03/25/2024 3.42 (L) 4.14 - 5.80 10*6/mm3 Final     Hemoglobin   Date Value Ref Range Status   03/25/2024 9.8 (L) 13.0 - 17.7 g/dL Final     Hematocrit   Date Value Ref Range Status   03/25/2024 30.6 (L) 37.5 - 51.0 % Final     MCV   Date Value Ref Range Status   03/25/2024 89.5 79.0 - 97.0 fL Final     MCH   Date Value Ref Range Status   03/25/2024 28.7 26.6 - 33.0 pg Final     MCHC   Date Value Ref Range Status   03/25/2024 32.0 31.5 - 35.7 g/dL Final     RDW   Date Value Ref Range Status   03/25/2024 13.3 12.3 - 15.4 % Final     RDW-SD   Date Value Ref Range Status   03/25/2024 43.5 37.0 - 54.0 fl Final     MPV   Date Value Ref Range Status   03/25/2024 9.6 6.0 - 12.0 fL Final     Platelets   Date Value Ref Range Status   03/25/2024 210 140 - 450 10*3/mm3 Final     Neutrophil %   Date Value Ref Range Status   03/25/2024 55.5 42.7 - 76.0 % Final     Lymphocyte %   Date Value Ref Range Status   03/25/2024 22.1 19.6 - 45.3 % Final     Monocyte %   Date Value Ref Range Status   03/25/2024 10.4 5.0 - 12.0 % Final     Eosinophil %   Date Value Ref Range Status   03/25/2024 11.3 (H) 0.3 - 6.2 % Final     Basophil %   Date Value Ref Range Status   03/25/2024 0.4 0.0 - 1.5 % Final     Immature Grans %   Date Value Ref Range Status   03/25/2024 0.3 0.0 - 0.5 % Final     Neutrophils, Absolute   Date Value Ref Range Status   03/25/2024 3.77 1.70 - 7.00 10*3/mm3 Final     Lymphocytes, Absolute   Date Value Ref Range Status    03/25/2024 1.50 0.70 - 3.10 10*3/mm3 Final     Monocytes, Absolute   Date Value Ref Range Status   03/25/2024 0.71 0.10 - 0.90 10*3/mm3 Final     Eosinophils, Absolute   Date Value Ref Range Status   03/25/2024 0.77 (H) 0.00 - 0.40 10*3/mm3 Final     Basophils, Absolute   Date Value Ref Range Status   03/25/2024 0.03 0.00 - 0.20 10*3/mm3 Final     Immature Grans, Absolute   Date Value Ref Range Status   03/25/2024 0.02 0.00 - 0.05 10*3/mm3 Final     nRBC   Date Value Ref Range Status   03/25/2024 0.0 0.0 - 0.2 /100 WBC Final         Lab Results   Component Value Date    GLUCOSE 96 03/25/2024    BUN 15 03/25/2024    CREATININE 1.76 (H) 03/25/2024    BCR 8.5 03/25/2024    K 4.0 03/25/2024    CO2 26.9 03/25/2024    CALCIUM 8.9 03/25/2024    ALBUMIN 3.3 (L) 03/25/2024    AST 11 03/25/2024    ALT 10 03/25/2024       Patient seen in no apparent distress.      PHYSICAL EXAM:     Foot/Ankle Exam    GENERAL  Appearance:  appears stated age  Orientation:  AAOx3  Affect:  appropriate  Gait:  unimpaired  Assistance:  independent  Right shoe gear: casual shoe  Left shoe gear: casual shoe    VASCULAR     Right Foot Vascularity   Normal vascular exam    Dorsalis pedis:  2+  Posterior tibial:  2+  Skin temperature:  warm  Edema grading:  None  CFT:  < 3 seconds  Pedal hair growth:  Present  Varicosities:  none     Left Foot Vascularity   Normal vascular exam    Dorsalis pedis:  2+  Posterior tibial:  2+  Skin temperature:  warm  Edema grading:  None  CFT:  < 3 seconds  Pedal hair growth:  Present  Varicosities:  none     NEUROLOGIC     Right Foot Neurologic   Normal sensation    Light touch sensation: normal  Vibratory sensation: normal  Hot/Cold sensation: normal  Protective Sensation using [place]-Flip Monofilament:   Sites intact: 10  Sites tested: 10     Left Foot Neurologic   Normal sensation    Light touch sensation: normal  Vibratory sensation: normal  Hot/Cold sensation:  normal  Protective Sensation using  [place]-Flip Monofilament:   Sites intact: 10  Sites tested: 10    MUSCLE STRENGTH     Right Foot Muscle Strength   Foot dorsiflexion:  4  Foot plantar flexion:  4  Foot inversion:  4  Foot eversion:  4     Left Foot Muscle Strength   Foot dorsiflexion:  4  Foot plantar flexion:  1  Foot inversion:  4  Foot eversion:  4    RANGE OF MOTION     Right Foot Range of Motion   Foot and ankle ROM within normal limits       Left Foot Range of Motion   Foot and ankle ROM within normal limits      DERMATOLOGIC      Right Foot Dermatologic   Skin  Right foot skin is intact.      Left Foot Dermatologic   Skin  Left foot skin is intact.      Left foot additional comments: Full-thickness ulceration to left posterior heel, 100% granular, no erythema no malodor no drainage.      RADIOLOGY:        No results found.    ASSESSMENT/PLAN     Diagnoses and all orders for this visit:    1. Acute hematogenous osteomyelitis of right foot (Primary)    2. Wound of left foot        Aquacel Ag daily to posterior heel wound.  Continue offloading in the boot.  Return to clinic in 1 month    Wound has continued to improve.    Comprehensive lower extremity examination and evaluation was performed.    Discussed findings and treatment plan including risks, benefits, and treatment options with patient in detail. Patient agreed with treatment plan.    Medications and allergies reviewed.  Reviewed available lab values along with other pertinent labs.  These were discussed with the patient.    An After Visit Summary was printed and given to the patient at discharge, including (if requested) any available informative/educational handouts regarding diagnosis, treatment, or medications. All questions were answered to patient/family satisfaction. Should symptoms fail to improve or worsen they agree to call or return to clinic or to go to the Emergency Department. Discussed the importance of following up with any needed screening tests/labs/specialist  appointments and any requested follow-up recommended by me today. Importance of maintaining follow-up discussed and patient accepts that missed appointments can delay diagnosis and potentially lead to worsening of conditions.    Return in about 1 month (around 5/19/2024)., or sooner if acute issues arise.    This document has been electronically signed by Alvarez Beck DPM on April 22, 2024 07:23 EDT

## 2024-05-02 ENCOUNTER — HOSPITAL ENCOUNTER (OUTPATIENT)
Dept: INFUSION THERAPY | Facility: HOSPITAL | Age: 49
Discharge: HOME | End: 2024-05-02
Admitting: FAMILY MEDICINE
Payer: OTHER MISCELLANEOUS

## 2024-05-02 VITALS
OXYGEN SATURATION: 99 % | HEART RATE: 68 BPM | RESPIRATION RATE: 18 BRPM | SYSTOLIC BLOOD PRESSURE: 158 MMHG | DIASTOLIC BLOOD PRESSURE: 84 MMHG | TEMPERATURE: 98.4 F

## 2024-05-02 DIAGNOSIS — Z51.89 VISIT FOR WOUND CHECK: Primary | ICD-10-CM

## 2024-05-02 PROCEDURE — G0463 HOSPITAL OUTPT CLINIC VISIT: HCPCS

## 2024-05-02 NOTE — SIGNIFICANT NOTE
Wound Eval / Progress Noted     Jazmine     Patient Name: Karsten Johnson  : 1975  MRN: [id number]  Today's Date: 2024                 Admit Date: 2024    Visit Dx:    ICD-10-CM ICD-9-CM   1. Visit for wound check  Z51.89 V58.89         * No active hospital problems. *        Past Medical History:   Diagnosis Date    Acute hematogenous osteomyelitis of right foot     Hypertension     Wound of left foot         Past Surgical History:   Procedure Laterality Date    INCISION AND DRAINAGE OF WOUND Left 02/10/2024    Procedure: INCISION AND DRAINAGE  LEFT ANKLE,BIOPSY LEFT ANKEL;  Surgeon: Alvarez Beck DPM;  Location: [facility];  Service: Podiatry;  Laterality: Left;    TONSILLECTOMY      WOUND DEBRIDEMENT Left 2024    Procedure: INCISION AND DEBRIDEMENT WOUND OF LEFT ANKLE;  Surgeon: Alvarez Beck DPM;  Location: [facility];  Service: Podiatry;  Laterality: Left;         Physical Assessment:  Wound 02/10/24 0800 Left posterior ankle Other (comment) (Active)   Wound Image   24 1327   Dressing Appearance intact;no drainage 24 1327   Closure None 24 1327   Base epithelialization;dry;scab 24 132   Periwound intact;pink 24 1327   Periwound Temperature warm 24 1327   Periwound Skin Turgor soft 24 1327   Edges rolled/closed 24 1327   Drainage Amount none 24 1327   Care, Wound cleansed with;sterile normal saline 24 1327   Dressing Care dressing applied;border dressing;non-adherent;petroleum-based;silicone 24 1327   Periwound Care absorptive dressing applied 24 1327        Wound Check / Follow-up:  Patient seen today for wound follow-up / wound check. Patient presents with significant other. He has been wearing a walking boot.     Dressing removed. No drainage to dressing and none from site. Cleansed with NS and gauze. Small amount of crusting noted. Re-epithelialization has occurred. No open tissue noted this  assessment. Complete closure has occurred. Recommending site care twice daily when no change of pressure or trauma but to still apply dressing if wearing boot or when possible trauma can occur. Recommending to take precautions for at least an additional two weeks and then can likely leave off dressings going forward.     Impression: Surgical incision with closure    Short term goals:  Maintain skin integrity. No follow-up warranted at this time.     Keesha Patrick RN    5/2/2024    13:28 EDT

## 2024-05-02 NOTE — ADDENDUM NOTE
Encounter addended by: Keesha Patrick RN on: 5/2/2024 1:32 PM   Actions taken: Clinical Note Signed, Flowsheet accepted, Charge Capture section accepted

## 2024-05-24 ENCOUNTER — OFFICE VISIT (OUTPATIENT)
Dept: PODIATRY | Facility: CLINIC | Age: 49
End: 2024-05-24
Payer: OTHER MISCELLANEOUS

## 2024-05-24 VITALS
HEIGHT: 66 IN | WEIGHT: 217 LBS | SYSTOLIC BLOOD PRESSURE: 179 MMHG | OXYGEN SATURATION: 98 % | DIASTOLIC BLOOD PRESSURE: 103 MMHG | HEART RATE: 75 BPM | BODY MASS INDEX: 34.87 KG/M2 | TEMPERATURE: 99.1 F

## 2024-05-24 DIAGNOSIS — R26.89 CALCANEAL GAIT: ICD-10-CM

## 2024-05-24 DIAGNOSIS — S91.302A WOUND OF LEFT FOOT: Primary | ICD-10-CM

## 2024-05-24 DIAGNOSIS — S86.012S ACHILLES RUPTURE, LEFT, SEQUELA: ICD-10-CM

## 2024-05-24 DIAGNOSIS — M86.071 ACUTE HEMATOGENOUS OSTEOMYELITIS OF RIGHT FOOT: ICD-10-CM

## 2024-05-24 NOTE — PROGRESS NOTES
[facility] - PODIATRY    Today's Date: 05/24/24    Patient Name: Karsten Johnson  MRN: [id number]  CSN: [id number]  PCP: Luz Nam PA-C,   Referring Provider: No ref. provider found    SUBJECTIVE     Chief Complaint   Patient presents with    Left Foot - Follow-up, Wound Check, Pain     Pain increasing after up on foot 2 hours      HPI: Karsten Johnson, a 48 y.o.male, presents to clinic.    Wound is healed.  Walking in the boot states that he is starting to get hip and back soreness from the boot.  Past Medical History:   Diagnosis Date    Acute hematogenous osteomyelitis of right foot     Hypertension     Wound of left foot      Past Surgical History:   Procedure Laterality Date    INCISION AND DRAINAGE OF WOUND Left 02/10/2024    Procedure: INCISION AND DRAINAGE  LEFT ANKLE,BIOPSY LEFT ANKEL;  Surgeon: Alvarez Beck DPM;  Location: [facility];  Service: Podiatry;  Laterality: Left;    TONSILLECTOMY      WOUND DEBRIDEMENT Left 02/14/2024    Procedure: INCISION AND DEBRIDEMENT WOUND OF LEFT ANKLE;  Surgeon: Alvarez Beck DPM;  Location: [facility];  Service: Podiatry;  Laterality: Left;     Family History   Problem Relation Age of Onset    Breast cancer Mother 40    Colon cancer Mother 55    Hypertension Father     Diabetes Father      Social History     Socioeconomic History    Marital status:    Tobacco Use    Smoking status: Every Day     Current packs/day: 1.00     Average packs/day: 1 pack/day for 25.0 years (25.0 ttl pk-yrs)     Types: Cigarettes     Passive exposure: Never    Smokeless tobacco: Never   Vaping Use    Vaping status: Never Used   Substance and Sexual Activity    Alcohol use: Yes     Alcohol/week: 3.0 standard drinks of alcohol     Types: 3 Cans of beer per week    Drug use: Yes     Types: Marijuana    Sexual activity: Yes     Partners: Female     No Known Allergies  Current Outpatient Medications   Medication Sig Dispense Refill    lisinopril  (PRINIVIL,ZESTRIL) 40 MG tablet Take 1 tablet by mouth Daily for 30 days. 30 tablet 2    traMADol (ULTRAM) 50 MG tablet Take 1 tablet by mouth Daily. 1 tablet daily for dressing changes (Patient not taking: Reported on 5/24/2024) 30 tablet 0     No current facility-administered medications for this visit.     Review of Systems   All other systems reviewed and are negative.      OBJECTIVE     Vitals:    05/24/24 1353   BP: (!) 179/103   Pulse:    Temp:    SpO2:        WBC   Date Value Ref Range Status   03/25/2024 6.80 3.40 - 10.80 10*3/mm3 Final     RBC   Date Value Ref Range Status   03/25/2024 3.42 (L) 4.14 - 5.80 10*6/mm3 Final     Hemoglobin   Date Value Ref Range Status   03/25/2024 9.8 (L) 13.0 - 17.7 g/dL Final     Hematocrit   Date Value Ref Range Status   03/25/2024 30.6 (L) 37.5 - 51.0 % Final     MCV   Date Value Ref Range Status   03/25/2024 89.5 79.0 - 97.0 fL Final     MCH   Date Value Ref Range Status   03/25/2024 28.7 26.6 - 33.0 pg Final     MCHC   Date Value Ref Range Status   03/25/2024 32.0 31.5 - 35.7 g/dL Final     RDW   Date Value Ref Range Status   03/25/2024 13.3 12.3 - 15.4 % Final     RDW-SD   Date Value Ref Range Status   03/25/2024 43.5 37.0 - 54.0 fl Final     MPV   Date Value Ref Range Status   03/25/2024 9.6 6.0 - 12.0 fL Final     Platelets   Date Value Ref Range Status   03/25/2024 210 140 - 450 10*3/mm3 Final     Neutrophil %   Date Value Ref Range Status   03/25/2024 55.5 42.7 - 76.0 % Final     Lymphocyte %   Date Value Ref Range Status   03/25/2024 22.1 19.6 - 45.3 % Final     Monocyte %   Date Value Ref Range Status   03/25/2024 10.4 5.0 - 12.0 % Final     Eosinophil %   Date Value Ref Range Status   03/25/2024 11.3 (H) 0.3 - 6.2 % Final     Basophil %   Date Value Ref Range Status   03/25/2024 0.4 0.0 - 1.5 % Final     Immature Grans %   Date Value Ref Range Status   03/25/2024 0.3 0.0 - 0.5 % Final     Neutrophils, Absolute   Date Value Ref Range Status   03/25/2024 3.77 1.70  - 7.00 10*3/mm3 Final     Lymphocytes, Absolute   Date Value Ref Range Status   03/25/2024 1.50 0.70 - 3.10 10*3/mm3 Final     Monocytes, Absolute   Date Value Ref Range Status   03/25/2024 0.71 0.10 - 0.90 10*3/mm3 Final     Eosinophils, Absolute   Date Value Ref Range Status   03/25/2024 0.77 (H) 0.00 - 0.40 10*3/mm3 Final     Basophils, Absolute   Date Value Ref Range Status   03/25/2024 0.03 0.00 - 0.20 10*3/mm3 Final     Immature Grans, Absolute   Date Value Ref Range Status   03/25/2024 0.02 0.00 - 0.05 10*3/mm3 Final     nRBC   Date Value Ref Range Status   03/25/2024 0.0 0.0 - 0.2 /100 WBC Final         Lab Results   Component Value Date    GLUCOSE 96 03/25/2024    BUN 15 03/25/2024    CREATININE 1.76 (H) 03/25/2024    BCR 8.5 03/25/2024    K 4.0 03/25/2024    CO2 26.9 03/25/2024    CALCIUM 8.9 03/25/2024    ALBUMIN 3.3 (L) 03/25/2024    AST 11 03/25/2024    ALT 10 03/25/2024       Patient seen in no apparent distress.      PHYSICAL EXAM:     Foot/Ankle Exam    GENERAL  Appearance:  appears stated age  Orientation:  AAOx3  Affect:  appropriate  Gait:  unimpaired  Assistance:  independent  Right shoe gear: casual shoe  Left shoe gear: casual shoe    VASCULAR     Right Foot Vascularity   Normal vascular exam    Dorsalis pedis:  2+  Posterior tibial:  2+  Skin temperature:  warm  Edema grading:  None  CFT:  < 3 seconds  Pedal hair growth:  Present  Varicosities:  none     Left Foot Vascularity   Normal vascular exam    Dorsalis pedis:  2+  Posterior tibial:  2+  Skin temperature:  warm  Edema grading:  None  CFT:  < 3 seconds  Pedal hair growth:  Present  Varicosities:  none     NEUROLOGIC     Right Foot Neurologic   Normal sensation    Light touch sensation: normal  Vibratory sensation: normal  Hot/Cold sensation: normal  Protective Sensation using [place]-Flip Monofilament:   Sites intact: 10  Sites tested: 10     Left Foot Neurologic   Normal sensation    Light touch sensation: normal  Vibratory  sensation: normal  Hot/Cold sensation:  normal  Protective Sensation using [place]-Flip Monofilament:   Sites intact: 10  Sites tested: 10    MUSCLE STRENGTH     Right Foot Muscle Strength   Foot dorsiflexion:  4  Foot plantar flexion:  4  Foot inversion:  4  Foot eversion:  4     Left Foot Muscle Strength   Foot dorsiflexion:  4  Foot plantar flexion:  1  Foot inversion:  4  Foot eversion:  4    RANGE OF MOTION     Right Foot Range of Motion   Foot and ankle ROM within normal limits       Left Foot Range of Motion   Foot and ankle ROM within normal limits      DERMATOLOGIC      Right Foot Dermatologic   Skin  Right foot skin is intact.      Left Foot Dermatologic   Skin  Left foot skin is intact.      Left foot additional comments: Healed      RADIOLOGY:        No results found.    ASSESSMENT/PLAN     Diagnoses and all orders for this visit:    1. Wound of left foot (Primary)    2. Acute hematogenous osteomyelitis of right foot    3. Calcaneal gait    4. Rupture of right Achilles tendon, sequela        Wound healed    Patient to transition out of boot and into brace    We will see how patient does the next few months with weightbearing on his ankle.      Discussed with patient possible [facility] transfer moving forward pending how he responds to treatment.    Discussed risk and benefits of this with history of osteomyelitis in his calcaneus.    Comprehensive lower extremity examination and evaluation was performed.    Discussed findings and treatment plan including risks, benefits, and treatment options with patient in detail. Patient agreed with treatment plan.    Medications and allergies reviewed.  Reviewed available lab values along with other pertinent labs.  These were discussed with the patient.    An After Visit Summary was printed and given to the patient at discharge, including (if requested) any available informative/educational handouts regarding diagnosis, treatment, or medications. All questions were  answered to patient/family satisfaction. Should symptoms fail to improve or worsen they agree to call or return to clinic or to go to the Emergency Department. Discussed the importance of following up with any needed screening tests/labs/specialist appointments and any requested follow-up recommended by me today. Importance of maintaining follow-up discussed and patient accepts that missed appointments can delay diagnosis and potentially lead to worsening of conditions.    No follow-ups on file., or sooner if acute issues arise.    This document has been electronically signed by Alvarez Beck DPM on May 24, 2024 14:49 EDT

## 2024-05-31 ENCOUNTER — OFFICE VISIT (OUTPATIENT)
Dept: INTERNAL MEDICINE | Facility: CLINIC | Age: 49
End: 2024-05-31
Payer: COMMERCIAL

## 2024-05-31 VITALS
WEIGHT: 209 LBS | OXYGEN SATURATION: 99 % | TEMPERATURE: 98.4 F | HEART RATE: 81 BPM | DIASTOLIC BLOOD PRESSURE: 110 MMHG | RESPIRATION RATE: 18 BRPM | BODY MASS INDEX: 33.59 KG/M2 | SYSTOLIC BLOOD PRESSURE: 178 MMHG | HEIGHT: 66 IN

## 2024-05-31 DIAGNOSIS — R73.03 PREDIABETES: ICD-10-CM

## 2024-05-31 DIAGNOSIS — I10 ESSENTIAL HYPERTENSION: ICD-10-CM

## 2024-05-31 DIAGNOSIS — Z11.59 SCREENING FOR VIRAL DISEASE: ICD-10-CM

## 2024-05-31 DIAGNOSIS — L02.619 ABSCESS OF HEEL: Primary | ICD-10-CM

## 2024-05-31 LAB
ALBUMIN SERPL-MCNC: 4.2 G/DL (ref 3.5–5.2)
ALBUMIN/GLOB SERPL: 1.3 G/DL
ALP SERPL-CCNC: 121 U/L (ref 39–117)
ALT SERPL W P-5'-P-CCNC: 10 U/L (ref 1–41)
ANION GAP SERPL CALCULATED.3IONS-SCNC: 8.9 MMOL/L (ref 5–15)
AST SERPL-CCNC: 16 U/L (ref 1–40)
BASOPHILS # BLD AUTO: 0.04 10*3/MM3 (ref 0–0.2)
BASOPHILS NFR BLD AUTO: 0.6 % (ref 0–1.5)
BILIRUB SERPL-MCNC: 0.2 MG/DL (ref 0–1.2)
BUN SERPL-MCNC: 11 MG/DL (ref 6–20)
BUN/CREAT SERPL: 10.6 (ref 7–25)
CALCIUM SPEC-SCNC: 9.2 MG/DL (ref 8.6–10.5)
CHLORIDE SERPL-SCNC: 107 MMOL/L (ref 98–107)
CHOLEST SERPL-MCNC: 138 MG/DL (ref 0–200)
CO2 SERPL-SCNC: 26.1 MMOL/L (ref 22–29)
CREAT SERPL-MCNC: 1.04 MG/DL (ref 0.76–1.27)
DEPRECATED RDW RBC AUTO: 43.6 FL (ref 37–54)
EGFRCR SERPLBLD CKD-EPI 2021: 87.5 ML/MIN/1.73
EOSINOPHIL # BLD AUTO: 0.59 10*3/MM3 (ref 0–0.4)
EOSINOPHIL NFR BLD AUTO: 8.6 % (ref 0.3–6.2)
ERYTHROCYTE [DISTWIDTH] IN BLOOD BY AUTOMATED COUNT: 13.8 % (ref 12.3–15.4)
GLOBULIN UR ELPH-MCNC: 3.2 GM/DL
GLUCOSE SERPL-MCNC: 87 MG/DL (ref 65–99)
HBA1C MFR BLD: 5.5 % (ref 4.8–5.6)
HCT VFR BLD AUTO: 38.7 % (ref 37.5–51)
HCV AB SER QL: NORMAL
HDLC SERPL-MCNC: 49 MG/DL (ref 40–60)
HGB BLD-MCNC: 12.7 G/DL (ref 13–17.7)
IMM GRANULOCYTES # BLD AUTO: 0.02 10*3/MM3 (ref 0–0.05)
IMM GRANULOCYTES NFR BLD AUTO: 0.3 % (ref 0–0.5)
LDLC SERPL CALC-MCNC: 63 MG/DL (ref 0–100)
LDLC/HDLC SERPL: 1.19 {RATIO}
LYMPHOCYTES # BLD AUTO: 1.89 10*3/MM3 (ref 0.7–3.1)
LYMPHOCYTES NFR BLD AUTO: 27.5 % (ref 19.6–45.3)
MCH RBC QN AUTO: 28.9 PG (ref 26.6–33)
MCHC RBC AUTO-ENTMCNC: 32.8 G/DL (ref 31.5–35.7)
MCV RBC AUTO: 88 FL (ref 79–97)
MONOCYTES # BLD AUTO: 0.6 10*3/MM3 (ref 0.1–0.9)
MONOCYTES NFR BLD AUTO: 8.7 % (ref 5–12)
NEUTROPHILS NFR BLD AUTO: 3.73 10*3/MM3 (ref 1.7–7)
NEUTROPHILS NFR BLD AUTO: 54.3 % (ref 42.7–76)
NRBC BLD AUTO-RTO: 0 /100 WBC (ref 0–0.2)
PLATELET # BLD AUTO: 220 10*3/MM3 (ref 140–450)
PMV BLD AUTO: 11.9 FL (ref 6–12)
POTASSIUM SERPL-SCNC: 4.5 MMOL/L (ref 3.5–5.2)
PROT SERPL-MCNC: 7.4 G/DL (ref 6–8.5)
RBC # BLD AUTO: 4.4 10*6/MM3 (ref 4.14–5.8)
SODIUM SERPL-SCNC: 142 MMOL/L (ref 136–145)
TRIGL SERPL-MCNC: 154 MG/DL (ref 0–150)
TSH SERPL DL<=0.05 MIU/L-ACNC: 0.42 UIU/ML (ref 0.27–4.2)
VLDLC SERPL-MCNC: 26 MG/DL (ref 5–40)
WBC NRBC COR # BLD AUTO: 6.87 10*3/MM3 (ref 3.4–10.8)

## 2024-05-31 PROCEDURE — 3077F SYST BP >= 140 MM HG: CPT | Performed by: PHYSICIAN ASSISTANT

## 2024-05-31 PROCEDURE — 86803 HEPATITIS C AB TEST: CPT | Performed by: PHYSICIAN ASSISTANT

## 2024-05-31 PROCEDURE — 83036 HEMOGLOBIN GLYCOSYLATED A1C: CPT | Performed by: PHYSICIAN ASSISTANT

## 2024-05-31 PROCEDURE — 99214 OFFICE O/P EST MOD 30 MIN: CPT | Performed by: PHYSICIAN ASSISTANT

## 2024-05-31 PROCEDURE — 3080F DIAST BP >= 90 MM HG: CPT | Performed by: PHYSICIAN ASSISTANT

## 2024-05-31 PROCEDURE — 1160F RVW MEDS BY RX/DR IN RCRD: CPT | Performed by: PHYSICIAN ASSISTANT

## 2024-05-31 PROCEDURE — 1159F MED LIST DOCD IN RCRD: CPT | Performed by: PHYSICIAN ASSISTANT

## 2024-05-31 PROCEDURE — 1125F AMNT PAIN NOTED PAIN PRSNT: CPT | Performed by: PHYSICIAN ASSISTANT

## 2024-05-31 PROCEDURE — 80061 LIPID PANEL: CPT | Performed by: PHYSICIAN ASSISTANT

## 2024-05-31 PROCEDURE — 80050 GENERAL HEALTH PANEL: CPT | Performed by: PHYSICIAN ASSISTANT

## 2024-05-31 RX ORDER — IBUPROFEN 800 MG/1
800 TABLET ORAL EVERY 6 HOURS PRN
Qty: 90 TABLET | Refills: 1 | Status: SHIPPED | OUTPATIENT
Start: 2024-05-31

## 2024-05-31 RX ORDER — LISINOPRIL 40 MG/1
40 TABLET ORAL
Qty: 90 TABLET | Refills: 0 | Status: SHIPPED | OUTPATIENT
Start: 2024-05-31 | End: 2024-06-30

## 2024-05-31 NOTE — PROGRESS NOTES
Chief Complaint  Hypertension and wound of heel    Subjective          Karsten Johnson presents to [facility]  Hypertension      Patient in for follow-up on blood pressure.  He did not take his blood pressure medication today.  States he also did not take it the day before his podiatry appointment.  Denies chest pain, palpitation, headache, dizziness, unilateral weakness, headache, blurry vision, slurred speech, jaw/shoulder pain.  States he is still having pain in his foot where his wound is located on his heel.  He was told to follow-up with PCP for pain management.  Past Medical History:   Diagnosis Date    Acute hematogenous osteomyelitis of right foot     Hypertension     Wound of left foot         Past Surgical History:   Procedure Laterality Date    INCISION AND DRAINAGE OF WOUND Left 02/10/2024    Procedure: INCISION AND DRAINAGE  LEFT ANKLE,BIOPSY LEFT ANKEL;  Surgeon: Alvarez Beck DPM;  Location: [facility];  Service: Podiatry;  Laterality: Left;    TONSILLECTOMY      WOUND DEBRIDEMENT Left 02/14/2024    Procedure: INCISION AND DEBRIDEMENT WOUND OF LEFT ANKLE;  Surgeon: Alvarez Beck DPM;  Location: [facility];  Service: Podiatry;  Laterality: Left;        Current Outpatient Medications on File Prior to Visit   Medication Sig Dispense Refill    [DISCONTINUED] lisinopril (PRINIVIL,ZESTRIL) 40 MG tablet Take 1 tablet by mouth Daily for 30 days. 30 tablet 2    [DISCONTINUED] traMADol (ULTRAM) 50 MG tablet Take 1 tablet by mouth Daily. 1 tablet daily for dressing changes 30 tablet 0     No current facility-administered medications on file prior to visit.        No Known Allergies    Social History     Tobacco Use   Smoking Status Every Day    Current packs/day: 1.00    Average packs/day: 1 pack/day for 30.4 years (30.4 ttl pk-yrs)    Types: Cigarettes    Start date: 1994    Passive exposure: Never   Smokeless Tobacco Never          Objective  "  Vital Signs:   BP (!) 178/110 (BP Location: Left arm, Patient Position: Sitting, Cuff Size: Adult)   Pulse 81   Temp 98.4 °F (36.9 °C) (Temporal)   Resp 18   Ht 167.6 cm (66\")   Wt 94.8 kg (209 lb)   SpO2 99%   BMI 33.73 kg/m²     Physical Exam  Vitals reviewed.   Constitutional:       Appearance: Normal appearance.   HENT:      Head: Normocephalic and atraumatic.      Nose: Nose normal.      Mouth/Throat:      Mouth: Mucous membranes are moist.   Eyes:      Extraocular Movements: Extraocular movements intact.      Conjunctiva/sclera: Conjunctivae normal.      Pupils: Pupils are equal, round, and reactive to light.   Cardiovascular:      Rate and Rhythm: Normal rate and regular rhythm.   Pulmonary:      Effort: Pulmonary effort is normal.      Breath sounds: Normal breath sounds.   Abdominal:      General: Abdomen is flat. Bowel sounds are normal.      Palpations: Abdomen is soft.   Musculoskeletal:         General: Normal range of motion.   Neurological:      General: No focal deficit present.      Mental Status: He is alert and oriented to person, place, and time.   Psychiatric:         Mood and Affect: Mood normal.        Result Review :   The following data was reviewed by: Luz Nam PA-C on 05/31/2024:  Common labs          3/11/2024    17:23 3/18/2024    11:53 3/25/2024    12:15   Common Labs   Glucose 86  129  96    BUN 10  14  15    Creatinine 0.86  1.48  1.76    Sodium 135  136  137    Potassium 3.8  3.9  4.0    Chloride 100  103  101    Calcium 8.5  8.7  8.9    Albumin 3.0  3.2  3.3    Total Bilirubin 0.4  0.4  0.3    Alkaline Phosphatase 103  104  100    AST (SGOT) 19  15  11    ALT (SGPT) 17  17  10    WBC 5.01  5.33  6.80    Hemoglobin 9.7  9.7  9.8    Hematocrit 30.9  30.4  30.6    Platelets 174  171  210                    Assessment and Plan    Diagnoses and all orders for this visit:    1. Abscess of heel (Primary)  Assessment & Plan:  Reviewed last note from podiatry. Encourage " patient to follow-up with them as directed. Offered pain management referral, patient declined since he will be moving soon.  Encouraged Tylenol and Motrin as needed for pain      2. Essential hypertension  Assessment & Plan:  Discussed blood pressure elevation at today's visit. Risks of blood pressure elevation include death, heart attack, stroke, kidney disease, blindness. Low salt diet, increase exercise. Discussed importance of medication compliance and not missing doses.  Restart current medications at this time. We will monitor at follow up and adjust medications if still elevated. To er if chest pain, palpitations, vision loss, unilateral weakness, altered mental status. Pt understands and agrees with plan.        Orders:  -     Comprehensive Metabolic Panel  -     CBC & Differential  -     TSH  -     Lipid Panel    3. Prediabetes  Assessment & Plan:  Labs today to evaluate    Orders:  -     Hemoglobin A1c    4. Screening for viral disease  -     Hepatitis C Antibody    Other orders  -     lisinopril (PRINIVIL,ZESTRIL) 40 MG tablet; Take 1 tablet by mouth Daily for 30 days.  Dispense: 90 tablet; Refill: 0  -     ibuprofen (ADVIL,MOTRIN) 800 MG tablet; Take 1 tablet by mouth Every 6 (Six) Hours As Needed for Mild Pain.  Dispense: 90 tablet; Refill: 1        Follow Up   Return in about 2 days (around 6/2/2024).  Patient was given instructions and counseling regarding his condition or for health maintenance advice. Please see specific information pulled into the AVS if appropriate.

## 2024-05-31 NOTE — ASSESSMENT & PLAN NOTE
Reviewed last note from podiatry. Encourage patient to follow-up with them as directed. Offered pain management referral, patient declined since he will be moving soon.  Encouraged Tylenol and Motrin as needed for pain

## 2024-05-31 NOTE — ASSESSMENT & PLAN NOTE
Discussed blood pressure elevation at today's visit. Risks of blood pressure elevation include death, heart attack, stroke, kidney disease, blindness. Low salt diet, increase exercise. Discussed importance of medication compliance and not missing doses.  Restart current medications at this time. We will monitor at follow up and adjust medications if still elevated. To er if chest pain, palpitations, vision loss, unilateral weakness, altered mental status. Pt understands and agrees with plan.

## 2024-06-06 ENCOUNTER — TELEPHONE (OUTPATIENT)
Dept: PODIATRY | Facility: CLINIC | Age: 49
End: 2024-06-06
Payer: COMMERCIAL

## 2024-06-06 NOTE — TELEPHONE ENCOUNTER
Caller: ALISHA ALBRECHT    Relationship: Emergency Contact    Best call back number:     What form or medical record are you requesting: UPDATED RETURN TO WORK DATE FOR SHORT TERM DISABILITY. RETURN TO WORK DATE SHOULD BE 7/25/24    Who is requesting this form or medical record from you:     How would you like to receive the form or medical records (pick-up, mail, fax): FAX  If fax, what is the fax number: [phone] ATTN: LJ    Timeframe paperwork needed: ASAP

## 2024-06-06 NOTE — TELEPHONE ENCOUNTER
CALLED PATIENT BACK, WE HAVEN'T SEEN SINCE MAY AND NO APPT UNTIL 7/24/24. NOT SURE WHY WE ARE SUPPOSED TO CHANGE RETURN TO WORK DATE.

## 2024-06-07 NOTE — TELEPHONE ENCOUNTER
DR LENTZED SENDING WORK NOTE PUTTING MR TAYLOR OFF WORK UNTIL 7/25/24, THE DAY AFTER WE SEE HIM IN OUR OFFICE 7/24/24.CALLED PATIENT AND LET HIM KNOW I WOULD FAX THAT OVER 6/7/24

## 2024-06-24 ENCOUNTER — TELEPHONE (OUTPATIENT)
Dept: PODIATRY | Facility: CLINIC | Age: 49
End: 2024-06-24
Payer: COMMERCIAL

## 2024-06-24 NOTE — TELEPHONE ENCOUNTER
Spoke with Catrachita Yang,  for the patient. She was checking on his work status. I advised patient has work note stating he needs to remain off work until 7/25/24, the day after his last scheduled f/u visit. She stated understanding and agreement with this plan.

## 2024-07-29 ENCOUNTER — OFFICE VISIT (OUTPATIENT)
Dept: PODIATRY | Facility: CLINIC | Age: 49
End: 2024-07-29
Payer: OTHER MISCELLANEOUS

## 2024-07-29 VITALS
OXYGEN SATURATION: 98 % | HEART RATE: 72 BPM | DIASTOLIC BLOOD PRESSURE: 89 MMHG | TEMPERATURE: 98.2 F | BODY MASS INDEX: 34.55 KG/M2 | WEIGHT: 215 LBS | SYSTOLIC BLOOD PRESSURE: 172 MMHG | HEIGHT: 66 IN

## 2024-07-29 DIAGNOSIS — S86.012S ACHILLES RUPTURE, LEFT, SEQUELA: Primary | ICD-10-CM

## 2024-07-29 DIAGNOSIS — S91.302A WOUND OF LEFT FOOT: ICD-10-CM

## 2024-07-29 DIAGNOSIS — R26.89 CALCANEAL GAIT: ICD-10-CM

## 2024-07-29 DIAGNOSIS — M86.071 ACUTE HEMATOGENOUS OSTEOMYELITIS OF RIGHT FOOT: ICD-10-CM

## 2024-07-29 PROCEDURE — 99213 OFFICE O/P EST LOW 20 MIN: CPT | Performed by: PODIATRIST

## 2024-07-29 NOTE — PROGRESS NOTES
[facility] - PODIATRY    Today's Date: 07/29/24    Patient Name: Karsten Johnson  MRN: [id number]  CSN: [id number]  PCP: Luz Nam PA-C,   Referring Provider: No ref. provider found    SUBJECTIVE     Chief Complaint   Patient presents with   • Left Foot - Follow-up, Pain, Work Related Injury, Wound Check     Wearing brace   Has to wear shoes for support   Pain increases when being on foot more      HPI: Karsten Johnson, a 48 y.o.male, presents to clinic.      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  States he is lost a lot of strength in his toes.  And it swells when he is on it for long periods of time.  Patient is trying to get Workmen's Comp. straightened out, as his original issue began at work and he is frustrated with the situation.    Past Medical History:   Diagnosis Date   • Acute hematogenous osteomyelitis of right foot    • Hypertension    • Wound of left foot      Past Surgical History:   Procedure Laterality Date   • INCISION AND DRAINAGE OF WOUND Left 02/10/2024    Procedure: INCISION AND DRAINAGE  LEFT ANKLE,BIOPSY LEFT ANKEL;  Surgeon: Alvarez Beck DPM;  Location: [facility] OR;  Service: Podiatry;  Laterality: Left;   • TONSILLECTOMY     • WOUND DEBRIDEMENT Left 02/14/2024    Procedure: INCISION AND DEBRIDEMENT WOUND OF LEFT ANKLE;  Surgeon: Alvarez Beck DPM;  Location: [facility] OR;  Service: Podiatry;  Laterality: Left;     Family History   Problem Relation Age of Onset   • Breast cancer Mother 40   • Colon cancer Mother 55   • Hypertension Father    • Diabetes Father      Social History     Socioeconomic History   • Marital status:    Tobacco Use   • Smoking status: Every Day     Current packs/day: 1.00     Average packs/day: 1 pack/day for 30.6 years (30.6 ttl pk-yrs)     Types: Cigarettes     Start date: 1994     Passive exposure: Never   • Smokeless tobacco: Never   Vaping  Use   • Vaping status: Never Used   Substance and Sexual Activity   • Alcohol use: Yes     Alcohol/week: 3.0 standard drinks of alcohol     Types: 3 Cans of beer per week   • Drug use: Yes     Types: Marijuana   • Sexual activity: Yes     Partners: Female     No Known Allergies  Current Outpatient Medications   Medication Sig Dispense Refill   • ibuprofen (ADVIL,MOTRIN) 800 MG tablet Take 1 tablet by mouth Every 6 (Six) Hours As Needed for Mild Pain. 90 tablet 1   • lisinopril (PRINIVIL,ZESTRIL) 40 MG tablet Take 1 tablet by mouth Daily for 30 days. 90 tablet 0     No current facility-administered medications for this visit.     Review of Systems   All other systems reviewed and are negative.      OBJECTIVE     Vitals:    07/29/24 1004   BP: 172/89   Pulse: 72   Temp: 98.2 °F (36.8 °C)   SpO2: 98%       WBC   Date Value Ref Range Status   05/31/2024 6.87 3.40 - 10.80 10*3/mm3 Final     RBC   Date Value Ref Range Status   05/31/2024 4.40 4.14 - 5.80 10*6/mm3 Final     Hemoglobin   Date Value Ref Range Status   05/31/2024 12.7 (L) 13.0 - 17.7 g/dL Final     Hematocrit   Date Value Ref Range Status   05/31/2024 38.7 37.5 - 51.0 % Final     MCV   Date Value Ref Range Status   05/31/2024 88.0 79.0 - 97.0 fL Final     MCH   Date Value Ref Range Status   05/31/2024 28.9 26.6 - 33.0 pg Final     MCHC   Date Value Ref Range Status   05/31/2024 32.8 31.5 - 35.7 g/dL Final     RDW   Date Value Ref Range Status   05/31/2024 13.8 12.3 - 15.4 % Final     RDW-SD   Date Value Ref Range Status   05/31/2024 43.6 37.0 - 54.0 fl Final     MPV   Date Value Ref Range Status   05/31/2024 11.9 6.0 - 12.0 fL Final     Platelets   Date Value Ref Range Status   05/31/2024 220 140 - 450 10*3/mm3 Final     Neutrophil %   Date Value Ref Range Status   05/31/2024 54.3 42.7 - 76.0 % Final     Lymphocyte %   Date Value Ref Range Status   05/31/2024 27.5 19.6 - 45.3 % Final     Monocyte %   Date Value Ref Range Status   05/31/2024 8.7 5.0 - 12.0 %  Final     Eosinophil %   Date Value Ref Range Status   05/31/2024 8.6 (H) 0.3 - 6.2 % Final     Basophil %   Date Value Ref Range Status   05/31/2024 0.6 0.0 - 1.5 % Final     Immature Grans %   Date Value Ref Range Status   05/31/2024 0.3 0.0 - 0.5 % Final     Neutrophils, Absolute   Date Value Ref Range Status   05/31/2024 3.73 1.70 - 7.00 10*3/mm3 Final     Lymphocytes, Absolute   Date Value Ref Range Status   05/31/2024 1.89 0.70 - 3.10 10*3/mm3 Final     Monocytes, Absolute   Date Value Ref Range Status   05/31/2024 0.60 0.10 - 0.90 10*3/mm3 Final     Eosinophils, Absolute   Date Value Ref Range Status   05/31/2024 0.59 (H) 0.00 - 0.40 10*3/mm3 Final     Basophils, Absolute   Date Value Ref Range Status   05/31/2024 0.04 0.00 - 0.20 10*3/mm3 Final     Immature Grans, Absolute   Date Value Ref Range Status   05/31/2024 0.02 0.00 - 0.05 10*3/mm3 Final     nRBC   Date Value Ref Range Status   05/31/2024 0.0 0.0 - 0.2 /100 WBC Final         Lab Results   Component Value Date    GLUCOSE 87 05/31/2024    BUN 11 05/31/2024    CREATININE 1.04 05/31/2024    BCR 10.6 05/31/2024    K 4.5 05/31/2024    CO2 26.1 05/31/2024    CALCIUM 9.2 05/31/2024    ALBUMIN 4.2 05/31/2024    AST 16 05/31/2024    ALT 10 05/31/2024       Patient seen in no apparent distress.      PHYSICAL EXAM:     Foot/Ankle Exam    GENERAL  Appearance:  appears stated age  Orientation:  AAOx3  Affect:  appropriate  Gait:  unimpaired  Assistance:  independent  Right shoe gear: casual shoe  Left shoe gear: casual shoe    VASCULAR     Right Foot Vascularity   Normal vascular exam    Dorsalis pedis:  2+  Posterior tibial:  2+  Skin temperature:  warm  Edema grading:  None  CFT:  < 3 seconds  Pedal hair growth:  Present  Varicosities:  none     Left Foot Vascularity   Normal vascular exam    Dorsalis pedis:  2+  Posterior tibial:  2+  Skin temperature:  warm  Edema grading:  None  CFT:  < 3 seconds  Pedal hair growth:  Present  Varicosities:  none      NEUROLOGIC     Right Foot Neurologic   Normal sensation    Light touch sensation: normal  Vibratory sensation: normal  Hot/Cold sensation: normal  Protective Sensation using [place]-Flip Monofilament:   Sites intact: 10  Sites tested: 10     Left Foot Neurologic   Normal sensation    Light touch sensation: normal  Vibratory sensation: normal  Hot/Cold sensation:  normal  Protective Sensation using [place]-Flip Monofilament:   Sites intact: 10  Sites tested: 10    MUSCLE STRENGTH     Right Foot Muscle Strength   Foot dorsiflexion:  4  Foot plantar flexion:  4  Foot inversion:  4  Foot eversion:  4     Left Foot Muscle Strength   Foot dorsiflexion:  3  Foot plantar flexion:  1  Foot inversion:  3  Foot eversion:  3    RANGE OF MOTION     Right Foot Range of Motion   Foot and ankle ROM within normal limits       Left Foot Range of Motion   Foot and ankle ROM within normal limits      DERMATOLOGIC      Right Foot Dermatologic   Skin  Right foot skin is intact.      Left Foot Dermatologic   Skin  Left foot skin is intact.      Left foot additional comments: Healed      RADIOLOGY:        No results found.    ASSESSMENT/PLAN     Diagnoses and all orders for this visit:    1. Acute hematogenous osteomyelitis of right foot (Primary)    2. Calcaneal gait    3. Achilles rupture, left, sequela    4. Wound of left foot      Patient to weight-bear as tolerated to his left ankle.  Discussed likely to have continued weakness in his left ankle due to his Achilles infection and osteomyelitis.  Patient was unable to get a custom brace or do physical therapy due to his insurance issues which likely did not help with this.    Discussed with patient possible [facility] transfer and/or cadaver repair in the future pending a clean bone biopsy to help with pushoff strength.    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    Patient can return to clinic as  needed.    Comprehensive lower extremity examination and evaluation was performed.    Discussed findings and treatment plan including risks, benefits, and treatment options with patient in detail. Patient agreed with treatment plan.    Medications and allergies reviewed.  Reviewed available lab values along with other pertinent labs.  These were discussed with the patient.    An After Visit Summary was printed and given to the patient at discharge, including (if requested) any available informative/educational handouts regarding diagnosis, treatment, or medications. All questions were answered to patient/family satisfaction. Should symptoms fail to improve or worsen they agree to call or return to clinic or to go to the Emergency Department. Discussed the importance of following up with any needed screening tests/labs/specialist appointments and any requested follow-up recommended by me today. Importance of maintaining follow-up discussed and patient accepts that missed appointments can delay diagnosis and potentially lead to worsening of conditions.    No follow-ups on file., or sooner if acute issues arise.    This document has been electronically signed by Alvarez Beck DPM on July 29, 2024 10:51 EDT

## 2024-09-23 ENCOUNTER — OFFICE VISIT (OUTPATIENT)
Dept: PODIATRY | Facility: CLINIC | Age: 49
End: 2024-09-23
Payer: OTHER MISCELLANEOUS

## 2024-09-23 VITALS
TEMPERATURE: 98.6 F | SYSTOLIC BLOOD PRESSURE: 159 MMHG | DIASTOLIC BLOOD PRESSURE: 96 MMHG | OXYGEN SATURATION: 97 % | WEIGHT: 222 LBS | HEART RATE: 83 BPM | BODY MASS INDEX: 35.68 KG/M2 | HEIGHT: 66 IN

## 2024-09-23 DIAGNOSIS — R26.89 CALCANEAL GAIT: ICD-10-CM

## 2024-09-23 DIAGNOSIS — S86.012S ACHILLES RUPTURE, LEFT, SEQUELA: Primary | ICD-10-CM

## 2024-09-23 DIAGNOSIS — M86.271 SUBACUTE OSTEOMYELITIS OF RIGHT FOOT: ICD-10-CM

## 2024-09-23 PROCEDURE — 99213 OFFICE O/P EST LOW 20 MIN: CPT | Performed by: PODIATRIST

## 2025-01-31 ENCOUNTER — OFFICE VISIT (OUTPATIENT)
Dept: PODIATRY | Facility: CLINIC | Age: 50
End: 2025-01-31
Payer: OTHER MISCELLANEOUS

## 2025-01-31 VITALS
WEIGHT: 221 LBS | BODY MASS INDEX: 35.52 KG/M2 | SYSTOLIC BLOOD PRESSURE: 150 MMHG | HEIGHT: 66 IN | OXYGEN SATURATION: 95 % | DIASTOLIC BLOOD PRESSURE: 89 MMHG | HEART RATE: 79 BPM

## 2025-01-31 DIAGNOSIS — M86.271 SUBACUTE OSTEOMYELITIS OF RIGHT FOOT: ICD-10-CM

## 2025-01-31 DIAGNOSIS — S86.012S ACHILLES RUPTURE, LEFT, SEQUELA: Primary | ICD-10-CM

## 2025-01-31 DIAGNOSIS — R26.89 CALCANEAL GAIT: ICD-10-CM

## 2025-01-31 PROCEDURE — 99213 OFFICE O/P EST LOW 20 MIN: CPT | Performed by: PODIATRIST

## 2025-02-03 NOTE — PROGRESS NOTES
[facility] - PODIATRY    Today's Date: 02/03/25    Patient Name: Karsten Johnson  MRN: [id number]  CSN: [id number]  PCP: Luz Nam PA-C,   Referring Provider: No ref. provider found    SUBJECTIVE     Chief Complaint   Patient presents with    Left Foot - Follow-up     Achilles rupture  Wearing brace   Tolerable pain, discomfort, started wearing as hoe recently and foot goes numb after having on.  Sharp pain      HPI: Karsten Johnson, a 49 y.o.male, presents to clinic.      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  States he is lost a lot of strength in his toes.  And it swells when he is on it for long periods of time.  Patient is trying to get Workmen's Comp. straightened out, as his original issue began at work and he is frustrated with the situation.    9/23/2024-patient continues to have issues with Workmen's Comp./insurance.  States that he is walking with a brace.  Unable to do custom bracing and/or physical therapy due to insurance issues.    1/31/2025-patient says this case is going to trial next month.  Patient says that he still has some swelling in his ankle and can be unstable at times.    Past Medical History:   Diagnosis Date    Achilles rupture, left     Acute hematogenous osteomyelitis of right foot     Calcaneal gait     Hypertension     Subacute osteomyelitis of right foot     Wound of left foot      Past Surgical History:   Procedure Laterality Date    INCISION AND DRAINAGE OF WOUND Left 02/10/2024    Procedure: INCISION AND DRAINAGE  LEFT ANKLE,BIOPSY LEFT ANKEL;  Surgeon: Alvarez Beck DPM;  Location: [facility] OR;  Service: Podiatry;  Laterality: Left;    TONSILLECTOMY      WOUND DEBRIDEMENT Left 02/14/2024    Procedure: INCISION AND DEBRIDEMENT WOUND OF LEFT ANKLE;  Surgeon: Alvarez Beck DPM;  Location: [facility] OR;  Service: Podiatry;  Laterality: Left;     Family History   Problem  Relation Age of Onset    Breast cancer Mother 40    Colon cancer Mother 55    Hypertension Father     Diabetes Father      Social History     Socioeconomic History    Marital status:    Tobacco Use    Smoking status: Every Day     Current packs/day: 1.00     Average packs/day: 1 pack/day for 31.1 years (31.1 ttl pk-yrs)     Types: Cigarettes     Start date: 1994     Passive exposure: Never    Smokeless tobacco: Never   Vaping Use    Vaping status: Never Used   Substance and Sexual Activity    Alcohol use: Yes     Alcohol/week: 3.0 standard drinks of alcohol     Types: 3 Cans of beer per week    Drug use: Yes     Types: Marijuana    Sexual activity: Yes     Partners: Female     No Known Allergies  Current Outpatient Medications   Medication Sig Dispense Refill    ibuprofen (ADVIL,MOTRIN) 800 MG tablet Take 1 tablet by mouth Every 6 (Six) Hours As Needed for Mild Pain. 90 tablet 1    lisinopril (PRINIVIL,ZESTRIL) 40 MG tablet Take 1 tablet by mouth Daily for 30 days. 90 tablet 0     No current facility-administered medications for this visit.     Review of Systems   All other systems reviewed and are negative.      OBJECTIVE     Vitals:    01/31/25 1258   BP: 150/89   Pulse: 79   SpO2: 95%       WBC   Date Value Ref Range Status   05/31/2024 6.87 3.40 - 10.80 10*3/mm3 Final     RBC   Date Value Ref Range Status   05/31/2024 4.40 4.14 - 5.80 10*6/mm3 Final     Hemoglobin   Date Value Ref Range Status   05/31/2024 12.7 (L) 13.0 - 17.7 g/dL Final     Hematocrit   Date Value Ref Range Status   05/31/2024 38.7 37.5 - 51.0 % Final     MCV   Date Value Ref Range Status   05/31/2024 88.0 79.0 - 97.0 fL Final     MCH   Date Value Ref Range Status   05/31/2024 28.9 26.6 - 33.0 pg Final     MCHC   Date Value Ref Range Status   05/31/2024 32.8 31.5 - 35.7 g/dL Final     RDW   Date Value Ref Range Status   05/31/2024 13.8 12.3 - 15.4 % Final     RDW-SD   Date Value Ref Range Status   05/31/2024 43.6 37.0 - 54.0 fl Final      MPV   Date Value Ref Range Status   05/31/2024 11.9 6.0 - 12.0 fL Final     Platelets   Date Value Ref Range Status   05/31/2024 220 140 - 450 10*3/mm3 Final     Neutrophil %   Date Value Ref Range Status   05/31/2024 54.3 42.7 - 76.0 % Final     Lymphocyte %   Date Value Ref Range Status   05/31/2024 27.5 19.6 - 45.3 % Final     Monocyte %   Date Value Ref Range Status   05/31/2024 8.7 5.0 - 12.0 % Final     Eosinophil %   Date Value Ref Range Status   05/31/2024 8.6 (H) 0.3 - 6.2 % Final     Basophil %   Date Value Ref Range Status   05/31/2024 0.6 0.0 - 1.5 % Final     Immature Grans %   Date Value Ref Range Status   05/31/2024 0.3 0.0 - 0.5 % Final     Neutrophils, Absolute   Date Value Ref Range Status   05/31/2024 3.73 1.70 - 7.00 10*3/mm3 Final     Lymphocytes, Absolute   Date Value Ref Range Status   05/31/2024 1.89 0.70 - 3.10 10*3/mm3 Final     Monocytes, Absolute   Date Value Ref Range Status   05/31/2024 0.60 0.10 - 0.90 10*3/mm3 Final     Eosinophils, Absolute   Date Value Ref Range Status   05/31/2024 0.59 (H) 0.00 - 0.40 10*3/mm3 Final     Basophils, Absolute   Date Value Ref Range Status   05/31/2024 0.04 0.00 - 0.20 10*3/mm3 Final     Immature Grans, Absolute   Date Value Ref Range Status   05/31/2024 0.02 0.00 - 0.05 10*3/mm3 Final     nRBC   Date Value Ref Range Status   05/31/2024 0.0 0.0 - 0.2 /100 WBC Final         Lab Results   Component Value Date    GLUCOSE 87 05/31/2024    BUN 11 05/31/2024    CREATININE 1.04 05/31/2024    BCR 10.6 05/31/2024    K 4.5 05/31/2024    CO2 26.1 05/31/2024    CALCIUM 9.2 05/31/2024    ALBUMIN 4.2 05/31/2024    AST 16 05/31/2024    ALT 10 05/31/2024       Patient seen in no apparent distress.      PHYSICAL EXAM:     Foot/Ankle Exam    GENERAL  Appearance:  appears stated age  Orientation:  AAOx3  Affect:  appropriate  Gait:  unimpaired  Assistance:  independent  Right shoe gear: casual shoe  Left shoe gear: casual shoe    VASCULAR     Right Foot Vascularity    Normal vascular exam    Dorsalis pedis:  2+  Posterior tibial:  2+  Skin temperature:  warm  Edema grading:  None  CFT:  < 3 seconds  Pedal hair growth:  Present  Varicosities:  none     Left Foot Vascularity   Normal vascular exam    Dorsalis pedis:  2+  Posterior tibial:  2+  Skin temperature:  warm  Edema grading:  None  CFT:  < 3 seconds  Pedal hair growth:  Present  Varicosities:  none     NEUROLOGIC     Right Foot Neurologic   Normal sensation    Light touch sensation: normal  Vibratory sensation: normal  Hot/Cold sensation: normal  Protective Sensation using [place]-Flip Monofilament:   Sites intact: 10  Sites tested: 10     Left Foot Neurologic   Normal sensation    Light touch sensation: normal  Vibratory sensation: normal  Hot/Cold sensation:  normal  Protective Sensation using [place]-Flip Monofilament:   Sites intact: 10  Sites tested: 10    MUSCLE STRENGTH     Right Foot Muscle Strength   Foot dorsiflexion:  4  Foot plantar flexion:  4  Foot inversion:  4  Foot eversion:  4     Left Foot Muscle Strength   Foot dorsiflexion:  3  Foot plantar flexion:  1  Foot inversion:  3  Foot eversion:  3    RANGE OF MOTION     Right Foot Range of Motion   Foot and ankle ROM within normal limits       Left Foot Range of Motion   Foot and ankle ROM within normal limits      DERMATOLOGIC      Right Foot Dermatologic   Skin  Right foot skin is intact.      Left Foot Dermatologic   Skin  Left foot skin is intact.      Left foot additional comments: Healed      RADIOLOGY:        No results found.    ASSESSMENT/PLAN     Diagnoses and all orders for this visit:    1. Achilles rupture, left, sequela (Primary)    2. Calcaneal gait    3. Subacute osteomyelitis of right foot      Patient currently ambulating in brace.  Discussed bracing and ankle arthrodesis.  Patient can return to clinic as needed.    Comprehensive lower extremity examination and evaluation was performed.    Discussed findings and treatment plan  including risks, benefits, and treatment options with patient in detail. Patient agreed with treatment plan.    Medications and allergies reviewed.  Reviewed available lab values along with other pertinent labs.  These were discussed with the patient.    An After Visit Summary was printed and given to the patient at discharge, including (if requested) any available informative/educational handouts regarding diagnosis, treatment, or medications. All questions were answered to patient/family satisfaction. Should symptoms fail to improve or worsen they agree to call or return to clinic or to go to the Emergency Department. Discussed the importance of following up with any needed screening tests/labs/specialist appointments and any requested follow-up recommended by me today. Importance of maintaining follow-up discussed and patient accepts that missed appointments can delay diagnosis and potentially lead to worsening of conditions.    Return if symptoms worsen or fail to improve., or sooner if acute issues arise.    This document has been electronically signed by Alvarez Beck DPM on February 3, 2025 07:35 EST

## 2025-02-12 ENCOUNTER — TELEPHONE (OUTPATIENT)
Dept: PODIATRY | Facility: CLINIC | Age: 50
End: 2025-02-12

## 2025-02-12 NOTE — TELEPHONE ENCOUNTER
Caller: HUSSAIN WITH [employer]    Relationship to patient:     Best call back number: [phone]    Patient is needing: WOULD LIKE TO KNOW IF THE PROVIDER WILL BE RESPONDING TO A FAX SENT 02/11/2025

## 2025-02-27 ENCOUNTER — TELEPHONE (OUTPATIENT)
Dept: PODIATRY | Facility: CLINIC | Age: 50
End: 2025-02-27

## 2025-02-27 NOTE — TELEPHONE ENCOUNTER
Caller: ALISHA ALBRECHT    Relationship: Emergency Contact    Best call back number: [phone]    What form or medical record are you requesting:BENEFITS PAPERWORK     Who is requesting this form or medical record from you: PATIENT     How would you like to receive the form or medical records (pick-up, mail, fax): FAX     Timeframe paperwork needed: DUE 02/28/2025    Additional notes: PATIENT IS NEEDING A PHYSCIAN SIGNATURE ON THE BENEFITS PAPERWORK

## 2025-03-04 ENCOUNTER — TELEPHONE (OUTPATIENT)
Dept: PODIATRY | Facility: CLINIC | Age: 50
End: 2025-03-04
Payer: COMMERCIAL

## 2025-03-04 NOTE — TELEPHONE ENCOUNTER
Caller: ALISHA ALBRECHT    Relationship to Patient: WIFE -[employer]   Phone Number: [phone]  Reason for Call: PATIENT  IS UNSURE ON THE MEDICAL RECORDS ON HOW TO DECIPHER. WOULD LIKE A BETTER EXPLANATION IN LAYMAN'S TERMS SO  IS ABLE TO ARGUE THE CASE ADEQUATELY    CONTACT: CM MEDINA  [phone] (PHONE)

## 2025-03-05 NOTE — TELEPHONE ENCOUNTER
Called patient and Mr. Valverde at their  office and left both voice messages. We have sent all the information we can. Doctor not able to change or amend his notes. They may reach out to [facility] to get all of his records to help them with their case but our office has sent all the information we have. Hub may relay.

## 2025-04-25 ENCOUNTER — HOSPITAL ENCOUNTER (OUTPATIENT)
Dept: GENERAL RADIOLOGY | Facility: HOSPITAL | Age: 50
Discharge: HOME | End: 2025-04-25
Payer: COMMERCIAL

## 2025-04-25 ENCOUNTER — LAB (OUTPATIENT)
Dept: LAB | Facility: HOSPITAL | Age: 50
End: 2025-04-25
Payer: COMMERCIAL

## 2025-04-25 ENCOUNTER — OFFICE VISIT (OUTPATIENT)
Dept: INTERNAL MEDICINE | Facility: CLINIC | Age: 50
End: 2025-04-25
Payer: COMMERCIAL

## 2025-04-25 VITALS
BODY MASS INDEX: 36.61 KG/M2 | WEIGHT: 226.8 LBS | TEMPERATURE: 98.9 F | DIASTOLIC BLOOD PRESSURE: 100 MMHG | OXYGEN SATURATION: 97 % | HEART RATE: 77 BPM | SYSTOLIC BLOOD PRESSURE: 160 MMHG

## 2025-04-25 DIAGNOSIS — Z87.828 HISTORY OF RUPTURE OF ACHILLES TENDON: ICD-10-CM

## 2025-04-25 DIAGNOSIS — M54.41 CHRONIC RIGHT-SIDED LOW BACK PAIN WITH RIGHT-SIDED SCIATICA: ICD-10-CM

## 2025-04-25 DIAGNOSIS — I10 ESSENTIAL HYPERTENSION: Primary | ICD-10-CM

## 2025-04-25 DIAGNOSIS — Z00.00 HEALTHCARE MAINTENANCE: ICD-10-CM

## 2025-04-25 DIAGNOSIS — G89.29 CHRONIC RIGHT-SIDED LOW BACK PAIN WITH RIGHT-SIDED SCIATICA: ICD-10-CM

## 2025-04-25 DIAGNOSIS — Z72.0 TOBACCO USE: ICD-10-CM

## 2025-04-25 DIAGNOSIS — R73.03 PREDIABETES: ICD-10-CM

## 2025-04-25 DIAGNOSIS — Z12.12 ENCOUNTER FOR COLORECTAL CANCER SCREENING: ICD-10-CM

## 2025-04-25 DIAGNOSIS — Z86.14 HISTORY OF MRSA INFECTION: ICD-10-CM

## 2025-04-25 DIAGNOSIS — Z87.891 PERSONAL HISTORY OF TOBACCO USE, PRESENTING HAZARDS TO HEALTH: ICD-10-CM

## 2025-04-25 DIAGNOSIS — F32.9 MAJOR DEPRESSIVE DISORDER WITH CURRENT ACTIVE EPISODE, UNSPECIFIED DEPRESSION EPISODE SEVERITY, UNSPECIFIED WHETHER RECURRENT: ICD-10-CM

## 2025-04-25 DIAGNOSIS — Z12.11 ENCOUNTER FOR COLORECTAL CANCER SCREENING: ICD-10-CM

## 2025-04-25 LAB
ALBUMIN SERPL-MCNC: 4.4 G/DL (ref 3.5–5.2)
ALBUMIN/GLOB SERPL: 1.3 G/DL
ALP SERPL-CCNC: 130 U/L (ref 39–117)
ALT SERPL W P-5'-P-CCNC: 15 U/L (ref 1–41)
ANION GAP SERPL CALCULATED.3IONS-SCNC: 10 MMOL/L (ref 5–15)
AST SERPL-CCNC: 16 U/L (ref 1–40)
BILIRUB SERPL-MCNC: 0.3 MG/DL (ref 0–1.2)
BUN SERPL-MCNC: 21 MG/DL (ref 6–20)
BUN/CREAT SERPL: 23.3 (ref 7–25)
CALCIUM SPEC-SCNC: 9.2 MG/DL (ref 8.6–10.5)
CHLORIDE SERPL-SCNC: 105 MMOL/L (ref 98–107)
CHOLEST SERPL-MCNC: 154 MG/DL (ref 0–200)
CO2 SERPL-SCNC: 25 MMOL/L (ref 22–29)
CREAT SERPL-MCNC: 0.9 MG/DL (ref 0.76–1.27)
DEPRECATED RDW RBC AUTO: 44.8 FL (ref 37–54)
EGFRCR SERPLBLD CKD-EPI 2021: 103.4 ML/MIN/1.73
ERYTHROCYTE [DISTWIDTH] IN BLOOD BY AUTOMATED COUNT: 13.5 % (ref 12.3–15.4)
GLOBULIN UR ELPH-MCNC: 3.3 GM/DL
GLUCOSE SERPL-MCNC: 86 MG/DL (ref 65–99)
HBA1C MFR BLD: 6.6 % (ref 4.8–5.6)
HCT VFR BLD AUTO: 43 % (ref 37.5–51)
HDLC SERPL-MCNC: 37 MG/DL (ref 40–60)
HGB BLD-MCNC: 13.8 G/DL (ref 13–17.7)
LDLC SERPL CALC-MCNC: 97 MG/DL (ref 0–100)
LDLC/HDLC SERPL: 2.56 {RATIO}
MCH RBC QN AUTO: 29.2 PG (ref 26.6–33)
MCHC RBC AUTO-ENTMCNC: 32.1 G/DL (ref 31.5–35.7)
MCV RBC AUTO: 90.9 FL (ref 79–97)
PLATELET # BLD AUTO: 166 10*3/MM3 (ref 140–450)
PMV BLD AUTO: 12.3 FL (ref 6–12)
POTASSIUM SERPL-SCNC: 4.8 MMOL/L (ref 3.5–5.2)
PROT SERPL-MCNC: 7.7 G/DL (ref 6–8.5)
RBC # BLD AUTO: 4.73 10*6/MM3 (ref 4.14–5.8)
SODIUM SERPL-SCNC: 140 MMOL/L (ref 136–145)
TRIGL SERPL-MCNC: 111 MG/DL (ref 0–150)
VLDLC SERPL-MCNC: 20 MG/DL (ref 5–40)
WBC NRBC COR # BLD AUTO: 11.06 10*3/MM3 (ref 3.4–10.8)

## 2025-04-25 PROCEDURE — 36415 COLL VENOUS BLD VENIPUNCTURE: CPT

## 2025-04-25 PROCEDURE — 80053 COMPREHEN METABOLIC PANEL: CPT

## 2025-04-25 PROCEDURE — 80061 LIPID PANEL: CPT

## 2025-04-25 PROCEDURE — 83036 HEMOGLOBIN GLYCOSYLATED A1C: CPT

## 2025-04-25 PROCEDURE — 72100 X-RAY EXAM L-S SPINE 2/3 VWS: CPT

## 2025-04-25 PROCEDURE — 85027 COMPLETE CBC AUTOMATED: CPT

## 2025-04-25 RX ORDER — LISINOPRIL 40 MG/1
40 TABLET ORAL
Qty: 90 TABLET | Refills: 3 | Status: SHIPPED | OUTPATIENT
Start: 2025-04-25

## 2025-04-25 RX ORDER — SERTRALINE HYDROCHLORIDE 25 MG/1
25 TABLET, FILM COATED ORAL DAILY
Qty: 30 TABLET | Refills: 2 | Status: SHIPPED | OUTPATIENT
Start: 2025-04-25

## 2025-04-25 NOTE — PROGRESS NOTES
Chief Complaint   Patient presents with    [facility]    Hypertension     Reports last dose of blood pressure medication was 3 weeks ago         History:  Karsten Johnson is a 49 y.o. male who presents today for evaluation of the above problems.      HPI  History of Present Illness    Karsten presents today to establish care.  He has a past medical history for hypertension and prediabetes.  His highest A1c was 6.4% February 9, 2024.    He has been out of his lisinopril 40 mg daily and his blood pressure today is 160/100.    He is seeking to establish care with a new primary care physician, having previously been under the care of a physician in [place]. He has been diagnosed with hypertension and requires medication management. He has not undergone a colonoscopy in the past. He has reduced his smoking habit from one pack to half a pack per day over the past 30 years. He has never undergone a CT scan for lung cancer screening.    Constant pain in the left leg and right lower back is reported, necessitating frequent position changes during prolonged sitting, such as in a car. Sharp pain and tingling are experienced in the right leg. These symptoms have been present since approximately 05/2024, coinciding with the onset of an altered gait. An x-ray of the back was performed, but the results are unknown.    Feelings of depression are reported, often waking up crying and feeling hopeless. Interest in exploring medication options to manage depression is expressed. No suicidal ideation or intent to harm others is endorsed.    A work-related injury to the Achilles tendon was sustained, which was subsequently complicated by a MRSA infection. This necessitated significant tissue removal including complete removal of his Achilles tendon, leaving him unable to work. He is currently in the process of filing for Workmen's Compensation.    PAST SURGICAL HISTORY:  Achilles tendon surgery complicated by MRSA infection.    SOCIAL  HISTORY  The patient admits to smoking about half a pack now, down from about a pack, for approximately 30 years.      Social History     Socioeconomic History    Marital status:    Tobacco Use    Smoking status: Every Day     Current packs/day: 1.00     Average packs/day: 1 pack/day for 31.3 years (31.3 ttl pk-yrs)     Types: Cigarettes     Start date: 1/1/1994     Passive exposure: Never    Smokeless tobacco: Never   Vaping Use    Vaping status: Never Used   Substance and Sexual Activity    Alcohol use: Not Currently     Alcohol/week: 3.0 standard drinks of alcohol     Types: 3 Cans of beer per week    Drug use: Yes     Types: Marijuana    Sexual activity: Yes     Partners: Female       ROS:  Review of Systems   Respiratory: Negative.     Cardiovascular: Negative.    Musculoskeletal:  Positive for back pain and gait problem.   Psychiatric/Behavioral:  Positive for dysphoric mood. Negative for suicidal ideas.          Current Outpatient Medications:     lisinopril (PRINIVIL,ZESTRIL) 40 MG tablet, Take 1 tablet by mouth Daily., Disp: 90 tablet, Rfl: 3    ibuprofen (ADVIL,MOTRIN) 800 MG tablet, Take 1 tablet by mouth Every 6 (Six) Hours As Needed for Mild Pain. (Patient not taking: Reported on 4/25/2025), Disp: 90 tablet, Rfl: 1    sertraline (Zoloft) 25 MG tablet, Take 1 tablet by mouth Daily., Disp: 30 tablet, Rfl: 2    Lab Results   Component Value Date    GLUCOSE 87 05/31/2024    BUN 11 05/31/2024    CREATININE 1.04 05/31/2024     05/31/2024    K 4.5 05/31/2024     05/31/2024    CALCIUM 9.2 05/31/2024    PROTEINTOT 7.4 05/31/2024    ALBUMIN 4.2 05/31/2024    ALT 10 05/31/2024    AST 16 05/31/2024    ALKPHOS 121 (H) 05/31/2024    BILITOT 0.2 05/31/2024    GLOB 3.2 05/31/2024    AGRATIO 1.3 05/31/2024    BCR 10.6 05/31/2024    ANIONGAP 8.9 05/31/2024    EGFR 87.5 05/31/2024       WBC   Date Value Ref Range Status   05/31/2024 6.87 3.40 - 10.80 10*3/mm3 Final     RBC   Date Value Ref Range Status    05/31/2024 4.40 4.14 - 5.80 10*6/mm3 Final     Hemoglobin   Date Value Ref Range Status   05/31/2024 12.7 (L) 13.0 - 17.7 g/dL Final     Hematocrit   Date Value Ref Range Status   05/31/2024 38.7 37.5 - 51.0 % Final     MCV   Date Value Ref Range Status   05/31/2024 88.0 79.0 - 97.0 fL Final     MCH   Date Value Ref Range Status   05/31/2024 28.9 26.6 - 33.0 pg Final     MCHC   Date Value Ref Range Status   05/31/2024 32.8 31.5 - 35.7 g/dL Final     RDW   Date Value Ref Range Status   05/31/2024 13.8 12.3 - 15.4 % Final     RDW-SD   Date Value Ref Range Status   05/31/2024 43.6 37.0 - 54.0 fl Final     MPV   Date Value Ref Range Status   05/31/2024 11.9 6.0 - 12.0 fL Final     Platelets   Date Value Ref Range Status   05/31/2024 220 140 - 450 10*3/mm3 Final     Neutrophil %   Date Value Ref Range Status   05/31/2024 54.3 42.7 - 76.0 % Final     Lymphocyte %   Date Value Ref Range Status   05/31/2024 27.5 19.6 - 45.3 % Final     Monocyte %   Date Value Ref Range Status   05/31/2024 8.7 5.0 - 12.0 % Final     Eosinophil %   Date Value Ref Range Status   05/31/2024 8.6 (H) 0.3 - 6.2 % Final     Basophil %   Date Value Ref Range Status   05/31/2024 0.6 0.0 - 1.5 % Final     Immature Grans %   Date Value Ref Range Status   05/31/2024 0.3 0.0 - 0.5 % Final     Neutrophils, Absolute   Date Value Ref Range Status   05/31/2024 3.73 1.70 - 7.00 10*3/mm3 Final     Lymphocytes, Absolute   Date Value Ref Range Status   05/31/2024 1.89 0.70 - 3.10 10*3/mm3 Final     Monocytes, Absolute   Date Value Ref Range Status   05/31/2024 0.60 0.10 - 0.90 10*3/mm3 Final     Eosinophils, Absolute   Date Value Ref Range Status   05/31/2024 0.59 (H) 0.00 - 0.40 10*3/mm3 Final     Basophils, Absolute   Date Value Ref Range Status   05/31/2024 0.04 0.00 - 0.20 10*3/mm3 Final     Immature Grans, Absolute   Date Value Ref Range Status   05/31/2024 0.02 0.00 - 0.05 10*3/mm3 Final     nRBC   Date Value Ref Range Status   05/31/2024 0.0 0.0 - 0.2  /100 WBC Final         OBJECTIVE:  Visit Vitals  /100 (BP Location: Left arm, Patient Position: Sitting, Cuff Size: Adult)   Pulse 77   Temp 98.9 °F (37.2 °C) (Temporal)   Wt 103 kg (226 lb 12.8 oz)   SpO2 97%   BMI 36.61 kg/m²      Physical Exam  Vitals and nursing note reviewed.   Constitutional:       General: He is not in acute distress.     Appearance: Normal appearance. He is well-developed. He is not ill-appearing or toxic-appearing.   HENT:      Head: Normocephalic and atraumatic.   Eyes:      Pupils: Pupils are equal, round, and reactive to light.   Neck:      Thyroid: No thyromegaly.      Trachea: Phonation normal.   Cardiovascular:      Rate and Rhythm: Normal rate and regular rhythm.      Heart sounds: No murmur heard.  Pulmonary:      Effort: No respiratory distress.      Breath sounds: No wheezing or rales.   Musculoskeletal:        Back:    Skin:     Coloration: Skin is not pale.      Findings: No erythema.   Neurological:      Mental Status: He is alert.   Psychiatric:         Behavior: Behavior normal.         Thought Content: Thought content normal.         Judgment: Judgment normal.       Physical Exam  Musculoskeletal: Right leg exhibits weakness and numbness with sharp pain and tingling. Patient has difficulty sitting for prolonged periods due to back pain.    Results  Labs   - A1c: Almost in the diabetic range    Assessment/Plan      Diagnoses and all orders for this visit:    1. Essential hypertension (Primary)  -     lisinopril (PRINIVIL,ZESTRIL) 40 MG tablet; Take 1 tablet by mouth Daily.  Dispense: 90 tablet; Refill: 3    2. Prediabetes  -     Hemoglobin A1c; Future    3. Healthcare maintenance  -     CBC (No Diff); Future  -     Comprehensive Metabolic Panel; Future  -     Hemoglobin A1c; Future  -     Lipid Panel; Future    4. Encounter for colorectal cancer screening  -     Ambulatory Referral to Gastroenterology    5. Personal history of tobacco use, presenting hazards to  health  -     CT Chest Low Dose Wo; Future    6. History of MRSA infection    7. Tobacco use  -     CT Chest Low Dose Wo; Future    8. Chronic right-sided low back pain with right-sided sciatica  -     XR Spine Lumbar 2 or 3 View; Future    9. Major depressive disorder with current active episode, unspecified depression episode severity, unspecified whether recurrent  -     sertraline (Zoloft) 25 MG tablet; Take 1 tablet by mouth Daily.  Dispense: 30 tablet; Refill: 2    10. History of rupture of Achilles tendon      Assessment & Plan  1. Hypertension.  - Lisinopril prescription will be renewed.  - Comprehensive blood work, including CBC and CMP, will be conducted.  - Blood pressure monitoring will be initiated.    2. Prediabetes.  - A1c levels were nearing the diabetic threshold last year.  - Re-evaluation of A1c levels will be performed.  - Previous records indicate no current diagnosis of diabetes.  - Blood work will include A1c testing.    3. Back pain.  - Experiencing back pain, likely due to compensatory mechanisms following left Achilles tendon injury.  - Lumbar x-ray will be ordered for further evaluation.  - Referral to physical therapy may be made.  - Referral to pain management will be considered if necessary.    4. Depression.  - Experiencing depressive symptoms related to inability to perform previous tasks due to foot condition.  - Zoloft will be prescribed to manage depressive symptoms.  - Patient reports crying and feelings of depression daily.  - No suicidal or homicidal ideations    5. Health maintenance.  - Referral to GI team for colonoscopy will be made.  - Qualifies for CT scan to screen for lung cancer due to smoking history and age.  - CT scan for lung cancer screening will be ordered.    Recommend tobacco cessation      Return in about 3 months (around 7/25/2025) for Recheck depression.      ARGENIS De Souza MD  13:27 CDT  4/25/2025   Electronically signed    Patient or patient  representative verbalized consent for the use of Ambient Listening during the visit with  JOSE De Souza MD for chart documentation. 4/25/2025  14:09 CDT

## 2025-04-28 PROBLEM — E11.9 TYPE 2 DIABETES MELLITUS WITHOUT COMPLICATION, WITHOUT LONG-TERM CURRENT USE OF INSULIN: Status: ACTIVE | Noted: 2024-03-20

## 2025-05-07 ENCOUNTER — HOSPITAL ENCOUNTER (OUTPATIENT)
Dept: PHYSICAL THERAPY | Facility: HOSPITAL | Age: 50
Setting detail: THERAPIES SERIES
Discharge: HOME | End: 2025-05-07
Payer: COMMERCIAL

## 2025-05-07 DIAGNOSIS — R26.9 IMPAIRED GAIT: ICD-10-CM

## 2025-05-07 DIAGNOSIS — M51.360 DISCOGENIC LUMBAR PAIN: Primary | ICD-10-CM

## 2025-05-07 PROCEDURE — 97535 SELF CARE MNGMENT TRAINING: CPT

## 2025-05-07 PROCEDURE — 97163 PT EVAL HIGH COMPLEX 45 MIN: CPT

## 2025-05-07 NOTE — PROGRESS NOTES
Physical Therapy Initial Evaluation and Plan of Care  [facility]  A department [place] [address], [place] [address]    Patient: Karsten Johnson               : 1975  Visit Date: 2025  Referring practitioner: JOSE De Souza MD  Date of Initial Visit: 2025    Visit Diagnoses:    ICD-10-CM ICD-9-CM   1. Discogenic lumbar pain  M51.360 724.2   2. Impaired gait  R26.9 781.2     Past Medical History:   Diagnosis Date    Achilles rupture, left     Acute hematogenous osteomyelitis of right foot     Anxiety 2024    Arthritis 23    Calcaneal gait     Depression 25    Hypertension     Low back pain 24    Subacute osteomyelitis of right foot     Wound of left foot      Past Surgical History:   Procedure Laterality Date    INCISION AND DRAINAGE OF WOUND Left 02/10/2024    Procedure: INCISION AND DRAINAGE  LEFT ANKLE,BIOPSY LEFT ANKEL;  Surgeon: Alvarez Beck DPM;  Location: [facility];  Service: Podiatry;  Laterality: Left;    TONSILLECTOMY      WOUND DEBRIDEMENT Left 2024    Procedure: INCISION AND DEBRIDEMENT WOUND OF LEFT ANKLE;  Surgeon: Alvarez Beck DPM;  Location: [facility] OR;  Service: Podiatry;  Laterality: Left;         SUBJECTIVE     Subjective Evaluation    History of Present Illness  Mechanism of injury: His back pain started about a year ago after he had some issues with his left ankle when his left achilles ruptured at work. He had this surgically fixed, but ended up with MRSA in it and they had to remove the achilles all together. As a result, his gait is off and he walks with a cane. He saw the chiropractor some. He is in constant pain at this  point, and and his issues are primarily in the R lumbar and R sciatic nerve area. He can't sit or stand for very long without without pain. R Lat stretches in sitting seem to help. He has always been very active, and not being able to do so is taking a  toll on him mentally and physically.   Regarding the pain down the RLE, it started in just the gluteal region, and has progressed down the back of the leg and into the lateral calf. He can walk to the back of [facility] before it starts to get worse. Experiences n/t in bottom of feet R>L that is off and on, worse with ambulation or prolonged  His pain affects everything and it is difficult for him to get to sleep.       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  Aggravating factors: movement, ambulation, standing and prolonged positioning  Progression: worsening    Social Support  Patient lives at: 7 steps into the house, 1 HR.  Lives with: significant other    Treatments  Previous treatment: chiropractic  Patient Goals  Patient goals for therapy: decreased pain, independence with ADLs/IADLs and return to work  Patient goal: Able to workout without pain, able to ride a bike again, able to drive 4 hours without increase pain       Outcome Measure:     Oswestry Disability Index   Pain Intensity  3   Personal Care 1   Lifting 3   Walking 1   Sitting 2   Standing  2   Sleeping  2   Sex Life (if applicable) 3   Social Life  3   Traveling  3   Total                     OBJECTIVE     Objective          Tests     Lumbar   Positive repeated extension.     Left   Positive crossed SLR (pain on R).     Right   Positive passive SLR and tibial bias.   Negative crossed SLR.         Spine ROM     ROM  Pain / laterality    LUMBAR     Flexion  75%*    Extension  50%*    L lateral flexion  75%*    R lateral flexion  75%**    L Rotation  75%*    R Rotation  75%**    *pain      Lower Extremity MMT and ROM    LEFT RIGHT   HIP Strength ROM Strength R ROM   flexion 5   4    extension 4-  4-    ABD 4  4+    ADD        IR       ER              KNEE       flexion       extension               ANKLE       dorsiflexion       plantarflexion       eversion       inversion       *pain    Therapeutic Exercises    17820 Units Comments   HL and Standing pelvic assessment  x2 R elevated    HL hip adduction with ball x15     HL hip abduction with manual resistance x15     Pelvic MET  x15     Outcome:   Corrected obliquity    Timed Minutes  5       Therapy Education/Self Care 45752   Education offered today NOC, POC moving forward, anatomy with spine model,    Medbride Code    Ongoing HEP   Repeated extension    Timed Minutes 10       Total Timed Treatment:     15   mins  Total Time of Visit:            50   mins    ASSESSMENT/PLAN     GOALS:  Goals                                                  Progress Note due by 6/6/25                                                              Recert due by 8/4/25   STG by: 6 weeks Comments Date Status   Improve lumbar extension to at least 75% without increased pain        Improve muscle relaxation of R QL                LTG by: 12 weeks       Reports no radicular symptoms in right LE for a week.       Able to drive at least 2 hours without exacerbation of pain > 3/10        Reports pain no greater than 4/10 when it does occur.       Improve GOPI to 10/50 or less       Independent with HEP for flexibility and core/hip stability.          Anticipated CPT codes: Gait Training 58881, Therapeutic Exercise 69526, Manual Therapy 49561, Therapeutic Activity 61362, Neuromuscular ReEducation 17337, Self Care/Home Management 08458, and Estim Attended [id number]      Assessment & Plan       Assessment  Impairments: abnormal coordination, abnormal gait, abnormal muscle firing, abnormal or restricted ROM, activity intolerance, impaired balance, impaired physical strength, lacks appropriate home exercise program and pain with function   Functional limitations: carrying  objects, lifting, sleeping, walking, uncomfortable because of pain, moving in bed, sitting, standing, stooping and unable to perform repetitive tasks   Assessment details: Pt is a 49 y.o. male presenting with R sided LBP with radicular symptoms that started following a failed L achilles tendon repair that ended up in MRSA infection and resection of the repair entirely. Ever since, he has been ambulating with an altered gait pattern and uses a cane for mobility. His altered gait pattern has irritated his lumbar issues, and S&S  and special testing all point toward a bulging disk that is irritating nerve roots in the R lumbar. The left sidebending stretch that alleviates his symptoms likely opens up the facets to allow for greater space and less impingement on the nerve root. He additionally has TTP in R gluteals and QL region. The Pt would benefit from skilled PTx to decrease pain, improve functional mobility, progress toward goals, and increase overall quality of life.    Prognosis: fair    Plan  Therapy options: will be seen for skilled therapy services  Planned modality interventions: cryotherapy, dry needling, electrical stimulation/Russian stimulation, iontophoresis, low level laser therapy, TENS, thermotherapy (hydrocollator packs), traction, ultrasound and high voltage pulsed current (spasm management)  Planned therapy interventions: abdominal trunk stabilization, ADL retraining, balance/weight-bearing training, body mechanics training, fine motor coordination training, flexibility, functional ROM exercises, gait training, home exercise program, IADL retraining, joint mobilization, manual therapy, motor coordination training, neuromuscular re-education, postural training, soft tissue mobilization, spinal/joint mobilization, strengthening, stretching and therapeutic activities  Frequency: 2x week  Duration in weeks: 12  Treatment plan discussed with: patient  Plan details: Assess response to repeated extension  HEP. Assess gait pattern and correct as able for improved ambulation and decreased strain through the lumbar.         SIGNATURE: Gini Lu PT DPT, [place] License #: [id number]    Electronically Signed on 5/7/2025      Initial Certification  Certification Period: 5/7/2025 through 8/4/2025  I certify that the therapy services are furnished while this patient is under my care.  The services outlined above are required by this patient, and will be reviewed every 90 days.     PHYSICIAN: JOSE De Souza MD (NPI: [id number])    Signature____________________________________________DATE: _________     Please sign and return via fax to [phone].   [email or web address]

## 2025-05-15 ENCOUNTER — APPOINTMENT (OUTPATIENT)
Dept: PHYSICAL THERAPY | Facility: HOSPITAL | Age: 50
End: 2025-05-15
Payer: COMMERCIAL

## 2025-05-20 ENCOUNTER — HOSPITAL ENCOUNTER (OUTPATIENT)
Dept: PHYSICAL THERAPY | Facility: HOSPITAL | Age: 50
Setting detail: THERAPIES SERIES
Discharge: HOME | End: 2025-05-20
Payer: COMMERCIAL

## 2025-05-20 DIAGNOSIS — M51.360 DISCOGENIC LUMBAR PAIN: Primary | ICD-10-CM

## 2025-05-20 DIAGNOSIS — R26.9 IMPAIRED GAIT: ICD-10-CM

## 2025-05-20 PROCEDURE — 97110 THERAPEUTIC EXERCISES: CPT

## 2025-05-20 PROCEDURE — 97140 MANUAL THERAPY 1/> REGIONS: CPT

## 2025-05-20 PROCEDURE — 97032 APPL MODALITY 1+ESTIM EA 15: CPT

## 2025-05-20 NOTE — THERAPY TREATMENT NOTE
Physical Therapy Treatment Note  [facility]  A [facility]  [address], [place] [address]    Patient: Karsten Johnson                                                 Visit Date: 2025  :     1975    Referring practitioner:    JOSE De Souza MD  Date of Initial Visit:          Type: THERAPY  Episode: R lumbar bulging disc   Number of visits this episode: 2    Visit Diagnoses:    ICD-10-CM ICD-9-CM   1. Discogenic lumbar pain  M51.360 724.2   2. Impaired gait  R26.9 781.2     SUBJECTIVE     Subjective:He reports intermittent N/T into his R calf with LBP that is constant      PAIN: 6/10         OBJECTIVE     Objective     Modalities Comments   Combo Cold Laser estim CIM B L3-5 prone    Tx:1   Minutes 10        Manual Therapy     [id number]  Comments   STM B lower lumbar region prone   STM R piriformis with double lacrosse ball prone               Timed Minutes 15         Therapeutic Exercises    69136 Units Comments   Assessed pelvic orientation in HL,standing, supine x 2      B hip isometric adduction with blue ball x 15     Isometric R pelvic rotation with 45 cm SB x 15     B LE muscle synergy x10           Timed Minutes 13      Therapy Education/Self Care 08435   Education offered today    Medbride Code    Ongoing HEP   Repeated extension    Timed Minutes        Total Timed Treatment:     38   mins  Total Time of Visit:             38   mins         ASSESSMENT/PLAN     GOALS  Goals                                                  Progress Note due by 25                                                              Recert due by 25   STG by: 6 weeks Comments Date Status   Improve lumbar extension to at least 75% without increased pain          Improve muscle relaxation of R QL                    LTG by: 12 weeks         Reports no radicular symptoms in right LE for a week.         Able to drive at least 2 hours without exacerbation of pain  > 3/10          Reports pain no greater than 4/10 when it does occur.         Improve GOPI to 10/50 or less          Independent with HEP for flexibility and core/hip stability.  reinforced CONSTANCE today  5/20  Ongoing     Anticipated CPT codes: Gait Training 83857, Therapeutic Exercise 16131, Manual Therapy 81257, Therapeutic Activity 45069, Neuromuscular ReEducation 03006, Self Care/Home Management 21314, Estim Attended [id number], and Estim Unattended [id number]      Assessment/Plan     ASSESSMENT:   Today was the first treatment session following the initial evaluation. He reported that extension especially in prone has thus far been effective.    PLAN:   Continue Laserstim and extension bias approach.    SIGNATURE: Kennedy Fuentes PTA, [place] License #: [id number]  Electronically Signed on 5/20/2025        [address] [facility] [place] [address]  [phone]

## 2025-05-22 ENCOUNTER — HOSPITAL ENCOUNTER (OUTPATIENT)
Dept: PHYSICAL THERAPY | Facility: HOSPITAL | Age: 50
Setting detail: THERAPIES SERIES
Discharge: HOME | End: 2025-05-22
Payer: COMMERCIAL

## 2025-05-22 DIAGNOSIS — R26.9 IMPAIRED GAIT: ICD-10-CM

## 2025-05-22 DIAGNOSIS — M51.360 DISCOGENIC LUMBAR PAIN: Primary | ICD-10-CM

## 2025-05-22 PROCEDURE — 97140 MANUAL THERAPY 1/> REGIONS: CPT

## 2025-05-22 PROCEDURE — 97535 SELF CARE MNGMENT TRAINING: CPT

## 2025-05-22 PROCEDURE — 97110 THERAPEUTIC EXERCISES: CPT

## 2025-05-22 NOTE — THERAPY TREATMENT NOTE
Physical Therapy Treatment Note  [facility] Outpatient Therapy Services  A department [facility]  [address], [place] [address]    Patient: Karsten Johnson                                                 Visit Date: 2025  :     1975    Referring practitioner:    JOSE De Souza MD  Date of Initial Visit:          Type: THERAPY  Episode: R lumbar bulging disc   Number of visits this episode: 3    Visit Diagnoses:    ICD-10-CM ICD-9-CM   1. Discogenic lumbar pain  M51.360 724.2   2. Impaired gait  R26.9 781.2     SUBJECTIVE     Subjective: States he still has the pain, but he can move a little more. Noting he still has the rad s/s down his R leg.       PAIN: 6/10     OBJECTIVE     Objective     Manual Therapy     80842  Comments   B HS/piriformis stretches    B hip IR/ER stretches    BLE LAD    SI check Rotation present, corrected   Figure 8 Lx traction    Lx traction w/ BLE on SB        Timed Minutes 22         Therapeutic Exercises    99808 Units Comments   LTR w/ SB 3 min    DKTC w/ SB 3 min    PPT w/ BLE on SB 15x3 sec         Timed Minutes 10      Therapy Education/Self Care 39721   Education offered today Looked at x-rays, anatomy of Lx muscles and nerves   Medbride Code    Ongoing HEP   Repeated extension    Timed Minutes 8       Total Timed Treatment:     40   mins  Total Time of Visit:             40   mins         ASSESSMENT/PLAN     GOALS  Goals                                                  Progress Note due by 25                                                              Recert due by 25   STG by: 6 weeks Comments Date Status   Improve lumbar extension to at least 75% without increased pain          Improve muscle relaxation of R QL                    LTG by: 12 weeks         Reports no radicular symptoms in right LE for a week.         Able to drive at least 2 hours without exacerbation of pain > 3/10          Reports pain no greater than 4/10 when it  does occur.         Improve GOPI to 10/50 or less          Independent with HEP for flexibility and core/hip stability.  reinforced CONSTANCE today  5/20  Ongoing     Anticipated CPT codes: Gait Training 16560, Therapeutic Exercise 63883, Manual Therapy 96880, Therapeutic Activity 75169, Neuromuscular ReEducation 70175, Self Care/Home Management 41401, Estim Attended 67274, and Estim Unattended 49391      Assessment/Plan     ASSESSMENT:   Pt reported cont Lx pain with rad s/s down his RLE to his calf, noting he felt better for about an hour after last visit but then the pain came back. He noted no discomfort during exercises, but did have slight tightness following PPT. He presented with BLE tightness R>L, and noted relief during RLE LAD and Lx traction. Following treatment he reported decreased Lx pain, along with no rad s/s down his RLE.    PLAN:   Continue Laserstim and extension bias approach.    SIGNATURE: Yang Quintero PTA, [place] License #: [id number]  Electronically Signed on 5/22/2025        [address] [place] [address]  [phone]

## 2025-05-27 ENCOUNTER — HOSPITAL ENCOUNTER (OUTPATIENT)
Dept: PHYSICAL THERAPY | Facility: HOSPITAL | Age: 50
Setting detail: THERAPIES SERIES
Discharge: HOME | End: 2025-05-27
Payer: COMMERCIAL

## 2025-05-27 DIAGNOSIS — R26.9 IMPAIRED GAIT: ICD-10-CM

## 2025-05-27 DIAGNOSIS — M51.360 DISCOGENIC LUMBAR PAIN: Primary | ICD-10-CM

## 2025-05-27 PROCEDURE — 97110 THERAPEUTIC EXERCISES: CPT

## 2025-05-27 PROCEDURE — 97140 MANUAL THERAPY 1/> REGIONS: CPT

## 2025-05-27 PROCEDURE — 97535 SELF CARE MNGMENT TRAINING: CPT

## 2025-05-27 NOTE — THERAPY TREATMENT NOTE
Physical Therapy Treatment Note  [facility] Outpatient Therapy Services  A [facility]  [address], [place] [address]    Patient: Karsten Johnson                                                 Visit Date: 2025  :     1975    Referring practitioner:    JOSE De Souza MD  Date of Initial Visit:          Type: THERAPY  Episode: R lumbar bulging disc   Number of visits this episode: 4    Visit Diagnoses:    ICD-10-CM ICD-9-CM   1. Discogenic lumbar pain  M51.360 724.2   2. Impaired gait  R26.9 781.2     SUBJECTIVE     Subjective: States he felt better after last visit, noting after a while the pain came back and thinks that because he walks with a limp that it does not help for long.       PAIN: 6/10     OBJECTIVE     Objective     Manual Therapy     84079  Comments   B HS/piriformis stretches    B hip IR/ER stretches    BLE LAD/SAD    B SKTC stretches        Timed Minutes 15         Therapeutic Exercises    06418 Units Comments   Seated 3 way Lx flex stretch w/ SB 15x3 sec ea    LTR w/ SB 3 min    DKTC w/ SB 3 min    HL ab crunch ISO w/ SB 15x3 sec         Timed Minutes 22      Therapy Education/Self Care 77429   Education offered today anatomy of Lx muscles and nerves,    Medbride Code    Ongoing HEP   Repeated extension    Timed Minutes 8       Total Timed Treatment:     45   mins  Total Time of Visit:             45   mins         ASSESSMENT/PLAN     GOALS  Goals                                                  Progress Note due by 25                                                              Recert due by 25   STG by: 6 weeks Comments Date Status   Improve lumbar extension to at least 75% without increased pain          Improve muscle relaxation of R QL                    LTG by: 12 weeks         Reports no radicular symptoms in right LE for a week.         Able to drive at least 2 hours without exacerbation of pain > 3/10          Reports pain no greater  than 4/10 when it does occur.         Improve GOPI to 10/50 or less          Independent with HEP for flexibility and core/hip stability.  reinforced CONSTANCE today  5/20  Ongoing     Anticipated CPT codes: Gait Training 39867, Therapeutic Exercise 83700, Manual Therapy 73449, Therapeutic Activity 33182, Neuromuscular ReEducation 02931, Self Care/Home Management 07657, Estim Attended 39968, and Estim Unattended 35763      Assessment/Plan     ASSESSMENT:   Pt reported cont Lx pain, noting he felt better for a little while after last visit but after going up stairs at home the pain came back. He has noted he can put his shoes on a little easier than before. He noted no discomfort during exercises. He presented with cont BLE tightness R>L, and noted relief during RLE LAD/SAD.    PLAN:   Continue Laserstim and extension bias approach.    SIGNATURE: Yang Quintero PTA, [place] License #: [id number]  Electronically Signed on 5/27/2025        [address] [place] [address]  [phone]

## 2025-05-29 ENCOUNTER — HOSPITAL ENCOUNTER (OUTPATIENT)
Dept: PHYSICAL THERAPY | Facility: HOSPITAL | Age: 50
Setting detail: THERAPIES SERIES
Discharge: HOME | End: 2025-05-29
Payer: COMMERCIAL

## 2025-05-29 DIAGNOSIS — R26.9 IMPAIRED GAIT: ICD-10-CM

## 2025-05-29 DIAGNOSIS — M51.360 DISCOGENIC LUMBAR PAIN: Primary | ICD-10-CM

## 2025-05-29 PROCEDURE — 97032 APPL MODALITY 1+ESTIM EA 15: CPT

## 2025-05-29 PROCEDURE — 97140 MANUAL THERAPY 1/> REGIONS: CPT

## 2025-05-29 PROCEDURE — 97110 THERAPEUTIC EXERCISES: CPT

## 2025-05-29 NOTE — THERAPY TREATMENT NOTE
Physical Therapy Treatment Note  [facility] Outpatient Therapy Services  A [facility]  [address], [place] [address]    Patient: Karsten Johnson                                                 Visit Date: 2025  :     1975    Referring practitioner:    JOSE De Souza MD  Date of Initial Visit:          Type: THERAPY  Episode: R lumbar bulging disc   Number of visits this episode: 5    Visit Diagnoses:    ICD-10-CM ICD-9-CM   1. Discogenic lumbar pain  M51.360 724.2   2. Impaired gait  R26.9 781.2     SUBJECTIVE     Subjective:He still has a deep aching feeling      PAIN: 6/10         OBJECTIVE     Objective       Modalities Comments   Combo Cold Laser estim CIM B L3-5 prone     Tx:2   Minutes 10     Manual Therapy     32238  Comments   STM B lower lumbar region prone                     Timed Minutes 15     Therapeutic Exercises    91557 Units Comments   B SL clamshells with red T band x 20     SLFO's x 15     B hip ER stretches with intermittent inferior lateral glides               Timed Minutes 20      Therapy Education/Self Care 27235   Education offered today    Medfelixe Code    Ongoing HEP   Repeated extension    Timed Minutes        Total Timed Treatment:     45   mins  Total Time of Visit:             45   mins         ASSESSMENT/PLAN     GOALS  Goals                                                  Progress Note due by 25                                                              Recert due by 25   STG by: 6 weeks Comments Date Status   Improve lumbar extension to at least 75% without increased pain          Improve muscle relaxation of R QL                    LTG by: 12 weeks         Reports no radicular symptoms in right LE for a week.         Able to drive at least 2 hours without exacerbation of pain > 3/10          Reports pain no greater than 4/10 when it does occur.         Improve GOPI to 10/50 or less          Independent with HEP for  flexibility and core/hip stability.  reinforced CONSTANCE today  5/20  Ongoing     Anticipated CPT codes: Gait Training 29280, Therapeutic Exercise 50564, Manual Therapy 44949, Therapeutic Activity 34258, Neuromuscular ReEducation 51505, Self Care/Home Management 90387, Estim Attended 03759, and Estim Unattended 28505      Assessment/Plan     ASSESSMENT:   He will have a follow up appointment with his referring physician tomorrow and that will likely dictate his POC going forward.    PLAN:   See assessment section.    SIGNATURE: Kennedy Fuentes [facility], [place] License #: [id number]  Electronically Signed on 5/29/2025        [address] [place] [address]  [phone]

## 2025-05-30 ENCOUNTER — OFFICE VISIT (OUTPATIENT)
Dept: INTERNAL MEDICINE | Facility: CLINIC | Age: 50
End: 2025-05-30
Payer: COMMERCIAL

## 2025-05-30 VITALS
OXYGEN SATURATION: 100 % | HEIGHT: 66 IN | HEART RATE: 59 BPM | DIASTOLIC BLOOD PRESSURE: 70 MMHG | WEIGHT: 234 LBS | BODY MASS INDEX: 37.61 KG/M2 | TEMPERATURE: 97.6 F | SYSTOLIC BLOOD PRESSURE: 128 MMHG

## 2025-05-30 DIAGNOSIS — M51.362 DEGENERATION OF INTERVERTEBRAL DISC OF LUMBAR REGION WITH DISCOGENIC BACK PAIN AND LOWER EXTREMITY PAIN: Primary | ICD-10-CM

## 2025-05-30 DIAGNOSIS — M51.34 DDD (DEGENERATIVE DISC DISEASE), THORACIC: ICD-10-CM

## 2025-05-30 PROCEDURE — 99213 OFFICE O/P EST LOW 20 MIN: CPT | Performed by: INTERNAL MEDICINE

## 2025-05-30 PROCEDURE — 3078F DIAST BP <80 MM HG: CPT | Performed by: INTERNAL MEDICINE

## 2025-05-30 PROCEDURE — 3044F HG A1C LEVEL LT 7.0%: CPT | Performed by: INTERNAL MEDICINE

## 2025-05-30 PROCEDURE — 1125F AMNT PAIN NOTED PAIN PRSNT: CPT | Performed by: INTERNAL MEDICINE

## 2025-05-30 PROCEDURE — 3074F SYST BP LT 130 MM HG: CPT | Performed by: INTERNAL MEDICINE

## 2025-05-30 RX ORDER — MELOXICAM 15 MG/1
1 TABLET ORAL DAILY
COMMUNITY
Start: 2025-05-15

## 2025-05-30 RX ORDER — GABAPENTIN 100 MG/1
1 CAPSULE ORAL EVERY 6 HOURS
COMMUNITY
Start: 2025-05-21

## 2025-05-30 NOTE — PROGRESS NOTES
Chief Complaint   Patient presents with    Follow-up    Back Pain         History:  Karsten Johnson is a 49 y.o. male who presents today for evaluation of the above problems.      HPI  History of Present Illness  The patient presents for evaluation of right-sided back pain.    He is seeking a note for disability purposes following a work-related injury that has rendered him unable to work. He has not yet consulted with the [facility] or an occupational medicine specialist. He has applied for disability but is uncertain about the necessary medical documentation. Additionally, he requires a letter for food stamps stating that he is unable to work. His food stamp benefits require him to work 20 hours per week, which he has been unable to fulfill. He is currently in the process of gathering information for his  regarding his workers' compensation claim, which was previously denied.    He continues to experience significant right-sided back pain, which has not been alleviated by physical therapy. He has attended three physical therapy sessions, with one session canceled. He is scheduled to receive an injection after his therapy, a procedure he must wait six weeks for. In the interim, he has been prescribed gabapentin 100 mg, to be taken every six hours as needed, but he reports no noticeable improvement. He has been taking two tablets at a time. He is under the care of Dr. Sosa at [facility].    His podiatrist, Dr. Diaz, has advised him to refrain from work until his Achilles condition improves. However, he reports a worsening of his back pain.      ROS:  Review of Systems   as above      Current Outpatient Medications:     atorvastatin (Lipitor) 10 MG tablet, Take 1 tablet by mouth Daily., Disp: 90 tablet, Rfl: 3    gabapentin (NEURONTIN) 100 MG capsule, Take 1 capsule by mouth Every 6 (Six) Hours., Disp: , Rfl:     lisinopril (PRINIVIL,ZESTRIL) 40 MG tablet, Take 1 tablet by mouth Daily., Disp:  90 tablet, Rfl: 3    meloxicam (MOBIC) 15 MG tablet, Take 1 tablet by mouth Daily., Disp: , Rfl:     sertraline (Zoloft) 25 MG tablet, Take 1 tablet by mouth Daily., Disp: 30 tablet, Rfl: 2    ibuprofen (ADVIL,MOTRIN) 800 MG tablet, Take 1 tablet by mouth Every 6 (Six) Hours As Needed for Mild Pain. (Patient not taking: Reported on 5/30/2025), Disp: 90 tablet, Rfl: 1    Lab Results   Component Value Date    GLUCOSE 86 04/25/2025    BUN 21 (H) 04/25/2025    CREATININE 0.90 04/25/2025     04/25/2025    K 4.8 04/25/2025     04/25/2025    CALCIUM 9.2 04/25/2025    PROTEINTOT 7.7 04/25/2025    ALBUMIN 4.4 04/25/2025    ALT 15 04/25/2025    AST 16 04/25/2025    ALKPHOS 130 (H) 04/25/2025    BILITOT 0.3 04/25/2025    GLOB 3.3 04/25/2025    AGRATIO 1.3 04/25/2025    BCR 23.3 04/25/2025    ANIONGAP 10.0 04/25/2025    EGFR 103.4 04/25/2025       WBC   Date Value Ref Range Status   04/25/2025 11.06 (H) 3.40 - 10.80 10*3/mm3 Final     RBC   Date Value Ref Range Status   04/25/2025 4.73 4.14 - 5.80 10*6/mm3 Final     Hemoglobin   Date Value Ref Range Status   04/25/2025 13.8 13.0 - 17.7 g/dL Final     Hematocrit   Date Value Ref Range Status   04/25/2025 43.0 37.5 - 51.0 % Final     MCV   Date Value Ref Range Status   04/25/2025 90.9 79.0 - 97.0 fL Final     MCH   Date Value Ref Range Status   04/25/2025 29.2 26.6 - 33.0 pg Final     MCHC   Date Value Ref Range Status   04/25/2025 32.1 31.5 - 35.7 g/dL Final     RDW   Date Value Ref Range Status   04/25/2025 13.5 12.3 - 15.4 % Final     RDW-SD   Date Value Ref Range Status   04/25/2025 44.8 37.0 - 54.0 fl Final     MPV   Date Value Ref Range Status   04/25/2025 12.3 (H) 6.0 - 12.0 fL Final     Platelets   Date Value Ref Range Status   04/25/2025 166 140 - 450 10*3/mm3 Final     Neutrophil %   Date Value Ref Range Status   05/31/2024 54.3 42.7 - 76.0 % Final     Lymphocyte %   Date Value Ref Range Status   05/31/2024 27.5 19.6 - 45.3 % Final     Monocyte %   Date  "Value Ref Range Status   05/31/2024 8.7 5.0 - 12.0 % Final     Eosinophil %   Date Value Ref Range Status   05/31/2024 8.6 (H) 0.3 - 6.2 % Final     Basophil %   Date Value Ref Range Status   05/31/2024 0.6 0.0 - 1.5 % Final     Immature Grans %   Date Value Ref Range Status   05/31/2024 0.3 0.0 - 0.5 % Final     Neutrophils, Absolute   Date Value Ref Range Status   05/31/2024 3.73 1.70 - 7.00 10*3/mm3 Final     Lymphocytes, Absolute   Date Value Ref Range Status   05/31/2024 1.89 0.70 - 3.10 10*3/mm3 Final     Monocytes, Absolute   Date Value Ref Range Status   05/31/2024 0.60 0.10 - 0.90 10*3/mm3 Final     Eosinophils, Absolute   Date Value Ref Range Status   05/31/2024 0.59 (H) 0.00 - 0.40 10*3/mm3 Final     Basophils, Absolute   Date Value Ref Range Status   05/31/2024 0.04 0.00 - 0.20 10*3/mm3 Final     Immature Grans, Absolute   Date Value Ref Range Status   05/31/2024 0.02 0.00 - 0.05 10*3/mm3 Final     nRBC   Date Value Ref Range Status   05/31/2024 0.0 0.0 - 0.2 /100 WBC Final         OBJECTIVE:  Visit Vitals  /70 (BP Location: Left arm, Patient Position: Sitting, Cuff Size: Adult)   Pulse 59   Temp 97.6 °F (36.4 °C) (Temporal)   Ht 167.6 cm (66\")   Wt 106 kg (234 lb)   SpO2 100%   BMI 37.77 kg/m²      Physical Exam  Constitutional:       General: He is not in acute distress.     Appearance: He is well-developed. He is not ill-appearing or toxic-appearing.   HENT:      Head: Normocephalic and atraumatic.   Eyes:      Pupils: Pupils are equal, round, and reactive to light.   Neck:      Thyroid: No thyromegaly.      Trachea: Phonation normal.   Pulmonary:      Effort: No respiratory distress.   Skin:     Coloration: Skin is not pale.      Findings: No erythema.   Neurological:      Mental Status: He is alert.   Psychiatric:         Behavior: Behavior normal.         Thought Content: Thought content normal.         Judgment: Judgment normal.         EXAMINATION:  XR SPINE LUMBAR 2 OR 3 VW-  4/25/2025 " 1:52 PM     HISTORY: M54.41-Lumbago with sciatica, right side; G89.29-Other chronic  pain.     COMPARISON: No comparison study.     TECHNIQUE: 2 views were obtained. Gleamer AI fracture analysis was  utilized.     FINDINGS: There is mild lumbar scoliosis convex to the right with a Vázquez  angle of 2.5 degrees. There is severe narrowing of the L5-S1 disc space.  There is moderate to severe narrowing of the L4-5 disc space. There is  severe narrowing of the T10-11 and T11-12 disc spaces. There is moderate  to severe narrowing of the T12-L1 disc space and mild narrowing of the  L5-S1 disc space. There is about 6 mm of anterior subluxation of L4  compared to L5. There is facet arthropathy predominantly at L3-S1. There  is no evidence of acute fracture. The pedicles are intact. On the AP  image, there are lateral syndesmophytes in the lower thoracic spine  extending down to the T12-L1 level.        IMPRESSION:  Scoliosis and degenerative changes of the lower thoracic  spine and lumbar spine, as described.     This report was signed and finalized on 4/25/2025 3:04 PM by Dr. Tam Quintero MD.  Assessment/Plan      Diagnoses and all orders for this visit:    1. Degeneration of intervertebral disc of lumbar region with discogenic back pain and lower extremity pain (Primary)    2. DDD (degenerative disc disease), thoracic      Assessment & Plan  1. Right-sided back pain.  - The x-ray results indicate severe degenerative disease.  - He has been referred to physical therapy but reports minimal improvement after three visits.  - He is currently taking gabapentin 100 mg every 6 hours as needed but has not noticed significant relief.  - He is advised to consult with Dr. Sosa regarding the potential increase in his gabapentin dosage.   -A letter will be provided stating that he should remain off work indefinitely for the purpose of obtaining food stamps.      Return for Next scheduled follow up.      ARGENIS De Souza MD  07:52  CDT  5/30/2025   Electronically signed    Patient or patient representative verbalized consent for the use of Ambient Listening during the visit with  JOSE De Souza MD for chart documentation. 5/30/2025  08:04 CDT

## 2025-06-04 ENCOUNTER — HOSPITAL ENCOUNTER (OUTPATIENT)
Dept: PHYSICAL THERAPY | Facility: HOSPITAL | Age: 50
Setting detail: THERAPIES SERIES
Discharge: HOME | End: 2025-06-04
Payer: COMMERCIAL

## 2025-06-04 DIAGNOSIS — M51.360 DISCOGENIC LUMBAR PAIN: Primary | ICD-10-CM

## 2025-06-04 DIAGNOSIS — R26.9 IMPAIRED GAIT: ICD-10-CM

## 2025-06-04 PROCEDURE — 97140 MANUAL THERAPY 1/> REGIONS: CPT

## 2025-06-04 PROCEDURE — 97110 THERAPEUTIC EXERCISES: CPT

## 2025-06-04 PROCEDURE — 97032 APPL MODALITY 1+ESTIM EA 15: CPT

## 2025-06-04 NOTE — PROGRESS NOTES
Progress Note Addendum    Patient: Karsten Johnson           : 1975  Visit Date: 2025  Referring practitioner: JOSE De Souza MD  Date of Initial Visit: Type: THERAPY  Episode: R lumbar bulging disc     Visit Diagnoses:    ICD-10-CM ICD-9-CM   1. Discogenic lumbar pain  M51.360 724.2   2. Impaired gait  R26.9 781.2          Clinical Progress: Unchanged  Home Program Compliance: Yes  Progress toward previous goals: Not Met  Prognosis to achieve goals: good    Objective   See PTA note for goals     Assessment & Plan       Assessment  Impairments: abnormal coordination, abnormal gait, abnormal muscle firing, abnormal or restricted ROM, activity intolerance, impaired balance, impaired physical strength, lacks appropriate home exercise program and pain with function   Functional limitations: carrying objects, lifting, sleeping, walking, uncomfortable because of pain, moving in bed, sitting, standing, stooping and unable to perform repetitive tasks   Prognosis: fair    Plan  Therapy options: will be seen for skilled therapy services  Planned modality interventions: cryotherapy, dry needling, electrical stimulation/Russian stimulation, iontophoresis, low level laser therapy, TENS, thermotherapy (hydrocollator packs), traction, ultrasound and high voltage pulsed current (spasm management)  Planned therapy interventions: abdominal trunk stabilization, ADL retraining, balance/weight-bearing training, body mechanics training, fine motor coordination training, flexibility, functional ROM exercises, gait training, home exercise program, IADL retraining, joint mobilization, manual therapy, motor coordination training, neuromuscular re-education, postural training, soft tissue mobilization, spinal/joint mobilization, strengthening, stretching and therapeutic activities  Frequency: 2x week  Duration in weeks: 12  Treatment plan discussed with: patient        Anticipated CPT codes: Gait Training 01613, Therapeutic  Exercise 59398, Manual Therapy 39190, Therapeutic Activity 07095, Neuromuscular ReEducation 28085, Self Care/Home Management 74846, Estim Attended 07971, and Estim Unattended 80297    I reviewed the treatment and goals with Kennedy Fuentes PTA and agree with the POC.    SIGNATURE: Gini Lu PT DPT, License #: [id number]    Electronically Signed on 6/4/2025

## 2025-06-04 NOTE — THERAPY TREATMENT NOTE
Physical Therapy Treatment Note and 30 Day Progress Note  [facility]  A department [facility]  [address], [place] [address]    Patient: Karsten Johnson                                                 Visit Date: 2025  :     1975    Referring practitioner:    JOSE De Souza MD  Date of Initial Visit:          Type: THERAPY  Episode: R lumbar bulging disc   Number of visits this episode: 6    Visit Diagnoses:    ICD-10-CM ICD-9-CM   1. Discogenic lumbar pain  M51.360 724.2   2. Impaired gait  R26.9 781.2     SUBJECTIVE     Subjective:He reports he can stand up straighter still has pain.       PAIN: 7/10         OBJECTIVE     Objective     Modalities Comments   Combo Cold Laser estim CIM B L3-5 prone     Tx:3   Minutes 10     Manual Therapy     49531  Comments   STM B lower lumbar region prone                     Timed Minutes 15     Therapeutic Exercises    22051 Units Comments   Reviewed all goals for progress note     B hip ER stretches with intermittent inferior lateral glides                    Timed Minutes 15        Therapy Education/Self Care 22802   Education offered today    Medbride Code    Ongoing HEP   Repeated extension    Timed Minutes        Total Timed Treatment:     40   mins  Total Time of Visit:             40   mins         ASSESSMENT/PLAN     GOALS  Goals                                                  Progress Note due by 25                                                              Recert due by 25   STG by: 6 weeks Comments Date Status   Improve lumbar extension to at least 75% without increased pain       Ongoing   Improve muscle relaxation of R QL  Point tenderness noted    Ongoing             LTG by: 12 weeks         Reports no radicular symptoms in right LE for a week.  none at this time    Ongoing   Able to drive at least 2 hours without exacerbation of pain > 3/10   reports increased pain after 20  minutes  6/4  Ongoing   Reports pain no greater than 4/10 when it does occur.  7/10  6/4  Ongoing   Improve GOPI to 10/50 or less   35/50  6/4  Ongoing   Independent with HEP for flexibility and core/hip stability.  reinforced CONSTANCE today  6/4  Ongoing     Anticipated CPT codes: Gait Training 12440, Therapeutic Exercise 88175, Manual Therapy 52790, Therapeutic Activity 09131, Neuromuscular ReEducation 04113, Self Care/Home Management 12889, Estim Attended 95898, and Estim Unattended 04631      Assessment/Plan     ASSESSMENT:   Thus far no goals have been met and he continues to have point tenderness most notably in the R lower lumbar region. In addition, his JEFFERSON score has increased from 23 to 35 as of today.     PLAN:   Continue Laserstim and extension bias approach. Monitor his symptoms closely as his JEFFERSON score has had a significant increase.     SIGNATURE: Kennedy Fuentes [facility] [place] License #: [id number]  Electronically Signed on 6/4/2025        [address] [place] [address]. [address]  [phone]

## 2025-06-06 ENCOUNTER — HOSPITAL ENCOUNTER (OUTPATIENT)
Dept: PHYSICAL THERAPY | Facility: HOSPITAL | Age: 50
Setting detail: THERAPIES SERIES
Discharge: HOME | End: 2025-06-06
Payer: COMMERCIAL

## 2025-06-06 DIAGNOSIS — R26.9 IMPAIRED GAIT: ICD-10-CM

## 2025-06-06 DIAGNOSIS — M51.360 DISCOGENIC LUMBAR PAIN: Primary | ICD-10-CM

## 2025-06-06 PROCEDURE — 97110 THERAPEUTIC EXERCISES: CPT

## 2025-06-06 PROCEDURE — 97140 MANUAL THERAPY 1/> REGIONS: CPT

## 2025-06-06 PROCEDURE — 97032 APPL MODALITY 1+ESTIM EA 15: CPT

## 2025-06-06 NOTE — THERAPY TREATMENT NOTE
Physical Therapy Treatment Note  [facility]  A department [facility]  [address], [place] [address]    Patient: Karsten Johnson                                                 Visit Date: 2025  :     1975    Referring practitioner:    JOSE De Souza MD  Date of Initial Visit:          Type: THERAPY  Episode: R lumbar bulging disc   Number of visits this episode: 7    Visit Diagnoses:    ICD-10-CM ICD-9-CM   1. Discogenic lumbar pain  M51.360 724.2   2. Impaired gait  R26.9 781.2     SUBJECTIVE     Subjective: States he is still hurting, but the goodess after each time is lasting a little longer. Notes he has some soreness, but it is from the muscles he has not used in a long time. The pain really starts increasing when he starts walking.       PAIN: 7/10     OBJECTIVE     Objective     Modalities Comments   Combo Cold Laser estim CIM B L3-5      Tx:4   Minutes 10     Manual Therapy     49653  Comments   R HS/piriformis stretches    R hip IR/ER stretches    RLE LAD/SAD    Lx traction w/ BLE on SB        Timed Minutes 16        Therapeutic Exercises    49452 Units Comments   Seated 3 way Lx flex stretch w/ SB 20x3 sec ea    Seated Lx EXT over SB 15x5 sec    Bridges w/ SB 2x10         Timed Minutes 15        Therapy Education/Self Care 75679   Education offered today    Medfelixe Code    Ongoing HEP   Repeated extension    Timed Minutes        Total Timed Treatment:     41   mins  Total Time of Visit:             41   mins         ASSESSMENT/PLAN     GOALS  Goals                                                  Progress Note due by 25                                                              Recert due by 25   STG by: 6 weeks Comments Date Status   Improve lumbar extension to at least 75% without increased pain       Ongoing   Improve muscle relaxation of R QL  Point tenderness noted    Ongoing             LTG by: 12 weeks         Reports  no radicular symptoms in right LE for a week.  none at this time  6/4  Ongoing   Able to drive at least 2 hours without exacerbation of pain > 3/10   reports increased pain after 20 minutes  6/4  Ongoing   Reports pain no greater than 4/10 when it does occur.  7/10  6/4  Ongoing   Improve GOPI to 10/50 or less   35/50  6/4  Ongoing   Independent with HEP for flexibility and core/hip stability.  reinforced CONSTANCE today  6/4  Ongoing     Anticipated CPT codes: Gait Training 53807, Therapeutic Exercise 94806, Manual Therapy 94989, Therapeutic Activity 66711, Neuromuscular ReEducation 46564, Self Care/Home Management 87616, Estim Attended 25219, and Estim Unattended 09226      Assessment/Plan     ASSESSMENT:   Pt reported cont R sided Lx pain with rad s/s in his R calf, noting he feels better when he leaves but the more he has to walk or sit the pain will come back. He noted no discomfort during exercises, reporting decreased Lx pain during Lx EXT. He presented with cont RLE tightness, noting significant decreased RLE and Lx pain during Lx traction/LAD/SAD.     PLAN:   Continue Laserstim and extension bias approach. Monitor his symptoms closely as his JEFFERSON score has had a significant increase.     SIGNATURE: Yang Quintero PTA, [place] License #: [id number]  Electronically Signed on 6/6/2025        [address] [place] [address]. [address]  [phone]

## 2025-06-10 ENCOUNTER — HOSPITAL ENCOUNTER (OUTPATIENT)
Dept: PHYSICAL THERAPY | Facility: HOSPITAL | Age: 50
Setting detail: THERAPIES SERIES
Discharge: HOME | End: 2025-06-10
Payer: COMMERCIAL

## 2025-06-10 DIAGNOSIS — R26.9 IMPAIRED GAIT: ICD-10-CM

## 2025-06-10 DIAGNOSIS — M51.360 DISCOGENIC LUMBAR PAIN: Primary | ICD-10-CM

## 2025-06-10 PROCEDURE — 97140 MANUAL THERAPY 1/> REGIONS: CPT

## 2025-06-10 PROCEDURE — 97032 APPL MODALITY 1+ESTIM EA 15: CPT

## 2025-06-10 PROCEDURE — 97110 THERAPEUTIC EXERCISES: CPT

## 2025-06-10 NOTE — THERAPY TREATMENT NOTE
Physical Therapy Treatment Note  [facility]  A [facility]  [address], [place] [address]    Patient: Karsten Johnson                                                 Visit Date: 6/10/2025  :     1975    Referring practitioner:    JOSE De Souza MD  Date of Initial Visit:          Type: THERAPY  Episode: R lumbar bulging disc   Number of visits this episode: 8    Visit Diagnoses:    ICD-10-CM ICD-9-CM   1. Discogenic lumbar pain  M51.360 724.2   2. Impaired gait  R26.9 781.2     SUBJECTIVE     Subjective:No new complaints      PAIN: 7/10         OBJECTIVE     Objective     Manual Therapy     34326  Comments   STM B lower lumbar region prone                     Timed Minutes 15       Modalities Comments   Combo Cold Laser estim CIM B L3-5       Tx:5   Minutes 10     Therapeutic Exercises    60955 Units Comments   B pec and thoracic stretches in sitting with 1/2 roller     B hip ER stretches with intermittent inferior lateral glides                     Timed Minutes 15      Therapy Education/Self Care 37881   Education offered today    Medbride Code    Ongoing HEP   Repeated extension    Timed Minutes        Total Timed Treatment:     40   mins  Total Time of Visit:             40   mins         ASSESSMENT/PLAN     GOALS  Goals                                                  Progress Note due by 25                                                              Recert due by 25   STG by: 6 weeks Comments Date Status   Improve lumbar extension to at least 75% without increased pain       Ongoing   Improve muscle relaxation of R QL  Point tenderness noted    Ongoing             LTG by: 12 weeks         Reports no radicular symptoms in right LE for a week.  none at this time    Ongoing   Able to drive at least 2 hours without exacerbation of pain > 3/10   reports increased pain after 20 minutes  /  Ongoing   Reports pain no greater  than 4/10 when it does occur.  7/10  6/4  Ongoing   Improve GOPI to 10/50 or less   35/50  6/4  Ongoing   Independent with HEP for flexibility and core/hip stability.  reinforced CONSTANCE today  6/4  Ongoing     Anticipated CPT codes: Gait Training 71672, Therapeutic Exercise 41163, Manual Therapy 53309, Therapeutic Activity 16072, Neuromuscular ReEducation 85266, Self Care/Home Management 79637, Estim Attended 96146, and Estim Unattended 77086      Assessment/Plan     ASSESSMENT:   At this point I believe imaging is warranted as there is no improvement with PT at this time.    PLAN:   Continue Laserstim and extension bias approach. Monitor his symptoms closely as his JEFFERSON score has had a significant increase.        SIGNATURE: Kennedy Fuentes PTA, [place] License #: [id number]  Electronically Signed on 6/10/2025        [address] [place] [address]  [phone]

## 2025-06-11 ENCOUNTER — TELEPHONE (OUTPATIENT)
Dept: INTERNAL MEDICINE | Facility: CLINIC | Age: 50
End: 2025-06-11
Payer: COMMERCIAL

## 2025-06-11 DIAGNOSIS — G89.29 CHRONIC RIGHT-SIDED LOW BACK PAIN WITH RIGHT-SIDED SCIATICA: ICD-10-CM

## 2025-06-11 DIAGNOSIS — M54.41 CHRONIC RIGHT-SIDED LOW BACK PAIN WITH RIGHT-SIDED SCIATICA: ICD-10-CM

## 2025-06-11 DIAGNOSIS — M51.362 DEGENERATION OF INTERVERTEBRAL DISC OF LUMBAR REGION WITH DISCOGENIC BACK PAIN AND LOWER EXTREMITY PAIN: Primary | ICD-10-CM

## 2025-06-11 NOTE — TELEPHONE ENCOUNTER
Caller: ALISHA ALBRECHT    Relationship: Emergency Contact    Best call back number:     [phone]        What is the best time to reach you: ANYTIME     Who are you requesting to speak with (clinical staff, provider,  specific staff member): CLINICAL     Do you know the name of the person who called: CLINICAL     What was the call regarding:HE STATES HE HAS HAD HIS 8TH VISIT AS OF 06/10/25  WITH PHYSICAL THERAPY FOR HIS BACK AND THERE IS NO IMPROVEMENT STATES HE NEEDS AN MRI     Is it okay if the provider responds through MyChart:  CALL BACK REQUEST

## 2025-06-11 NOTE — TELEPHONE ENCOUNTER
I have ordered an MRI of his lumbar spine for further evaluation given the lack of improvement with physical therapy and pain management.  Thanks

## 2025-06-12 ENCOUNTER — HOSPITAL ENCOUNTER (OUTPATIENT)
Dept: PHYSICAL THERAPY | Facility: HOSPITAL | Age: 50
Setting detail: THERAPIES SERIES
Discharge: HOME | End: 2025-06-12
Payer: COMMERCIAL

## 2025-06-12 DIAGNOSIS — M51.360 DISCOGENIC LUMBAR PAIN: Primary | ICD-10-CM

## 2025-06-12 DIAGNOSIS — R26.9 IMPAIRED GAIT: ICD-10-CM

## 2025-06-12 PROCEDURE — 97140 MANUAL THERAPY 1/> REGIONS: CPT

## 2025-06-12 PROCEDURE — 97110 THERAPEUTIC EXERCISES: CPT

## 2025-06-12 NOTE — THERAPY TREATMENT NOTE
"Physical Therapy Treatment Note  [facility]  A department [facility]  [address], [place] [address]    Patient: Karsten Johnson                                                 Visit Date: 2025  :     1975    Referring practitioner:    JOSE De Souza MD  Date of Initial Visit:          Type: THERAPY  Episode: R lumbar bulging disc   Number of visits this episode: 9    Visit Diagnoses:    ICD-10-CM ICD-9-CM   1. Discogenic lumbar pain  M51.360 724.2   2. Impaired gait  R26.9 781.2     SUBJECTIVE     Subjective: States he is having some pain in his back up towards his shoulder blade, noting he thinks he overdid it yesterday.       PAIN: 7/10     OBJECTIVE     Objective     Manual Therapy     55682  Comments   RLE LAD/SAD    R HS/piriformis stretches    R hip IR/ER stretches    Figure 8 Lx traction Feet on 4\" box   LX traction w/ BLE on SB        Timed Minutes 26        Therapeutic Exercises    61398 Units Comments   LTR w/ SB 3 min    DKTC w/ SB 3 min    Seated Lx EXT into SB 15x5 sec         Timed Minutes 15      Therapy Education/Self Care 47919   Education offered today    Medbride Code    Ongoing HEP   Repeated extension    Timed Minutes        Total Timed Treatment:     41   mins  Total Time of Visit:             41   mins         ASSESSMENT/PLAN     GOALS  Goals                                                  Progress Note due by 25                                                              Recert due by 25   STG by: 6 weeks Comments Date Status   Improve lumbar extension to at least 75% without increased pain       Ongoing   Improve muscle relaxation of R QL  Point tenderness noted    Ongoing             LTG by: 12 weeks         Reports no radicular symptoms in right LE for a week.  none at this time    Ongoing   Able to drive at least 2 hours without exacerbation of pain > 3/10   reports increased pain after 20 minutes    " Ongoing   Reports pain no greater than 4/10 when it does occur.  7/10  6/4  Ongoing   Improve GOPI to 10/50 or less   35/50  6/4  Ongoing   Independent with HEP for flexibility and core/hip stability.  reinforced CONSTANCE today  6/4  Ongoing     Anticipated CPT codes: Gait Training 68204, Therapeutic Exercise 57644, Manual Therapy 34841, Therapeutic Activity 51139, Neuromuscular ReEducation 95594, Self Care/Home Management 89094, Estim Attended 00208, and Estim Unattended 67414      Assessment/Plan     ASSESSMENT:   Pt reported cont Lx and RLE rad s/s, noting he thinks he overdid it last night and is having cornelius pain up into his R midback area. He noted no discomfort during treatment. He reported significant decreased pain during RLE LAD/SAD and Lx traction.    PLAN:   Continue Laserstim and extension bias approach. Monitor his symptoms closely as his JEFFERSON score has had a significant increase.        SIGNATURE: Yang Quintero PTA, [place] License #: [id number]  Electronically Signed on 6/12/2025        [address] [place] [address]. [address]  [phone]

## 2025-06-17 ENCOUNTER — HOSPITAL ENCOUNTER (OUTPATIENT)
Dept: PHYSICAL THERAPY | Facility: HOSPITAL | Age: 50
Setting detail: THERAPIES SERIES
Discharge: HOME | End: 2025-06-17
Payer: COMMERCIAL

## 2025-06-17 ENCOUNTER — OFFICE VISIT (OUTPATIENT)
Dept: GASTROENTEROLOGY | Facility: CLINIC | Age: 50
End: 2025-06-17
Payer: COMMERCIAL

## 2025-06-17 VITALS
HEART RATE: 63 BPM | HEIGHT: 66 IN | BODY MASS INDEX: 36.48 KG/M2 | SYSTOLIC BLOOD PRESSURE: 140 MMHG | TEMPERATURE: 97.7 F | WEIGHT: 227 LBS | DIASTOLIC BLOOD PRESSURE: 78 MMHG | OXYGEN SATURATION: 100 %

## 2025-06-17 DIAGNOSIS — Z80.0 FAMILY HX OF COLON CANCER: ICD-10-CM

## 2025-06-17 DIAGNOSIS — I10 HTN (HYPERTENSION), BENIGN: ICD-10-CM

## 2025-06-17 DIAGNOSIS — Z12.11 ENCOUNTER FOR SCREENING FOR MALIGNANT NEOPLASM OF COLON: Primary | ICD-10-CM

## 2025-06-17 DIAGNOSIS — M51.360 DISCOGENIC LUMBAR PAIN: Primary | ICD-10-CM

## 2025-06-17 DIAGNOSIS — Z71.6 TOBACCO ABUSE COUNSELING: ICD-10-CM

## 2025-06-17 DIAGNOSIS — R26.9 IMPAIRED GAIT: ICD-10-CM

## 2025-06-17 PROCEDURE — G0283 ELEC STIM OTHER THAN WOUND: HCPCS

## 2025-06-17 PROCEDURE — 97140 MANUAL THERAPY 1/> REGIONS: CPT

## 2025-06-17 RX ORDER — POLYETHYLENE GLYCOL 3350, SODIUM CHLORIDE, SODIUM BICARBONATE, POTASSIUM CHLORIDE 420; 11.2; 5.72; 1.48 G/4L; G/4L; G/4L; G/4L
4000 POWDER, FOR SOLUTION ORAL ONCE
Qty: 4000 ML | Refills: 0 | Status: SHIPPED | OUTPATIENT
Start: 2025-06-17 | End: 2025-06-17

## 2025-06-17 NOTE — THERAPY TREATMENT NOTE
Physical Therapy Treatment Note  [facility]  A [facility]  [address], [place] [address]    Patient: Karsten Johnson                                                 Visit Date: 2025  :     1975    Referring practitioner:    JOSE De Souza MD  Date of Initial Visit:          Type: THERAPY  Episode: R lumbar bulging disc   Number of visits this episode: 10    Visit Diagnoses:    ICD-10-CM ICD-9-CM   1. Discogenic lumbar pain  M51.360 724.2   2. Impaired gait  R26.9 781.2     SUBJECTIVE     Subjective:He reports his back pain has increased and now is up his mid back      PAIN: 7/10         OBJECTIVE     Objective     Manual Therapy     27507  Comments   STM B lower lumbar region prone                     Timed Minutes 15     Modalities Comments   IFC quadripolar set up lower lumbar/mid thoracic 15 mA prone       Minutes 24        Therapy Education/Self Care 37890   Education offered today    Medbride Code    Ongoing HEP   Repeated extension    Timed Minutes        Total Timed Treatment:     15   mins  Total Time of Visit:             39   mins         ASSESSMENT/PLAN     GOALS  Goals                                                  Progress Note due by 25                                                              Recert due by 25   STG by: 6 weeks Comments Date Status   Improve lumbar extension to at least 75% without increased pain       Ongoing   Improve muscle relaxation of R QL  Point tenderness noted    Ongoing             LTG by: 12 weeks         Reports no radicular symptoms in right LE for a week.  none at this time    Ongoing   Able to drive at least 2 hours without exacerbation of pain > 3/10   reports increased pain after 20 minutes  /  Ongoing   Reports pain no greater than 4/10 when it does occur.  7/10  6/17  Ongoing   Improve GOPI to 10/50 or less   35/50  /  Ongoing   Independent with HEP for flexibility  and core/hip stability.  reinforced CONSTANCE today  6/4  Ongoing     Anticipated CPT codes: Gait Training 23606, Therapeutic Exercise 70773, Manual Therapy 11168, Therapeutic Activity 27471, Neuromuscular ReEducation 74462, Self Care/Home Management 01619, Estim Attended 06780, and Estim Unattended 09363      Assessment/Plan     ASSESSMENT:   At this point his progression has been quite challenging and ultimately I feel like PT isn't going to resolve his symptoms. He does have an MRI scheduled for later this month which I feel is appropriate at this time.    PLAN:   At this point we may need to pivot our approach to pain modulation and pre-hab prior to surgical intervention that is likely in my opinion.     SIGNATURE: Kennedy Fuentes PTA, [place] License #: [id number]   Electronically Signed on 6/17/2025        [address] [address]  [phone]

## 2025-06-17 NOTE — PROGRESS NOTES
Karsten Johnson  1975 6/17/2025  Chief Complaint   Patient presents with    Colonoscopy     Colon screening     Subjective   HPI  Karsten Johnson is a 49 y.o. male who presents as a referral for preventative maintenance. He has no complaints of nausea or vomiting. No change in bowels. No wt loss. No BRBPR. No melena. There is positive family hx for colon cancer. No abdominal pain.    Past Medical History:   Diagnosis Date    Achilles rupture, left     Acute hematogenous osteomyelitis of right foot     Anxiety 2/5/2024    Arthritis 1/1/25    Calcaneal gait     Depression 2/5/25    Hypertension     Low back pain 4/20/24    Subacute osteomyelitis of right foot     Wound of left foot      Past Surgical History:   Procedure Laterality Date    INCISION AND DRAINAGE OF WOUND Left 02/10/2024    Procedure: INCISION AND DRAINAGE  LEFT ANKLE,BIOPSY LEFT ANKEL;  Surgeon: Alvarez Beck DPM;  Location: [facility] OR;  Service: Podiatry;  Laterality: Left;    TONSILLECTOMY      WOUND DEBRIDEMENT Left 02/14/2024    Procedure: INCISION AND DEBRIDEMENT WOUND OF LEFT ANKLE;  Surgeon: Alvarez Beck DPM;  Location: [facility] OR;  Service: Podiatry;  Laterality: Left;     Outpatient Medications Marked as Taking for the 6/17/25 encounter (Office Visit) with Joyce Conway APRN   Medication Sig Dispense Refill    atorvastatin (Lipitor) 10 MG tablet Take 1 tablet by mouth Daily. 90 tablet 3    gabapentin (NEURONTIN) 100 MG capsule Take 1 capsule by mouth Every 6 (Six) Hours.      lisinopril (PRINIVIL,ZESTRIL) 40 MG tablet Take 1 tablet by mouth Daily. 90 tablet 3    meloxicam (MOBIC) 15 MG tablet Take 1 tablet by mouth Daily.      sertraline (Zoloft) 25 MG tablet Take 1 tablet by mouth Daily. 30 tablet 2     No Known Allergies  Social History     Socioeconomic History    Marital status:    Tobacco Use    Smoking status: Every Day     Current packs/day: 1.00     Average packs/day: 1 pack/day for 31.5  "years (31.5 ttl pk-yrs)     Types: Cigarettes     Start date: 1/1/1994     Passive exposure: Never    Smokeless tobacco: Never   Vaping Use    Vaping status: Never Used   Substance and Sexual Activity    Alcohol use: Not Currently     Alcohol/week: 3.0 standard drinks of alcohol     Types: 3 Cans of beer per week    Drug use: Yes     Types: Marijuana    Sexual activity: Yes     Partners: Female     Family History   Problem Relation Age of Onset    Breast cancer Mother 40    Colon cancer Mother 55    Hypertension Father     Diabetes Father     Colon polyps Neg Hx      Health Maintenance   Topic Date Due    DIABETIC FOOT EXAM  Never done    DIABETIC EYE EXAM  Never done    URINE MICROALBUMIN-CREATININE RATIO (uACR)  Never done    Hepatitis B (1 of 3 - 19+ 3-dose series) Never done    Pneumococcal Vaccine 50+ (1 of 2 - PCV) Never done    TDAP/TD VACCINES (1 - Tdap) Never done    COLORECTAL CANCER SCREENING  Never done    ZOSTER VACCINE (1 of 2) Never done    LUNG CANCER SCREENING  Never done    ANNUAL PHYSICAL  Never done    COVID-19 Vaccine (1 - 2024-25 season) Never done    INFLUENZA VACCINE  07/01/2025    HEMOGLOBIN A1C  10/25/2025    HEPATITIS C SCREENING  Completed       REVIEW OF SYSTEMS  General: well appearing, no fever chills or sweats, no unexplained wt loss  HEENT: no acute visual or hearing disturbances  Cardiovascular: No chest pain or palpitations  Pulmonary: No shortness of breath, coughing, wheezing or hemoptysis  : No burning, urgency, hematuria, or dysuria  Musculoskeletal: No joint pain or stiffness  Peripheral: no edema  Skin: No lesions or rashes  Neuro: No dizziness, headaches, stroke, syncope  Endocrine: No hot or cold intolerances  Hematological: No blood dyscrasias    Objective   Vitals:    06/17/25 1040   BP: 140/78   BP Location: Left arm   Pulse: 63   Temp: 97.7 °F (36.5 °C)   TempSrc: Temporal   SpO2: 100%   Weight: 103 kg (227 lb)   Height: 167.6 cm (65.98\")     Body mass index is 36.66 " kg/m².         PHYSICAL EXAM  General: age appropriate well nourished well appearing, no acute distress  Head: normocephalic and atraumatic  Global assessment-supple  Neck-No JVD noted, no lymphadenopathy  Pulmonary-clear to auscultation bilaterally, normal respiratory effort  Cardiovascular-normal rate and rhythm, normal heart sounds, S1 and S2 noted  Abdomen-soft, non tender, non distended, normal bowel sounds all 4 quadrants, no hepatosplenomegaly noted  Extremities-No clubbing cyanosis or edema  Neuro-Non focal, converses appropriately, awake, alert, oriented    Assessment & Plan     Diagnoses and all orders for this visit:    1. Family hx of colon cancer (Primary)  -     Case Request; Standing  -     Case Request    2. HTN (hypertension), benign  Comments:  cont BP medication the day of procedure    3. Tobacco abuse counseling    Other orders  -     Implement Anesthesia Orders Day of Procedure; Standing  -     Follow Anesthesia Guidelines / Protocol; Future  -     Verify bowel prep was successful; Standing  -     Obtain Informed Consent; Standing  -     polyethylene glycol-electrolytes (NULYTELY) 420 g solution; Take 4,000 mL by mouth 1 (One) Time for 1 dose.  Dispense: 4000 mL; Refill: 0        COLONOSCOPY WITH ANESTHESIA (N/A)  Body mass index is 36.66 kg/m².  There are no Patient Instructions on file for this visit.  Patient instructions on prep prior to procedure provided to the patient.    All risks, benefits, alternatives, and indications of colonoscopy procedure have been discussed with the patient. Risks to include perforation of the colon requiring possible surgery or colostomy, risk of bleeding from biopsies or removal of colon tissue, possibility of missing a colon polyp or cancer, or adverse drug reaction.  Benefits to include the diagnosis and management of disease of the colon and rectum. Alternatives to include barium enema, radiographic evaluation, lab testing or no intervention. Pt verbalizes  understanding and agrees.     Joyce Conway, APRN  2025  11:03 CDT      IF YOU SMOKE OR USE TOBACCO PLEASE READ THE FOLLOWIN minutes reading provided    Why is smoking bad for me?  Smoking increases the risk of heart disease, lung disease, vascular disease, stroke, and cancer.     If you smoke, STOP!    If you would like more information on quitting smoking, please visit the [employer] website: [email or web address]   This link will provide additional resources including the QUIT line and the Beat the Pack support groups.     For more information:    Quit Now [place]  -QUIT-NOW  [email or web address]

## 2025-06-17 NOTE — H&P (VIEW-ONLY)
Karsten Johnson  1975 6/17/2025  Chief Complaint   Patient presents with    Colonoscopy     Colon screening     Subjective   HPI  Karsten Johnson is a 49 y.o. male who presents as a referral for preventative maintenance. He has no complaints of nausea or vomiting. No change in bowels. No wt loss. No BRBPR. No melena. There is positive family hx for colon cancer. No abdominal pain.    Past Medical History:   Diagnosis Date    Achilles rupture, left     Acute hematogenous osteomyelitis of right foot     Anxiety 2/5/2024    Arthritis 1/1/25    Calcaneal gait     Depression 2/5/25    Hypertension     Low back pain 4/20/24    Subacute osteomyelitis of right foot     Wound of left foot      Past Surgical History:   Procedure Laterality Date    INCISION AND DRAINAGE OF WOUND Left 02/10/2024    Procedure: INCISION AND DRAINAGE  LEFT ANKLE,BIOPSY LEFT ANKEL;  Surgeon: Alvarez Beck DPM;  Location: [facility] OR;  Service: Podiatry;  Laterality: Left;    TONSILLECTOMY      WOUND DEBRIDEMENT Left 02/14/2024    Procedure: INCISION AND DEBRIDEMENT WOUND OF LEFT ANKLE;  Surgeon: Alvarez Beck DPM;  Location: [facility] OR;  Service: Podiatry;  Laterality: Left;     Outpatient Medications Marked as Taking for the 6/17/25 encounter (Office Visit) with Joyce Conway APRN   Medication Sig Dispense Refill    atorvastatin (Lipitor) 10 MG tablet Take 1 tablet by mouth Daily. 90 tablet 3    gabapentin (NEURONTIN) 100 MG capsule Take 1 capsule by mouth Every 6 (Six) Hours.      lisinopril (PRINIVIL,ZESTRIL) 40 MG tablet Take 1 tablet by mouth Daily. 90 tablet 3    meloxicam (MOBIC) 15 MG tablet Take 1 tablet by mouth Daily.      sertraline (Zoloft) 25 MG tablet Take 1 tablet by mouth Daily. 30 tablet 2     No Known Allergies  Social History     Socioeconomic History    Marital status:    Tobacco Use    Smoking status: Every Day     Current packs/day: 1.00     Average packs/day: 1 pack/day for 31.5  "years (31.5 ttl pk-yrs)     Types: Cigarettes     Start date: 1/1/1994     Passive exposure: Never    Smokeless tobacco: Never   Vaping Use    Vaping status: Never Used   Substance and Sexual Activity    Alcohol use: Not Currently     Alcohol/week: 3.0 standard drinks of alcohol     Types: 3 Cans of beer per week    Drug use: Yes     Types: Marijuana    Sexual activity: Yes     Partners: Female     Family History   Problem Relation Age of Onset    Breast cancer Mother 40    Colon cancer Mother 55    Hypertension Father     Diabetes Father     Colon polyps Neg Hx      Health Maintenance   Topic Date Due    DIABETIC FOOT EXAM  Never done    DIABETIC EYE EXAM  Never done    URINE MICROALBUMIN-CREATININE RATIO (uACR)  Never done    Hepatitis B (1 of 3 - 19+ 3-dose series) Never done    Pneumococcal Vaccine 50+ (1 of 2 - PCV) Never done    TDAP/TD VACCINES (1 - Tdap) Never done    COLORECTAL CANCER SCREENING  Never done    ZOSTER VACCINE (1 of 2) Never done    LUNG CANCER SCREENING  Never done    ANNUAL PHYSICAL  Never done    COVID-19 Vaccine (1 - 2024-25 season) Never done    INFLUENZA VACCINE  07/01/2025    HEMOGLOBIN A1C  10/25/2025    HEPATITIS C SCREENING  Completed       REVIEW OF SYSTEMS  General: well appearing, no fever chills or sweats, no unexplained wt loss  HEENT: no acute visual or hearing disturbances  Cardiovascular: No chest pain or palpitations  Pulmonary: No shortness of breath, coughing, wheezing or hemoptysis  : No burning, urgency, hematuria, or dysuria  Musculoskeletal: No joint pain or stiffness  Peripheral: no edema  Skin: No lesions or rashes  Neuro: No dizziness, headaches, stroke, syncope  Endocrine: No hot or cold intolerances  Hematological: No blood dyscrasias    Objective   Vitals:    06/17/25 1040   BP: 140/78   BP Location: Left arm   Pulse: 63   Temp: 97.7 °F (36.5 °C)   TempSrc: Temporal   SpO2: 100%   Weight: 103 kg (227 lb)   Height: 167.6 cm (65.98\")     Body mass index is 36.66 " kg/m².         PHYSICAL EXAM  General: age appropriate well nourished well appearing, no acute distress  Head: normocephalic and atraumatic  Global assessment-supple  Neck-No JVD noted, no lymphadenopathy  Pulmonary-clear to auscultation bilaterally, normal respiratory effort  Cardiovascular-normal rate and rhythm, normal heart sounds, S1 and S2 noted  Abdomen-soft, non tender, non distended, normal bowel sounds all 4 quadrants, no hepatosplenomegaly noted  Extremities-No clubbing cyanosis or edema  Neuro-Non focal, converses appropriately, awake, alert, oriented    Assessment & Plan     Diagnoses and all orders for this visit:    1. Family hx of colon cancer (Primary)  -     Case Request; Standing  -     Case Request    2. HTN (hypertension), benign  Comments:  cont BP medication the day of procedure    3. Tobacco abuse counseling    Other orders  -     Implement Anesthesia Orders Day of Procedure; Standing  -     Follow Anesthesia Guidelines / Protocol; Future  -     Verify bowel prep was successful; Standing  -     Obtain Informed Consent; Standing  -     polyethylene glycol-electrolytes (NULYTELY) 420 g solution; Take 4,000 mL by mouth 1 (One) Time for 1 dose.  Dispense: 4000 mL; Refill: 0        COLONOSCOPY WITH ANESTHESIA (N/A)  Body mass index is 36.66 kg/m².  There are no Patient Instructions on file for this visit.  Patient instructions on prep prior to procedure provided to the patient.    All risks, benefits, alternatives, and indications of colonoscopy procedure have been discussed with the patient. Risks to include perforation of the colon requiring possible surgery or colostomy, risk of bleeding from biopsies or removal of colon tissue, possibility of missing a colon polyp or cancer, or adverse drug reaction.  Benefits to include the diagnosis and management of disease of the colon and rectum. Alternatives to include barium enema, radiographic evaluation, lab testing or no intervention. Pt verbalizes  understanding and agrees.     Joyce Conway, APRN  2025  11:03 CDT      IF YOU SMOKE OR USE TOBACCO PLEASE READ THE FOLLOWIN minutes reading provided    Why is smoking bad for me?  Smoking increases the risk of heart disease, lung disease, vascular disease, stroke, and cancer.     If you smoke, STOP!    If you would like more information on quitting smoking, please visit the makeena website: [email or web address]   This link will provide additional resources including the QUIT line and the Beat the Pack support groups.     For more information:    Quit Now [place]  -QUIT-NOW  [email or web address]

## 2025-06-19 ENCOUNTER — HOSPITAL ENCOUNTER (OUTPATIENT)
Dept: PHYSICAL THERAPY | Facility: HOSPITAL | Age: 50
Setting detail: THERAPIES SERIES
Discharge: HOME | End: 2025-06-19
Payer: COMMERCIAL

## 2025-06-19 DIAGNOSIS — R26.9 IMPAIRED GAIT: ICD-10-CM

## 2025-06-19 DIAGNOSIS — M51.360 DISCOGENIC LUMBAR PAIN: Primary | ICD-10-CM

## 2025-06-19 PROCEDURE — 97110 THERAPEUTIC EXERCISES: CPT

## 2025-06-19 PROCEDURE — 97140 MANUAL THERAPY 1/> REGIONS: CPT

## 2025-06-19 NOTE — THERAPY TREATMENT NOTE
Physical Therapy Treatment Note  [facility] Outpatient Therapy Services  A department [facility]  [address], [place] [address]    Patient: Karsten Johnson                                                 Visit Date: 2025  :     1975    Referring practitioner:    JOSE De Souza MD  Date of Initial Visit:          Type: THERAPY  Episode: R lumbar bulging disc   Number of visits this episode: 11    Visit Diagnoses:    ICD-10-CM ICD-9-CM   1. Discogenic lumbar pain  M51.360 724.2   2. Impaired gait  R26.9 781.2     SUBJECTIVE     Subjective: States he is still hurting and not much has changed, noting it feels better after treatments but does not last.       PAIN: -7/10     OBJECTIVE     Objective     Therapeutic Exercises    91599 Units Comments   Seated 3 way Lx flex stretch w/ SB 15x5 sec ea    LTR w/ SB 3 min    HL ab crunch ISO w/ SB 15x5 sec         Timed Minutes 10       Manual Therapy     56242  Comments   Lx traction w/ BLE on SB    RLE LAD/SAD    R HS/piriformis stretches    R hip IR/ER stretches     Standing QL stretches    Supine R QL/ITB stretch          Timed Minutes 31       Therapy Education/Self Care 30318   Education offered today    Medbride Code    Ongoing HEP   Repeated extension    Timed Minutes        Total Timed Treatment:     41   mins  Total Time of Visit:             41   mins         ASSESSMENT/PLAN     GOALS  Goals                                                  Progress Note due by 25                                                              Recert due by 25   STG by: 6 weeks Comments Date Status   Improve lumbar extension to at least 75% without increased pain     /  Ongoing   Improve muscle relaxation of R QL  Point tenderness noted  /  Ongoing             LTG by: 12 weeks         Reports no radicular symptoms in right LE for a week.  none at this time  /  Ongoing   Able to drive at least 2 hours without exacerbation of pain > 3/10    reports increased pain after 20 minutes  6/4  Ongoing   Reports pain no greater than 4/10 when it does occur.  7/10  6/17  Ongoing   Improve GOPI to 10/50 or less   35/50  6/4  Ongoing   Independent with HEP for flexibility and core/hip stability.  reinforced CONSTANCE today  6/4  Ongoing     Anticipated CPT codes: Gait Training 85269, Therapeutic Exercise 17464, Manual Therapy 53466, Therapeutic Activity 57880, Neuromuscular ReEducation 36604, Self Care/Home Management 79710, Estim Attended 05546, and Estim Unattended 47296      Assessment/Plan     ASSESSMENT:   Pt reported cont Lx pain, noting the rad s/s down his RLE have improved and he only gets it occasionally. He noted no discomfort during exercises. He presented with cont RLE/hip tightness, reporting decreased pain during Lx traction and RLE LAD.    PLAN:   At this point we may need to pivot our approach to pain modulation and pre-hab prior to surgical intervention that is likely in my opinion.     SIGNATURE: Yang Quintero PTA, [place] License #: [id number]  Electronically Signed on 6/19/2025        [facility] [place] [address]  [phone]

## 2025-06-24 ENCOUNTER — HOSPITAL ENCOUNTER (OUTPATIENT)
Dept: PHYSICAL THERAPY | Facility: HOSPITAL | Age: 50
Setting detail: THERAPIES SERIES
Discharge: HOME | End: 2025-06-24
Payer: COMMERCIAL

## 2025-06-24 DIAGNOSIS — M51.360 DISCOGENIC LUMBAR PAIN: Primary | ICD-10-CM

## 2025-06-24 DIAGNOSIS — R26.9 IMPAIRED GAIT: ICD-10-CM

## 2025-06-24 PROCEDURE — 97140 MANUAL THERAPY 1/> REGIONS: CPT

## 2025-06-24 PROCEDURE — G0283 ELEC STIM OTHER THAN WOUND: HCPCS

## 2025-06-24 NOTE — THERAPY TREATMENT NOTE
Physical Therapy Treatment Note  [facility]  A [facility]  [address], [place] [address]    Patient: Karsten Johnson                                                 Visit Date: 2025  :     1975    Referring practitioner:    JOSE De Souza MD  Date of Initial Visit:          Type: THERAPY  Episode: R lumbar bulging disc   Number of visits this episode: 12    Visit Diagnoses:    ICD-10-CM ICD-9-CM   1. Discogenic lumbar pain  M51.360 724.2   2. Impaired gait  R26.9 781.2     SUBJECTIVE     Subjective:He reports no improvement a pinching sensation in his R lower lumbar      PAIN: 7/10         OBJECTIVE     Objective     Modalities Comments   IFC quadripolar set up lower lumbar/mid thoracic 23 mA prone         Minutes 20       Manual Therapy     87203  Comments   STM B lower lumbar region  prone                   Timed Minutes 15         Therapy Education/Self Care 33107   Education offered today    Medbride Code    Ongoing HEP   Repeated extension    Timed Minutes        Total Timed Treatment:     15   mins  Total Time of Visit:             35   mins         ASSESSMENT/PLAN     GOALS  Goals                                                  Progress Note due by 25                                                              Recert due by 25   STG by: 6 weeks Comments Date Status   Improve lumbar extension to at least 75% without increased pain       Ongoing   Improve muscle relaxation of R QL  Point tenderness noted    Ongoing             LTG by: 12 weeks         Reports no radicular symptoms in right LE for a week.  none at this time    Ongoing   Able to drive at least 2 hours without exacerbation of pain > 3/10   reports increased pain after 20 minutes  /  Ongoing   Reports pain no greater than 4/10 when it does occur.  7/10    Ongoing   Improve GOPI to 10/50 or less   35/50  /  Ongoing   Independent with HEP for  flexibility and core/hip stability.  reinforced CONSTANCE today  6/4  Ongoing     Anticipated CPT codes: Gait Training 04876, Therapeutic Exercise 33451, Manual Therapy 47422, Therapeutic Activity 77692, Neuromuscular ReEducation 15435, Self Care/Home Management 42243, Estim Attended 03752, and Estim Unattended 98983      Assessment/Plan     ASSESSMENT:   He was a little more guarded and his gait patten was a little worse as compared to previously.     PLAN:   At this point we may need to pivot our approach to pain modulation and pre-hab prior to surgical intervention that is likely in my opinion.     SIGNATURE: Kennedy Fuentes, [facility], [place] License #: [id number]  Electronically Signed on 6/24/2025        [address]  [place] [address]  [phone]

## 2025-06-26 ENCOUNTER — HOSPITAL ENCOUNTER (OUTPATIENT)
Dept: PHYSICAL THERAPY | Facility: HOSPITAL | Age: 50
Setting detail: THERAPIES SERIES
Discharge: HOME | End: 2025-06-26
Payer: COMMERCIAL

## 2025-06-26 DIAGNOSIS — M51.360 DISCOGENIC LUMBAR PAIN: Primary | ICD-10-CM

## 2025-06-26 DIAGNOSIS — R26.9 IMPAIRED GAIT: ICD-10-CM

## 2025-06-26 PROCEDURE — 97140 MANUAL THERAPY 1/> REGIONS: CPT

## 2025-06-26 PROCEDURE — 97032 APPL MODALITY 1+ESTIM EA 15: CPT

## 2025-06-26 NOTE — THERAPY TREATMENT NOTE
Physical Therapy Treatment Note  [facility] Outpatient Therapy Services  A [facility]  [address], [place] [address]    Patient: aKrsten Johnson                                                 Visit Date: 2025  :     1975    Referring practitioner:    JOSE De Souza MD  Date of Initial Visit:          Type: THERAPY  Episode: R lumbar bulging disc   Number of visits this episode: 13    Visit Diagnoses:    ICD-10-CM ICD-9-CM   1. Discogenic lumbar pain  M51.360 724.2   2. Impaired gait  R26.9 781.2     SUBJECTIVE     Subjective:He reports B LE pain R more than L and LBP. The back pain is worse than the LE pain.      PAIN: 7/10         OBJECTIVE     Objective       Manual Therapy     27283  Comments   Figure 8 lumbar traction Grades 1 and 2    Sacral PA mobilizations Grades 1 and 2    Manual sacral traction IPLA and B Grade 2 with oscillations            Timed Minutes 40     Modalities Comments   IFC quadripolar set up lower lumbar/mid thoracic  mA prone         Minutes 15         Therapy Education/Self Care 06558   Education offered today    Medfelixe Code    Ongoing HEP   Repeated extension    Timed Minutes        Total Timed Treatment:     40   mins  Total Time of Visit:             55   mins         ASSESSMENT/PLAN     GOALS  Goals                                                  Progress Note due by 25                                                              Recert due by 25   STG by: 6 weeks Comments Date Status   Improve lumbar extension to at least 75% without increased pain       Ongoing   Improve muscle relaxation of R QL  Point tenderness noted    Ongoing             LTG by: 12 weeks         Reports no radicular symptoms in right LE for a week.  B LE    Ongoing   Able to drive at least 2 hours without exacerbation of pain > 3/10   reports increased pain after 20 minutes    Ongoing   Reports pain no greater than 4/10 when it does  occur.  7/10  6/24  Ongoing   Improve GOPI to 10/50 or less   35/50  6/4  Ongoing   Independent with HEP for flexibility and core/hip stability.  reinforced CONSTANCE today  6/4  Ongoing     Anticipated CPT codes: Gait Training 72577, Therapeutic Exercise 99308, Manual Therapy 30875, Therapeutic Activity 45789, Neuromuscular ReEducation 79206, Self Care/Home Management 84837, and Estim Attended 15181      Assessment/Plan     ASSESSMENT:   Over the last few sessions he has become more guarded and has had less activity and positional tolerance. He will have an MRI tomorrow which I think is warranted and be instrumental in directing his POC going forward. Base on his recent presentation PT alone isn't going to be able to resolve his symptoms and at this point may be counter productive.    PLAN:   Place POC on hold and review his MRI results.     SIGNATURE: Kennedy Fuentes PTA, [place] License #: [id number]  Electronically Signed on 6/26/2025        [address] [place] [address] [address]  [place] [address]  [phone]

## 2025-07-09 ENCOUNTER — ANESTHESIA (OUTPATIENT)
Dept: GASTROENTEROLOGY | Facility: HOSPITAL | Age: 50
End: 2025-07-09
Payer: COMMERCIAL

## 2025-07-09 ENCOUNTER — ANESTHESIA EVENT (OUTPATIENT)
Dept: GASTROENTEROLOGY | Facility: HOSPITAL | Age: 50
End: 2025-07-09
Payer: COMMERCIAL

## 2025-07-09 ENCOUNTER — HOSPITAL ENCOUNTER (OUTPATIENT)
Facility: HOSPITAL | Age: 50
Setting detail: HOSPITAL OUTPATIENT SURGERY
Discharge: HOME | End: 2025-07-09
Attending: INTERNAL MEDICINE | Admitting: INTERNAL MEDICINE
Payer: COMMERCIAL

## 2025-07-09 VITALS
WEIGHT: 227 LBS | BODY MASS INDEX: 36.48 KG/M2 | RESPIRATION RATE: 15 BRPM | OXYGEN SATURATION: 99 % | SYSTOLIC BLOOD PRESSURE: 162 MMHG | DIASTOLIC BLOOD PRESSURE: 97 MMHG | HEART RATE: 64 BPM | HEIGHT: 66 IN | TEMPERATURE: 97 F

## 2025-07-09 DIAGNOSIS — Z80.0 FAMILY HX OF COLON CANCER: ICD-10-CM

## 2025-07-09 PROCEDURE — 88305 TISSUE EXAM BY PATHOLOGIST: CPT | Performed by: INTERNAL MEDICINE

## 2025-07-09 PROCEDURE — 25010000002 PROPOFOL 10 MG/ML EMULSION: Performed by: NURSE ANESTHETIST, CERTIFIED REGISTERED

## 2025-07-09 PROCEDURE — 25810000003 SODIUM CHLORIDE 0.9 % SOLUTION: Performed by: ANESTHESIOLOGY

## 2025-07-09 PROCEDURE — 25010000002 LIDOCAINE PF 2% 2 % SOLUTION

## 2025-07-09 RX ORDER — LIDOCAINE HYDROCHLORIDE 20 MG/ML
INJECTION, SOLUTION EPIDURAL; INFILTRATION; INTRACAUDAL; PERINEURAL AS NEEDED
Status: DISCONTINUED | OUTPATIENT
Start: 2025-07-09 | End: 2025-07-09 | Stop reason: SURG

## 2025-07-09 RX ORDER — PROPOFOL 10 MG/ML
VIAL (ML) INTRAVENOUS AS NEEDED
Status: DISCONTINUED | OUTPATIENT
Start: 2025-07-09 | End: 2025-07-09 | Stop reason: SURG

## 2025-07-09 RX ORDER — SODIUM CHLORIDE 0.9 % (FLUSH) 0.9 %
10 SYRINGE (ML) INJECTION AS NEEDED
Status: DISCONTINUED | OUTPATIENT
Start: 2025-07-09 | End: 2025-07-09 | Stop reason: HOSPADM

## 2025-07-09 RX ORDER — LIDOCAINE HYDROCHLORIDE 10 MG/ML
0.5 INJECTION, SOLUTION EPIDURAL; INFILTRATION; INTRACAUDAL; PERINEURAL ONCE AS NEEDED
Status: DISCONTINUED | OUTPATIENT
Start: 2025-07-09 | End: 2025-07-09 | Stop reason: HOSPADM

## 2025-07-09 RX ORDER — ONDANSETRON 2 MG/ML
4 INJECTION INTRAMUSCULAR; INTRAVENOUS ONCE AS NEEDED
Status: DISCONTINUED | OUTPATIENT
Start: 2025-07-09 | End: 2025-07-09 | Stop reason: HOSPADM

## 2025-07-09 RX ORDER — SODIUM CHLORIDE 9 MG/ML
500 INJECTION, SOLUTION INTRAVENOUS CONTINUOUS PRN
Status: DISCONTINUED | OUTPATIENT
Start: 2025-07-09 | End: 2025-07-09 | Stop reason: HOSPADM

## 2025-07-09 RX ADMIN — GLYCOPYRROLATE 0.1 MG: 0.2 INJECTION INTRAMUSCULAR; INTRAVENOUS at 11:29

## 2025-07-09 RX ADMIN — LIDOCAINE HYDROCHLORIDE 100 MG: 20 INJECTION, SOLUTION EPIDURAL; INFILTRATION; INTRACAUDAL; PERINEURAL at 11:29

## 2025-07-09 RX ADMIN — PROPOFOL 400 MG: 10 INJECTION, EMULSION INTRAVENOUS at 11:26

## 2025-07-09 RX ADMIN — SODIUM CHLORIDE 500 ML: 9 INJECTION, SOLUTION INTRAVENOUS at 08:27

## 2025-07-09 NOTE — ANESTHESIA POSTPROCEDURE EVALUATION
Patient: Karsten Johnson    Procedure Summary       Date: 07/09/25 Room / Location: [facility] ENDOSCOPY 4 / [facility] ENDOSCOPY    Anesthesia Start: 1112 Anesthesia Stop: 1148    Procedure: COLONOSCOPY WITH ANESTHESIA Diagnosis:       Family hx of colon cancer      (Family hx of colon cancer [Z80.0])    Surgeons: Vin Pearl MD Provider: PAUL Banegas CRNA    Anesthesia Type: MAC ASA Status: 2            Anesthesia Type: MAC    Vitals  No vitals data found for the desired time range.          Post Anesthesia Care and Evaluation    Patient location during evaluation: PACU  Patient participation: complete - patient participated  Level of consciousness: awake and alert  Pain score: 0  Pain management: adequate    Airway patency: patent  Anesthetic complications: No anesthetic complications    Cardiovascular status: acceptable and stable  Respiratory status: acceptable and unassisted  Hydration status: acceptable

## 2025-07-09 NOTE — ANESTHESIA PREPROCEDURE EVALUATION
Anesthesia Evaluation     Patient summary reviewed and Nursing notes reviewed   NPO Solid Status: > 8 hours  NPO Liquid Status: > 2 hours           Airway   Mallampati: III  TM distance: >3 FB  Neck ROM: full  No difficulty expected  Dental    (+) poor dentition        Pulmonary    (+) a smoker (1 ppd) Current,decreased breath sounds  Cardiovascular - normal exam  Exercise tolerance: good (4-7 METS)    ECG reviewed  Rhythm: regular  Rate: normal    (+) hypertension      Neuro/Psych- negative ROS  GI/Hepatic/Renal/Endo    (+) obesity, diabetes mellitus type 2    Musculoskeletal (-) negative ROS    Abdominal    Substance History   (+) alcohol use (couple of drinks per week)     OB/GYN negative ob/gyn ROS         Other - negative ROS       ROS/Med Hx Other: Left heel pain likely d/t achilles rupture and heel abscess s/p I&D left ankle and biopsy of left ankle 2/10/24  Acute hematogenous osteomyelitis  MRSA bacteremia   Echo 2/9/24- EF 55%        Phys Exam Other: Many cracked and broken teeth, one loose upper right premolar    A few scattered wheezes upon auscultation            Latest Reference Range & Units 02/08/24 04:03   Sodium 136 - 145 mmol/L 132 (L)   Potassium 3.5 - 5.2 mmol/L 4.0   Chloride 98 - 107 mmol/L 98   CO2 22.0 - 29.0 mmol/L 23.5   Anion Gap 5.0 - 15.0 mmol/L 10.5   BUN 6 - 20 mg/dL 14   Creatinine 0.76 - 1.27 mg/dL 0.80   BUN/Creatinine Ratio 7.0 - 25.0  17.5   eGFR >60.0 mL/min/1.73 107.8   Glucose 65 - 99 mg/dL 126 (H)   Calcium 8.6 - 10.5 mg/dL 9.1   Magnesium 1.6 - 2.6 mg/dL 1.9   (L): Data is abnormally low  (H): Data is abnormally high      NORMAL ECG - 2/7/24  Sinus rhythm  No previous ECG available for comparison        Anesthesia Plan    ASA 2     MAC     (Patient understands anesthesia not responsible for dental damage.  )  intravenous induction     Anesthetic plan, risks, benefits, and alternatives have been provided, discussed and informed consent has been obtained with:  patient.  Pre-procedure education provided      CODE STATUS:

## 2025-07-10 LAB
CYTO UR: NORMAL
LAB AP CASE REPORT: NORMAL
Lab: NORMAL
PATH REPORT.FINAL DX SPEC: NORMAL
PATH REPORT.GROSS SPEC: NORMAL

## 2025-07-11 ENCOUNTER — TELEPHONE (OUTPATIENT)
Dept: INTERNAL MEDICINE | Facility: CLINIC | Age: 50
End: 2025-07-11
Payer: COMMERCIAL

## 2025-07-11 ENCOUNTER — TELEPHONE (OUTPATIENT)
Dept: GASTROENTEROLOGY | Facility: CLINIC | Age: 50
End: 2025-07-11
Payer: COMMERCIAL

## 2025-07-11 DIAGNOSIS — D37.3 APPENDICEAL TUMOR: Primary | ICD-10-CM

## 2025-07-11 NOTE — TELEPHONE ENCOUNTER
I called the patient to discuss his pathology results.  I informed him that the polyps removed were benign.  But the polyp that was removed from the appendiceal orifice was an adenomatous polyp with low-grade dysplasia.  I described this as a precancerous lesion.  With low-grade dysplasia it means it is a step even closer to turning to cancer.  There is no cancer cells present but it is a type of lesion that could develop into cancer.  As we discussed after his colonoscopy again today, I removed all of the polyp tissue that I could see.  But unfortunately I cannot absolutely rule out that there is not more of this polyp tissue growing inside the appendix of which I cannot visualize.  Thus if that is the case, residual tissue inside the appendix could theoretically develop into a malignancy later in life.  I therefore recommend resection of the appendix.  I suggest an evaluation with the surgeon to have this performed in the near future.  He recalled our conversation about this from after colonoscopy.  He expressed good understanding is in agreement with this approach.      Mookie, I have placed an order for general surgery referral to Dr. Armenta.  Can you please check to make sure the patient did get an appointment with them and let the patient know when the appointment is as well as let me know.

## 2025-07-11 NOTE — TELEPHONE ENCOUNTER
Caller: ALISHA ALBRECHT    Relationship to patient: Emergency Contact      Best call back number:   Telephone Information:   Mobile [phone]       Provider: DR. MOREIRA     Medication PA needed: APPEAL FOR PRIOR AUTH FOR MRI     Reason for call/Prior Auth: INSURANCE HAS NOW DENIED THE MRI TWICE INDICATING PHYSICAL THERAPY, PATIENT HAS COMPLETED 6 WEEKS OF PHYSICAL THERAPY, PLEASE ADVISE

## 2025-07-14 NOTE — TELEPHONE ENCOUNTER
Physical therapy notes have been reviewed and printed.  Please call insurance for an appeal for the MRI.

## 2025-07-17 ENCOUNTER — PATIENT ROUNDING (BHMG ONLY) (OUTPATIENT)
Dept: SURGERY | Facility: CLINIC | Age: 50
End: 2025-07-17
Payer: COMMERCIAL

## 2025-07-17 ENCOUNTER — TELEPHONE (OUTPATIENT)
Dept: SURGERY | Facility: CLINIC | Age: 50
End: 2025-07-17

## 2025-07-17 ENCOUNTER — OFFICE VISIT (OUTPATIENT)
Dept: SURGERY | Facility: CLINIC | Age: 50
End: 2025-07-17
Payer: COMMERCIAL

## 2025-07-17 VITALS
BODY MASS INDEX: 37.48 KG/M2 | OXYGEN SATURATION: 98 % | DIASTOLIC BLOOD PRESSURE: 90 MMHG | HEART RATE: 61 BPM | HEIGHT: 66 IN | SYSTOLIC BLOOD PRESSURE: 167 MMHG | WEIGHT: 233.2 LBS

## 2025-07-17 DIAGNOSIS — D12.1 ADENOMATOUS POLYP OF APPENDIX: Primary | ICD-10-CM

## 2025-07-17 RX ORDER — SODIUM CHLORIDE, SODIUM LACTATE, POTASSIUM CHLORIDE, CALCIUM CHLORIDE 600; 310; 30; 20 MG/100ML; MG/100ML; MG/100ML; MG/100ML
75 INJECTION, SOLUTION INTRAVENOUS CONTINUOUS
OUTPATIENT
Start: 2025-07-17 | End: 2025-07-21

## 2025-07-17 NOTE — PROGRESS NOTES
[facility] History and Physical  Karsten Johnson  MRN: [id number]  Age: 49 y.o. male     YOB: 1975    Primary   Problem      Appendiceal polyp    SUBJECTIVE  History  of Presenting Illness     History of Present Illness  The patient presents for evaluation of an incompletely resected appendiceal polyp that demonstrated some low-grade dysplasia within the piecemeal biopsies. He is accompanied by his wife.    An appendectomy has been recommended following a colonoscopy that revealed a precancerous polyp at the entrance of the appendix. The polyp could not be completely removed during the procedure. The colonoscopy was performed as part of routine screening upon his turning 48, and he reports no symptoms such as rectal bleeding or abdominal pain.     Approximately a year ago, he experienced MRSA and sepsis in his foot. He is not currently on antibiotics for MRSA. Due to these issues, he is unable to work and uses a cane for mobility. He also reports having back issues.    Current medications include Zoloft, lisinopril, a cholesterol-lowering drug, and Mobic.    SOCIAL HISTORY  Marital Status: Engaged    FAMILY HISTORY  - Mother: Colon cancer             Past Medical History     Past Medical History:  Past Medical History:   Diagnosis Date    Achilles rupture, left     Acute hematogenous osteomyelitis of right foot     Anxiety 2/5/2024    Arthritis 1/1/25    Calcaneal gait     Depression 2/5/25    Hypertension     Low back pain 4/20/24    Subacute osteomyelitis of right foot     Wound of left foot        PCP: JOSE De Souza MD    Past Surgical History:  Past Surgical History:   Procedure Laterality Date    COLONOSCOPY N/A 7/9/2025    Procedure: COLONOSCOPY WITH ANESTHESIA;  Surgeon: Vin Pearl MD;  Location: [facility] ENDOSCOPY;  Service: Gastroenterology;  Laterality: N/A;  pre screen  post polyp  Dr. De Souza    INCISION AND DRAINAGE OF WOUND Left 02/10/2024     "Procedure: INCISION AND DRAINAGE  LEFT ANKLE,BIOPSY LEFT ANKEL;  Surgeon: Alvarez Beck DPM;  Location: [facility] MAIN OR;  Service: Podiatry;  Laterality: Left;    TONSILLECTOMY      WOUND DEBRIDEMENT Left 02/14/2024    Procedure: INCISION AND DEBRIDEMENT WOUND OF LEFT ANKLE;  Surgeon: Alvarez Beck DPM;  Location: [facility] MAIN OR;  Service: Podiatry;  Laterality: Left;       Family History:  Family History   Problem Relation Age of Onset    Breast cancer Mother 40    Colon cancer Mother 55    Hypertension Father     Diabetes Father     Colon polyps Neg Hx        Social History:  Social History     Tobacco Use    Smoking status: Every Day     Current packs/day: 1.00     Average packs/day: 1 pack/day for 31.5 years (31.5 ttl pk-yrs)     Types: Cigarettes     Start date: 1/1/1994     Passive exposure: Never    Smokeless tobacco: Never   Substance Use Topics    Alcohol use: Not Currently     Alcohol/week: 3.0 standard drinks of alcohol     Types: 3 Cans of beer per week       Allergies:  No Known Allergies    Medications:  Current Outpatient Medications   Medication Instructions    atorvastatin (LIPITOR) 10 mg, Oral, Daily    gabapentin (NEURONTIN) 100 MG capsule 1 capsule, Every 6 Hours    ibuprofen (ADVIL,MOTRIN) 800 mg, Oral, Every 6 Hours PRN    lisinopril (PRINIVIL,ZESTRIL) 40 mg, Oral, Every 24 Hours Scheduled    meloxicam (MOBIC) 15 MG tablet 1 tablet, Daily    sertraline (ZOLOFT) 25 mg, Oral, Daily           Review of Systems   Per HPI.       Physical Examination     Vitals:    07/17/25 1104   BP: 167/90   BP Location: Right arm   Patient Position: Sitting   Cuff Size: Adult   Pulse: 61   SpO2: 98%   Weight: 106 kg (233 lb 3.2 oz)   Height: 167.6 cm (66\")       Estimated body mass index is 37.64 kg/m² as calculated from the following:    Height as of this encounter: 167.6 cm (66\").    Weight as of this encounter: 106 kg (233 lb 3.2 oz).  PREVIOUS WEIGHTS:   Wt Readings from Last 5 Encounters: "   07/17/25 106 kg (233 lb 3.2 oz)   07/09/25 103 kg (227 lb)   06/17/25 103 kg (227 lb)   05/30/25 106 kg (234 lb)   04/25/25 103 kg (226 lb 12.8 oz)       Physical Examination:  Gen: awake, alert, sitting up in chair in no acute distress  HEENT: NCAT, EOMI, anicteric sclerae  CV: RRR, normotensive  Resp: nonlabored on room air, sats appropriate  Abd: soft, nontender, nondistended   MSK: moves all four, no c/c/e  Neuro: no focal deficits, cranial nerves grossly intact  Psy:  appropriate, cooperative      Data Review     Labs:  CBC  Lab Results   Component Value Date    WBC 11.06 (H) 04/25/2025    HGB 13.8 04/25/2025    HCT 43.0 04/25/2025     04/25/2025      BMP  Lab Results   Component Value Date     04/25/2025    K 4.8 04/25/2025     04/25/2025    CO2 25.0 04/25/2025    BUN 21 (H) 04/25/2025    CREATININE 0.90 04/25/2025    GLUCOSE 86 04/25/2025    CALCIUM 9.2 04/25/2025    MG 2.2 02/12/2024    PHOS 2.8 02/15/2024      LFTs  Lab Results   Component Value Date    PROTEINTOT 7.7 04/25/2025    ALBUMIN 4.4 04/25/2025    BILITOT 0.3 04/25/2025    ALT 15 04/25/2025    AST 16 04/25/2025    ALKPHOS 130 (H) 04/25/2025       UDS  Lab Results   Component Value Date    LABOPIASCN Positive (A) 02/08/2024    BARBITSCNUR Negative 02/08/2024    LABBENZSCN Negative 02/08/2024    COCAINEUR Negative 02/08/2024    LABMETHSCN Negative 02/08/2024    THCURSCR Positive (A) 02/08/2024        Pathology:  Lab Results   Component Value Date    NOTE  07/09/2025     This report may contain a detailed description of human tissue sent by a health care provider to the laboratory for pathologic evaluation. The content of this report is essential for diagnosis and may provide important critical findings. This information may be unfamiliar to patients to review without a medical professional present. It is advised that the patient review this report in the presence of a health care provider who can answer questions and explain the  "results.      CASEREPORT  07/09/2025     Surgical Pathology Report                         Case: [id number]                                  Authorizing Provider:  Vin Pearl MD      Collected:           07/09/2025 11:30 AM          Ordering Location:     [facility]     Received:            07/09/2025 01:11 PM                                 ENDOSCOPY                                                                    Pathologist:           Anh Linder MD                                                        Specimens:   1) - Large Intestine, polyp at appendiceal orifice                                                  2) - Large Intestine, polyp at IC valve                                                             3) - Large Intestine, proximal transverse colon polyp x2                                   FINALDX  07/09/2025     1.  Large intestine, appendiceal orifice, polypectomy: Fragments of traditional serrated adenoma exhibiting low-grade dysplasia.    2.  Large intestine, ileocecal valve polyp, polypectomy:  - Fragments of adenomatous polyp, tubular type.  - No high-grade dysplasia identified.    3.  Large intestine, proximal transverse colon polyps x 2, polypectomy:  - Fragments of adenomatous polyps, tubular type.  - No high-grade dysplasia identified.      GROSSDES  07/09/2025     1. Large Intestine.  Received in a formalin filled container labeled with the patient's name, medical record number and \"polyp at appendiceal orifice\" are 3 fragments of tan-pink mucosa measuring up to 0.6 cm in greatest dimension.  The specimen is entirely submitted in a cassette labeled 1A.      2. Large Intestine.  Received in a formalin filled container labeled with the patient's name, medical record number and \"polyp at IC valve\" are multiple fragments of tan-pink mucosa measuring up to 0.3 cm in greatest dimension.  The specimen is entirely submitted in a cassette labeled 2A.    3. Large " "Intestine.  Received in a formalin filled container labeled with the patient's name, medical record number and \"proximal transverse colon polyp x 2\" are multiple fragments of tan-pink mucosa measuring up to 0.5 cm in greatest dimension.  The specimen is entirely submitted in a cassette labeled 3A.          MICRO  07/09/2025     Microscopic examination performed on all specimens.         * Cannot find OR log *      Problem List     Patient Active Problem List   Diagnosis    Achilles rupture    Abscess of heel    Acute hematogenous osteomyelitis    Type 2 diabetes mellitus without complication, without long-term current use of insulin    Essential hypertension    Cigarette nicotine dependence without complication    History of MRSA infection    Tobacco use    Family hx of colon cancer            Assessment/Plan     Results  Diagnostic Testing   - Colonoscopy: Polyp at the ileocecal valve was adenomatous with no high grade dysplasia. Fragments of a serrated adenoma with some low-grade dysplasia were found at the appendiceal orifice.    Assessment & Plan  1. Precancerous polyp.  A colonoscopy revealed a polyp at the appendiceal orifice, which could not be completely removed. Pathology indicated fragments of a serrated adenoma with low-grade dysplasia. A robotic appendectomy with frozen section is recommended to remove the appendix and obtain a definitive diagnosis. The procedure will involve taking an aggressive cecal cuff to ensure complete removal of the polyp. If the appendiceal base is tightly adhered to the cecum, additional surgical steps may be necessary, including a potential ileocecectomy. Risks of surgery include pain, bleeding, infection, damage to surrounding structures.       Smoking cessation: Daily smoker - counseled regarding need for cessation, especially during the perioperative period.   BMI: 37.6 - pt provided with info packet with appropriate information for diet/exercise   Med rec complete: " yes  VTE: VTE PPX is not indicated.    Time Spent: I spent 45 minutes caring for Karsten Johnson on this date of service. This time includes time, independent of any procedures, spent by me in the following activities: preparing for the clinic visit, reviewing tests, obtaining and/or reviewing a separately obtained history, performing a medically appropriate examination and/or evaluation, counseling and educating the patient/family/caregiver, ordering medications, tests, or procedures, and documenting information in the medical record.     Sarwat Turner MD  7/17/2025   11:31 CDT    Patient or patient representative verbalized consent for the use of Ambient Listening during the visit with  Sarwat Turner MD for chart documentation. 7/17/2025  16:41 CDT

## 2025-07-17 NOTE — TELEPHONE ENCOUNTER
Karsten Johnson is scheduled for surgery with Dr. Turner on 08/06/25 with an arrival time of 7:00AM.  You will check in at the main registration desk.   On the day of surgery you will need a .   We ask that you do not eat or drink anything after midnight on the day of surgery.   Please do not take any anti-inflammatory or weight loss medications, vitamins, ibuprofen, aleve, Advil, motrin, Excedrin, mobic, meloxicam for 7 days prior to surgery.     ______________________________________________________________________________________________________________________________________  Your pre-work appointment will be on 07/30/2025 arrive at 8:00AM, you will check in at main registration.   For your pre-work appointment you do not have to fast.   For pre-work you do not need a . You will get some lab work and sign consent for surgery.   You may also get an EKG and/or chest xray if needed.     If you have any questions do not hesitate to reach out.   You can reach us at [phone] hit option 2.

## 2025-07-18 ENCOUNTER — PATIENT MESSAGE (OUTPATIENT)
Dept: INTERNAL MEDICINE | Facility: CLINIC | Age: 50
End: 2025-07-18
Payer: COMMERCIAL

## 2025-07-19 DIAGNOSIS — F32.9 MAJOR DEPRESSIVE DISORDER WITH CURRENT ACTIVE EPISODE, UNSPECIFIED DEPRESSION EPISODE SEVERITY, UNSPECIFIED WHETHER RECURRENT: ICD-10-CM

## 2025-07-21 RX ORDER — SERTRALINE HYDROCHLORIDE 25 MG/1
25 TABLET, FILM COATED ORAL DAILY
Qty: 30 TABLET | Refills: 2 | Status: SHIPPED | OUTPATIENT
Start: 2025-07-21

## 2025-07-21 NOTE — TELEPHONE ENCOUNTER
Rx Refill Note  Requested Prescriptions     Pending Prescriptions Disp Refills    sertraline (ZOLOFT) 25 MG tablet [Pharmacy Med Name: SERTRALINE 25MG TABLETS] 30 tablet 2     Sig: TAKE 1 TABLET BY MOUTH DAILY      Last office visit with prescribing clinician: 5/30/2025   Last telemedicine visit with prescribing clinician: Visit date not found   Next office visit with prescribing clinician: 07/25/25                        Would you like a call back once the refill request has been completed: [] Yes [] No    If the office needs to give you a call back, can they leave a voicemail: [] Yes [] No    KYLAH Tavares  07/21/25, 09:10 CDT    Medication last filled 4/25/25, qty 30, 2 refills

## 2025-07-25 ENCOUNTER — TELEPHONE (OUTPATIENT)
Dept: INTERNAL MEDICINE | Facility: CLINIC | Age: 50
End: 2025-07-25
Payer: COMMERCIAL

## 2025-07-27 ENCOUNTER — HOSPITAL ENCOUNTER (EMERGENCY)
Facility: HOSPITAL | Age: 50
Discharge: HOME | End: 2025-07-27
Attending: FAMILY MEDICINE | Admitting: FAMILY MEDICINE
Payer: COMMERCIAL

## 2025-07-27 ENCOUNTER — APPOINTMENT (OUTPATIENT)
Dept: CT IMAGING | Facility: HOSPITAL | Age: 50
End: 2025-07-27
Payer: COMMERCIAL

## 2025-07-27 VITALS
HEART RATE: 68 BPM | HEIGHT: 66 IN | SYSTOLIC BLOOD PRESSURE: 160 MMHG | DIASTOLIC BLOOD PRESSURE: 87 MMHG | OXYGEN SATURATION: 98 % | BODY MASS INDEX: 36.96 KG/M2 | TEMPERATURE: 98.2 F | RESPIRATION RATE: 20 BRPM | WEIGHT: 230 LBS

## 2025-07-27 DIAGNOSIS — R10.84 GENERALIZED ABDOMINAL PAIN: Primary | ICD-10-CM

## 2025-07-27 LAB
ALBUMIN SERPL-MCNC: 4.6 G/DL (ref 3.5–5.2)
ALBUMIN/GLOB SERPL: 1.3 G/DL
ALP SERPL-CCNC: 130 U/L (ref 39–117)
ALT SERPL W P-5'-P-CCNC: 14 U/L (ref 1–41)
ANION GAP SERPL CALCULATED.3IONS-SCNC: 18 MMOL/L (ref 5–15)
APTT PPP: 31.8 SECONDS (ref 24.5–36)
AST SERPL-CCNC: 20 U/L (ref 1–40)
BASOPHILS # BLD AUTO: 0.04 10*3/MM3 (ref 0–0.2)
BASOPHILS NFR BLD AUTO: 0.4 % (ref 0–1.5)
BILIRUB SERPL-MCNC: 0.6 MG/DL (ref 0–1.2)
BUN SERPL-MCNC: 12.6 MG/DL (ref 6–20)
BUN/CREAT SERPL: 13.1 (ref 7–25)
CALCIUM SPEC-SCNC: 10.1 MG/DL (ref 8.6–10.5)
CHLORIDE SERPL-SCNC: 103 MMOL/L (ref 98–107)
CO2 SERPL-SCNC: 18 MMOL/L (ref 22–29)
CREAT SERPL-MCNC: 0.96 MG/DL (ref 0.76–1.27)
D-LACTATE SERPL-SCNC: 2.1 MMOL/L (ref 0.5–2)
DEPRECATED RDW RBC AUTO: 43.9 FL (ref 37–54)
EGFRCR SERPLBLD CKD-EPI 2021: 95.7 ML/MIN/1.73
EOSINOPHIL # BLD AUTO: 0.09 10*3/MM3 (ref 0–0.4)
EOSINOPHIL NFR BLD AUTO: 0.8 % (ref 0.3–6.2)
ERYTHROCYTE [DISTWIDTH] IN BLOOD BY AUTOMATED COUNT: 13.6 % (ref 12.3–15.4)
GLOBULIN UR ELPH-MCNC: 3.6 GM/DL
GLUCOSE SERPL-MCNC: 163 MG/DL (ref 65–99)
HCT VFR BLD AUTO: 47.2 % (ref 37.5–51)
HGB BLD-MCNC: 15.8 G/DL (ref 13–17.7)
IMM GRANULOCYTES # BLD AUTO: 0.03 10*3/MM3 (ref 0–0.05)
IMM GRANULOCYTES NFR BLD AUTO: 0.3 % (ref 0–0.5)
INR PPP: 1.03 (ref 0.91–1.09)
LIPASE SERPL-CCNC: 45 U/L (ref 13–60)
LYMPHOCYTES # BLD AUTO: 1.35 10*3/MM3 (ref 0.7–3.1)
LYMPHOCYTES NFR BLD AUTO: 12.3 % (ref 19.6–45.3)
MAGNESIUM SERPL-MCNC: 1.8 MG/DL (ref 1.6–2.6)
MCH RBC QN AUTO: 29.6 PG (ref 26.6–33)
MCHC RBC AUTO-ENTMCNC: 33.5 G/DL (ref 31.5–35.7)
MCV RBC AUTO: 88.4 FL (ref 79–97)
MONOCYTES # BLD AUTO: 0.49 10*3/MM3 (ref 0.1–0.9)
MONOCYTES NFR BLD AUTO: 4.4 % (ref 5–12)
NEUTROPHILS NFR BLD AUTO: 81.8 % (ref 42.7–76)
NEUTROPHILS NFR BLD AUTO: 9.02 10*3/MM3 (ref 1.7–7)
NRBC BLD AUTO-RTO: 0 /100 WBC (ref 0–0.2)
PLATELET # BLD AUTO: 150 10*3/MM3 (ref 140–450)
PMV BLD AUTO: 12.2 FL (ref 6–12)
POTASSIUM SERPL-SCNC: 4.3 MMOL/L (ref 3.5–5.2)
PROT SERPL-MCNC: 8.2 G/DL (ref 6–8.5)
PROTHROMBIN TIME: 14.1 SECONDS (ref 11.8–14.8)
RBC # BLD AUTO: 5.34 10*6/MM3 (ref 4.14–5.8)
SODIUM SERPL-SCNC: 139 MMOL/L (ref 136–145)
WBC NRBC COR # BLD AUTO: 11.02 10*3/MM3 (ref 3.4–10.8)

## 2025-07-27 PROCEDURE — 25510000001 IOPAMIDOL 61 % SOLUTION: Performed by: FAMILY MEDICINE

## 2025-07-27 PROCEDURE — 74177 CT ABD & PELVIS W/CONTRAST: CPT

## 2025-07-27 PROCEDURE — 25010000002 MORPHINE PER 10 MG: Performed by: FAMILY MEDICINE

## 2025-07-27 PROCEDURE — 83690 ASSAY OF LIPASE: CPT | Performed by: FAMILY MEDICINE

## 2025-07-27 PROCEDURE — 80053 COMPREHEN METABOLIC PANEL: CPT | Performed by: FAMILY MEDICINE

## 2025-07-27 PROCEDURE — 85610 PROTHROMBIN TIME: CPT | Performed by: FAMILY MEDICINE

## 2025-07-27 PROCEDURE — 85025 COMPLETE CBC W/AUTO DIFF WBC: CPT | Performed by: FAMILY MEDICINE

## 2025-07-27 PROCEDURE — 96374 THER/PROPH/DIAG INJ IV PUSH: CPT

## 2025-07-27 PROCEDURE — 83735 ASSAY OF MAGNESIUM: CPT | Performed by: FAMILY MEDICINE

## 2025-07-27 PROCEDURE — 85730 THROMBOPLASTIN TIME PARTIAL: CPT | Performed by: FAMILY MEDICINE

## 2025-07-27 PROCEDURE — 83605 ASSAY OF LACTIC ACID: CPT | Performed by: FAMILY MEDICINE

## 2025-07-27 PROCEDURE — 99285 EMERGENCY DEPT VISIT HI MDM: CPT | Performed by: FAMILY MEDICINE

## 2025-07-27 RX ORDER — ONDANSETRON 4 MG/1
4 TABLET, ORALLY DISINTEGRATING ORAL EVERY 8 HOURS PRN
Qty: 16 TABLET | Refills: 0 | Status: SHIPPED | OUTPATIENT
Start: 2025-07-27

## 2025-07-27 RX ORDER — IOPAMIDOL 612 MG/ML
100 INJECTION, SOLUTION INTRAVASCULAR
Status: COMPLETED | OUTPATIENT
Start: 2025-07-27 | End: 2025-07-27

## 2025-07-27 RX ORDER — SODIUM CHLORIDE 0.9 % (FLUSH) 0.9 %
10 SYRINGE (ML) INJECTION AS NEEDED
Status: DISCONTINUED | OUTPATIENT
Start: 2025-07-27 | End: 2025-07-27 | Stop reason: HOSPADM

## 2025-07-27 RX ADMIN — MORPHINE SULFATE 4 MG: 4 INJECTION, SOLUTION INTRAMUSCULAR; INTRAVENOUS at 16:27

## 2025-07-27 RX ADMIN — IOPAMIDOL 100 ML: 612 INJECTION, SOLUTION INTRAVENOUS at 14:47

## 2025-07-27 NOTE — ED PROVIDER NOTES
Subjective   History of Present Illness  Patient presents emergency room with right Ecpirin abdominal pain.  Is been having nausea vomiting.  He states that the pain is getting severely worse.  He states that he has no blood in his vomit.  He states that he has had this pain for some time.  He states a recent colonoscopy found some precancerous polyps he was eval by general surgery.  And he states that they are going to have to remove the precancerous polyp.  Scheduled surgery is in about 2 weeks for that.  It is near his appendix.  So they can remove his appendix.  Patient denies any other symptoms at this time.          Review of Systems   All other systems reviewed and are negative.      Past Medical History:   Diagnosis Date    Achilles rupture, left     Acute hematogenous osteomyelitis of right foot     Anxiety 2/5/2024    Arthritis 1/1/25    Calcaneal gait     Depression 2/5/25    Hypertension     Low back pain 4/20/24    Subacute osteomyelitis of right foot     Wound of left foot        No Known Allergies    Past Surgical History:   Procedure Laterality Date    COLONOSCOPY N/A 7/9/2025    Procedure: COLONOSCOPY WITH ANESTHESIA;  Surgeon: Vin Pearl MD;  Location: [facility] ENDOSCOPY;  Service: Gastroenterology;  Laterality: N/A;  pre screen  post polyp  Dr. De Souza    INCISION AND DRAINAGE OF WOUND Left 02/10/2024    Procedure: INCISION AND DRAINAGE  LEFT ANKLE,BIOPSY LEFT ANKEL;  Surgeon: Alvarez Beck DPM;  Location: [facility] OR;  Service: Podiatry;  Laterality: Left;    TONSILLECTOMY      WOUND DEBRIDEMENT Left 02/14/2024    Procedure: INCISION AND DEBRIDEMENT WOUND OF LEFT ANKLE;  Surgeon: Alvarez Beck DPM;  Location: [facility] OR;  Service: Podiatry;  Laterality: Left;       Family History   Problem Relation Age of Onset    Breast cancer Mother 40    Colon cancer Mother 55    Hypertension Father     Diabetes Father     Colon polyps Neg Hx        Social History     Socioeconomic  History    Marital status:    Tobacco Use    Smoking status: Every Day     Current packs/day: 1.00     Average packs/day: 1 pack/day for 31.6 years (31.6 ttl pk-yrs)     Types: Cigarettes     Start date: 1/1/1994     Passive exposure: Never    Smokeless tobacco: Never   Vaping Use    Vaping status: Never Used   Substance and Sexual Activity    Alcohol use: Not Currently     Alcohol/week: 3.0 standard drinks of alcohol     Types: 3 Cans of beer per week    Drug use: Yes     Types: Marijuana    Sexual activity: Yes     Partners: Female           Objective   Physical Exam  Vitals and nursing note reviewed.   Constitutional:       Appearance: He is well-developed.   HENT:      Head: Normocephalic and atraumatic.      Mouth/Throat:      Mouth: Mucous membranes are moist.   Eyes:      Extraocular Movements: Extraocular movements intact.      Pupils: Pupils are equal, round, and reactive to light.   Cardiovascular:      Rate and Rhythm: Normal rate and regular rhythm.   Abdominal:      General: Bowel sounds are normal.      Palpations: Abdomen is soft.      Tenderness: There is generalized abdominal tenderness. There is no guarding.   Skin:     General: Skin is warm and dry.   Neurological:      General: No focal deficit present.      Mental Status: He is alert and oriented to person, place, and time.   Psychiatric:         Mood and Affect: Mood normal.         Behavior: Behavior normal.         Procedures           ED Course                                                     Lab Results (last 24 hours)       Procedure Component Value Units Date/Time    CBC & Differential [[id number]]  (Abnormal) Collected: 07/27/25 1403    Specimen: Blood Updated: 07/27/25 1413    Narrative:      The following orders were created for panel order CBC & Differential.  Procedure                               Abnormality         Status                     ---------                               -----------         ------                      CBC Auto Differential[[id number]]        Abnormal            Final result                 Please view results for these tests on the individual orders.    Comprehensive Metabolic Panel [[id number]]  (Abnormal) Collected: 07/27/25 1403    Specimen: Blood Updated: 07/27/25 1430     Glucose 163 mg/dL      BUN 12.6 mg/dL      Creatinine 0.96 mg/dL      Sodium 139 mmol/L      Potassium 4.3 mmol/L      Chloride 103 mmol/L      CO2 18.0 mmol/L      Calcium 10.1 mg/dL      Total Protein 8.2 g/dL      Albumin 4.6 g/dL      ALT (SGPT) 14 U/L      AST (SGOT) 20 U/L      Alkaline Phosphatase 130 U/L      Total Bilirubin 0.6 mg/dL      Globulin 3.6 gm/dL      A/G Ratio 1.3 g/dL      BUN/Creatinine Ratio 13.1     Anion Gap 18.0 mmol/L      eGFR 95.7 mL/min/1.73     Narrative:      GFR Categories in Chronic Kidney Disease (CKD)              GFR Category          GFR (mL/min/1.73)    Interpretation  G1                    90 or greater        Normal or high (1)  G2                    60-89                Mild decrease (1)  G3a                   45-59                Mild to moderate decrease  G3b                   30-44                Moderate to severe decrease  G4                    15-29                Severe decrease  G5                    14 or less           Kidney failure    (1)In the absence of evidence of kidney disease, neither GFR category G1 or G2 fulfill the criteria for CKD.    eGFR calculation 2021 CKD-EPI creatinine equation, which does not include race as a factor    Protime-INR [[id number]]  (Normal) Collected: 07/27/25 1403    Specimen: Blood Updated: 07/27/25 1422     Protime 14.1 Seconds      INR 1.03    aPTT [[id number]]  (Normal) Collected: 07/27/25 1403    Specimen: Blood Updated: 07/27/25 1422     PTT 31.8 seconds     Narrative:      PTT = The equivalent PTT values for the therapeutic range of heparin levels at 0.3 to 0.7 U/ml are 77 - 99 seconds.    Lipase [[id number]]  (Normal) Collected: 07/27/25 1403     Specimen: Blood Updated: 07/27/25 1425     Lipase 45 U/L     Lactic Acid, Plasma [[id number]]  (Abnormal) Collected: 07/27/25 1403    Specimen: Blood Updated: 07/27/25 1432     Lactate 2.1 mmol/L     Magnesium [[id number]]  (Normal) Collected: 07/27/25 1403    Specimen: Blood Updated: 07/27/25 1430     Magnesium 1.8 mg/dL     CBC Auto Differential [[id number]]  (Abnormal) Collected: 07/27/25 1403    Specimen: Blood Updated: 07/27/25 1413     WBC 11.02 10*3/mm3      RBC 5.34 10*6/mm3      Hemoglobin 15.8 g/dL      Hematocrit 47.2 %      MCV 88.4 fL      MCH 29.6 pg      MCHC 33.5 g/dL      RDW 13.6 %      RDW-SD 43.9 fl      MPV 12.2 fL      Platelets 150 10*3/mm3      Neutrophil % 81.8 %      Lymphocyte % 12.3 %      Monocyte % 4.4 %      Eosinophil % 0.8 %      Basophil % 0.4 %      Immature Grans % 0.3 %      Neutrophils, Absolute 9.02 10*3/mm3      Lymphocytes, Absolute 1.35 10*3/mm3      Monocytes, Absolute 0.49 10*3/mm3      Eosinophils, Absolute 0.09 10*3/mm3      Basophils, Absolute 0.04 10*3/mm3      Immature Grans, Absolute 0.03 10*3/mm3      nRBC 0.0 /100 WBC           CT Abdomen Pelvis With Contrast   Final Result   1. Prostatic hypertrophy and mild wall thickening of the urinary bladder   likely combination of partial outflow tract obstruction and incomplete   distention.   2. Symmetric perinephric stranding may be physiologic and is nonspecific   however infectious etiology is not excluded. Clinical correlation is   recommended. There is a small right renal cyst.   3. Fatty infiltrated liver. Gallbladder and bile ducts appear grossly   unremarkable.           This report was signed and finalized on 7/27/2025 3:01 PM by Gama Washington.            Medications   sodium chloride 0.9 % flush 10 mL (has no administration in time range)   iopamidol (ISOVUE-300) 61 % injection 100 mL (100 mL Intravenous Given 7/27/25 1447)   morphine injection 4 mg (4 mg Intravenous Given 7/27/25 1627)       Medical Decision  Making  I had a discussion with the patient/family regarding diagnosis, diagnostic results, treatment plan, and medications.  The patient/family indicated understanding of these instructions.  I spent adequate time at the bedside prior to discharge necessary to discuss the aftercare instructions, giving patient education, providing explanations of the results of our evaluations/findings, and my decision making to assure that the patient/family understand the plan of care.  Time was allotted to answer questions at that time and throughout the ED course.  Emphasis was placed on timely follow-up after discharge.  I also discussed the potential for the development of an acute emergent condition requiring further evaluation, return to the ER, admission, or even surgical intervention. I discussed that we found nothing during the visit today indicating the need for further ER workup at this time, admission to the hospital, or the presence of an acute unstable medical condition.  I encouraged the patient to return to the emergency department immediately for ANY concerns, worsening, new complaints, or if symptoms persist and unable to seek follow-up in a timely fashion.  The patient/family expressed understanding and agreement with this plan.      Problems Addressed:  Generalized abdominal pain: complicated acute illness or injury    Amount and/or Complexity of Data Reviewed  External Data Reviewed: labs, radiology and notes.  Labs: ordered. Decision-making details documented in ED Course.  Radiology: ordered. Decision-making details documented in ED Course.    Risk  Prescription drug management.        Final diagnoses:   Generalized abdominal pain       ED Disposition  ED Disposition       ED Disposition   Discharge    Condition   Stable    Comment   --               JOSE De Souza MD  [address]  [address]  [place] [facility] [address]  [phone]    Schedule an appointment as soon as possible for a visit        [facility] EMERGENCY DEPARTMENT  [address]  [facility] [address] [phone]    As needed, If symptoms worsen         Medication List        New Prescriptions      ondansetron ODT 4 MG disintegrating tablet  Commonly known as: ZOFRAN-ODT  Place 1 tablet on the tongue Every 8 (Eight) Hours As Needed for Nausea or Vomiting.               Where to Get Your Medications        These medications [facility] to [id number] - [place], [place] - [address] - [phone]  - [phone] FX  [address] [address]      Phone: [phone]   ondansetron ODT 4 MG disintegrating tablet            Andrei Mckeon MD  07/27/25 8100

## 2025-07-30 ENCOUNTER — TELEPHONE (OUTPATIENT)
Dept: INTERNAL MEDICINE | Facility: CLINIC | Age: 50
End: 2025-07-30
Payer: COMMERCIAL

## 2025-07-30 ENCOUNTER — HOSPITAL ENCOUNTER (EMERGENCY)
Facility: HOSPITAL | Age: 50
Discharge: HOME | End: 2025-07-30
Admitting: EMERGENCY MEDICINE
Payer: COMMERCIAL

## 2025-07-30 ENCOUNTER — APPOINTMENT (OUTPATIENT)
Dept: CT IMAGING | Facility: HOSPITAL | Age: 50
End: 2025-07-30
Payer: COMMERCIAL

## 2025-07-30 VITALS
HEART RATE: 57 BPM | DIASTOLIC BLOOD PRESSURE: 88 MMHG | TEMPERATURE: 98.7 F | RESPIRATION RATE: 15 BRPM | WEIGHT: 223 LBS | SYSTOLIC BLOOD PRESSURE: 169 MMHG | OXYGEN SATURATION: 99 % | BODY MASS INDEX: 35.84 KG/M2 | HEIGHT: 66 IN

## 2025-07-30 DIAGNOSIS — F12.90 CANNABINOID HYPEREMESIS SYNDROME: ICD-10-CM

## 2025-07-30 DIAGNOSIS — R11.2 NAUSEA AND VOMITING, UNSPECIFIED VOMITING TYPE: Primary | ICD-10-CM

## 2025-07-30 DIAGNOSIS — R11.2 CANNABINOID HYPEREMESIS SYNDROME: ICD-10-CM

## 2025-07-30 LAB
ALBUMIN SERPL-MCNC: 4.4 G/DL (ref 3.5–5.2)
ALBUMIN/GLOB SERPL: 1.3 G/DL
ALP SERPL-CCNC: 114 U/L (ref 39–117)
ALT SERPL W P-5'-P-CCNC: 21 U/L (ref 1–41)
AMPHET+METHAMPHET UR QL: NEGATIVE
AMPHETAMINES UR QL: NEGATIVE
ANION GAP SERPL CALCULATED.3IONS-SCNC: 12 MMOL/L (ref 5–15)
AST SERPL-CCNC: 26 U/L (ref 1–40)
BARBITURATES UR QL SCN: NEGATIVE
BASOPHILS # BLD AUTO: 0.06 10*3/MM3 (ref 0–0.2)
BASOPHILS NFR BLD AUTO: 0.6 % (ref 0–1.5)
BENZODIAZ UR QL SCN: NEGATIVE
BILIRUB SERPL-MCNC: 0.6 MG/DL (ref 0–1.2)
BILIRUB UR QL STRIP: NEGATIVE
BUN SERPL-MCNC: 27 MG/DL (ref 6–20)
BUN/CREAT SERPL: 17.4 (ref 7–25)
BUPRENORPHINE SERPL-MCNC: NEGATIVE NG/ML
CALCIUM SPEC-SCNC: 9.7 MG/DL (ref 8.6–10.5)
CANNABINOIDS SERPL QL: POSITIVE
CHLORIDE SERPL-SCNC: 100 MMOL/L (ref 98–107)
CK SERPL-CCNC: 116 U/L (ref 20–200)
CLARITY UR: CLEAR
CO2 SERPL-SCNC: 22 MMOL/L (ref 22–29)
COCAINE UR QL: NEGATIVE
COLOR UR: YELLOW
CREAT SERPL-MCNC: 1.55 MG/DL (ref 0.76–1.27)
D-LACTATE SERPL-SCNC: 1.5 MMOL/L (ref 0.5–2)
DEPRECATED RDW RBC AUTO: 44.3 FL (ref 37–54)
EGFRCR SERPLBLD CKD-EPI 2021: 53.9 ML/MIN/1.73
EOSINOPHIL # BLD AUTO: 0.26 10*3/MM3 (ref 0–0.4)
EOSINOPHIL NFR BLD AUTO: 2.6 % (ref 0.3–6.2)
ERYTHROCYTE [DISTWIDTH] IN BLOOD BY AUTOMATED COUNT: 13.5 % (ref 12.3–15.4)
FENTANYL UR-MCNC: NEGATIVE NG/ML
GLOBULIN UR ELPH-MCNC: 3.3 GM/DL
GLUCOSE SERPL-MCNC: 153 MG/DL (ref 65–99)
GLUCOSE UR STRIP-MCNC: NEGATIVE MG/DL
HCT VFR BLD AUTO: 46.8 % (ref 37.5–51)
HGB BLD-MCNC: 15.6 G/DL (ref 13–17.7)
HGB UR QL STRIP.AUTO: NEGATIVE
IMM GRANULOCYTES # BLD AUTO: 0.04 10*3/MM3 (ref 0–0.05)
IMM GRANULOCYTES NFR BLD AUTO: 0.4 % (ref 0–0.5)
KETONES UR QL STRIP: ABNORMAL
LEUKOCYTE ESTERASE UR QL STRIP.AUTO: NEGATIVE
LIPASE SERPL-CCNC: 153 U/L (ref 13–60)
LYMPHOCYTES # BLD AUTO: 1.9 10*3/MM3 (ref 0.7–3.1)
LYMPHOCYTES NFR BLD AUTO: 19.1 % (ref 19.6–45.3)
MAGNESIUM SERPL-MCNC: 2.1 MG/DL (ref 1.6–2.6)
MCH RBC QN AUTO: 29.7 PG (ref 26.6–33)
MCHC RBC AUTO-ENTMCNC: 33.3 G/DL (ref 31.5–35.7)
MCV RBC AUTO: 89 FL (ref 79–97)
METHADONE UR QL SCN: NEGATIVE
MONOCYTES # BLD AUTO: 0.74 10*3/MM3 (ref 0.1–0.9)
MONOCYTES NFR BLD AUTO: 7.4 % (ref 5–12)
NEUTROPHILS NFR BLD AUTO: 6.96 10*3/MM3 (ref 1.7–7)
NEUTROPHILS NFR BLD AUTO: 69.9 % (ref 42.7–76)
NITRITE UR QL STRIP: NEGATIVE
NRBC BLD AUTO-RTO: 0 /100 WBC (ref 0–0.2)
OPIATES UR QL: POSITIVE
OXYCODONE UR QL SCN: NEGATIVE
PCP UR QL SCN: NEGATIVE
PH UR STRIP.AUTO: 7.5 [PH] (ref 5–8)
PLATELET # BLD AUTO: 138 10*3/MM3 (ref 140–450)
PMV BLD AUTO: 11.7 FL (ref 6–12)
POTASSIUM SERPL-SCNC: 4.4 MMOL/L (ref 3.5–5.2)
PROT SERPL-MCNC: 7.7 G/DL (ref 6–8.5)
PROT UR QL STRIP: ABNORMAL
RBC # BLD AUTO: 5.26 10*6/MM3 (ref 4.14–5.8)
SODIUM SERPL-SCNC: 134 MMOL/L (ref 136–145)
SP GR UR STRIP: >1.03 (ref 1–1.03)
TRICYCLICS UR QL SCN: NEGATIVE
UROBILINOGEN UR QL STRIP: ABNORMAL
WBC NRBC COR # BLD AUTO: 9.96 10*3/MM3 (ref 3.4–10.8)

## 2025-07-30 PROCEDURE — 25010000002 FAMOTIDINE 10 MG/ML SOLUTION: Performed by: NURSE PRACTITIONER

## 2025-07-30 PROCEDURE — 80053 COMPREHEN METABOLIC PANEL: CPT | Performed by: NURSE PRACTITIONER

## 2025-07-30 PROCEDURE — 81003 URINALYSIS AUTO W/O SCOPE: CPT | Performed by: NURSE PRACTITIONER

## 2025-07-30 PROCEDURE — 80307 DRUG TEST PRSMV CHEM ANLYZR: CPT | Performed by: NURSE PRACTITIONER

## 2025-07-30 PROCEDURE — 96375 TX/PRO/DX INJ NEW DRUG ADDON: CPT

## 2025-07-30 PROCEDURE — 83735 ASSAY OF MAGNESIUM: CPT | Performed by: NURSE PRACTITIONER

## 2025-07-30 PROCEDURE — 74177 CT ABD & PELVIS W/CONTRAST: CPT

## 2025-07-30 PROCEDURE — 83690 ASSAY OF LIPASE: CPT | Performed by: NURSE PRACTITIONER

## 2025-07-30 PROCEDURE — 82550 ASSAY OF CK (CPK): CPT | Performed by: NURSE PRACTITIONER

## 2025-07-30 PROCEDURE — 83605 ASSAY OF LACTIC ACID: CPT | Performed by: NURSE PRACTITIONER

## 2025-07-30 PROCEDURE — 25510000001 IOPAMIDOL 61 % SOLUTION: Performed by: NURSE PRACTITIONER

## 2025-07-30 PROCEDURE — 25810000003 SODIUM CHLORIDE 0.9 % SOLUTION: Performed by: NURSE PRACTITIONER

## 2025-07-30 PROCEDURE — 99285 EMERGENCY DEPT VISIT HI MDM: CPT

## 2025-07-30 PROCEDURE — 96374 THER/PROPH/DIAG INJ IV PUSH: CPT

## 2025-07-30 PROCEDURE — 25010000002 PROCHLORPERAZINE 10 MG/2ML SOLUTION: Performed by: NURSE PRACTITIONER

## 2025-07-30 PROCEDURE — 25010000002 DIPHENHYDRAMINE PER 50 MG: Performed by: NURSE PRACTITIONER

## 2025-07-30 PROCEDURE — 85025 COMPLETE CBC W/AUTO DIFF WBC: CPT | Performed by: NURSE PRACTITIONER

## 2025-07-30 RX ORDER — FAMOTIDINE 10 MG/ML
20 INJECTION, SOLUTION INTRAVENOUS ONCE
Status: COMPLETED | OUTPATIENT
Start: 2025-07-30 | End: 2025-07-30

## 2025-07-30 RX ORDER — IOPAMIDOL 612 MG/ML
100 INJECTION, SOLUTION INTRAVASCULAR
Status: COMPLETED | OUTPATIENT
Start: 2025-07-30 | End: 2025-07-30

## 2025-07-30 RX ORDER — PROCHLORPERAZINE MALEATE 10 MG
10 TABLET ORAL EVERY 6 HOURS PRN
Qty: 15 TABLET | Refills: 0 | Status: SHIPPED | OUTPATIENT
Start: 2025-07-30

## 2025-07-30 RX ORDER — DIPHENHYDRAMINE HYDROCHLORIDE 50 MG/ML
50 INJECTION, SOLUTION INTRAMUSCULAR; INTRAVENOUS ONCE
Status: COMPLETED | OUTPATIENT
Start: 2025-07-30 | End: 2025-07-30

## 2025-07-30 RX ORDER — LABETALOL HYDROCHLORIDE 5 MG/ML
20 INJECTION, SOLUTION INTRAVENOUS ONCE
Status: DISCONTINUED | OUTPATIENT
Start: 2025-07-30 | End: 2025-07-30 | Stop reason: HOSPADM

## 2025-07-30 RX ORDER — PROCHLORPERAZINE EDISYLATE 5 MG/ML
10 INJECTION INTRAMUSCULAR; INTRAVENOUS ONCE
Status: COMPLETED | OUTPATIENT
Start: 2025-07-30 | End: 2025-07-30

## 2025-07-30 RX ADMIN — FAMOTIDINE 20 MG: 10 INJECTION INTRAVENOUS at 09:34

## 2025-07-30 RX ADMIN — SODIUM CHLORIDE 1000 ML: 9 INJECTION, SOLUTION INTRAVENOUS at 09:28

## 2025-07-30 RX ADMIN — PROCHLORPERAZINE EDISYLATE 10 MG: 5 INJECTION INTRAMUSCULAR; INTRAVENOUS at 09:33

## 2025-07-30 RX ADMIN — IOPAMIDOL 100 ML: 612 INJECTION, SOLUTION INTRAVENOUS at 09:49

## 2025-07-30 RX ADMIN — DIPHENHYDRAMINE HYDROCHLORIDE 50 MG: 50 INJECTION INTRAMUSCULAR; INTRAVENOUS at 09:28

## 2025-07-30 NOTE — DISCHARGE INSTRUCTIONS
Return to ER if symptoms worsen   Increase oral fluids   Follow up with primary care provider this week

## 2025-07-30 NOTE — ED PROVIDER NOTES
Subjective   History of Present Illness  Patient is a 49-year-old male presents the emergency department with abdominal pain, nausea and vomiting for the past 6 days.  Patient was seen in the emergency department on July 27 with same symptoms.  Had negative CT at that time.  He has had a recent colonoscopy which showed some precancerous polyps near the appendix and he is seeing a general surgeon for this and is scheduled to have his appendix removed in about 2 weeks since these polyps are near the appendix.  He states that he was sent home with Zofran after his ER visit here on July 27.  His last dose of Zofran was around 7:00 this morning.  He states he is continue to have vomiting.  He is urinated once today.  He has a history of anxiety osteomyelitis of the right ankle arthritis depression hypertension low back pain.  He states he has not had anything to eat or drink within the last 2 days.  He denies any fever.  He states he has not taken any of his medications in the last few days since he has been vomiting.  He denies any diarrhea.    History provided by:  Patient   used: No        Review of Systems   Constitutional: Negative.    HENT: Negative.     Eyes: Negative.    Respiratory: Negative.     Cardiovascular: Negative.    Gastrointestinal:         Patient is a 49-year-old male presents the emergency department with abdominal pain, nausea and vomiting for the past 6 days.  Patient was seen in the emergency department on July 27 with same symptoms.  Had negative CT at that time.  He has had a recent colonoscopy which showed some precancerous polyps near the appendix and he is seeing a general surgeon for this and is scheduled to have his appendix removed in about 2 weeks since these polyps are near the appendix.  He states that he was sent home with Zofran after his ER visit here on July 27.  His last dose of Zofran was around 7:00 this morning.  He states he is continue to have vomiting.   He is urinated once today.  He has a history of anxiety osteomyelitis of the right ankle arthritis depression hypertension low back pain.  He states he has not had anything to eat or drink within the last 2 days.  He denies any fever.  He states he has not taken any of his medications in the last few days since he has been vomiting.  He denies any diarrhea.     Endocrine: Negative.    Genitourinary: Negative.    Musculoskeletal: Negative.    Skin: Negative.    Allergic/Immunologic: Negative.    Neurological: Negative.    Hematological: Negative.    Psychiatric/Behavioral: Negative.     All other systems reviewed and are negative.      Past Medical History:   Diagnosis Date    Achilles rupture, left     Acute hematogenous osteomyelitis of right foot     Anxiety 2/5/2024    Arthritis 1/1/25    Calcaneal gait     Depression 2/5/25    Hypertension     Low back pain 4/20/24    Subacute osteomyelitis of right foot     Wound of left foot        No Known Allergies    Past Surgical History:   Procedure Laterality Date    COLONOSCOPY N/A 7/9/2025    Procedure: COLONOSCOPY WITH ANESTHESIA;  Surgeon: Vin Pearl MD;  Location: [facility] ENDOSCOPY;  Service: Gastroenterology;  Laterality: N/A;  pre screen  post polyp  Dr. De Souza    INCISION AND DRAINAGE OF WOUND Left 02/10/2024    Procedure: INCISION AND DRAINAGE  LEFT ANKLE,BIOPSY LEFT ANKEL;  Surgeon: Alvarez Beck DPM;  Location: [facility];  Service: Podiatry;  Laterality: Left;    TONSILLECTOMY      WOUND DEBRIDEMENT Left 02/14/2024    Procedure: INCISION AND DEBRIDEMENT WOUND OF LEFT ANKLE;  Surgeon: Alvarez Beck DPM;  Location: [facility] OR;  Service: Podiatry;  Laterality: Left;       Family History   Problem Relation Age of Onset    Breast cancer Mother 40    Colon cancer Mother 55    Hypertension Father     Diabetes Father     Colon polyps Neg Hx        Social History     Socioeconomic History    Marital status:    Tobacco Use     "Smoking status: Every Day     Current packs/day: 1.00     Average packs/day: 1 pack/day for 31.6 years (31.6 ttl pk-yrs)     Types: Cigarettes     Start date: 1/1/1994     Passive exposure: Never    Smokeless tobacco: Never   Vaping Use    Vaping status: Never Used   Substance and Sexual Activity    Alcohol use: Not Currently     Alcohol/week: 3.0 standard drinks of alcohol     Types: 3 Cans of beer per week    Drug use: Yes     Types: Marijuana    Sexual activity: Yes     Partners: Female       Prior to Admission medications    Medication Sig Start Date End Date Taking? Authorizing Provider   atorvastatin (Lipitor) 10 MG tablet Take 1 tablet by mouth Daily. 4/28/25   JOSE De Souza MD   gabapentin (NEURONTIN) 100 MG capsule Take 1 capsule by mouth Every 6 (Six) Hours. 5/21/25   ProviderAdele MD   ibuprofen (ADVIL,MOTRIN) 800 MG tablet Take 1 tablet by mouth Every 6 (Six) Hours As Needed for Mild Pain.  Patient not taking: Reported on 7/17/2025 5/31/24   Luz Nam PA-C   lisinopril (PRINIVIL,ZESTRIL) 40 MG tablet Take 1 tablet by mouth Daily. 4/25/25   JOSE De Souza MD   meloxicam (MOBIC) 15 MG tablet Take 1 tablet by mouth Daily. 5/15/25   ProviderAdele MD   ondansetron ODT (ZOFRAN-ODT) 4 MG disintegrating tablet Place 1 tablet on the tongue Every 8 (Eight) Hours As Needed for Nausea or Vomiting. 7/27/25   Andrei Mckeon MD   sertraline (ZOLOFT) 25 MG tablet TAKE 1 TABLET BY MOUTH DAILY 7/21/25   JOSE De Souza MD       /85   Pulse 57   Temp 98.5 °F (36.9 °C) (Oral)   Resp 22   Ht 167.6 cm (66\")   Wt 101 kg (223 lb)   SpO2 95%   BMI 35.99 kg/m²     Objective   Physical Exam  Vitals and nursing note reviewed.   Constitutional:       Appearance: He is well-developed.      Comments: Nontoxic-appearing.  Patient is dry heaving on exam.   HENT:      Head: Normocephalic and atraumatic.   Eyes:      Conjunctiva/sclera: Conjunctivae normal.      Pupils: Pupils are equal, " round, and reactive to light.   Cardiovascular:      Rate and Rhythm: Normal rate and regular rhythm.      Heart sounds: Normal heart sounds.   Pulmonary:      Effort: Pulmonary effort is normal.      Breath sounds: Normal breath sounds.   Abdominal:      General: Bowel sounds are normal.      Palpations: Abdomen is soft.      Comments: Generalized abdominal tenderness.  No guarding or rebound.   Musculoskeletal:         General: Normal range of motion.      Cervical back: Normal range of motion and neck supple.   Skin:     General: Skin is warm and dry.      Comments: Pallor noted.   Neurological:      Mental Status: He is alert and oriented to person, place, and time.      Deep Tendon Reflexes: Reflexes are normal and symmetric.   Psychiatric:         Behavior: Behavior normal.         Thought Content: Thought content normal.         Judgment: Judgment normal.         Procedures         Lab Results (last 24 hours)       Procedure Component Value Units Date/Time    CBC & Differential [[id number]]  (Abnormal) Collected: 07/30/25 0923    Specimen: Blood Updated: 07/30/25 0932    Narrative:      The following orders were created for panel order CBC & Differential.  Procedure                               Abnormality         Status                     ---------                               -----------         ------                     CBC Auto Differential[[id number]]        Abnormal            Final result                 Please view results for these tests on the individual orders.    Comprehensive Metabolic Panel [[id number]]  (Abnormal) Collected: 07/30/25 0923    Specimen: Blood Updated: 07/30/25 0953     Glucose 153 mg/dL      BUN 27.0 mg/dL      Creatinine 1.55 mg/dL      Sodium 134 mmol/L      Potassium 4.4 mmol/L      Chloride 100 mmol/L      CO2 22.0 mmol/L      Calcium 9.7 mg/dL      Total Protein 7.7 g/dL      Albumin 4.4 g/dL      ALT (SGPT) 21 U/L      AST (SGOT) 26 U/L      Alkaline Phosphatase 114 U/L       Total Bilirubin 0.6 mg/dL      Globulin 3.3 gm/dL      A/G Ratio 1.3 g/dL      BUN/Creatinine Ratio 17.4     Anion Gap 12.0 mmol/L      eGFR 53.9 mL/min/1.73     Narrative:      GFR Categories in Chronic Kidney Disease (CKD)              GFR Category          GFR (mL/min/1.73)    Interpretation  G1                    90 or greater        Normal or high (1)  G2                    60-89                Mild decrease (1)  G3a                   45-59                Mild to moderate decrease  G3b                   30-44                Moderate to severe decrease  G4                    15-29                Severe decrease  G5                    14 or less           Kidney failure    (1)In the absence of evidence of kidney disease, neither GFR category G1 or G2 fulfill the criteria for CKD.    eGFR calculation 2021 CKD-EPI creatinine equation, which does not include race as a factor    Lipase [[id number]]  (Abnormal) Collected: 07/30/25 0923    Specimen: Blood Updated: 07/30/25 0944     Lipase 153 U/L     Lactic Acid, Plasma [[id number]]  (Normal) Collected: 07/30/25 0923    Specimen: Blood Updated: 07/30/25 0945     Lactate 1.5 mmol/L     CK [[id number]]  (Normal) Collected: 07/30/25 0923    Specimen: Blood Updated: 07/30/25 0949     Creatine Kinase 116 U/L     Magnesium [[id number]]  (Normal) Collected: 07/30/25 0923    Specimen: Blood Updated: 07/30/25 0953     Magnesium 2.1 mg/dL     CBC Auto Differential [[id number]]  (Abnormal) Collected: 07/30/25 0923    Specimen: Blood Updated: 07/30/25 0932     WBC 9.96 10*3/mm3      RBC 5.26 10*6/mm3      Hemoglobin 15.6 g/dL      Hematocrit 46.8 %      MCV 89.0 fL      MCH 29.7 pg      MCHC 33.3 g/dL      RDW 13.5 %      RDW-SD 44.3 fl      MPV 11.7 fL      Platelets 138 10*3/mm3      Neutrophil % 69.9 %      Lymphocyte % 19.1 %      Monocyte % 7.4 %      Eosinophil % 2.6 %      Basophil % 0.6 %      Immature Grans % 0.4 %      Neutrophils, Absolute 6.96 10*3/mm3      Lymphocytes,  Absolute 1.90 10*3/mm3      Monocytes, Absolute 0.74 10*3/mm3      Eosinophils, Absolute 0.26 10*3/mm3      Basophils, Absolute 0.06 10*3/mm3      Immature Grans, Absolute 0.04 10*3/mm3      nRBC 0.0 /100 WBC     Urinalysis With Culture If Indicated - Urine, Clean Catch [[id number]]  (Abnormal) Collected: 07/30/25 1121    Specimen: Urine, Clean Catch Updated: 07/30/25 1129     Color, UA Yellow     Appearance, UA Clear     pH, UA 7.5     Specific Gravity, UA >1.030     Glucose, UA Negative     Ketones, UA Trace     Bilirubin, UA Negative     Blood, UA Negative     Protein, UA Trace     Leuk Esterase, UA Negative     Nitrite, UA Negative     Urobilinogen, UA 1.0 E.U./dL    Narrative:      In absence of clinical symptoms, the presence of pyuria, bacteria, and/or nitrites on the urinalysis result does not correlate with infection.  Urine microscopic not indicated.    Urine Drug Screen - Urine, Clean Catch [[id number]]  (Abnormal) Collected: 07/30/25 1121    Specimen: Urine, Clean Catch Updated: 07/30/25 1137     THC, Screen, Urine Positive     Phencyclidine (PCP), Urine Negative     Cocaine Screen, Urine Negative     Methamphetamine, Ur Negative     Opiate Screen Positive     Amphetamine Screen, Urine Negative     Benzodiazepine Screen, Urine Negative     Tricyclic Antidepressants Screen Negative     Methadone Screen, Urine Negative     Barbiturates Screen, Urine Negative     Oxycodone Screen, Urine Negative     Buprenorphine, Screen, Urine Negative    Narrative:      Cutoff For Drugs Screened:    Amphetamines               500 ng/ml  Barbiturates               200 ng/ml  Benzodiazepines            150 ng/ml  Cocaine                    150 ng/ml  Methadone                  200 ng/ml  Opiates                    100 ng/ml  Phencyclidine               25 ng/ml  THC                         50 ng/ml  Methamphetamine            500 ng/ml  Tricyclic Antidepressants  300 ng/ml  Oxycodone                  100 ng/ml  Buprenorphine                10 ng/ml    The normal value for all drugs tested is negative. This report includes unconfirmed screening results, with the cutoff values listed, to be used for medical treatment purposes only.  Unconfirmed results must not be used for non-medical purposes such as employment or legal testing.  Clinical consideration should be applied to any drug of abuse test, particularly when unconfirmed results are used.      Fentanyl, Urine - Urine, Clean Catch [[id number]] Collected: 07/30/25 1121    Specimen: Urine, Clean Catch Updated: 07/30/25 1125            CT Abdomen Pelvis With Contrast   Final Result   1. Stable abdomen/pelvis CT appearance.   2. No acute abnormality.       This report was signed and finalized on 7/30/2025 9:57 AM by Dr. Dallas Alvarado MD.              ED Course  ED Course as of 07/30/25 1149   Wed Jul 30, 2025   1141 Urinalysis shows trace ketones trace of protein magnesium 2.1 CK1 16 lactate 1.5 lipase 153 CMP glucose 153 BUN 27 creatinine 1.55 sodium 134 lipase 153 CBC white count 9.96 hemoglobin 15.6 hematocrit 46.8 CT of the abdomen pelvis is negative for anything acute.  UDS is positive for THC and opiates.  Patient received Pepcid Compazine and Benadryl and a liter of normal saline.  He is feeling much better at this time.  Discussed can cannabinoid hyperemesis syndrome with the patient.  Advised to avoid marijuana usage.  Will send the patient home with Compazine for nausea.  Advised to increase oral fluids.  Patient is in agreement with the care plan he states he is feeling much better at this time.  Patient will follow-up with his primary care provider.  Return to emergency department if symptoms worsen.  Patient be discharged shortly in stable condition. [CW]      ED Course User Index  [CW] Earline Calhoun APRN        Medical Decision Making  Patient is a 49-year-old male presents the emergency department with abdominal pain, nausea and vomiting for the past 6 days.   Patient was seen in the emergency department on July 27 with same symptoms.  Had negative CT at that time.  He has had a recent colonoscopy which showed some precancerous polyps near the appendix and he is seeing a general surgeon for this and is scheduled to have his appendix removed in about 2 weeks since these polyps are near the appendix.  He states that he was sent home with Zofran after his ER visit here on July 27.  His last dose of Zofran was around 7:00 this morning.  He states he is continue to have vomiting.  He is urinated once today.  He has a history of anxiety osteomyelitis of the right ankle arthritis depression hypertension low back pain.  He states he has not had anything to eat or drink within the last 2 days.  He denies any fever.  He states he has not taken any of his medications in the last few days since he has been vomiting.  He denies any diarrhea.  Course of treatment in the ED: Nontoxic-appearing.  No acute distress.  Patient is dry heaving on exam.  Blood pressure 188/105, temp 98.5, heart rate 84, respirations 18, O2 sats 98% on room air.  Lungs clear to auscultation.  CV normal sinus rhythm.  Abdomen is soft, nondistended.  Patient has generalized abdominal tenderness.  No guarding or rebound.  Mild pallor noted have ordered laboratory studies, CT of the abdomen pelvis, Compazine, Benadryl, IV fluid bolus, Pepcid, UDS  Differential diagnosis to include but not limited to: appendicitis; cholecystitis; sanjay; volume depletion; electrolyte imbalance; pyelonephritis; uti; bowel perforation, and other   Labs Reviewed  COMPREHENSIVE METABOLIC PANEL - Abnormal; Notable for the following components:     Glucose                       153 (*)                BUN                           27.0 (*)               Creatinine                    1.55 (*)               Sodium                        134 (*)                eGFR                          53.9 (*)            All other components within normal  limits         Narrative: GFR Categories in Chronic Kidney Disease (CKD)                                              GFR Category          GFR (mL/min/1.73)    Interpretation                  G1                    90 or greater        Normal or high (1)                  G2                    60-89                Mild decrease (1)                  G3a                   45-59                Mild to moderate decrease                  G3b                   30-44                Moderate to severe decrease                  G4                    15-29                Severe decrease                  G5                    14 or less           Kidney failure                                    (1)In the absence of evidence of kidney disease, neither GFR category G1 or G2 fulfill the criteria for CKD.                                    eGFR calculation 2021 CKD-EPI creatinine equation, which does not include race as a factor  LIPASE - Abnormal; Notable for the following components:     Lipase                        153 (*)             All other components within normal limits  URINALYSIS W/ CULTURE IF INDICATED - Abnormal; Notable for the following components:     Specific Gravity, UA          >1.030 (*)               Ketones, UA                   Trace (*)               Protein, UA                   Trace (*)            All other components within normal limits         Narrative: In absence of clinical symptoms, the presence of pyuria, bacteria, and/or nitrites on the urinalysis result does not correlate with infection.                  Urine microscopic not indicated.  URINE DRUG SCREEN - Abnormal; Notable for the following components:     THC, Screen, Urine            Positive (*)               Opiate Screen                 Positive (*)            All other components within normal limits         Narrative: Cutoff For Drugs Screened:                                    Amphetamines               500 ng/ml                   Barbiturates               200 ng/ml                  Benzodiazepines            150 ng/ml                  Cocaine                    150 ng/ml                  Methadone                  200 ng/ml                  Opiates                    100 ng/ml                  Phencyclidine               25 ng/ml                  THC                         50 ng/ml                  Methamphetamine            500 ng/ml                  Tricyclic Antidepressants  300 ng/ml                  Oxycodone                  100 ng/ml                  Buprenorphine               10 ng/ml                                    The normal value for all drugs tested is negative. This report includes unconfirmed screening results, with the cutoff values listed, to be used for medical treatment purposes only.  Unconfirmed results must not be used for non-medical purposes such as employment or legal testing.  Clinical consideration should be applied to any drug of abuse test, particularly when unconfirmed results are used.    CBC WITH AUTO DIFFERENTIAL - Abnormal; Notable for the following components:     Platelets                     138 (*)                Lymphocyte %                  19.1 (*)            All other components within normal limits  LACTIC ACID, PLASMA - Normal  CK - Normal  MAGNESIUM - Normal  FENTANYL, URINE  CBC AND DIFFERENTIAL  CT Abdomen Pelvis With Contrast   Final Result    1. Stable abdomen/pelvis CT appearance.    2. No acute abnormality.         This report was signed and finalized on 7/30/2025 9:57 AM by Dr. Dallas Alvarado MD.          Urinalysis shows trace ketones trace of protein magnesium 2.1 CK1 16 lactate 1.5 lipase 153 CMP glucose 153 BUN 27 creatinine 1.55 sodium 134 lipase 153 CBC white count 9.96 hemoglobin 15.6 hematocrit 46.8 CT of the abdomen pelvis is negative for anything acute.  UDS is positive for THC and opiates.  Patient received Pepcid Compazine and Benadryl and a liter of normal saline.  He is  feeling much better at this time.  Discussed can cannabinoid hyperemesis syndrome with the patient.  Advised to avoid marijuana usage.  Will send the patient home with Compazine for nausea.  Advised to increase oral fluids.  Patient is in agreement with the care plan he states he is feeling much better at this time.  Patient will follow-up with his primary care provider.  Return to emergency department if symptoms worsen.  Patient be discharged shortly in stable condition.      Problems Addressed:  Cannabinoid hyperemesis syndrome: complicated acute illness or injury  Nausea and vomiting, unspecified vomiting type: complicated acute illness or injury    Amount and/or Complexity of Data Reviewed  Labs: ordered. Decision-making details documented in ED Course.  Radiology: ordered. Decision-making details documented in ED Course.    Risk  Prescription drug management.         Final diagnoses:   Nausea and vomiting, unspecified vomiting type   Cannabinoid hyperemesis syndrome          Earline Calhoun, APRN  07/30/25 1149

## 2025-07-30 NOTE — TELEPHONE ENCOUNTER
I would advise that he be evaluated as soon as possible today either at urgent care or the emergency room.  Thanks

## 2025-07-30 NOTE — TELEPHONE ENCOUNTER
Call transferred to this writer who spoke to spouse. She reports patient is vomiting since Friday. He is scheduled for surgery on 8/6/25 and currently at the hospital for Pre-op. Patient was heard vomiting this morning. Spouse reports he has a lot of gas in the morning and then will get sick. He is able to take small sips and drinks. Wondering what else they will need to do to help patient.    This writer advised spouse that he may need to report back to ED for evaluation and possible fluids.

## 2025-07-30 NOTE — TELEPHONE ENCOUNTER
Wife (Adriana) called stating patient is vomiting and everything she gives her  Karsten is not working.

## 2025-08-05 ENCOUNTER — TELEPHONE (OUTPATIENT)
Dept: SURGERY | Facility: CLINIC | Age: 50
End: 2025-08-05
Payer: COMMERCIAL

## 2025-08-05 ENCOUNTER — PRE-ADMISSION TESTING (OUTPATIENT)
Dept: PREADMISSION TESTING | Facility: HOSPITAL | Age: 50
End: 2025-08-05
Payer: COMMERCIAL

## 2025-08-05 ENCOUNTER — HOSPITAL ENCOUNTER (OUTPATIENT)
Dept: GENERAL RADIOLOGY | Facility: HOSPITAL | Age: 50
Discharge: HOME | End: 2025-08-05
Payer: COMMERCIAL

## 2025-08-05 VITALS
SYSTOLIC BLOOD PRESSURE: 185 MMHG | DIASTOLIC BLOOD PRESSURE: 103 MMHG | RESPIRATION RATE: 16 BRPM | OXYGEN SATURATION: 97 % | HEIGHT: 67 IN | WEIGHT: 226.19 LBS | HEART RATE: 55 BPM | BODY MASS INDEX: 35.5 KG/M2

## 2025-08-05 PROCEDURE — 93005 ELECTROCARDIOGRAM TRACING: CPT

## 2025-08-05 PROCEDURE — 71045 X-RAY EXAM CHEST 1 VIEW: CPT

## 2025-08-06 ENCOUNTER — ANESTHESIA EVENT (OUTPATIENT)
Dept: PERIOP | Facility: HOSPITAL | Age: 50
End: 2025-08-06
Payer: COMMERCIAL

## 2025-08-06 ENCOUNTER — HOSPITAL ENCOUNTER (OUTPATIENT)
Facility: HOSPITAL | Age: 50
Setting detail: HOSPITAL OUTPATIENT SURGERY
Discharge: HOME | End: 2025-08-06
Attending: STUDENT IN AN ORGANIZED HEALTH CARE EDUCATION/TRAINING PROGRAM | Admitting: STUDENT IN AN ORGANIZED HEALTH CARE EDUCATION/TRAINING PROGRAM
Payer: COMMERCIAL

## 2025-08-06 ENCOUNTER — ANESTHESIA (OUTPATIENT)
Dept: PERIOP | Facility: HOSPITAL | Age: 50
End: 2025-08-06
Payer: COMMERCIAL

## 2025-08-06 VITALS
RESPIRATION RATE: 16 BRPM | SYSTOLIC BLOOD PRESSURE: 136 MMHG | TEMPERATURE: 97 F | DIASTOLIC BLOOD PRESSURE: 87 MMHG | HEART RATE: 61 BPM | OXYGEN SATURATION: 92 %

## 2025-08-06 DIAGNOSIS — D12.1 ADENOMATOUS POLYP OF APPENDIX: ICD-10-CM

## 2025-08-06 LAB
GLUCOSE BLDC GLUCOMTR-MCNC: 154 MG/DL (ref 70–130)
GLUCOSE BLDC GLUCOMTR-MCNC: 88 MG/DL (ref 70–130)
QT INTERVAL: 432 MS
QTC INTERVAL: 420 MS

## 2025-08-06 PROCEDURE — 25010000002 LIDOCAINE PF 2% 2 % SOLUTION: Performed by: NURSE ANESTHETIST, CERTIFIED REGISTERED

## 2025-08-06 PROCEDURE — 82948 REAGENT STRIP/BLOOD GLUCOSE: CPT | Performed by: ANESTHESIOLOGY

## 2025-08-06 PROCEDURE — 88304 TISSUE EXAM BY PATHOLOGIST: CPT | Performed by: STUDENT IN AN ORGANIZED HEALTH CARE EDUCATION/TRAINING PROGRAM

## 2025-08-06 PROCEDURE — 25810000003 LACTATED RINGERS PER 1000 ML: Performed by: STUDENT IN AN ORGANIZED HEALTH CARE EDUCATION/TRAINING PROGRAM

## 2025-08-06 PROCEDURE — 88332 PATH CONSLTJ SURG EA ADD BLK: CPT | Performed by: PATHOLOGY

## 2025-08-06 PROCEDURE — S2900 ROBOTIC SURGICAL SYSTEM: HCPCS | Performed by: STUDENT IN AN ORGANIZED HEALTH CARE EDUCATION/TRAINING PROGRAM

## 2025-08-06 PROCEDURE — 25010000002 FENTANYL CITRATE (PF) 100 MCG/2ML SOLUTION: Performed by: NURSE ANESTHETIST, CERTIFIED REGISTERED

## 2025-08-06 PROCEDURE — 25010000002 GLYCOPYRROLATE 0.4 MG/2ML SOLUTION: Performed by: NURSE ANESTHETIST, CERTIFIED REGISTERED

## 2025-08-06 PROCEDURE — 25010000002 ONDANSETRON PER 1 MG: Performed by: NURSE ANESTHETIST, CERTIFIED REGISTERED

## 2025-08-06 PROCEDURE — 44970 LAPAROSCOPY APPENDECTOMY: CPT | Performed by: STUDENT IN AN ORGANIZED HEALTH CARE EDUCATION/TRAINING PROGRAM

## 2025-08-06 PROCEDURE — 25010000002 MIDAZOLAM PER 1MG: Performed by: ANESTHESIOLOGY

## 2025-08-06 PROCEDURE — 25010000002 PROPOFOL 10 MG/ML EMULSION: Performed by: NURSE ANESTHETIST, CERTIFIED REGISTERED

## 2025-08-06 PROCEDURE — 25010000002 ONDANSETRON PER 1 MG: Performed by: ANESTHESIOLOGY

## 2025-08-06 PROCEDURE — 25010000002 SUGAMMADEX 200 MG/2ML SOLUTION: Performed by: NURSE ANESTHETIST, CERTIFIED REGISTERED

## 2025-08-06 PROCEDURE — 82948 REAGENT STRIP/BLOOD GLUCOSE: CPT

## 2025-08-06 PROCEDURE — 88331 PATH CONSLTJ SURG 1 BLK 1SPC: CPT | Performed by: PATHOLOGY

## 2025-08-06 PROCEDURE — 25010000002 CEFAZOLIN PER 500 MG: Performed by: STUDENT IN AN ORGANIZED HEALTH CARE EDUCATION/TRAINING PROGRAM

## 2025-08-06 PROCEDURE — 25010000002 BUPIVACAINE 0.5 % SOLUTION 50 ML VIAL: Performed by: STUDENT IN AN ORGANIZED HEALTH CARE EDUCATION/TRAINING PROGRAM

## 2025-08-06 PROCEDURE — 25010000002 LIDOCAINE 1% - EPINEPHRINE 1:100000 1 %-1:100000 SOLUTION 10 ML VIAL: Performed by: STUDENT IN AN ORGANIZED HEALTH CARE EDUCATION/TRAINING PROGRAM

## 2025-08-06 PROCEDURE — 25010000002 MORPHINE SULFATE (PF) 2 MG/ML SOLUTION 1 ML CARTRIDGE: Performed by: STUDENT IN AN ORGANIZED HEALTH CARE EDUCATION/TRAINING PROGRAM

## 2025-08-06 PROCEDURE — 25010000002 HYDROMORPHONE 1 MG/ML SOLUTION: Performed by: NURSE ANESTHETIST, CERTIFIED REGISTERED

## 2025-08-06 PROCEDURE — 25010000002 BUPIVACAINE (PF) 0.25 % SOLUTION 30 ML VIAL: Performed by: STUDENT IN AN ORGANIZED HEALTH CARE EDUCATION/TRAINING PROGRAM

## 2025-08-06 PROCEDURE — 25010000002 LIDOCAINE 1 % SOLUTION 20 ML VIAL: Performed by: STUDENT IN AN ORGANIZED HEALTH CARE EDUCATION/TRAINING PROGRAM

## 2025-08-06 PROCEDURE — 25010000002 DEXAMETHASONE PER 1 MG: Performed by: STUDENT IN AN ORGANIZED HEALTH CARE EDUCATION/TRAINING PROGRAM

## 2025-08-06 DEVICE — 8MM STAPLER
Type: IMPLANTABLE DEVICE | Site: ABDOMEN | Status: FUNCTIONAL
Brand: SUREFORM

## 2025-08-06 RX ORDER — ACETAMINOPHEN 500 MG
1000 TABLET ORAL ONCE
Status: COMPLETED | OUTPATIENT
Start: 2025-08-06 | End: 2025-08-06

## 2025-08-06 RX ORDER — SODIUM CHLORIDE 0.9 % (FLUSH) 0.9 %
3-10 SYRINGE (ML) INJECTION AS NEEDED
Status: DISCONTINUED | OUTPATIENT
Start: 2025-08-06 | End: 2025-08-06 | Stop reason: HOSPADM

## 2025-08-06 RX ORDER — IBUPROFEN 600 MG/1
600 TABLET, FILM COATED ORAL EVERY 6 HOURS PRN
Status: DISCONTINUED | OUTPATIENT
Start: 2025-08-06 | End: 2025-08-06 | Stop reason: HOSPADM

## 2025-08-06 RX ORDER — ONDANSETRON 4 MG/1
4 TABLET, ORALLY DISINTEGRATING ORAL EVERY 8 HOURS PRN
Qty: 4 TABLET | Refills: 0 | Status: SHIPPED | OUTPATIENT
Start: 2025-08-06

## 2025-08-06 RX ORDER — FENTANYL CITRATE 50 UG/ML
50 INJECTION, SOLUTION INTRAMUSCULAR; INTRAVENOUS
Status: DISCONTINUED | OUTPATIENT
Start: 2025-08-06 | End: 2025-08-06 | Stop reason: HOSPADM

## 2025-08-06 RX ORDER — NALOXONE HCL 0.4 MG/ML
0.4 VIAL (ML) INJECTION AS NEEDED
Status: DISCONTINUED | OUTPATIENT
Start: 2025-08-06 | End: 2025-08-06 | Stop reason: HOSPADM

## 2025-08-06 RX ORDER — HYDROCODONE BITARTRATE AND ACETAMINOPHEN 10; 325 MG/1; MG/1
1 TABLET ORAL EVERY 4 HOURS PRN
Status: DISCONTINUED | OUTPATIENT
Start: 2025-08-06 | End: 2025-08-06 | Stop reason: HOSPADM

## 2025-08-06 RX ORDER — MAGNESIUM HYDROXIDE 1200 MG/15ML
LIQUID ORAL AS NEEDED
Status: DISCONTINUED | OUTPATIENT
Start: 2025-08-06 | End: 2025-08-06 | Stop reason: HOSPADM

## 2025-08-06 RX ORDER — MIDAZOLAM HYDROCHLORIDE 2 MG/2ML
1 INJECTION, SOLUTION INTRAMUSCULAR; INTRAVENOUS
Status: DISCONTINUED | OUTPATIENT
Start: 2025-08-06 | End: 2025-08-06 | Stop reason: HOSPADM

## 2025-08-06 RX ORDER — PROPOFOL 10 MG/ML
VIAL (ML) INTRAVENOUS AS NEEDED
Status: DISCONTINUED | OUTPATIENT
Start: 2025-08-06 | End: 2025-08-06 | Stop reason: SURG

## 2025-08-06 RX ORDER — FENTANYL CITRATE 50 UG/ML
INJECTION, SOLUTION INTRAMUSCULAR; INTRAVENOUS AS NEEDED
Status: DISCONTINUED | OUTPATIENT
Start: 2025-08-06 | End: 2025-08-06 | Stop reason: SURG

## 2025-08-06 RX ORDER — LIDOCAINE HYDROCHLORIDE 40 MG/ML
SOLUTION TOPICAL AS NEEDED
Status: DISCONTINUED | OUTPATIENT
Start: 2025-08-06 | End: 2025-08-06 | Stop reason: SURG

## 2025-08-06 RX ORDER — OXYCODONE HYDROCHLORIDE 5 MG/1
5 TABLET ORAL EVERY 6 HOURS PRN
Qty: 12 TABLET | Refills: 0 | Status: SHIPPED | OUTPATIENT
Start: 2025-08-06

## 2025-08-06 RX ORDER — SODIUM CHLORIDE 0.9 % (FLUSH) 0.9 %
3 SYRINGE (ML) INJECTION EVERY 12 HOURS SCHEDULED
Status: DISCONTINUED | OUTPATIENT
Start: 2025-08-06 | End: 2025-08-06 | Stop reason: HOSPADM

## 2025-08-06 RX ORDER — SODIUM CHLORIDE, SODIUM LACTATE, POTASSIUM CHLORIDE, CALCIUM CHLORIDE 600; 310; 30; 20 MG/100ML; MG/100ML; MG/100ML; MG/100ML
1000 INJECTION, SOLUTION INTRAVENOUS CONTINUOUS
Status: DISCONTINUED | OUTPATIENT
Start: 2025-08-06 | End: 2025-08-06 | Stop reason: HOSPADM

## 2025-08-06 RX ORDER — HYDROCODONE BITARTRATE AND ACETAMINOPHEN 5; 325 MG/1; MG/1
1 TABLET ORAL EVERY 4 HOURS PRN
Status: DISCONTINUED | OUTPATIENT
Start: 2025-08-06 | End: 2025-08-06 | Stop reason: HOSPADM

## 2025-08-06 RX ORDER — HYDROCODONE BITARTRATE AND ACETAMINOPHEN 7.5; 325 MG/1; MG/1
1 TABLET ORAL ONCE AS NEEDED
Refills: 0 | Status: DISCONTINUED | OUTPATIENT
Start: 2025-08-06 | End: 2025-08-06 | Stop reason: HOSPADM

## 2025-08-06 RX ORDER — GLYCOPYRROLATE 0.2 MG/ML
INJECTION INTRAMUSCULAR; INTRAVENOUS AS NEEDED
Status: DISCONTINUED | OUTPATIENT
Start: 2025-08-06 | End: 2025-08-06 | Stop reason: SURG

## 2025-08-06 RX ORDER — SODIUM CHLORIDE, SODIUM LACTATE, POTASSIUM CHLORIDE, CALCIUM CHLORIDE 600; 310; 30; 20 MG/100ML; MG/100ML; MG/100ML; MG/100ML
75 INJECTION, SOLUTION INTRAVENOUS CONTINUOUS
Status: DISCONTINUED | OUTPATIENT
Start: 2025-08-06 | End: 2025-08-06 | Stop reason: HOSPADM

## 2025-08-06 RX ORDER — LABETALOL HYDROCHLORIDE 5 MG/ML
5 INJECTION, SOLUTION INTRAVENOUS
Status: DISCONTINUED | OUTPATIENT
Start: 2025-08-06 | End: 2025-08-06 | Stop reason: HOSPADM

## 2025-08-06 RX ORDER — LIDOCAINE HYDROCHLORIDE 20 MG/ML
INJECTION, SOLUTION EPIDURAL; INFILTRATION; INTRACAUDAL; PERINEURAL AS NEEDED
Status: DISCONTINUED | OUTPATIENT
Start: 2025-08-06 | End: 2025-08-06 | Stop reason: SURG

## 2025-08-06 RX ORDER — SODIUM CHLORIDE 0.9 % (FLUSH) 0.9 %
10 SYRINGE (ML) INJECTION AS NEEDED
Status: DISCONTINUED | OUTPATIENT
Start: 2025-08-06 | End: 2025-08-06 | Stop reason: HOSPADM

## 2025-08-06 RX ORDER — ROCURONIUM BROMIDE 10 MG/ML
INJECTION, SOLUTION INTRAVENOUS AS NEEDED
Status: DISCONTINUED | OUTPATIENT
Start: 2025-08-06 | End: 2025-08-06 | Stop reason: SURG

## 2025-08-06 RX ORDER — ONDANSETRON 2 MG/ML
4 INJECTION INTRAMUSCULAR; INTRAVENOUS ONCE AS NEEDED
Status: COMPLETED | OUTPATIENT
Start: 2025-08-06 | End: 2025-08-06

## 2025-08-06 RX ORDER — DOCUSATE SODIUM 250 MG
250 CAPSULE ORAL 2 TIMES DAILY PRN
Qty: 90 CAPSULE | Refills: 0 | Status: SHIPPED | OUTPATIENT
Start: 2025-08-06

## 2025-08-06 RX ORDER — EPHEDRINE SULFATE 50 MG/ML
INJECTION INTRAVENOUS AS NEEDED
Status: DISCONTINUED | OUTPATIENT
Start: 2025-08-06 | End: 2025-08-06 | Stop reason: SURG

## 2025-08-06 RX ORDER — ONDANSETRON 2 MG/ML
INJECTION INTRAMUSCULAR; INTRAVENOUS AS NEEDED
Status: DISCONTINUED | OUTPATIENT
Start: 2025-08-06 | End: 2025-08-06 | Stop reason: SURG

## 2025-08-06 RX ORDER — SODIUM CHLORIDE 9 MG/ML
40 INJECTION, SOLUTION INTRAVENOUS AS NEEDED
Status: DISCONTINUED | OUTPATIENT
Start: 2025-08-06 | End: 2025-08-06 | Stop reason: HOSPADM

## 2025-08-06 RX ORDER — FLUMAZENIL 0.1 MG/ML
0.2 INJECTION INTRAVENOUS AS NEEDED
Status: DISCONTINUED | OUTPATIENT
Start: 2025-08-06 | End: 2025-08-06 | Stop reason: HOSPADM

## 2025-08-06 RX ORDER — SODIUM CHLORIDE, SODIUM LACTATE, POTASSIUM CHLORIDE, CALCIUM CHLORIDE 600; 310; 30; 20 MG/100ML; MG/100ML; MG/100ML; MG/100ML
100 INJECTION, SOLUTION INTRAVENOUS CONTINUOUS
Status: DISCONTINUED | OUTPATIENT
Start: 2025-08-06 | End: 2025-08-06 | Stop reason: HOSPADM

## 2025-08-06 RX ADMIN — SODIUM CHLORIDE, POTASSIUM CHLORIDE, SODIUM LACTATE AND CALCIUM CHLORIDE 75 ML/HR: 600; 310; 30; 20 INJECTION, SOLUTION INTRAVENOUS at 06:58

## 2025-08-06 RX ADMIN — CEFAZOLIN 2000 MG: 2 INJECTION, POWDER, FOR SOLUTION INTRAMUSCULAR; INTRAVENOUS at 09:07

## 2025-08-06 RX ADMIN — ONDANSETRON 4 MG: 2 INJECTION INTRAMUSCULAR; INTRAVENOUS at 09:36

## 2025-08-06 RX ADMIN — HYDROMORPHONE HYDROCHLORIDE 1 MG: 1 INJECTION, SOLUTION INTRAMUSCULAR; INTRAVENOUS; SUBCUTANEOUS at 10:17

## 2025-08-06 RX ADMIN — SUGAMMADEX 200 MG: 100 INJECTION, SOLUTION INTRAVENOUS at 10:10

## 2025-08-06 RX ADMIN — FENTANYL CITRATE 100 MCG: 50 INJECTION, SOLUTION INTRAMUSCULAR; INTRAVENOUS at 09:00

## 2025-08-06 RX ADMIN — ROCURONIUM BROMIDE 80 MG: 10 INJECTION, SOLUTION INTRAVENOUS at 09:01

## 2025-08-06 RX ADMIN — EPHEDRINE SULFATE 10 MG: 50 INJECTION INTRAVENOUS at 09:08

## 2025-08-06 RX ADMIN — PROPOFOL 200 MG: 10 INJECTION, EMULSION INTRAVENOUS at 09:00

## 2025-08-06 RX ADMIN — MIDAZOLAM HYDROCHLORIDE 1 MG: 1 INJECTION, SOLUTION INTRAMUSCULAR; INTRAVENOUS at 08:00

## 2025-08-06 RX ADMIN — ACETAMINOPHEN 1000 MG: 500 TABLET, FILM COATED ORAL at 08:00

## 2025-08-06 RX ADMIN — ONDANSETRON 4 MG: 2 INJECTION INTRAMUSCULAR; INTRAVENOUS at 10:58

## 2025-08-06 RX ADMIN — LIDOCAINE HYDROCHLORIDE 1 EACH: 40 SOLUTION TOPICAL at 09:02

## 2025-08-06 RX ADMIN — LIDOCAINE HYDROCHLORIDE 100 MG: 20 INJECTION, SOLUTION EPIDURAL; INFILTRATION; INTRACAUDAL; PERINEURAL at 09:00

## 2025-08-06 RX ADMIN — GLYCOPYRROLATE 0.2 MG: 0.2 INJECTION INTRAMUSCULAR; INTRAVENOUS at 09:05

## 2025-08-11 ENCOUNTER — TELEPHONE (OUTPATIENT)
Dept: SURGERY | Facility: CLINIC | Age: 50
End: 2025-08-11
Payer: COMMERCIAL

## 2025-08-11 LAB
LAB AP CASE REPORT: NORMAL
LAB AP CLINICAL INFORMATION: NORMAL
Lab: NORMAL
Lab: NORMAL
PATH REPORT.FINAL DX SPEC: NORMAL
PATH REPORT.GROSS SPEC: NORMAL

## 2025-08-12 ENCOUNTER — OFFICE VISIT (OUTPATIENT)
Dept: INTERNAL MEDICINE | Facility: CLINIC | Age: 50
End: 2025-08-12
Payer: COMMERCIAL

## 2025-08-12 VITALS
HEART RATE: 64 BPM | HEIGHT: 67 IN | DIASTOLIC BLOOD PRESSURE: 92 MMHG | WEIGHT: 230.4 LBS | RESPIRATION RATE: 16 BRPM | OXYGEN SATURATION: 97 % | SYSTOLIC BLOOD PRESSURE: 148 MMHG | BODY MASS INDEX: 36.16 KG/M2

## 2025-08-12 DIAGNOSIS — F32.9 MAJOR DEPRESSIVE DISORDER WITH CURRENT ACTIVE EPISODE, UNSPECIFIED DEPRESSION EPISODE SEVERITY, UNSPECIFIED WHETHER RECURRENT: ICD-10-CM

## 2025-08-12 DIAGNOSIS — I10 ESSENTIAL HYPERTENSION: ICD-10-CM

## 2025-08-12 DIAGNOSIS — Z90.49 S/P LAPAROSCOPIC APPENDECTOMY: Primary | ICD-10-CM

## 2025-08-12 RX ORDER — GABAPENTIN 300 MG/1
1 CAPSULE ORAL 3 TIMES DAILY
COMMUNITY
Start: 2025-07-30

## 2025-08-12 RX ORDER — AMLODIPINE BESYLATE 2.5 MG/1
2.5 TABLET ORAL DAILY
Qty: 90 TABLET | Refills: 1 | Status: SHIPPED | OUTPATIENT
Start: 2025-08-12

## 2025-08-18 DIAGNOSIS — F32.9 MAJOR DEPRESSIVE DISORDER WITH CURRENT ACTIVE EPISODE, UNSPECIFIED DEPRESSION EPISODE SEVERITY, UNSPECIFIED WHETHER RECURRENT: Primary | ICD-10-CM

## 2025-08-21 ENCOUNTER — OFFICE VISIT (OUTPATIENT)
Dept: SURGERY | Facility: CLINIC | Age: 50
End: 2025-08-21
Payer: COMMERCIAL

## 2025-08-21 VITALS
SYSTOLIC BLOOD PRESSURE: 152 MMHG | BODY MASS INDEX: 36.1 KG/M2 | HEIGHT: 67 IN | WEIGHT: 230 LBS | DIASTOLIC BLOOD PRESSURE: 100 MMHG

## 2025-08-21 DIAGNOSIS — K37 APPENDICITIS, UNSPECIFIED APPENDICITIS TYPE: ICD-10-CM

## 2025-08-21 DIAGNOSIS — D12.1 ADENOMATOUS POLYP OF APPENDIX: Primary | ICD-10-CM

## 2025-08-21 PROCEDURE — 99024 POSTOP FOLLOW-UP VISIT: CPT | Performed by: STUDENT IN AN ORGANIZED HEALTH CARE EDUCATION/TRAINING PROGRAM

## (undated) DEVICE — SUT NLY 2/0 664G

## (undated) DEVICE — ELECTRD BLD EDGE/STD 1P 2.4X6.35MM STRL

## (undated) DEVICE — TISSUE RETRIEVAL SYSTEM: Brand: INZII RETRIEVAL SYSTEM

## (undated) DEVICE — DAVINCI: Brand: MEDLINE INDUSTRIES, INC.

## (undated) DEVICE — ELECTRD BLD EZ CLN MOD XLNG 2.75IN

## (undated) DEVICE — BNDG ELAS CO-FLEX SLF ADHR 4IN5YD LF STRL

## (undated) DEVICE — BNDG ELAS MATRX V/CLS 6INX10YD LF

## (undated) DEVICE — BLD SCLPL RIBBACK C/SS SZ15

## (undated) DEVICE — TRY PREP SCRB VAG

## (undated) DEVICE — DRP SURG U/BUTT W/PCH PRT STRL

## (undated) DEVICE — CUFF,BP,DISP,1 TUBE,ADULT,HP: Brand: MEDLINE

## (undated) DEVICE — 8MM STAPLER: Brand: SUREFORM

## (undated) DEVICE — SUT MNCRYL 4/0 PS2 27IN UD MCP426H

## (undated) DEVICE — BLADELESS OBTURATOR: Brand: WECK VISTA

## (undated) DEVICE — PK POSTN TRENGUARD450 PROC

## (undated) DEVICE — SEAL

## (undated) DEVICE — GLV SURG SENSICARE PI ORTHO SZ8 LF STRL

## (undated) DEVICE — VESSEL SEALER EXTEND: Brand: ENDOWRIST

## (undated) DEVICE — CUFF TOURNI 1BLADDER 1PRT 4X18IN RED

## (undated) DEVICE — SWAB CULT COL W AMIES GEL

## (undated) DEVICE — SPNG GZ STRL 2S 4X4 16PLY

## (undated) DEVICE — TRY BASN BASIC SGL 32OZ CLR

## (undated) DEVICE — ARM DRAPE

## (undated) DEVICE — NDL HYPO PRECISIONGLIDE REG 21G 1 1/2

## (undated) DEVICE — DEFENDO AIR WATER SUCTION AND BIOPSY VALVE KIT FOR  OLYMPUS: Brand: DEFENDO AIR/WATER/SUCTION AND BIOPSY VALVE

## (undated) DEVICE — MICRO HVTSA, 0.5G AND HVTSA SOURCEMARK PRODUCT CODE M1206 AND M1206-01: Brand: EXOFIN MICRO HVTSA, 0.5G

## (undated) DEVICE — SOL IRR NACL 0.9PCT 3000ML

## (undated) DEVICE — Device

## (undated) DEVICE — SYR LL TP 10ML STRL

## (undated) DEVICE — TBG INSUFFLATION LUER LOCK: Brand: MEDLINE INDUSTRIES, INC.

## (undated) DEVICE — ARGYLE YANKAUER BULB TIP WITH VENT: Brand: ARGYLE

## (undated) DEVICE — MASK,OXYGEN,MED CONC,ADLT,7' TUB, UC: Brand: PENDING

## (undated) DEVICE — SENSR O2 OXIMAX FNGR A/ 18IN NONSTR

## (undated) DEVICE — KT CLN CLEANOR SCPE

## (undated) DEVICE — SNAR POLYP CAPTIVATOR RND STFF 2.4 240CM 10MM 1P/U

## (undated) DEVICE — SUT VIC 0 UR6 27IN VCP603H

## (undated) DEVICE — THE SINGLE USE ETRAP – POLYP TRAP IS USED FOR SUCTION RETRIEVAL OF ENDOSCOPICALLY REMOVED POLYPS.: Brand: ETRAP

## (undated) DEVICE — PENCL E/S HNDSWCH ROCKR CB

## (undated) DEVICE — GLV SURG SENSICARE W/ALOE PF LF 7.5 STRL

## (undated) DEVICE — SWAB CULT CULTURESWAB PLUS TP/MINI

## (undated) DEVICE — DRSNG WND GZ CURAD OIL EMULSION 3X8IN LF STRL 1PK

## (undated) DEVICE — STRIP,CLOSURE,WOUND,MEDI-STRIP,1/2X4: Brand: MEDLINE

## (undated) DEVICE — BNDG ELAS CO-FLEX SLF ADHR 6IN 5YD LF STRL

## (undated) DEVICE — PATIENT RETURN ELECTRODE, SINGLE-USE, CONTACT QUALITY MONITORING, ADULT, WITH 9FT CORD, FOR PATIENTS WEIGING OVER 33LBS. (15KG): Brand: MEGADYNE

## (undated) DEVICE — THE CHANNEL CLEANING BRUSH IS A NYLON FLEXI BRUSH ATTACHED TO A FLEXIBLE PLASTIC SHEATH DESIGNED TO SAFELY REMOVE DEBRIS FROM FLEXIBLE ENDOSCOPES.

## (undated) DEVICE — SYR LUERLOK 30CC

## (undated) DEVICE — PK EXTREM CUST HAR

## (undated) DEVICE — BNDG RL GZ BULKEE2 4.5IN 4.1YD STRL

## (undated) DEVICE — DRSNG PAD ABD 8X10IN STRL

## (undated) DEVICE — GLV SURG SENSICARE PI LF PF 7.5 GRN STRL